# Patient Record
Sex: MALE | Race: WHITE | NOT HISPANIC OR LATINO | Employment: FULL TIME | URBAN - METROPOLITAN AREA
[De-identification: names, ages, dates, MRNs, and addresses within clinical notes are randomized per-mention and may not be internally consistent; named-entity substitution may affect disease eponyms.]

---

## 2017-01-09 ENCOUNTER — MEDICAL CORRESPONDENCE (OUTPATIENT)
Dept: TRANSPLANT | Facility: CLINIC | Age: 22
End: 2017-01-09

## 2017-01-09 ENCOUNTER — TELEPHONE (OUTPATIENT)
Dept: TRANSPLANT | Facility: CLINIC | Age: 22
End: 2017-01-09

## 2017-01-09 NOTE — TELEPHONE ENCOUNTER
Patient left message that he has new insurance information.  Return call to patient directing him to call PFR with new information.  Patient stated understanding and states he has PFR name and contact number

## 2017-01-23 ENCOUNTER — RESULTS ONLY (OUTPATIENT)
Dept: OTHER | Facility: CLINIC | Age: 22
End: 2017-01-23

## 2017-01-27 ENCOUNTER — RESULTS ONLY (OUTPATIENT)
Dept: OTHER | Facility: CLINIC | Age: 22
End: 2017-01-27

## 2017-01-27 LAB — CROSSMATCH RESULT: NORMAL

## 2017-02-01 LAB — CROSSMATCH RESULT: NORMAL

## 2017-04-03 ENCOUNTER — RESULTS ONLY (OUTPATIENT)
Dept: OTHER | Facility: CLINIC | Age: 22
End: 2017-04-03

## 2017-04-03 DIAGNOSIS — Z76.82 AWAITING ORGAN TRANSPLANT: ICD-10-CM

## 2017-04-03 PROCEDURE — 86832 HLA CLASS I HIGH DEFIN QUAL: CPT | Performed by: INTERNAL MEDICINE

## 2017-04-03 PROCEDURE — 86833 HLA CLASS II HIGH DEFIN QUAL: CPT | Performed by: INTERNAL MEDICINE

## 2017-04-06 LAB — PRA SINGLE ANTIGEN IGG ANTIBODY: NORMAL

## 2017-04-10 ENCOUNTER — TELEPHONE (OUTPATIENT)
Dept: TRANSPLANT | Facility: CLINIC | Age: 22
End: 2017-04-10

## 2017-04-10 NOTE — TELEPHONE ENCOUNTER
Pt called to see if a date had been set for his transplant. Coordinator will speak with donor coordinator and get back to pt.

## 2017-04-11 ENCOUNTER — TELEPHONE (OUTPATIENT)
Dept: TRANSPLANT | Facility: CLINIC | Age: 22
End: 2017-04-11

## 2017-04-11 NOTE — TELEPHONE ENCOUNTER
Coordinator called pt to go over instructions for upcoming transplant on 6/8/17. Pt will have pre-op appointments and final crossmatch on 6/5/17. Reviewed general components of inpt stay and post-transplant routines, including frequent appointments here as an outpt x1-2 weeks. Dialysis notified of transplant date.  Pt verbalized understanding and had no further questions.

## 2017-04-12 ENCOUNTER — TELEPHONE (OUTPATIENT)
Dept: TRANSPLANT | Facility: CLINIC | Age: 22
End: 2017-04-12

## 2017-04-13 LAB
SA1 CELL: NORMAL
SA1 COMMENTS: NORMAL
SA1 HI RISK ABY: NORMAL
SA1 MOD RISK ABY: NORMAL
SA1 TEST METHOD: NORMAL
SA2 CELL: NORMAL
SA2 COMMENTS: NORMAL
SA2 HI RISK ABY UA: NORMAL
SA2 MOD RISK ABY: NORMAL
SA2 TEST METHOD: NORMAL

## 2017-04-19 DIAGNOSIS — Z76.82 AWAITING ORGAN TRANSPLANT: Primary | ICD-10-CM

## 2017-04-19 DIAGNOSIS — N18.6 ESRD (END STAGE RENAL DISEASE) (H): ICD-10-CM

## 2017-04-24 ENCOUNTER — RECORDS - HEALTHEAST (OUTPATIENT)
Dept: ADMINISTRATIVE | Facility: OTHER | Age: 22
End: 2017-04-24

## 2017-04-25 ENCOUNTER — HOSPITAL ENCOUNTER (OUTPATIENT)
Dept: INTERVENTIONAL RADIOLOGY/VASCULAR | Facility: HOSPITAL | Age: 22
Discharge: HOME | End: 2017-04-25
Attending: INTERNAL MEDICINE

## 2017-04-25 DIAGNOSIS — I87.8 VENOUS PRESSURE INCREASED: ICD-10-CM

## 2017-04-25 DIAGNOSIS — N19 RENAL FAILURE: ICD-10-CM

## 2017-04-25 ASSESSMENT — MIFFLIN-ST. JEOR: SCORE: 2038.59

## 2017-04-26 ENCOUNTER — COMMUNICATION - HEALTHEAST (OUTPATIENT)
Dept: INTERVENTIONAL RADIOLOGY/VASCULAR | Facility: HOSPITAL | Age: 22
End: 2017-04-26

## 2017-05-03 ENCOUNTER — TELEPHONE (OUTPATIENT)
Dept: TRANSPLANT | Facility: CLINIC | Age: 22
End: 2017-05-03

## 2017-05-03 NOTE — TELEPHONE ENCOUNTER
Pt called coordinator to state leave of absence paperwork has been emailed. Pt gave verbal permission to email. Paperwork forwarded to Juliana Mitchell RN to fill out.

## 2017-05-04 ENCOUNTER — TELEPHONE (OUTPATIENT)
Dept: TRANSPLANT | Facility: CLINIC | Age: 22
End: 2017-05-04

## 2017-05-04 NOTE — TELEPHONE ENCOUNTER
Work MEKA paperwork completed for surgery scheduled 6/7/17.  Returned to patient via email, per patient request.

## 2017-06-01 ENCOUNTER — RESULTS ONLY (OUTPATIENT)
Dept: OTHER | Facility: CLINIC | Age: 22
End: 2017-06-01

## 2017-06-01 ENCOUNTER — OFFICE VISIT (OUTPATIENT)
Dept: TRANSPLANT | Facility: CLINIC | Age: 22
End: 2017-06-01
Attending: TRANSPLANT SURGERY
Payer: COMMERCIAL

## 2017-06-01 ENCOUNTER — RECORDS - HEALTHEAST (OUTPATIENT)
Dept: ADMINISTRATIVE | Facility: OTHER | Age: 22
End: 2017-06-01

## 2017-06-01 ENCOUNTER — OFFICE VISIT (OUTPATIENT)
Dept: NEPHROLOGY | Facility: CLINIC | Age: 22
End: 2017-06-01
Attending: INTERNAL MEDICINE
Payer: COMMERCIAL

## 2017-06-01 VITALS
OXYGEN SATURATION: 98 % | WEIGHT: 222.8 LBS | SYSTOLIC BLOOD PRESSURE: 137 MMHG | HEART RATE: 88 BPM | BODY MASS INDEX: 28.59 KG/M2 | RESPIRATION RATE: 20 BRPM | DIASTOLIC BLOOD PRESSURE: 91 MMHG | HEIGHT: 74 IN | TEMPERATURE: 98 F

## 2017-06-01 DIAGNOSIS — N18.6 ESRD (END STAGE RENAL DISEASE) (H): Primary | ICD-10-CM

## 2017-06-01 DIAGNOSIS — N18.6 ESRD (END STAGE RENAL DISEASE) (H): ICD-10-CM

## 2017-06-01 DIAGNOSIS — N02.B9 IGA NEPHROPATHY: ICD-10-CM

## 2017-06-01 DIAGNOSIS — Z76.82 AWAITING ORGAN TRANSPLANT: ICD-10-CM

## 2017-06-01 DIAGNOSIS — D63.1 ANEMIA IN CHRONIC KIDNEY DISEASE: ICD-10-CM

## 2017-06-01 DIAGNOSIS — N18.9 ANEMIA IN CHRONIC KIDNEY DISEASE: ICD-10-CM

## 2017-06-01 DIAGNOSIS — I15.1 HYPERTENSION SECONDARY TO OTHER RENAL DISORDERS: ICD-10-CM

## 2017-06-01 DIAGNOSIS — Z01.818 PRE-TRANSPLANT EVALUATION FOR END STAGE RENAL DISEASE: Primary | ICD-10-CM

## 2017-06-01 DIAGNOSIS — N18.6 END STAGE KIDNEY DISEASE (H): ICD-10-CM

## 2017-06-01 LAB
ALBUMIN SERPL-MCNC: 4 G/DL (ref 3.4–5)
ALBUMIN UR-MCNC: >499 MG/DL
ALP SERPL-CCNC: 108 U/L (ref 40–150)
ALT SERPL W P-5'-P-CCNC: 28 U/L (ref 0–70)
ANION GAP SERPL CALCULATED.3IONS-SCNC: 8 MMOL/L (ref 3–14)
APPEARANCE UR: CLEAR
AST SERPL W P-5'-P-CCNC: 17 U/L (ref 0–45)
BILIRUB SERPL-MCNC: 0.6 MG/DL (ref 0.2–1.3)
BILIRUB UR QL STRIP: NEGATIVE
BUN SERPL-MCNC: 27 MG/DL (ref 7–30)
CALCIUM SERPL-MCNC: 9.4 MG/DL (ref 8.5–10.1)
CHLORIDE SERPL-SCNC: 102 MMOL/L (ref 94–109)
CO2 SERPL-SCNC: 29 MMOL/L (ref 20–32)
COLOR UR AUTO: YELLOW
CREAT SERPL-MCNC: 5.82 MG/DL (ref 0.66–1.25)
ERYTHROCYTE [DISTWIDTH] IN BLOOD BY AUTOMATED COUNT: 15 % (ref 10–15)
FERRITIN SERPL-MCNC: 310 NG/ML (ref 26–388)
GFR SERPL CREATININE-BSD FRML MDRD: 12 ML/MIN/1.7M2
GLUCOSE SERPL-MCNC: 69 MG/DL (ref 70–99)
GLUCOSE UR STRIP-MCNC: NEGATIVE MG/DL
HCT VFR BLD AUTO: 41.3 % (ref 40–53)
HGB BLD-MCNC: 13.1 G/DL (ref 13.3–17.7)
HGB UR QL STRIP: ABNORMAL
HYALINE CASTS #/AREA URNS LPF: 1 /LPF (ref 0–2)
IRON SATN MFR SERPL: 21 % (ref 15–46)
IRON SERPL-MCNC: 65 UG/DL (ref 35–180)
KETONES UR STRIP-MCNC: NEGATIVE MG/DL
LEUKOCYTE ESTERASE UR QL STRIP: NEGATIVE
MCH RBC QN AUTO: 30.1 PG (ref 26.5–33)
MCHC RBC AUTO-ENTMCNC: 31.7 G/DL (ref 31.5–36.5)
MCV RBC AUTO: 95 FL (ref 78–100)
NITRATE UR QL: NEGATIVE
PH UR STRIP: 8 PH (ref 5–7)
PLATELET # BLD AUTO: 354 10E9/L (ref 150–450)
POTASSIUM SERPL-SCNC: 5.2 MMOL/L (ref 3.4–5.3)
PROT SERPL-MCNC: 8.2 G/DL (ref 6.8–8.8)
PTH-INTACT SERPL-MCNC: 367 PG/ML (ref 12–72)
RBC # BLD AUTO: 4.35 10E12/L (ref 4.4–5.9)
RBC #/AREA URNS AUTO: 10 /HPF (ref 0–2)
SODIUM SERPL-SCNC: 139 MMOL/L (ref 133–144)
SP GR UR STRIP: 1.01 (ref 1–1.03)
SQUAMOUS #/AREA URNS AUTO: <1 /HPF (ref 0–1)
TIBC SERPL-MCNC: 304 UG/DL (ref 240–430)
URN SPEC COLLECT METH UR: ABNORMAL
UROBILINOGEN UR STRIP-MCNC: 0 MG/DL (ref 0–2)
WBC # BLD AUTO: 5.3 10E9/L (ref 4–11)
WBC #/AREA URNS AUTO: 1 /HPF (ref 0–2)

## 2017-06-01 PROCEDURE — 86825 HLA X-MATH NON-CYTOTOXIC: CPT | Performed by: INTERNAL MEDICINE

## 2017-06-01 PROCEDURE — 36415 COLL VENOUS BLD VENIPUNCTURE: CPT | Performed by: TRANSPLANT SURGERY

## 2017-06-01 PROCEDURE — 86923 COMPATIBILITY TEST ELECTRIC: CPT | Performed by: TRANSPLANT SURGERY

## 2017-06-01 PROCEDURE — 86832 HLA CLASS I HIGH DEFIN QUAL: CPT | Performed by: INTERNAL MEDICINE

## 2017-06-01 PROCEDURE — 87389 HIV-1 AG W/HIV-1&-2 AB AG IA: CPT | Performed by: TRANSPLANT SURGERY

## 2017-06-01 PROCEDURE — 86645 CMV ANTIBODY IGM: CPT | Performed by: TRANSPLANT SURGERY

## 2017-06-01 PROCEDURE — 83540 ASSAY OF IRON: CPT | Performed by: TRANSPLANT SURGERY

## 2017-06-01 PROCEDURE — 86833 HLA CLASS II HIGH DEFIN QUAL: CPT | Performed by: INTERNAL MEDICINE

## 2017-06-01 PROCEDURE — 85027 COMPLETE CBC AUTOMATED: CPT | Performed by: TRANSPLANT SURGERY

## 2017-06-01 PROCEDURE — 81001 URINALYSIS AUTO W/SCOPE: CPT | Performed by: TRANSPLANT SURGERY

## 2017-06-01 PROCEDURE — 80053 COMPREHEN METABOLIC PANEL: CPT | Performed by: TRANSPLANT SURGERY

## 2017-06-01 PROCEDURE — 82728 ASSAY OF FERRITIN: CPT | Performed by: TRANSPLANT SURGERY

## 2017-06-01 PROCEDURE — 83970 ASSAY OF PARATHORMONE: CPT | Performed by: TRANSPLANT SURGERY

## 2017-06-01 PROCEDURE — 87340 HEPATITIS B SURFACE AG IA: CPT | Performed by: TRANSPLANT SURGERY

## 2017-06-01 PROCEDURE — 86850 RBC ANTIBODY SCREEN: CPT | Performed by: TRANSPLANT SURGERY

## 2017-06-01 PROCEDURE — 86704 HEP B CORE ANTIBODY TOTAL: CPT | Performed by: TRANSPLANT SURGERY

## 2017-06-01 PROCEDURE — 82306 VITAMIN D 25 HYDROXY: CPT | Performed by: TRANSPLANT SURGERY

## 2017-06-01 PROCEDURE — 86665 EPSTEIN-BARR CAPSID VCA: CPT | Performed by: TRANSPLANT SURGERY

## 2017-06-01 PROCEDURE — 86706 HEP B SURFACE ANTIBODY: CPT | Performed by: TRANSPLANT SURGERY

## 2017-06-01 PROCEDURE — 86644 CMV ANTIBODY: CPT | Performed by: TRANSPLANT SURGERY

## 2017-06-01 PROCEDURE — 86900 BLOOD TYPING SEROLOGIC ABO: CPT | Performed by: TRANSPLANT SURGERY

## 2017-06-01 PROCEDURE — 87086 URINE CULTURE/COLONY COUNT: CPT | Performed by: TRANSPLANT SURGERY

## 2017-06-01 PROCEDURE — 83550 IRON BINDING TEST: CPT | Performed by: TRANSPLANT SURGERY

## 2017-06-01 PROCEDURE — 86901 BLOOD TYPING SEROLOGIC RH(D): CPT | Performed by: TRANSPLANT SURGERY

## 2017-06-01 PROCEDURE — 86803 HEPATITIS C AB TEST: CPT | Performed by: TRANSPLANT SURGERY

## 2017-06-01 ASSESSMENT — PAIN SCALES - GENERAL: PAINLEVEL: NO PAIN (0)

## 2017-06-01 NOTE — MR AVS SNAPSHOT
After Visit Summary   6/1/2017    Golden Stover    MRN: [id number]           Patient Information     Date Of Birth          1995        Visit Information        Provider Department      6/1/2017 2:00 PM Amelia Platt NP [facility] Solid Organ Transplant        Today's Diagnoses     ESRD (end stage renal disease) (H)    -  1       Follow-ups after your visit        Your next 10 appointments already scheduled     Jun 07, 2017   Procedure with Martell Robert MD   [facility], [place], Same Day Surgery (--)    [address] [facility] [address]   [phone]            Jun 29, 2017  9:00 AM CDT   (Arrive by 8:45 AM)   Kidney Post Op with Martell Robert MD   [facility] Solid Organ Transplant ([facility])    [address] [place] [id number] [phone]            Jul 11, 2017  1:05 PM CDT   (Arrive by 12:35 PM)   Return Kidney Transplant with Jak King MD   [facility] Nephrology ([facility])    [address] [place] [id number] [phone]            Sep 11, 2017  1:05 PM CDT   (Arrive by 12:35 PM)   Return Kidney Transplant with Jak King MD   [facility] Nephrology ([facility])    [address] [place] [id number] [phone]              Who to contact     If you have questions or need follow up information about today's clinic visit or your schedule please contact [facility] SOLID ORGAN TRANSPLANT directly at [phone].  Normal or non-critical lab and imaging results will be communicated to you by MyChart, letter or phone within 4 business days after the clinic has received the results. If you do not hear from us within 7 days, please contact the clinic through MyChart or phone. If you have a critical or abnormal lab result, we will notify you by phone as soon as possible.  Submit refill requests through  Locally or call your pharmacy and they will forward the refill request to us. Please allow 3 business days for your refill to be completed.          Additional Information About Your Visit        Local [facility] Information     Locally gives you secure access to your electronic health record. If you see a primary care provider, you can also send messages to your care team and make appointments. If you have questions, please call your primary care clinic.  If you do not have a primary care provider, please call [phone] and they will assist you.        Care EveryWhere ID     This is your Care EveryWhere ID. This could be used by other organizations to access your [facility] medical records  [id number]         Blood Pressure from Last 3 Encounters:   06/01/17 (!) 137/91   12/07/16 (!) 147/93    Weight from Last 3 Encounters:   06/01/17 101.1 kg (222 lb 12.8 oz)   12/07/16 104.5 kg (230 lb 4.8 oz)              Today, you had the following     No orders found for display         Today's Medication Changes          These changes are accurate as of: 6/1/17 11:59 PM.  If you have any questions, ask your nurse or doctor.               Stop taking these medicines if you haven't already. Please contact your care team if you have questions.     RENVELA 800 MG tablet   Generic drug:  sevelamer   Stopped by:  Nurse, [facility]                    Primary Care Provider    None Specified       No primary provider on file.        Thank you!     Thank you for choosing [facility] SOLID ORGAN TRANSPLANT  for your care. Our goal is always to provide you with excellent care. Hearing back from our patients is one way we can continue to improve our services. Please take a few minutes to complete the written survey that you may receive in the mail after your visit with us. Thank you!             Your Updated Medication List - Protect others around you: Learn how to safely use, store and throw away your medicines at www.disposemymeds.org.           This list is accurate as of: 6/1/17 11:59 PM.  Always use your most recent med list.                   Brand Name Dispense Instructions for use    calcium acetate 667 MG Caps capsule    PHOSLO     Take 2,001 mg by mouth 3 times daily (with meals)       lisinopril 10 MG tablet    PRINIVIL/ZESTRIL     10 mg daily       NIFEdipine ER 90 MG Tb24    ADALAT CC     90 mg daily       SUPER B COMPLEX/C PO      1 tablet daily       Vitamin D3 400 UNITS Caps      1 capsule daily       VITAMIN E MTC PO      Take 400 Units by mouth daily

## 2017-06-01 NOTE — LETTER
6/1/2017       RE: Golden Stover  [address] [place] [id number]     Dear Colleague,    Thank you for referring your patient, Golden Stover, to the [facility] SOLID ORGAN TRANSPLANT at [facility]. Please see a copy of my visit note below.    Transplant Surgery H&P:                           HPI:      Mr. Stover is a 21 year old  Male who comes to clinic today for preop prior to planned living donor kidney transplantation. The patient was previously reviewed by the multidisciplinary selection committee and found to be medically and psychosocially appropriate for kidney transplantation. Mr. Stover has End stage renal failure due to IgA Nephropathy.     The patient is on dialysis.      If on dialysis, modality: chest port  Dialysis days: Tuesday, Thursday, Saturday    Chronic anticoagulation  [x]      [] Indication:   Recurrent infections  [x]      []  Type:                  Bladder dysfunction  [x]      [] Cause:  Claudication   [x]      [] Distance:    Previous Amputation  [x]      [] Cause:       Health events since transplant evaluation: None    Special considerations:  PONV: no  Hinduism: No    MEDICAL HISTORY:      Patient Active Problem List    Diagnosis Date Noted     Secondary renal hyperparathyroidism (H)      Priority: Medium     IgA nephropathy 11/21/2016     Priority: Medium     Hypertension 11/21/2016     Priority: Medium     Anemia in chronic kidney disease 11/21/2016     Priority: Medium     End stage kidney disease (H) 11/21/2016     Priority: Medium      Past Medical History:   Diagnosis Date     Anemia in chronic kidney disease 11/21/2016     End stage kidney disease (H) 11/21/2016     Hypertension 11/21/2016     IgA nephropathy 11/21/2016     Rhabdomyolysis 09/28/2016     Secondary renal hyperparathyroidism (H)      Past Surgical History:   Procedure Laterality Date     BIOPSY KIDNEY  2016     Current Outpatient Prescriptions    Medication Sig Dispense Refill     lisinopril (PRINIVIL/ZESTRIL) 10 MG tablet 10 mg daily        NIFEdipine ER (ADALAT CC) 90 MG TB24 90 mg daily        sevelamer (RENVELA) 800 MG tablet 800 mg 3 times daily (with meals) Take 1 tablet with snacks.       SUPER B COMPLEX/C PO 1 tablet daily        Cholecalciferol (VITAMIN D3) 400 UNITS CAPS 1 capsule daily        OTC products: None, except as noted above  No Known Allergies   Social History   Substance Use Topics     Smoking status: Never Smoker     Smokeless tobacco: Not on file     Alcohol use 0.6 oz/week     1 Standard drinks or equivalent per week     OR  Social History     Social History     Marital status: Single     Spouse name: N/A     Number of children: N/A     Years of education: N/A     Occupational History     Not on file.     Social History Main Topics     Smoking status: Never Smoker     Smokeless tobacco: Not on file     Alcohol use 0.6 oz/week     1 Standard drinks or equivalent per week     Drug use: No     Sexual activity: Not on file     Other Topics Concern     Not on file     Social History Narrative     History   Drug Use No     Family History   Problem Relation Age of Onset     CANCER Paternal Grandfather      CANCER Maternal Grandfather        REVIEW OF SYSTEMS:        C: NEGATIVE for fever, chills, change in weight  I: NEGATIVE for worrisome rashes, moles or lesions  E: NEGATIVE for vision changes or irritation  E/M: NEGATIVE for ear, mouth and throat problems  R: NEGATIVE for significant cough or SOB  B: NEGATIVE for masses, tenderness or discharge  CV: NEGATIVE for chest pain, palpitations or peripheral edema  GI: NEGATIVE for nausea, abdominal pain, heartburn, or change in bowel habits  : NEGATIVE for frequency, dysuria, or hematuria  M: NEGATIVE for significant arthralgias or myalgia  NEURO: POSITIVE for numbness or tingling bilateral lower extremities and NEGATIVE for HX headaches-migraine, HX headaches-musculoskelatal, HX CVA and HX  TIA  E: NEGATIVE for temperature intolerance, skin/hair changes  H: NEGATIVE for bleeding problems  P: NEGATIVE for changes in mood or affect    EXAM:                            GENERAL APPEARANCE: healthy, alert and no distress     EYES: EOMI,  PERRL     HENT: ear canals and TM's normal and nose and mouth without ulcers or lesions     NECK: no adenopathy, no asymmetry, masses, or scars and thyroid normal to palpation     RESP: lungs clear to auscultation - no rales, rhonchi or wheezes     CV: regular rates and rhythm, normal S1 S2, no S3 or S4 and no murmur, click or rub     ABDOMEN:  soft, nontender, no HSM or masses and bowel sounds normal     MS: extremities normal- no gross deformities noted, no evidence of inflammation in joints, FROM in all extremities.     SKIN: no suspicious lesions or rashes     NEURO: Normal strength and tone, sensory exam grossly normal, mentation intact and speech normal     PSYCH: mentation appears normal. and affect normal/bright     LYMPHATICS: No axillary, cervical, or supraclavicular nodes        DIAGNOSTICS:              No results found for this or any previous visit (from the past 672 hour(s)).  [facility] cPRA   Date Value Ref Range Status   12/13/2016 60  Final     OR   EKG: appears normal, NSR, normal axis, normal intervals, no acute ST/T changes c/w ischemia, no LVH by voltage criteria, unchanged from previous tracings  Chest XRay  Labs Resulted Today:   Results for orders placed or performed in visit on 06/01/17   CBC with platelets   Result Value Ref Range    WBC 5.3 4.0 - 11.0 10e9/L    RBC Count 4.35 (L) 4.4 - 5.9 10e12/L    Hemoglobin 13.1 (L) 13.3 - 17.7 g/dL    Hematocrit 41.3 40.0 - 53.0 %    MCV 95 78 - 100 fl    MCH 30.1 26.5 - 33.0 pg    MCHC 31.7 31.5 - 36.5 g/dL    RDW 15.0 10.0 - 15.0 %    Platelet Count 354 150 - 450 10e9/L   Comprehensive metabolic panel   Result Value Ref Range    Sodium 139 133 - 144 mmol/L    Potassium 5.2 3.4 - 5.3 mmol/L    Chloride 102 94 -  109 mmol/L    Carbon Dioxide 29 20 - 32 mmol/L    Anion Gap 8 3 - 14 mmol/L    Glucose 69 (L) 70 - 99 mg/dL    Urea Nitrogen 27 7 - 30 mg/dL    Creatinine 5.82 (H) 0.66 - 1.25 mg/dL    GFR Estimate 12 (L) >60 mL/min/1.7m2    GFR Estimate If Black 15 (L) >60 mL/min/1.7m2    Calcium 9.4 8.5 - 10.1 mg/dL    Bilirubin Total 0.6 0.2 - 1.3 mg/dL    Albumin 4.0 3.4 - 5.0 g/dL    Protein Total 8.2 6.8 - 8.8 g/dL    Alkaline Phosphatase 108 40 - 150 U/L    ALT 28 0 - 70 U/L    AST 17 0 - 45 U/L   Routine UA with microscopic   Result Value Ref Range    Color Urine Yellow     Appearance Urine Clear     Glucose Urine Negative NEG mg/dL    Bilirubin Urine Negative NEG    Ketones Urine Negative NEG mg/dL    Specific Gravity Urine 1.007 1.003 - 1.035    Blood Urine Small (A) NEG    pH Urine 8.0 (H) 5.0 - 7.0 pH    Protein Albumin Urine >499 (A) NEG mg/dL    Urobilinogen mg/dL 0.0 0.0 - 2.0 mg/dL    Nitrite Urine Negative NEG    Leukocyte Esterase Urine Negative NEG    Source Midstream Urine     WBC Urine 1 0 - 2 /HPF    RBC Urine 10 (H) 0 - 2 /HPF    Squamous Epithelial /HPF Urine <1 0 - 1 /HPF    Hyaline Casts 1 0 - 2 /LPF   Iron and iron binding capacity   Result Value Ref Range    Iron 65 35 - 180 ug/dL    Iron Binding Cap 304 240 - 430 ug/dL    Iron Saturation Index 21 15 - 46 %   Ferritin   Result Value Ref Range    Ferritin 310 26 - 388 ng/mL   ABO/Rh type and screen   Result Value Ref Range    ABO Pending     RH(D) Pending     Antibody Screen Pending     Test Valid Only At       [facility]    Specimen Expires 06/04/2017    Urine Culture Aerobic Bacterial   Result Value Ref Range    Specimen Description Midstream Urine     Special Requests Specimen received in preservative     Culture Micro Pending     Micro Report Status Pending      Labs Drawn and in Process:   Unresulted Labs Ordered in the Past 30 Days of this Admission     Date and Time Order Name Status Description     6/1/2017 1239 VITAMIN D DEFICIENCY SCREENING In process     6/1/2017 1239 PARATHYROID HORMONE INTACT In process     6/1/2017 1239 URINE CULTURE AEROBIC BACTERIAL Preliminary     6/1/2017 1239 ABO/RH TYPE AND SCREEN In process     6/1/2017 1239 HLA FINAL CROSSMATCH RECIPIENT In process     6/1/2017 1239 HEPATITIS C ANTIBODY In process     6/1/2017 1239 HEPATITIS B CORE ANTIBODY In process     6/1/2017 1239 HEPATITIS B SURFACE ANTIGEN In process     6/1/2017 1239 HEPATITIS B SURFACE ANTIBODY In process     6/1/2017 1239 HIV ANTIGEN ANTIBODY COMBO In process     6/1/2017 1239 EBV CAPSID ANTIBODY IGM In process     6/1/2017 1239 EBV CAPSID ANTIBODY IGG In process     6/1/2017 1239 CMV ANTIBODY IGM In process     6/1/2017 1239 CMV ANTIBODY IGG In process         Recent Labs   Lab Test  12/07/16   0637   HGB  12.4*   PLT  434   INR  0.89   NA  139   POTASSIUM  4.1   CR  6.21*     ASSESSMENT:                 Mr. Stover is a 21 year old male who is appropriate for elective living donor kidney transplantation with the following pertinent issues:    1. Labs reviewed. Findings requiring additional evaluation before surgery: No  2. EKG (6/01/2017): appears normal, NSR  3. ECHO NA  4. ABO= O  5. Paired Exchange case: No  6. Outstanding issues: ABO, cross match , xray    PLAN:                 1. Consent: Done  2. Outstanding issues: ABO, cross match, x-ray    Signed Electronically by: Amelia Platt CNP       Again, thank you for allowing me to participate in the care of your patient.      Sincerely,    Martell Robert MD

## 2017-06-01 NOTE — LETTER
6/1/2017       RE: Golden Stover  [address] [place] [id number]     Dear Colleague,    Thank you for referring your patient, Golden Stover, to the [facility] SOLID ORGAN TRANSPLANT at [facility]. Please see a copy of my visit note below.    See surgeon note     Again, thank you for allowing me to participate in the care of your patient.      Sincerely,    Amelia Platt NP

## 2017-06-01 NOTE — PROGRESS NOTES
Transplant Surgery H&P:                           HPI:      Mr. Stover is a 21 year old  Male who comes to clinic today for preop prior to planned living donor kidney transplantation. The patient was previously reviewed by the multidisciplinary selection committee and found to be medically and psychosocially appropriate for kidney transplantation. Mr. Stover has End stage renal failure due to IgA Nephropathy.     The patient is on dialysis.      If on dialysis, modality: chest port  Dialysis days: Tuesday, Thursday, Saturday    Chronic anticoagulation  [x]      [] Indication:   Recurrent infections  [x]      []  Type:                  Bladder dysfunction  [x]      [] Cause:  Claudication   [x]      [] Distance:    Previous Amputation  [x]      [] Cause:       Health events since transplant evaluation: None    Special considerations:  PONV: no  Mosque: No    MEDICAL HISTORY:      Patient Active Problem List    Diagnosis Date Noted     Secondary renal hyperparathyroidism (H)      Priority: Medium     IgA nephropathy 11/21/2016     Priority: Medium     Hypertension 11/21/2016     Priority: Medium     Anemia in chronic kidney disease 11/21/2016     Priority: Medium     End stage kidney disease (H) 11/21/2016     Priority: Medium      Past Medical History:   Diagnosis Date     Anemia in chronic kidney disease 11/21/2016     End stage kidney disease (H) 11/21/2016     Hypertension 11/21/2016     IgA nephropathy 11/21/2016     Rhabdomyolysis 09/28/2016     Secondary renal hyperparathyroidism (H)      Past Surgical History:   Procedure Laterality Date     BIOPSY KIDNEY  2016     Current Outpatient Prescriptions   Medication Sig Dispense Refill     lisinopril (PRINIVIL/ZESTRIL) 10 MG tablet 10 mg daily        NIFEdipine ER (ADALAT CC) 90 MG TB24 90 mg daily        sevelamer (RENVELA) 800 MG tablet 800 mg 3 times daily (with meals) Take 1 tablet with snacks.       SUPER B COMPLEX/C PO 1 tablet daily         Cholecalciferol (VITAMIN D3) 400 UNITS CAPS 1 capsule daily        OTC products: None, except as noted above  No Known Allergies   Social History   Substance Use Topics     Smoking status: Never Smoker     Smokeless tobacco: Not on file     Alcohol use 0.6 oz/week     1 Standard drinks or equivalent per week     OR  Social History     Social History     Marital status: Single     Spouse name: N/A     Number of children: N/A     Years of education: N/A     Occupational History     Not on file.     Social History Main Topics     Smoking status: Never Smoker     Smokeless tobacco: Not on file     Alcohol use 0.6 oz/week     1 Standard drinks or equivalent per week     Drug use: No     Sexual activity: Not on file     Other Topics Concern     Not on file     Social History Narrative     History   Drug Use No     Family History   Problem Relation Age of Onset     CANCER Paternal Grandfather      CANCER Maternal Grandfather        REVIEW OF SYSTEMS:        C: NEGATIVE for fever, chills, change in weight  I: NEGATIVE for worrisome rashes, moles or lesions  E: NEGATIVE for vision changes or irritation  E/M: NEGATIVE for ear, mouth and throat problems  R: NEGATIVE for significant cough or SOB  B: NEGATIVE for masses, tenderness or discharge  CV: NEGATIVE for chest pain, palpitations or peripheral edema  GI: NEGATIVE for nausea, abdominal pain, heartburn, or change in bowel habits  : NEGATIVE for frequency, dysuria, or hematuria  M: NEGATIVE for significant arthralgias or myalgia  NEURO: POSITIVE for numbness or tingling bilateral lower extremities and NEGATIVE for HX headaches-migraine, HX headaches-musculoskelatal, HX CVA and HX TIA  E: NEGATIVE for temperature intolerance, skin/hair changes  H: NEGATIVE for bleeding problems  P: NEGATIVE for changes in mood or affect    EXAM:                            GENERAL APPEARANCE: healthy, alert and no distress     EYES: EOMI,  PERRL     HENT: ear canals and TM's normal and nose  and mouth without ulcers or lesions     NECK: no adenopathy, no asymmetry, masses, or scars and thyroid normal to palpation     RESP: lungs clear to auscultation - no rales, rhonchi or wheezes     CV: regular rates and rhythm, normal S1 S2, no S3 or S4 and no murmur, click or rub     ABDOMEN:  soft, nontender, no HSM or masses and bowel sounds normal     MS: extremities normal- no gross deformities noted, no evidence of inflammation in joints, FROM in all extremities.     SKIN: no suspicious lesions or rashes     NEURO: Normal strength and tone, sensory exam grossly normal, mentation intact and speech normal     PSYCH: mentation appears normal. and affect normal/bright     LYMPHATICS: No axillary, cervical, or supraclavicular nodes        DIAGNOSTICS:              No results found for this or any previous visit (from the past 672 hour(s)).  [facility] cPRA   Date Value Ref Range Status   12/13/2016 60  Final     OR   EKG: appears normal, NSR, normal axis, normal intervals, no acute ST/T changes c/w ischemia, no LVH by voltage criteria, unchanged from previous tracings  Chest XRay  Labs Resulted Today:   Results for orders placed or performed in visit on 06/01/17   CBC with platelets   Result Value Ref Range    WBC 5.3 4.0 - 11.0 10e9/L    RBC Count 4.35 (L) 4.4 - 5.9 10e12/L    Hemoglobin 13.1 (L) 13.3 - 17.7 g/dL    Hematocrit 41.3 40.0 - 53.0 %    MCV 95 78 - 100 fl    MCH 30.1 26.5 - 33.0 pg    MCHC 31.7 31.5 - 36.5 g/dL    RDW 15.0 10.0 - 15.0 %    Platelet Count 354 150 - 450 10e9/L   Comprehensive metabolic panel   Result Value Ref Range    Sodium 139 133 - 144 mmol/L    Potassium 5.2 3.4 - 5.3 mmol/L    Chloride 102 94 - 109 mmol/L    Carbon Dioxide 29 20 - 32 mmol/L    Anion Gap 8 3 - 14 mmol/L    Glucose 69 (L) 70 - 99 mg/dL    Urea Nitrogen 27 7 - 30 mg/dL    Creatinine 5.82 (H) 0.66 - 1.25 mg/dL    GFR Estimate 12 (L) >60 mL/min/1.7m2    GFR Estimate If Black 15 (L) >60 mL/min/1.7m2    Calcium 9.4 8.5 - 10.1  mg/dL    Bilirubin Total 0.6 0.2 - 1.3 mg/dL    Albumin 4.0 3.4 - 5.0 g/dL    Protein Total 8.2 6.8 - 8.8 g/dL    Alkaline Phosphatase 108 40 - 150 U/L    ALT 28 0 - 70 U/L    AST 17 0 - 45 U/L   Routine UA with microscopic   Result Value Ref Range    Color Urine Yellow     Appearance Urine Clear     Glucose Urine Negative NEG mg/dL    Bilirubin Urine Negative NEG    Ketones Urine Negative NEG mg/dL    Specific Gravity Urine 1.007 1.003 - 1.035    Blood Urine Small (A) NEG    pH Urine 8.0 (H) 5.0 - 7.0 pH    Protein Albumin Urine >499 (A) NEG mg/dL    Urobilinogen mg/dL 0.0 0.0 - 2.0 mg/dL    Nitrite Urine Negative NEG    Leukocyte Esterase Urine Negative NEG    Source Midstream Urine     WBC Urine 1 0 - 2 /HPF    RBC Urine 10 (H) 0 - 2 /HPF    Squamous Epithelial /HPF Urine <1 0 - 1 /HPF    Hyaline Casts 1 0 - 2 /LPF   Iron and iron binding capacity   Result Value Ref Range    Iron 65 35 - 180 ug/dL    Iron Binding Cap 304 240 - 430 ug/dL    Iron Saturation Index 21 15 - 46 %   Ferritin   Result Value Ref Range    Ferritin 310 26 - 388 ng/mL   ABO/Rh type and screen   Result Value Ref Range    ABO Pending     RH(D) Pending     Antibody Screen Pending     Test Valid Only At       [facility]    Specimen Expires 06/04/2017    Urine Culture Aerobic Bacterial   Result Value Ref Range    Specimen Description Midstream Urine     Special Requests Specimen received in preservative     Culture Micro Pending     Micro Report Status Pending      Labs Drawn and in Process:   Unresulted Labs Ordered in the Past 30 Days of this Admission     Date and Time Order Name Status Description    6/1/2017 1239 VITAMIN D DEFICIENCY SCREENING In process     6/1/2017 1239 PARATHYROID HORMONE INTACT In process     6/1/2017 1239 URINE CULTURE AEROBIC BACTERIAL Preliminary     6/1/2017 1239 ABO/RH TYPE AND SCREEN In process     6/1/2017 1239 HLA FINAL CROSSMATCH RECIPIENT In process     6/1/2017  1239 HEPATITIS C ANTIBODY In process     6/1/2017 1239 HEPATITIS B CORE ANTIBODY In process     6/1/2017 1239 HEPATITIS B SURFACE ANTIGEN In process     6/1/2017 1239 HEPATITIS B SURFACE ANTIBODY In process     6/1/2017 1239 HIV ANTIGEN ANTIBODY COMBO In process     6/1/2017 1239 EBV CAPSID ANTIBODY IGM In process     6/1/2017 1239 EBV CAPSID ANTIBODY IGG In process     6/1/2017 1239 CMV ANTIBODY IGM In process     6/1/2017 1239 CMV ANTIBODY IGG In process         Recent Labs   Lab Test  12/07/16   0637   HGB  12.4*   PLT  434   INR  0.89   NA  139   POTASSIUM  4.1   CR  6.21*     ASSESSMENT:                 Mr. Stover is a 21 year old male who is appropriate for elective living donor kidney transplantation with the following pertinent issues:    1. Labs reviewed. Findings requiring additional evaluation before surgery: No  2. EKG (6/01/2017): appears normal, NSR  3. ECHO NA  4. ABO= O  5. Paired Exchange case: No  6. Outstanding issues: ABO, cross match , xray    PLAN:                 1. Consent: Done  2. Outstanding issues: ABO, cross match, x-ray    Signed Electronically by: Amelia Platt, CNP

## 2017-06-01 NOTE — MR AVS SNAPSHOT
After Visit Summary   6/1/2017    Golden Stover    MRN: [id number]           Patient Information     Date Of Birth          1995        Visit Information        Provider Department      6/1/2017 1:30 PM Nurse, [facility] Solid Organ Transplant        Today's Diagnoses     ESRD (end stage renal disease) (H)    -  1    IgA nephropathy           Follow-ups after your visit        Your next 10 appointments already scheduled     Jun 07, 2017   Procedure with Martell Robert MD   [facility], [place], Same Day Surgery (--)    [address] [facility] [address]   [phone]            Jun 29, 2017  9:00 AM CDT   (Arrive by 8:45 AM)   Kidney Post Op with Martell Robert MD   [facility] Solid Organ Transplant ([facility])    [address] [place] [id number] [phone]            Jul 11, 2017  1:05 PM CDT   (Arrive by 12:35 PM)   Return Kidney Transplant with Jak King MD   [facility] Nephrology ([facility])    [address] [place] [id number] [phone]            Sep 11, 2017  1:05 PM CDT   (Arrive by 12:35 PM)   Return Kidney Transplant with Jak King MD   [facility] Nephrology ([facility])    [address] [place] [id number] [phone]              Who to contact     If you have questions or need follow up information about today's clinic visit or your schedule please contact [facility] SOLID ORGAN TRANSPLANT directly at [phone].  Normal or non-critical lab and imaging results will be communicated to you by MyChart, letter or phone within 4 business days after the clinic has received the results. If you do not hear from us within 7 days, please contact the clinic through MyChart or phone. If you have a critical or abnormal lab result, we will notify you by phone as soon as possible.  Submit refill  "requests through [employer] or call your pharmacy and they will forward the refill request to us. Please allow 3 business days for your refill to be completed.          Additional Information About Your Visit        [employer] Information     [employer] gives you secure access to your electronic health record. If you see a primary care provider, you can also send messages to your care team and make appointments. If you have questions, please call your primary care clinic.  If you do not have a primary care provider, please call [phone] and they will assist you.        Care EveryWhere ID     This is your Care EveryWhere ID. This could be used by other organizations to access your [facility] medical records  [id number]        Your Vitals Were     Pulse Temperature Respirations Height Pulse Oximetry BMI (Body Mass Index)    88 98  F (36.7  C) (Oral) 20 1.88 m (6' 2\") 98% 28.61 kg/m2       Blood Pressure from Last 3 Encounters:   06/01/17 (!) 137/91   12/07/16 (!) 147/93    Weight from Last 3 Encounters:   06/01/17 101.1 kg (222 lb 12.8 oz)   12/07/16 104.5 kg (230 lb 4.8 oz)              Today, you had the following     No orders found for display         Today's Medication Changes          These changes are accurate as of: 6/1/17 11:59 PM.  If you have any questions, ask your nurse or doctor.               Stop taking these medicines if you haven't already. Please contact your care team if you have questions.     RENVELA 800 MG tablet   Generic drug:  sevelamer   Stopped by:  Nurse, [facility]                    Primary Care Provider    None Specified       No primary provider on file.        Thank you!     Thank you for choosing [facility] SOLID ORGAN TRANSPLANT  for your care. Our goal is always to provide you with excellent care. Hearing back from our patients is one way we can continue to improve our services. Please take a few minutes to complete the written survey that you may receive in the mail after your visit with us. " Thank you!             Your Updated Medication List - Protect others around you: Learn how to safely use, store and throw away your medicines at www.disposemymeds.org.          This list is accurate as of: 6/1/17 11:59 PM.  Always use your most recent med list.                   Brand Name Dispense Instructions for use    calcium acetate 667 MG Caps capsule    PHOSLO     Take 2,001 mg by mouth 3 times daily (with meals)       lisinopril 10 MG tablet    PRINIVIL/ZESTRIL     10 mg daily       NIFEdipine ER 90 MG Tb24    ADALAT CC     90 mg daily       SUPER B COMPLEX/C PO      1 tablet daily       Vitamin D3 400 UNITS Caps      1 capsule daily       VITAMIN E MTC PO      Take 400 Units by mouth daily

## 2017-06-01 NOTE — PATIENT INSTRUCTIONS
1. Take all of your medications the day before your surgery  2. Don't take any of your medications the day of surgery  3. Do not eat or drink anything starting at midnight on the day of your surgery

## 2017-06-01 NOTE — MR AVS SNAPSHOT
After Visit Summary   6/1/2017    Golden Stover    MRN: [id number]           Patient Information     Date Of Birth          1995        Visit Information        Provider Department      6/1/2017 3:00 PM Martell Robert MD [facility] Solid Organ Transplant        Today's Diagnoses     Pre-transplant evaluation for end stage renal disease    -  1    IgA nephropathy           Follow-ups after your visit        Your next 10 appointments already scheduled     Jun 29, 2017  9:00 AM CDT   (Arrive by 8:45 AM)   Kidney Post Op with Martell Robert MD   [facility] Solid Organ Transplant ([facility])    [address] [place] [id number]   [phone]            Jul 11, 2017  1:05 PM CDT   (Arrive by 12:35 PM)   Return Kidney Transplant with Jak King MD   [facility] Nephrology ([facility])    [address] [place] [id number]   [phone]            Sep 11, 2017  1:05 PM CDT   (Arrive by 12:35 PM)   Return Kidney Transplant with Jak King MD   [facility] Nephrology ([facility])    [address] [place] [id number]   [phone]              Who to contact     If you have questions or need follow up information about today's clinic visit or your schedule please contact [facility] SOL[facility] ORGAN TRANSPL[facility] directly at [phone].  Normal or non-critical lab and imaging results will be communicated to you by MyChart, letter or phone within 4 business days after the clinic has received the results. If you do not hear from us within 7 days, please contact the clinic through MyChart or phone. If you have a critical or abnormal lab result, we will notify you by phone as soon as possible.  Submit refill requests through [employer] or call your pharmacy and they will forward the refill request to us. Please allow 3 business days for your  refill to be completed.          Additional Information About Your Visit        [employer] Information     [employer] gives you secure access to your electronic health record. If you see a primary care provider, you can also send messages to your care team and make appointments. If you have questions, please call your primary care clinic.  If you do not have a primary care provider, please call [phone] and they will assist you.        Care EveryWhere ID     This is your Care EveryWhere ID. This could be used by other organizations to access your [facility] medical records  [id number]         Blood Pressure from Last 3 Encounters:   06/13/17 155/82   06/12/17 (!) 147/109   06/01/17 (!) 137/91    Weight from Last 3 Encounters:   06/14/17 97.5 kg (214 lb 14.4 oz)   06/13/17 98.5 kg (217 lb 3.2 oz)   06/12/17 99 kg (218 lb 3.2 oz)              Today, you had the following     No orders found for display         Today's Medication Changes          These changes are accurate as of: 6/1/17 11:59 PM.  If you have any questions, ask your nurse or doctor.               Stop taking these medicines if you haven't already. Please contact your care team if you have questions.     RENVELA 800 MG tablet   Generic drug:  sevelamer   Stopped by:  Nurse, [facility]                    Primary Care Provider    None Specified       No address on file        Thank you!     Thank you for choosing [facility] SOLID ORGAN TRANSPLANT  for your care. Our goal is always to provide you with excellent care. Hearing back from our patients is one way we can continue to improve our services. Please take a few minutes to complete the written survey that you may receive in the mail after your visit with us. Thank you!             Your Updated Medication List - Protect others around you: Learn how to safely use, store and throw away your medicines at www.disposemymeds.org.          This list is accurate as of: 6/1/17 11:59 PM.  Always use your most recent med  list.                   Brand Name Dispense Instructions for use    acetaminophen 325 MG tablet    TYLENOL    100 tablet    Take 2 tablets (650 mg) by mouth every 6 hours as needed for mild pain       calcitRIOL 0.25 MCG capsule    ROCALTROL    30 capsule    Take 1 capsule (0.25 mcg) by mouth daily       dapsone 100 MG tablet     30 tablet    Take 1 tablet (100 mg) by mouth daily       docusate sodium 100 MG tablet    COLACE    60 tablet    Take 100 mg by mouth daily       hydrOXYzine 25 MG capsule    VISTARIL    40 capsule    Take 1 capsule (25 mg) by mouth 3 times daily as needed for itching       mycophenolate 250 MG capsule    CELLCEPT - GENERIC EQUIVALENT    180 capsule    Take 3 capsules (750 mg) by mouth 2 times daily       oxyCODONE 5 MG IR tablet    ROXICODONE    40 tablet    Take 1-2 tablets (5-10 mg) by mouth every 4 hours as needed for moderate to severe pain       phosphorus tablet 250 mg 250 MG per tablet    K PHOS NEUTRAL    120 tablet    Take 2 tablets (500 mg) by mouth 2 times daily       valGANciclovir 450 MG tablet    VALCYTE    60 tablet    Take 2 tablets (900 mg) by mouth daily

## 2017-06-01 NOTE — PROGRESS NOTES
Pre Op Teaching Flowsheet       Pre and Post op Patient Education  Relevant Diagnosis:  Day -6 kidney recipient  Teaching Topic:  Pre and post op teaching  Person Involved in teaching:  Golden Stover     Motivation Level:  Asks Questions: Yes  Eager to Learn:  Yes  Cooperative: Yes  Receptive (willing/able to accept information):  Yes  Patient demonstrates understanding of the following:  Date and time of surgery:  6/07/2017     Location of surgery:  [address] [place] [address]  History and Physical and any other testing necessary prior to surgery: Yes  Required time line for completion of History and Physical and any pre-op testing: Yes    NPO Guidelines: NPO after midnight    Patient demonstrates understanding of the following:  Pre-op bowel prep:  N/A  Pre-op showering/scrub information with PCMX Soap: N/A  Medications to take the day of surgery:  Per PCP  Blood thinner medications discussed and when to stop (if applicable):  N/A  Diabetes medication management (if applicable):  N/A  Discussed pain control after surgery: pain scale and pain medications  Infection Prevention: Patient demonstrates understanding of the following:  Patient instructed on hand hygiene:  Yes  Surgical procedure site care taught: at time of discharge  Signs and symptoms of infection taught:  Yes  Wound care will be taught at the time of discharge.  Central venous catheter care will be taught at the time of discharge (if applicable).    Post-op follow-up:  Discussed how to contact the hospital, nurse, and clinic scheduling staff if necessary.    Instructional materials used/given/mailed:  [facility] Booklet, post op teaching sheet, Map, Soap, and arrival/location information.    Surgical instructions given to patient yes.    Patient attended all appointments and completed all scheduled tests.  Patient to follow up with Transplant Coordinator.  Patient stated understanding and has contact numbers.

## 2017-06-01 NOTE — LETTER
6/1/2017       RE: Golden Stover  [address]  [place] [facility] [place] [address]     Dear Colleague,    Thank you for referring your patient, Golden Stover, to the [facility] NEPHROLOGY at [facility]. Please see a copy of my visit note below.    Nephrology Follow-Up Visit    Patient Name: Golden Stover  Patient YOB: 1995  Date of Service: June 1, 2017  Patient's PCP: No primary care provider on file.    Assessment and Plan:   1. ESRD with planned LDKT: excellent candidate, optimized for surgery next week. He will take all medications the day before surgery and will hold all medications (including his Nifedipine and lisinopril) the day of surgery. He will have dialysis the day before surgery to his dry weight.  2. Cardiac assessment: he is young, otherwise healthy, has good exercise tolerance without any anginal or angina-like symptoms, and he has normal resting ECG. He is low risk for any cardiac complications surrounding surgery and does not need stress testing.  3. Hx of IgA Nephropathy: presented with end stage and thus not treated, has microscopic hematuria and proteinuria and still makes urine. There is a chance of recurrence after transplant and he was informed of this.  4. HTN: well controlled, euvolemic on exam, on two BP meds that will be held on day of surgery    Assessment and plan was discussed with patient and he voiced his understanding and agreement.    Patient staffed with Dr. King.     Tony Alicea  Nephrology Fellow  Pager: [phone]    Attestation:  This patient has been seen and evaluated by me, Jak King MD.  I have reviewed the note and agree with plan of care as documented by the fellow.       Reason for Visit:  Golden Stover is a 21 year old male with ESRD on HD who is here for evaluation prior to kidney transplant on June 7th, 2017 (from his father)    HPI:  Mr. Stover is a pleasant 21yM that was diagnosed with  high BP in July of 2016, then later in summer had labs that showed Cr of near 10 and had kidney biopsy that showed IgA nephropathy but scarred kidney. He was started on dialysis 9/2016 and has been on hemodialysis via CVC since then. He is currently dialyzing T/Th/S in [place], [place]. He has had no problem on dialysis. His BP is 130's systolic before and after dialysis. He still makes urine and does not have large fluctuations in weight. He has had no recent infections and describes no fever, chills, sweats, urinary symptoms, cough, or diarrhea.    ROS:  A comprehensive review of systems was obtained and negative, except as noted in the HPI or PMH.    Active Medical Problems:  Patient Active Problem List   Diagnosis     IgA nephropathy     Hypertension     Anemia in chronic kidney disease     End stage kidney disease (H)     Secondary renal hyperparathyroidism (H)       Personal Hx:   Social History     Social History     Marital status: Single     Spouse name: N/A     Number of children: N/A     Years of education: N/A     Occupational History     Not on file.     Social History Main Topics     Smoking status: Never Smoker     Smokeless tobacco: Not on file     Alcohol use 0.6 oz/week     1 Standard drinks or equivalent per week     Drug use: No     Sexual activity: Not on file     Other Topics Concern     Not on file     Social History Narrative       Allergies:  No Known Allergies    Medications:  Prior to Admission medications    Medication Sig Start Date End Date Taking? Authorizing Provider   calcium acetate (PHOSLO) 667 MG CAPS capsule Take 2,001 mg by mouth 3 times daily (with meals)    Reported, Patient   VITAMIN E MTC PO Take 400 Units by mouth daily    Reported, Patient   lisinopril (PRINIVIL/ZESTRIL) 10 MG tablet 10 mg daily  9/15/16   Reported, Patient   NIFEdipine ER (ADALAT CC) 90 MG TB24 90 mg daily  9/15/16   Reported, Patient   SUPER B COMPLEX/C PO 1 tablet daily  9/29/16   Reported, Patient    Cholecalciferol (VITAMIN D3) 400 UNITS CAPS 1 capsule daily  9/29/16   Reported, Patient       Vitals:  Reviewed. BP is 130's/70's    Exam:  General: alert and in no distress  HEENT: no icterus  Heart: RRR, no murmur  Lungs: CTAB  Abd: soft, non-tender  Ext: no edema  Access CVC catheter in place on R chest wall    Results:  Recent Results (from the past 336 hour(s))   Routine UA with microscopic    Collection Time: 06/01/17  1:03 PM   Result Value Ref Range    Color Urine Yellow     Appearance Urine Clear     Glucose Urine Negative NEG mg/dL    Bilirubin Urine Negative NEG    Ketones Urine Negative NEG mg/dL    Specific Gravity Urine 1.007 1.003 - 1.035    Blood Urine Small (A) NEG    pH Urine 8.0 (H) 5.0 - 7.0 pH    Protein Albumin Urine >499 (A) NEG mg/dL    Urobilinogen mg/dL 0.0 0.0 - 2.0 mg/dL    Nitrite Urine Negative NEG    Leukocyte Esterase Urine Negative NEG    Source Midstream Urine     WBC Urine 1 0 - 2 /HPF    RBC Urine 10 (H) 0 - 2 /HPF    Squamous Epithelial /HPF Urine <1 0 - 1 /HPF    Hyaline Casts 1 0 - 2 /LPF   CBC with platelets    Collection Time: 06/01/17  1:10 PM   Result Value Ref Range    WBC 5.3 4.0 - 11.0 10e9/L    RBC Count 4.35 (L) 4.4 - 5.9 10e12/L    Hemoglobin 13.1 (L) 13.3 - 17.7 g/dL    Hematocrit 41.3 40.0 - 53.0 %    MCV 95 78 - 100 fl    MCH 30.1 26.5 - 33.0 pg    MCHC 31.7 31.5 - 36.5 g/dL    RDW 15.0 10.0 - 15.0 %    Platelet Count 354 150 - 450 10e9/L   Comprehensive metabolic panel    Collection Time: 06/01/17  1:10 PM   Result Value Ref Range    Sodium 139 133 - 144 mmol/L    Potassium 5.2 3.4 - 5.3 mmol/L    Chloride 102 94 - 109 mmol/L    Carbon Dioxide 29 20 - 32 mmol/L    Anion Gap 8 3 - 14 mmol/L    Glucose 69 (L) 70 - 99 mg/dL    Urea Nitrogen 27 7 - 30 mg/dL    Creatinine 5.82 (H) 0.66 - 1.25 mg/dL    GFR Estimate 12 (L) >60 mL/min/1.7m2    GFR Estimate If Black 15 (L) >60 mL/min/1.7m2    Calcium 9.4 8.5 - 10.1 mg/dL    Bilirubin Total 0.6 0.2 - 1.3 mg/dL    Albumin  4.0 3.4 - 5.0 g/dL    Protein Total 8.2 6.8 - 8.8 g/dL    Alkaline Phosphatase 108 40 - 150 U/L    ALT 28 0 - 70 U/L    AST 17 0 - 45 U/L   Iron and iron binding capacity    Collection Time: 06/01/17  1:10 PM   Result Value Ref Range    Iron 65 35 - 180 ug/dL    Iron Binding Cap 304 240 - 430 ug/dL    Iron Saturation Index 21 15 - 46 %   Ferritin    Collection Time: 06/01/17  1:10 PM   Result Value Ref Range    Ferritin 310 26 - 388 ng/mL   ABO/Rh type and screen    Collection Time: 06/01/17  1:11 PM   Result Value Ref Range    ABO Pending     RH(D) Pending     Antibody Screen Pending     Test Valid Only At Pending     Specimen Expires Pending        Again, thank you for allowing me to participate in the care of your patient.      Sincerely,    Jak King MD

## 2017-06-01 NOTE — PROGRESS NOTES
Nephrology Follow-Up Visit    Patient Name: Golden Stover  Patient YOB: 1995  Date of Service: June 1, 2017  Patient's PCP: No primary care provider on file.    Assessment and Plan:   1. ESRD with planned LDKT: excellent candidate, optimized for surgery next week. He will take all medications the day before surgery and will hold all medications (including his Nifedipine and lisinopril) the day of surgery. He will have dialysis the day before surgery to his dry weight.  2. Cardiac assessment: he is young, otherwise healthy, has good exercise tolerance without any anginal or angina-like symptoms, and he has normal resting ECG. He is low risk for any cardiac complications surrounding surgery and does not need stress testing.  3. Hx of IgA Nephropathy: presented with end stage and thus not treated, has microscopic hematuria and proteinuria and still makes urine. There is a chance of recurrence after transplant and he was informed of this.  4. HTN: well controlled, euvolemic on exam, on two BP meds that will be held on day of surgery    Assessment and plan was discussed with patient and he voiced his understanding and agreement.    Patient staffed with Dr. King.     Tony Alicea  Nephrology Fellow  Pager: [phone]    Attestation:  This patient has been seen and evaluated by me, Jak King MD.  I have reviewed the note and agree with plan of care as documented by the fellow.       Reason for Visit:  Golden Stover is a 21 year old male with ESRD on HD who is here for evaluation prior to kidney transplant on June 7th, 2017 (from his father)    HPI:  Mr. Stover is a pleasant 21yM that was diagnosed with high BP in July of 2016, then later in summer had labs that showed Cr of near 10 and had kidney biopsy that showed IgA nephropathy but scarred kidney. He was started on dialysis 9/2016 and has been on hemodialysis via CVC since then. He is currently dialyzing T/Th/S in [place], [place]. He  has had no problem on dialysis. His BP is 130's systolic before and after dialysis. He still makes urine and does not have large fluctuations in weight. He has had no recent infections and describes no fever, chills, sweats, urinary symptoms, cough, or diarrhea.    ROS:  A comprehensive review of systems was obtained and negative, except as noted in the HPI or PMH.    Active Medical Problems:  Patient Active Problem List   Diagnosis     IgA nephropathy     Hypertension     Anemia in chronic kidney disease     End stage kidney disease (H)     Secondary renal hyperparathyroidism (H)       Personal Hx:   Social History     Social History     Marital status: Single     Spouse name: N/A     Number of children: N/A     Years of education: N/A     Occupational History     Not on file.     Social History Main Topics     Smoking status: Never Smoker     Smokeless tobacco: Not on file     Alcohol use 0.6 oz/week     1 Standard drinks or equivalent per week     Drug use: No     Sexual activity: Not on file     Other Topics Concern     Not on file     Social History Narrative       Allergies:  No Known Allergies    Medications:  Prior to Admission medications    Medication Sig Start Date End Date Taking? Authorizing Provider   calcium acetate (PHOSLO) 667 MG CAPS capsule Take 2,001 mg by mouth 3 times daily (with meals)    Reported, Patient   VITAMIN E MTC PO Take 400 Units by mouth daily    Reported, Patient   lisinopril (PRINIVIL/ZESTRIL) 10 MG tablet 10 mg daily  9/15/16   Reported, Patient   NIFEdipine ER (ADALAT CC) 90 MG TB24 90 mg daily  9/15/16   Reported, Patient   SUPER B COMPLEX/C PO 1 tablet daily  9/29/16   Reported, Patient   Cholecalciferol (VITAMIN D3) 400 UNITS CAPS 1 capsule daily  9/29/16   Reported, Patient       Vitals:  Reviewed. BP is 130's/70's    Exam:  General: alert and in no distress  HEENT: no icterus  Heart: RRR, no murmur  Lungs: CTAB  Abd: soft, non-tender  Ext: no edema  Access CVC catheter in  place on R chest wall    Results:  Recent Results (from the past 336 hour(s))   Routine UA with microscopic    Collection Time: 06/01/17  1:03 PM   Result Value Ref Range    Color Urine Yellow     Appearance Urine Clear     Glucose Urine Negative NEG mg/dL    Bilirubin Urine Negative NEG    Ketones Urine Negative NEG mg/dL    Specific Gravity Urine 1.007 1.003 - 1.035    Blood Urine Small (A) NEG    pH Urine 8.0 (H) 5.0 - 7.0 pH    Protein Albumin Urine >499 (A) NEG mg/dL    Urobilinogen mg/dL 0.0 0.0 - 2.0 mg/dL    Nitrite Urine Negative NEG    Leukocyte Esterase Urine Negative NEG    Source Midstream Urine     WBC Urine 1 0 - 2 /HPF    RBC Urine 10 (H) 0 - 2 /HPF    Squamous Epithelial /HPF Urine <1 0 - 1 /HPF    Hyaline Casts 1 0 - 2 /LPF   CBC with platelets    Collection Time: 06/01/17  1:10 PM   Result Value Ref Range    WBC 5.3 4.0 - 11.0 10e9/L    RBC Count 4.35 (L) 4.4 - 5.9 10e12/L    Hemoglobin 13.1 (L) 13.3 - 17.7 g/dL    Hematocrit 41.3 40.0 - 53.0 %    MCV 95 78 - 100 fl    MCH 30.1 26.5 - 33.0 pg    MCHC 31.7 31.5 - 36.5 g/dL    RDW 15.0 10.0 - 15.0 %    Platelet Count 354 150 - 450 10e9/L   Comprehensive metabolic panel    Collection Time: 06/01/17  1:10 PM   Result Value Ref Range    Sodium 139 133 - 144 mmol/L    Potassium 5.2 3.4 - 5.3 mmol/L    Chloride 102 94 - 109 mmol/L    Carbon Dioxide 29 20 - 32 mmol/L    Anion Gap 8 3 - 14 mmol/L    Glucose 69 (L) 70 - 99 mg/dL    Urea Nitrogen 27 7 - 30 mg/dL    Creatinine 5.82 (H) 0.66 - 1.25 mg/dL    GFR Estimate 12 (L) >60 mL/min/1.7m2    GFR Estimate If Black 15 (L) >60 mL/min/1.7m2    Calcium 9.4 8.5 - 10.1 mg/dL    Bilirubin Total 0.6 0.2 - 1.3 mg/dL    Albumin 4.0 3.4 - 5.0 g/dL    Protein Total 8.2 6.8 - 8.8 g/dL    Alkaline Phosphatase 108 40 - 150 U/L    ALT 28 0 - 70 U/L    AST 17 0 - 45 U/L   Iron and iron binding capacity    Collection Time: 06/01/17  1:10 PM   Result Value Ref Range    Iron 65 35 - 180 ug/dL    Iron Binding Cap 304 240 - 430  ug/dL    Iron Saturation Index 21 15 - 46 %   Ferritin    Collection Time: 06/01/17  1:10 PM   Result Value Ref Range    Ferritin 310 26 - 388 ng/mL   ABO/Rh type and screen    Collection Time: 06/01/17  1:11 PM   Result Value Ref Range    ABO Pending     RH(D) Pending     Antibody Screen Pending     Test Valid Only At Pending     Specimen Expires Pending

## 2017-06-01 NOTE — MR AVS SNAPSHOT
After Visit Summary   6/1/2017    Golden Stover    MRN: [id number]           Patient Information     Date Of Birth          1995        Visit Information        Provider Department      6/1/2017 3:30 PM Jak King MD [facility] Nephrology        Today's Diagnoses     Pre-transplant evaluation for end stage renal disease    -  1    End stage kidney disease (H)        Hypertension secondary to other renal disorders        Anemia in chronic kidney disease          Care Instructions    1. Take all of your medications the day before your surgery  2. Don't take any of your medications the day of surgery  3. Do not eat or drink anything starting at midnight on the day of your surgery          Follow-ups after your visit        Your next 10 appointments already scheduled     Jun 07, 2017   Procedure with Martell Robert MD   [facility] [place], Same Day Surgery (--)    [address] [address]   [phone]            Jun 29, 2017  9:00 AM CDT   (Arrive by 8:45 AM)   Kidney Post Op with Martell Robert MD   [facility] [place]    [address] [place] [id number] [phone]            Jul 11, 2017  1:05 PM CDT   (Arrive by 12:35 PM)   Return Kidney Transplant with Jak King MD   [facility] [place]    [address] [place] [id number] [phone]            Sep 11, 2017  1:05 PM CDT   (Arrive by 12:35 PM)   Return Kidney Transplant with Jak King MD   [facility] [place] [place]    [address] [place] [id number] [phone]              Who to contact     If you have questions or need follow up information about today's clinic visit or your schedule please contact [facility] NEPHROLOGY directly at [phone].  Normal or non-critical lab and  imaging results will be communicated to you by EBOOKAPLACEhart, letter or phone within 4 business days after the clinic has received the results. If you do not hear from us within 7 days, please contact the clinic through [employer] or phone. If you have a critical or abnormal lab result, we will notify you by phone as soon as possible.  Submit refill requests through [employer]Line or call your pharmacy and they will forward the refill request to us. Please allow 3 business days for your refill to be completed.          Additional Information About Your Visit        [employer] Information     [employer] gives you secure access to your electronic health record. If you see a primary care provider, you can also send messages to your care team and make appointments. If you have questions, please call your primary care clinic.  If you do not have a primary care provider, please call [phone] and they will assist you.        Care EveryWhere ID     This is your Care EveryWhere ID. This could be used by other organizations to access your [facility] medical records  [id number]         Blood Pressure from Last 3 Encounters:   06/01/17 (!) 137/91   12/07/16 (!) 147/93    Weight from Last 3 Encounters:   06/01/17 101.1 kg (222 lb 12.8 oz)   12/07/16 104.5 kg (230 lb 4.8 oz)              Today, you had the following     No orders found for display         Today's Medication Changes          These changes are accurate as of: 6/1/17 11:59 PM.  If you have any questions, ask your nurse or doctor.               Stop taking these medicines if you haven't already. Please contact your care team if you have questions.     RENVELA 800 MG tablet   Generic drug:  sevelamer   Stopped by:  Nurse, [facility]                    Primary Care Provider    None Specified       No primary provider on file.        Thank you!     Thank you for choosing [facility] NEPHROLOGY  for your care. Our goal is always to provide you with excellent care. Hearing back from our patients  is one way we can continue to improve our services. Please take a few minutes to complete the written survey that you may receive in the mail after your visit with us. Thank you!             Your Updated Medication List - Protect others around you: Learn how to safely use, store and throw away your medicines at www.disposemymeds.org.          This list is accurate as of: 6/1/17 11:59 PM.  Always use your most recent med list.                   Brand Name Dispense Instructions for use    calcium acetate 667 MG Caps capsule    PHOSLO     Take 2,001 mg by mouth 3 times daily (with meals)       lisinopril 10 MG tablet    PRINIVIL/ZESTRIL     10 mg daily       NIFEdipine ER 90 MG Tb24    ADALAT CC     90 mg daily       SUPER B COMPLEX/C PO      1 tablet daily       Vitamin D3 400 UNITS Caps      1 capsule daily       VITAMIN E MTC PO      Take 400 Units by mouth daily

## 2017-06-01 NOTE — LETTER
6/1/2017      RE: Golden Stover  [address] [address]       Nephrology Follow-Up Visit    Patient Name: Golden Stover  Patient YOB: 1995  Date of Service: June 1, 2017  Patient's PCP: No primary care provider on file.    Assessment and Plan:   1. ESRD with planned LDKT: excellent candidate, optimized for surgery next week. He will take all medications the day before surgery and will hold all medications (including his Nifedipine and lisinopril) the day of surgery. He will have dialysis the day before surgery to his dry weight.  2. Cardiac assessment: he is young, otherwise healthy, has good exercise tolerance without any anginal or angina-like symptoms, and he has normal resting ECG. He is low risk for any cardiac complications surrounding surgery and does not need stress testing.  3. Hx of IgA Nephropathy: presented with end stage and thus not treated, has microscopic hematuria and proteinuria and still makes urine. There is a chance of recurrence after transplant and he was informed of this.  4. HTN: well controlled, euvolemic on exam, on two BP meds that will be held on day of surgery    Assessment and plan was discussed with patient and he voiced his understanding and agreement.    Patient staffed with Dr. King.     Tony Alicea  Nephrology Fellow  Pager: [phone]    Attestation:  This patient has been seen and evaluated by me, Jak King MD.  I have reviewed the note and agree with plan of care as documented by the fellow.       Reason for Visit:  Golden Stover is a 21 year old male with ESRD on HD who is here for evaluation prior to kidney transplant on June 7th, 2017 (from his father)    HPI:  Mr. Stover is a pleasant 21yM that was diagnosed with high BP in July of 2016, then later in summer had labs that showed Cr of near 10 and had kidney biopsy that showed IgA nephropathy but scarred kidney. He was started on dialysis 9/2016 and has been on  hemodialysis via CVC since then. He is currently dialyzing T/Th/S in [place], [place]. He has had no problem on dialysis. His BP is 130's systolic before and after dialysis. He still makes urine and does not have large fluctuations in weight. He has had no recent infections and describes no fever, chills, sweats, urinary symptoms, cough, or diarrhea.    ROS:  A comprehensive review of systems was obtained and negative, except as noted in the HPI or PMH.    Active Medical Problems:  Patient Active Problem List   Diagnosis     IgA nephropathy     Hypertension     Anemia in chronic kidney disease     End stage kidney disease (H)     Secondary renal hyperparathyroidism (H)       Personal Hx:   Social History     Social History     Marital status: Single     Spouse name: N/A     Number of children: N/A     Years of education: N/A     Occupational History     Not on file.     Social History Main Topics     Smoking status: Never Smoker     Smokeless tobacco: Not on file     Alcohol use 0.6 oz/week     1 Standard drinks or equivalent per week     Drug use: No     Sexual activity: Not on file     Other Topics Concern     Not on file     Social History Narrative       Allergies:  No Known Allergies    Medications:  Prior to Admission medications    Medication Sig Start Date End Date Taking? Authorizing Provider   calcium acetate (PHOSLO) 667 MG CAPS capsule Take 2,001 mg by mouth 3 times daily (with meals)    Reported, Patient   VITAMIN E MTC PO Take 400 Units by mouth daily    Reported, Patient   lisinopril (PRINIVIL/ZESTRIL) 10 MG tablet 10 mg daily  9/15/16   Reported, Patient   NIFEdipine ER (ADALAT CC) 90 MG TB24 90 mg daily  9/15/16   Reported, Patient   SUPER B COMPLEX/C PO 1 tablet daily  9/29/16   Reported, Patient   Cholecalciferol (VITAMIN D3) 400 UNITS CAPS 1 capsule daily  9/29/16   Reported, Patient       Vitals:  Reviewed. BP is 130's/70's    Exam:  General: alert and in no distress  HEENT: no icterus  Heart: RRR, no  murmur  Lungs: CTAB  Abd: soft, non-tender  Ext: no edema  Access CVC catheter in place on R chest wall    Results:  Recent Results (from the past 336 hour(s))   Routine UA with microscopic    Collection Time: 06/01/17  1:03 PM   Result Value Ref Range    Color Urine Yellow     Appearance Urine Clear     Glucose Urine Negative NEG mg/dL    Bilirubin Urine Negative NEG    Ketones Urine Negative NEG mg/dL    Specific Gravity Urine 1.007 1.003 - 1.035    Blood Urine Small (A) NEG    pH Urine 8.0 (H) 5.0 - 7.0 pH    Protein Albumin Urine >499 (A) NEG mg/dL    Urobilinogen mg/dL 0.0 0.0 - 2.0 mg/dL    Nitrite Urine Negative NEG    Leukocyte Esterase Urine Negative NEG    Source Midstream Urine     WBC Urine 1 0 - 2 /HPF    RBC Urine 10 (H) 0 - 2 /HPF    Squamous Epithelial /HPF Urine <1 0 - 1 /HPF    Hyaline Casts 1 0 - 2 /LPF   CBC with platelets    Collection Time: 06/01/17  1:10 PM   Result Value Ref Range    WBC 5.3 4.0 - 11.0 10e9/L    RBC Count 4.35 (L) 4.4 - 5.9 10e12/L    Hemoglobin 13.1 (L) 13.3 - 17.7 g/dL    Hematocrit 41.3 40.0 - 53.0 %    MCV 95 78 - 100 fl    MCH 30.1 26.5 - 33.0 pg    MCHC 31.7 31.5 - 36.5 g/dL    RDW 15.0 10.0 - 15.0 %    Platelet Count 354 150 - 450 10e9/L   Comprehensive metabolic panel    Collection Time: 06/01/17  1:10 PM   Result Value Ref Range    Sodium 139 133 - 144 mmol/L    Potassium 5.2 3.4 - 5.3 mmol/L    Chloride 102 94 - 109 mmol/L    Carbon Dioxide 29 20 - 32 mmol/L    Anion Gap 8 3 - 14 mmol/L    Glucose 69 (L) 70 - 99 mg/dL    Urea Nitrogen 27 7 - 30 mg/dL    Creatinine 5.82 (H) 0.66 - 1.25 mg/dL    GFR Estimate 12 (L) >60 mL/min/1.7m2    GFR Estimate If Black 15 (L) >60 mL/min/1.7m2    Calcium 9.4 8.5 - 10.1 mg/dL    Bilirubin Total 0.6 0.2 - 1.3 mg/dL    Albumin 4.0 3.4 - 5.0 g/dL    Protein Total 8.2 6.8 - 8.8 g/dL    Alkaline Phosphatase 108 40 - 150 U/L    ALT 28 0 - 70 U/L    AST 17 0 - 45 U/L   Iron and iron binding capacity    Collection Time: 06/01/17  1:10 PM    Result Value Ref Range    Iron 65 35 - 180 ug/dL    Iron Binding Cap 304 240 - 430 ug/dL    Iron Saturation Index 21 15 - 46 %   Ferritin    Collection Time: 06/01/17  1:10 PM   Result Value Ref Range    Ferritin 310 26 - 388 ng/mL   ABO/Rh type and screen    Collection Time: 06/01/17  1:11 PM   Result Value Ref Range    ABO Pending     RH(D) Pending     Antibody Screen Pending     Test Valid Only At Pending     Specimen Expires Pending        Jak King MD

## 2017-06-02 LAB
BACTERIA SPEC CULT: NO GROWTH
CMV IGG SERPL QL IA: 0.2 AI (ref 0–0.8)
CMV IGM SERPL QL IA: NORMAL AI (ref 0–0.8)
DEPRECATED CALCIDIOL+CALCIFEROL SERPL-MC: 72 UG/L (ref 20–75)
EBV VCA IGG SER QL IA: ABNORMAL AI (ref 0–0.8)
EBV VCA IGM SER QL IA: NORMAL AI (ref 0–0.8)
HBV CORE AB SERPL QL IA: NONREACTIVE
HBV SURFACE AB SERPL IA-ACNC: 378.98 M[IU]/ML
HBV SURFACE AG SERPL QL IA: NONREACTIVE
HCV AB SERPL QL IA: NORMAL
HIV 1+2 AB+HIV1 P24 AG SERPL QL IA: NORMAL
HLA FINAL CROSSMATCH RECIPIENT: NORMAL
Lab: NORMAL
MICRO REPORT STATUS: NORMAL
SPECIMEN SOURCE: NORMAL

## 2017-06-02 ASSESSMENT — ACTIVITIES OF DAILY LIVING (ADL): FALL_HISTORY_WITHIN_LAST_SIX_MONTHS: NO

## 2017-06-06 ENCOUNTER — ANESTHESIA EVENT (OUTPATIENT)
Dept: SURGERY | Facility: CLINIC | Age: 22
End: 2017-06-06

## 2017-06-07 ENCOUNTER — APPOINTMENT (OUTPATIENT)
Dept: ULTRASOUND IMAGING | Facility: CLINIC | Age: 22
DRG: 652 | End: 2017-06-07
Attending: TRANSPLANT SURGERY
Payer: COMMERCIAL

## 2017-06-07 ENCOUNTER — ANESTHESIA (OUTPATIENT)
Dept: SURGERY | Facility: CLINIC | Age: 22
End: 2017-06-07

## 2017-06-07 ENCOUNTER — HOSPITAL ENCOUNTER (INPATIENT)
Facility: CLINIC | Age: 22
LOS: 5 days | Discharge: HOME HEALTH | DRG: 652 | End: 2017-06-12
Attending: TRANSPLANT SURGERY | Admitting: TRANSPLANT SURGERY
Payer: COMMERCIAL

## 2017-06-07 DIAGNOSIS — Z94.0 LIVING-DONOR KIDNEY TRANSPLANT RECIPIENT: ICD-10-CM

## 2017-06-07 DIAGNOSIS — N02.B9 IGA NEPHROPATHY: Primary | ICD-10-CM

## 2017-06-07 LAB
ABO + RH BLD: NORMAL
ABO + RH BLD: NORMAL
ANION GAP SERPL CALCULATED.3IONS-SCNC: 10 MMOL/L (ref 3–14)
ANION GAP SERPL CALCULATED.3IONS-SCNC: 8 MMOL/L (ref 3–14)
BLD GP AB SCN SERPL QL: NORMAL
BLD PROD TYP BPU: NORMAL
BLOOD BANK CMNT PATIENT-IMP: NORMAL
BUN SERPL-MCNC: 37 MG/DL (ref 7–30)
BUN SERPL-MCNC: 44 MG/DL (ref 7–30)
CALCIUM SERPL-MCNC: 8.5 MG/DL (ref 8.5–10.1)
CALCIUM SERPL-MCNC: 8.8 MG/DL (ref 8.5–10.1)
CHLORIDE SERPL-SCNC: 106 MMOL/L (ref 94–109)
CHLORIDE SERPL-SCNC: 108 MMOL/L (ref 94–109)
CO2 SERPL-SCNC: 21 MMOL/L (ref 20–32)
CO2 SERPL-SCNC: 22 MMOL/L (ref 20–32)
CREAT SERPL-MCNC: 6.46 MG/DL (ref 0.66–1.25)
CREAT SERPL-MCNC: 6.69 MG/DL (ref 0.66–1.25)
ERYTHROCYTE [DISTWIDTH] IN BLOOD BY AUTOMATED COUNT: 15.1 % (ref 10–15)
GFR SERPL CREATININE-BSD FRML MDRD: 10 ML/MIN/1.7M2
GFR SERPL CREATININE-BSD FRML MDRD: 11 ML/MIN/1.7M2
GLUCOSE BLDC GLUCOMTR-MCNC: 144 MG/DL (ref 70–99)
GLUCOSE BLDC GLUCOMTR-MCNC: 153 MG/DL (ref 70–99)
GLUCOSE BLDC GLUCOMTR-MCNC: 93 MG/DL (ref 70–99)
GLUCOSE SERPL-MCNC: 142 MG/DL (ref 70–99)
GLUCOSE SERPL-MCNC: 148 MG/DL (ref 70–99)
HCT VFR BLD AUTO: 35.6 % (ref 40–53)
HGB BLD-MCNC: 11.3 G/DL (ref 13.3–17.7)
HGB BLD-MCNC: 11.6 G/DL (ref 13.3–17.7)
HGB BLD-MCNC: 12 G/DL (ref 13.3–17.7)
MAGNESIUM SERPL-MCNC: 1.6 MG/DL (ref 1.6–2.3)
MCH RBC QN AUTO: 29.7 PG (ref 26.5–33)
MCHC RBC AUTO-ENTMCNC: 31.7 G/DL (ref 31.5–36.5)
MCV RBC AUTO: 94 FL (ref 78–100)
NUM BPU REQUESTED: 2
PHOSPHATE SERPL-MCNC: 3.3 MG/DL (ref 2.5–4.5)
PLATELET # BLD AUTO: 212 10E9/L (ref 150–450)
POTASSIUM SERPL-SCNC: 5.8 MMOL/L (ref 3.4–5.3)
POTASSIUM SERPL-SCNC: 5.9 MMOL/L (ref 3.4–5.3)
POTASSIUM SERPL-SCNC: 6 MMOL/L (ref 3.4–5.3)
POTASSIUM SERPL-SCNC: 6.6 MMOL/L (ref 3.4–5.3)
RBC # BLD AUTO: 3.8 10E12/L (ref 4.4–5.9)
SODIUM SERPL-SCNC: 136 MMOL/L (ref 133–144)
SODIUM SERPL-SCNC: 138 MMOL/L (ref 133–144)
SPECIMEN EXP DATE BLD: NORMAL
WBC # BLD AUTO: 6.6 10E9/L (ref 4–11)

## 2017-06-07 PROCEDURE — 25000128 H RX IP 250 OP 636: Performed by: NURSE PRACTITIONER

## 2017-06-07 PROCEDURE — 80048 BASIC METABOLIC PNL TOTAL CA: CPT | Performed by: SURGERY

## 2017-06-07 PROCEDURE — 86850 RBC ANTIBODY SCREEN: CPT | Performed by: NURSE PRACTITIONER

## 2017-06-07 PROCEDURE — 27210995 ZZH RX 272: Performed by: TRANSPLANT SURGERY

## 2017-06-07 PROCEDURE — 80048 BASIC METABOLIC PNL TOTAL CA: CPT | Performed by: TRANSPLANT SURGERY

## 2017-06-07 PROCEDURE — 36415 COLL VENOUS BLD VENIPUNCTURE: CPT | Performed by: ANESTHESIOLOGY

## 2017-06-07 PROCEDURE — 71000014 ZZH RECOVERY PHASE 1 LEVEL 2 FIRST HR: Performed by: TRANSPLANT SURGERY

## 2017-06-07 PROCEDURE — 25000128 H RX IP 250 OP 636: Performed by: TRANSPLANT SURGERY

## 2017-06-07 PROCEDURE — 81100000 ZZH ACQUISITION KIDNEY LIVING DONOR

## 2017-06-07 PROCEDURE — 25000125 ZZHC RX 250: Performed by: TRANSPLANT SURGERY

## 2017-06-07 PROCEDURE — 25000131 ZZH RX MED GY IP 250 OP 636 PS 637: Mod: GY | Performed by: TRANSPLANT SURGERY

## 2017-06-07 PROCEDURE — 37000009 ZZH ANESTHESIA TECHNICAL FEE, EACH ADDTL 15 MIN: Performed by: TRANSPLANT SURGERY

## 2017-06-07 PROCEDURE — 36000062 ZZH SURGERY LEVEL 4 1ST 30 MIN - UMMC: Performed by: TRANSPLANT SURGERY

## 2017-06-07 PROCEDURE — 40000170 ZZH STATISTIC PRE-PROCEDURE ASSESSMENT II: Performed by: TRANSPLANT SURGERY

## 2017-06-07 PROCEDURE — 85018 HEMOGLOBIN: CPT | Performed by: SURGERY

## 2017-06-07 PROCEDURE — 86900 BLOOD TYPING SEROLOGIC ABO: CPT | Performed by: NURSE PRACTITIONER

## 2017-06-07 PROCEDURE — 25000131 ZZH RX MED GY IP 250 OP 636 PS 637: Mod: GY | Performed by: STUDENT IN AN ORGANIZED HEALTH CARE EDUCATION/TRAINING PROGRAM

## 2017-06-07 PROCEDURE — 76776 US EXAM K TRANSPL W/DOPPLER: CPT

## 2017-06-07 PROCEDURE — 25000125 ZZHC RX 250: Performed by: NURSE ANESTHETIST, CERTIFIED REGISTERED

## 2017-06-07 PROCEDURE — 12000006 ZZH R&B IMCU INTERMEDIATE UMMC

## 2017-06-07 PROCEDURE — 84132 ASSAY OF SERUM POTASSIUM: CPT | Performed by: TRANSPLANT SURGERY

## 2017-06-07 PROCEDURE — 40000196 ZZH STATISTIC RAPCV CVP MONITORING

## 2017-06-07 PROCEDURE — 0TY10Z0 TRANSPLANTATION OF LEFT KIDNEY, ALLOGENEIC, OPEN APPROACH: ICD-10-PCS | Performed by: TRANSPLANT SURGERY

## 2017-06-07 PROCEDURE — S5010 5% DEXTROSE AND 0.45% SALINE: HCPCS | Performed by: NURSE ANESTHETIST, CERTIFIED REGISTERED

## 2017-06-07 PROCEDURE — 25000125 ZZHC RX 250: Performed by: ANESTHESIOLOGY

## 2017-06-07 PROCEDURE — 40000275 ZZH STATISTIC RCP TIME EA 10 MIN

## 2017-06-07 PROCEDURE — 36000064 ZZH SURGERY LEVEL 4 EA 15 ADDTL MIN - UMMC: Performed by: TRANSPLANT SURGERY

## 2017-06-07 PROCEDURE — 27210794 ZZH OR GENERAL SUPPLY STERILE: Performed by: TRANSPLANT SURGERY

## 2017-06-07 PROCEDURE — 85018 HEMOGLOBIN: CPT | Performed by: TRANSPLANT SURGERY

## 2017-06-07 PROCEDURE — 25000125 ZZHC RX 250: Performed by: STUDENT IN AN ORGANIZED HEALTH CARE EDUCATION/TRAINING PROGRAM

## 2017-06-07 PROCEDURE — 40000014 ZZH STATISTIC ARTERIAL MONITORING DAILY

## 2017-06-07 PROCEDURE — 83735 ASSAY OF MAGNESIUM: CPT | Performed by: TRANSPLANT SURGERY

## 2017-06-07 PROCEDURE — 25000128 H RX IP 250 OP 636: Performed by: NURSE ANESTHETIST, CERTIFIED REGISTERED

## 2017-06-07 PROCEDURE — 88305 TISSUE EXAM BY PATHOLOGIST: CPT | Performed by: TRANSPLANT SURGERY

## 2017-06-07 PROCEDURE — 00000146 ZZHCL STATISTIC GLUCOSE BY METER IP

## 2017-06-07 PROCEDURE — 85027 COMPLETE CBC AUTOMATED: CPT | Performed by: TRANSPLANT SURGERY

## 2017-06-07 PROCEDURE — 25800025 ZZH RX 258: Performed by: STUDENT IN AN ORGANIZED HEALTH CARE EDUCATION/TRAINING PROGRAM

## 2017-06-07 PROCEDURE — 25000125 ZZHC RX 250: Performed by: NURSE PRACTITIONER

## 2017-06-07 PROCEDURE — C2617 STENT, NON-COR, TEM W/O DEL: HCPCS | Performed by: TRANSPLANT SURGERY

## 2017-06-07 PROCEDURE — P9041 ALBUMIN (HUMAN),5%, 50ML: HCPCS | Performed by: NURSE ANESTHETIST, CERTIFIED REGISTERED

## 2017-06-07 PROCEDURE — 25000566 ZZH SEVOFLURANE, EA 15 MIN: Performed by: TRANSPLANT SURGERY

## 2017-06-07 PROCEDURE — 25000128 H RX IP 250 OP 636: Performed by: STUDENT IN AN ORGANIZED HEALTH CARE EDUCATION/TRAINING PROGRAM

## 2017-06-07 PROCEDURE — 84132 ASSAY OF SERUM POTASSIUM: CPT | Performed by: ANESTHESIOLOGY

## 2017-06-07 PROCEDURE — 71000015 ZZH RECOVERY PHASE 1 LEVEL 2 EA ADDTL HR: Performed by: TRANSPLANT SURGERY

## 2017-06-07 PROCEDURE — 86901 BLOOD TYPING SEROLOGIC RH(D): CPT | Performed by: NURSE PRACTITIONER

## 2017-06-07 PROCEDURE — 25800025 ZZH RX 258: Performed by: NURSE ANESTHETIST, CERTIFIED REGISTERED

## 2017-06-07 PROCEDURE — 84100 ASSAY OF PHOSPHORUS: CPT | Performed by: TRANSPLANT SURGERY

## 2017-06-07 PROCEDURE — 25000128 H RX IP 250 OP 636: Performed by: ANESTHESIOLOGY

## 2017-06-07 PROCEDURE — 37000008 ZZH ANESTHESIA TECHNICAL FEE, 1ST 30 MIN: Performed by: TRANSPLANT SURGERY

## 2017-06-07 DEVICE — STENT URETERAL SOFT UNIVERSA 5.0FRX18CM US-518 G53145: Type: IMPLANTABLE DEVICE | Status: NON-FUNCTIONAL

## 2017-06-07 RX ORDER — LIDOCAINE 40 MG/G
CREAM TOPICAL
Status: DISCONTINUED | OUTPATIENT
Start: 2017-06-07 | End: 2017-06-07 | Stop reason: HOSPADM

## 2017-06-07 RX ORDER — SODIUM CHLORIDE 9 MG/ML
1000 INJECTION, SOLUTION INTRAVENOUS CONTINUOUS PRN
Status: DISCONTINUED | OUTPATIENT
Start: 2017-06-07 | End: 2017-06-08

## 2017-06-07 RX ORDER — GABAPENTIN 300 MG/1
300 CAPSULE ORAL ONCE
Status: DISCONTINUED | OUTPATIENT
Start: 2017-06-07 | End: 2017-06-07 | Stop reason: HOSPADM

## 2017-06-07 RX ORDER — SODIUM CHLORIDE 450 MG/100ML
INJECTION, SOLUTION INTRAVENOUS CONTINUOUS PRN
Status: DISCONTINUED | OUTPATIENT
Start: 2017-06-07 | End: 2017-06-08

## 2017-06-07 RX ORDER — GLYCOPYRROLATE 0.2 MG/ML
INJECTION, SOLUTION INTRAMUSCULAR; INTRAVENOUS PRN
Status: DISCONTINUED | OUTPATIENT
Start: 2017-06-07 | End: 2017-06-07

## 2017-06-07 RX ORDER — FENTANYL CITRATE 50 UG/ML
INJECTION, SOLUTION INTRAMUSCULAR; INTRAVENOUS PRN
Status: DISCONTINUED | OUTPATIENT
Start: 2017-06-07 | End: 2017-06-07

## 2017-06-07 RX ORDER — VALGANCICLOVIR 450 MG/1
450 TABLET, FILM COATED ORAL
Status: DISCONTINUED | OUTPATIENT
Start: 2017-06-08 | End: 2017-06-08

## 2017-06-07 RX ORDER — FUROSEMIDE 10 MG/ML
40 INJECTION INTRAMUSCULAR; INTRAVENOUS 2 TIMES DAILY
Status: DISCONTINUED | OUTPATIENT
Start: 2017-06-07 | End: 2017-06-08

## 2017-06-07 RX ORDER — SODIUM CHLORIDE 9 MG/ML
INJECTION, SOLUTION INTRAVENOUS CONTINUOUS PRN
Status: DISCONTINUED | OUTPATIENT
Start: 2017-06-07 | End: 2017-06-07

## 2017-06-07 RX ORDER — ONDANSETRON 4 MG/1
4 TABLET, ORALLY DISINTEGRATING ORAL EVERY 30 MIN PRN
Status: DISCONTINUED | OUTPATIENT
Start: 2017-06-07 | End: 2017-06-07 | Stop reason: HOSPADM

## 2017-06-07 RX ORDER — MYCOPHENOLATE MOFETIL 250 MG/1
750 CAPSULE ORAL
Status: DISCONTINUED | OUTPATIENT
Start: 2017-06-07 | End: 2017-06-12 | Stop reason: HOSPADM

## 2017-06-07 RX ORDER — MANNITOL 250 MG/ML
INJECTION, SOLUTION INTRAVENOUS PRN
Status: DISCONTINUED | OUTPATIENT
Start: 2017-06-07 | End: 2017-06-07

## 2017-06-07 RX ORDER — CALCIUM CHLORIDE 100 MG/ML
INJECTION INTRAVENOUS; INTRAVENTRICULAR PRN
Status: DISCONTINUED | OUTPATIENT
Start: 2017-06-07 | End: 2017-06-07

## 2017-06-07 RX ORDER — DEXTROSE MONOHYDRATE 100 MG/ML
INJECTION, SOLUTION INTRAVENOUS CONTINUOUS
Status: DISCONTINUED | OUTPATIENT
Start: 2017-06-07 | End: 2017-06-07

## 2017-06-07 RX ORDER — HYDRALAZINE HYDROCHLORIDE 20 MG/ML
2.5-5 INJECTION INTRAMUSCULAR; INTRAVENOUS EVERY 10 MIN PRN
Status: DISCONTINUED | OUTPATIENT
Start: 2017-06-07 | End: 2017-06-07 | Stop reason: HOSPADM

## 2017-06-07 RX ORDER — ONDANSETRON 2 MG/ML
4 INJECTION INTRAMUSCULAR; INTRAVENOUS EVERY 30 MIN PRN
Status: DISCONTINUED | OUTPATIENT
Start: 2017-06-07 | End: 2017-06-07 | Stop reason: HOSPADM

## 2017-06-07 RX ORDER — FUROSEMIDE 10 MG/ML
40 INJECTION INTRAMUSCULAR; INTRAVENOUS ONCE
Status: COMPLETED | OUTPATIENT
Start: 2017-06-07 | End: 2017-06-07

## 2017-06-07 RX ORDER — PROPOFOL 10 MG/ML
INJECTION, EMULSION INTRAVENOUS PRN
Status: DISCONTINUED | OUTPATIENT
Start: 2017-06-07 | End: 2017-06-07

## 2017-06-07 RX ORDER — AMOXICILLIN 250 MG
1-2 CAPSULE ORAL 2 TIMES DAILY
Status: DISCONTINUED | OUTPATIENT
Start: 2017-06-08 | End: 2017-06-11

## 2017-06-07 RX ORDER — ONDANSETRON 4 MG/1
4 TABLET, ORALLY DISINTEGRATING ORAL EVERY 6 HOURS PRN
Status: DISCONTINUED | OUTPATIENT
Start: 2017-06-07 | End: 2017-06-09

## 2017-06-07 RX ORDER — DEXTROSE MONOHYDRATE 25 G/50ML
25 INJECTION, SOLUTION INTRAVENOUS ONCE
Status: DISCONTINUED | OUTPATIENT
Start: 2017-06-07 | End: 2017-06-07

## 2017-06-07 RX ORDER — LIDOCAINE HYDROCHLORIDE 20 MG/ML
INJECTION, SOLUTION INFILTRATION; PERINEURAL PRN
Status: DISCONTINUED | OUTPATIENT
Start: 2017-06-07 | End: 2017-06-07

## 2017-06-07 RX ORDER — SULFAMETHOXAZOLE AND TRIMETHOPRIM 400; 80 MG/1; MG/1
1 TABLET ORAL
Status: DISCONTINUED | OUTPATIENT
Start: 2017-06-09 | End: 2017-06-09

## 2017-06-07 RX ORDER — ONDANSETRON 2 MG/ML
4 INJECTION INTRAMUSCULAR; INTRAVENOUS EVERY 6 HOURS PRN
Status: DISCONTINUED | OUTPATIENT
Start: 2017-06-07 | End: 2017-06-09

## 2017-06-07 RX ORDER — NEOSTIGMINE METHYLSULFATE 1 MG/ML
VIAL (ML) INJECTION PRN
Status: DISCONTINUED | OUTPATIENT
Start: 2017-06-07 | End: 2017-06-07

## 2017-06-07 RX ORDER — LABETALOL HYDROCHLORIDE 5 MG/ML
10 INJECTION, SOLUTION INTRAVENOUS
Status: COMPLETED | OUTPATIENT
Start: 2017-06-07 | End: 2017-06-07

## 2017-06-07 RX ORDER — NALOXONE HYDROCHLORIDE 0.4 MG/ML
.1-.4 INJECTION, SOLUTION INTRAMUSCULAR; INTRAVENOUS; SUBCUTANEOUS
Status: DISCONTINUED | OUTPATIENT
Start: 2017-06-07 | End: 2017-06-12 | Stop reason: HOSPADM

## 2017-06-07 RX ORDER — EPHEDRINE SULFATE 50 MG/ML
INJECTION, SOLUTION INTRAMUSCULAR; INTRAVENOUS; SUBCUTANEOUS PRN
Status: DISCONTINUED | OUTPATIENT
Start: 2017-06-07 | End: 2017-06-07

## 2017-06-07 RX ORDER — PROCHLORPERAZINE MALEATE 5 MG
5-10 TABLET ORAL EVERY 6 HOURS PRN
Status: DISCONTINUED | OUTPATIENT
Start: 2017-06-07 | End: 2017-06-12 | Stop reason: HOSPADM

## 2017-06-07 RX ORDER — DEXTROSE MONOHYDRATE 25 G/50ML
25-50 INJECTION, SOLUTION INTRAVENOUS
Status: DISCONTINUED | OUTPATIENT
Start: 2017-06-07 | End: 2017-06-07 | Stop reason: HOSPADM

## 2017-06-07 RX ORDER — MEPERIDINE HYDROCHLORIDE 25 MG/ML
12.5 INJECTION INTRAMUSCULAR; INTRAVENOUS; SUBCUTANEOUS EVERY 5 MIN PRN
Status: DISCONTINUED | OUTPATIENT
Start: 2017-06-07 | End: 2017-06-07 | Stop reason: HOSPADM

## 2017-06-07 RX ORDER — ONDANSETRON 2 MG/ML
INJECTION INTRAMUSCULAR; INTRAVENOUS PRN
Status: DISCONTINUED | OUTPATIENT
Start: 2017-06-07 | End: 2017-06-07

## 2017-06-07 RX ORDER — FENTANYL CITRATE 50 UG/ML
25-50 INJECTION, SOLUTION INTRAMUSCULAR; INTRAVENOUS
Status: DISCONTINUED | OUTPATIENT
Start: 2017-06-07 | End: 2017-06-07 | Stop reason: HOSPADM

## 2017-06-07 RX ORDER — FUROSEMIDE 10 MG/ML
INJECTION INTRAMUSCULAR; INTRAVENOUS PRN
Status: DISCONTINUED | OUTPATIENT
Start: 2017-06-07 | End: 2017-06-07

## 2017-06-07 RX ORDER — SODIUM CHLORIDE, SODIUM LACTATE, POTASSIUM CHLORIDE, CALCIUM CHLORIDE 600; 310; 30; 20 MG/100ML; MG/100ML; MG/100ML; MG/100ML
INJECTION, SOLUTION INTRAVENOUS CONTINUOUS
Status: DISCONTINUED | OUTPATIENT
Start: 2017-06-07 | End: 2017-06-07 | Stop reason: HOSPADM

## 2017-06-07 RX ORDER — NICOTINE POLACRILEX 4 MG
15-30 LOZENGE BUCCAL
Status: DISCONTINUED | OUTPATIENT
Start: 2017-06-07 | End: 2017-06-07 | Stop reason: HOSPADM

## 2017-06-07 RX ORDER — ACETAMINOPHEN 325 MG/1
975 TABLET ORAL ONCE
Status: DISCONTINUED | OUTPATIENT
Start: 2017-06-07 | End: 2017-06-07 | Stop reason: HOSPADM

## 2017-06-07 RX ORDER — ALBUMIN, HUMAN INJ 5% 5 %
SOLUTION INTRAVENOUS CONTINUOUS PRN
Status: DISCONTINUED | OUTPATIENT
Start: 2017-06-07 | End: 2017-06-07

## 2017-06-07 RX ORDER — NICOTINE POLACRILEX 4 MG
15-30 LOZENGE BUCCAL
Status: DISCONTINUED | OUTPATIENT
Start: 2017-06-07 | End: 2017-06-12 | Stop reason: HOSPADM

## 2017-06-07 RX ORDER — SODIUM CHLORIDE 9 MG/ML
INJECTION, SOLUTION INTRAVENOUS CONTINUOUS
Status: DISCONTINUED | OUTPATIENT
Start: 2017-06-07 | End: 2017-06-07 | Stop reason: HOSPADM

## 2017-06-07 RX ORDER — HYDROMORPHONE HYDROCHLORIDE 1 MG/ML
.3-.5 INJECTION, SOLUTION INTRAMUSCULAR; INTRAVENOUS; SUBCUTANEOUS EVERY 5 MIN PRN
Status: DISCONTINUED | OUTPATIENT
Start: 2017-06-07 | End: 2017-06-07 | Stop reason: HOSPADM

## 2017-06-07 RX ORDER — DEXTROSE MONOHYDRATE 25 G/50ML
25-50 INJECTION, SOLUTION INTRAVENOUS
Status: DISCONTINUED | OUTPATIENT
Start: 2017-06-07 | End: 2017-06-12 | Stop reason: HOSPADM

## 2017-06-07 RX ADMIN — FUROSEMIDE 40 MG: 10 INJECTION, SOLUTION INTRAVENOUS at 14:45

## 2017-06-07 RX ADMIN — SODIUM CHLORIDE 500 MG: 9 INJECTION, SOLUTION INTRAVENOUS at 10:00

## 2017-06-07 RX ADMIN — FENTANYL CITRATE 25 MCG: 50 INJECTION INTRAMUSCULAR; INTRAVENOUS at 13:57

## 2017-06-07 RX ADMIN — FUROSEMIDE 40 MG: 10 INJECTION, SOLUTION INTRAVENOUS at 20:23

## 2017-06-07 RX ADMIN — FENTANYL CITRATE 25 MCG: 50 INJECTION INTRAMUSCULAR; INTRAVENOUS at 14:01

## 2017-06-07 RX ADMIN — FENTANYL CITRATE 50 MCG: 50 INJECTION, SOLUTION INTRAMUSCULAR; INTRAVENOUS at 11:49

## 2017-06-07 RX ADMIN — FENTANYL CITRATE 100 MCG: 50 INJECTION, SOLUTION INTRAMUSCULAR; INTRAVENOUS at 12:47

## 2017-06-07 RX ADMIN — HYDROMORPHONE HYDROCHLORIDE 0.5 MG: 1 INJECTION, SOLUTION INTRAMUSCULAR; INTRAVENOUS; SUBCUTANEOUS at 13:24

## 2017-06-07 RX ADMIN — Medication 5 MG: at 13:19

## 2017-06-07 RX ADMIN — FUROSEMIDE 20 MG: 10 INJECTION, SOLUTION INTRAVENOUS at 11:34

## 2017-06-07 RX ADMIN — ROCURONIUM BROMIDE 50 MG: 10 INJECTION INTRAVENOUS at 09:57

## 2017-06-07 RX ADMIN — LIDOCAINE HYDROCHLORIDE 100 MG: 20 INJECTION, SOLUTION INFILTRATION; PERINEURAL at 09:57

## 2017-06-07 RX ADMIN — CISATRACURIUM BESYLATE 2 MG: 2 INJECTION INTRAVENOUS at 11:14

## 2017-06-07 RX ADMIN — FENTANYL CITRATE 50 MCG: 50 INJECTION INTRAMUSCULAR; INTRAVENOUS at 14:21

## 2017-06-07 RX ADMIN — ONDANSETRON 4 MG: 2 INJECTION INTRAMUSCULAR; INTRAVENOUS at 13:20

## 2017-06-07 RX ADMIN — CEFUROXIME 1.5 G: 1.5 INJECTION, POWDER, FOR SOLUTION INTRAVENOUS at 10:00

## 2017-06-07 RX ADMIN — MYCOPHENOLATE MOFETIL 750 MG: 250 CAPSULE ORAL at 18:15

## 2017-06-07 RX ADMIN — DEXTROSE MONOHYDRATE 1000 MG: 50 INJECTION, SOLUTION INTRAVENOUS at 10:16

## 2017-06-07 RX ADMIN — MANNITOL 12.5 G: 12.5 INJECTION, SOLUTION INTRAVENOUS at 11:35

## 2017-06-07 RX ADMIN — HYDROMORPHONE HYDROCHLORIDE: 10 INJECTION, SOLUTION INTRAMUSCULAR; INTRAVENOUS; SUBCUTANEOUS at 14:24

## 2017-06-07 RX ADMIN — CISATRACURIUM BESYLATE 4 MG: 2 INJECTION INTRAVENOUS at 10:40

## 2017-06-07 RX ADMIN — SODIUM CHLORIDE 1000 ML: 9 INJECTION, SOLUTION INTRAVENOUS at 13:56

## 2017-06-07 RX ADMIN — SODIUM CHLORIDE: 9 INJECTION, SOLUTION INTRAVENOUS at 10:30

## 2017-06-07 RX ADMIN — CALCIUM CHLORIDE 500 MG: 100 INJECTION, SOLUTION INTRAVENOUS at 10:19

## 2017-06-07 RX ADMIN — GLYCOPYRROLATE 0.8 MG: 0.2 INJECTION, SOLUTION INTRAMUSCULAR; INTRAVENOUS at 13:19

## 2017-06-07 RX ADMIN — CALCIUM CHLORIDE 500 MG: 100 INJECTION, SOLUTION INTRAVENOUS at 11:23

## 2017-06-07 RX ADMIN — DEXTROSE AND SODIUM CHLORIDE: 5; 900 INJECTION, SOLUTION INTRAVENOUS at 13:52

## 2017-06-07 RX ADMIN — PROPOFOL 200 MG: 10 INJECTION, EMULSION INTRAVENOUS at 09:51

## 2017-06-07 RX ADMIN — SODIUM CHLORIDE: 9 INJECTION, SOLUTION INTRAVENOUS at 11:13

## 2017-06-07 RX ADMIN — CISATRACURIUM BESYLATE 2 MG: 2 INJECTION INTRAVENOUS at 11:44

## 2017-06-07 RX ADMIN — CISATRACURIUM BESYLATE 2 MG: 2 INJECTION INTRAVENOUS at 12:32

## 2017-06-07 RX ADMIN — Medication 10 MG: at 14:27

## 2017-06-07 RX ADMIN — CALCIUM GLUCONATE 50 ML/HR: 94 INJECTION, SOLUTION INTRAVENOUS at 21:16

## 2017-06-07 RX ADMIN — DEXTROSE MONOHYDRATE AND SODIUM CHLORIDE: 5; .45 INJECTION, SOLUTION INTRAVENOUS at 12:46

## 2017-06-07 RX ADMIN — CISATRACURIUM BESYLATE 2 MG: 2 INJECTION INTRAVENOUS at 12:47

## 2017-06-07 RX ADMIN — SODIUM CHLORIDE: 9 INJECTION, SOLUTION INTRAVENOUS at 09:45

## 2017-06-07 RX ADMIN — ANTI-THYMOCYTE GLOBULIN (RABBIT) 200 MG: 5 INJECTION, POWDER, LYOPHILIZED, FOR SOLUTION INTRAVENOUS at 10:12

## 2017-06-07 RX ADMIN — FENTANYL CITRATE 100 MCG: 50 INJECTION, SOLUTION INTRAMUSCULAR; INTRAVENOUS at 09:42

## 2017-06-07 RX ADMIN — MIDAZOLAM HYDROCHLORIDE 2 MG: 1 INJECTION, SOLUTION INTRAMUSCULAR; INTRAVENOUS at 09:42

## 2017-06-07 RX ADMIN — SODIUM CHLORIDE 1000 ML: 4.5 INJECTION, SOLUTION INTRAVENOUS at 22:14

## 2017-06-07 RX ADMIN — Medication 5 MG: at 11:38

## 2017-06-07 RX ADMIN — SODIUM CHLORIDE: 9 INJECTION, SOLUTION INTRAVENOUS at 10:10

## 2017-06-07 RX ADMIN — ALBUMIN (HUMAN): 12.5 SOLUTION INTRAVENOUS at 11:19

## 2017-06-07 ASSESSMENT — PAIN DESCRIPTION - DESCRIPTORS
DESCRIPTORS: ACHING;SORE
DESCRIPTORS: SORE
DESCRIPTORS: ACHING;SORE

## 2017-06-07 NOTE — OP NOTE
Transplant Surgery  Operative Note     Procedure date:  06/07/17    Preoperative diagnosis:  End Stage renal failure due to IgA Nephropathy    Postoperative diagnosis:  Same,     Procedure:  1. Left kidney  transplant,  Living, Right  iliac fossa, without vascular reconstruction. A J-J ureteral stent was not placed.  2. Kidney allograft preparation on Back Table      Surgeon:  TRACY PEDRAZA    Fellow/Assistant:  Radha Cohen, fellow, [place] [employer] resident     Anesthesia:  General    Specimen:  Renal biopsy    Drains:  no drain    Urine output:  800 mls    Estimated blood loss:  100    Fluids administered:    Fluid Amount   Crystalloid (mL) 2400   Colloid (mL) 250        Indication: The patient has End Stage renal failure due to IgA Nephropathy and received an organ offer for a Living kidney allograft. After discussing the risks and benefits of proceeding, the patient agreed to proceed with surgery and provided informed consent.  Findings: Integrity of recipient artery: Normal   Intraoperative Events: None,     Final ABO/Crossmatch verification: After the donor organ arrived to the operating room and prior to anastomosis, I participated in the transplant pre-verification upon organ receipt timeout by visually verifying the donor ID, organ and laterality, donor blood type, recipient unique identifier, recipient blood type, and that the donor and recipient are blood type compatible. The crossmatch was done prospectively; the T cell flow crossmatch result was negative and B cell flow crossmatch result was negative prior to anastomosis.  The patient received Thymoglobulin on induction.    Donor Organ Information:   Donor UNOS ID:  [id number]    Donor arterial clamp on:  6/7/2017 10:11 AM    Total ischemic time:  97 min    Cold ischemic time:  59 min    Warm ischemic time:  38 min    Preservation fluid:  HTK      Back Table Details:   Procedure:  Bench preparation of the kidney allograft for transplantation  without vascular reconstruction    Surgeon:  TRACY PEDRAZA    Faculty Co-Surgeon:  TRACY PEDRAZA    Fellow/Assistant:  Radha Cohen, fellow, [employer] resident     Donor arrival to recipient room:  6/7/2017 10:25 AM    Graft injury:  No    Graft biopsy:  yes    Organ received on:  Ice    Pump resistance:      Pump flow:      Arterial anatomy:  Single    Donor arterial quality:  Normal    Venous anatomy:  Double    Ureteral anatomy:  Single    Any reconstruction:  No    Artery:  none    Vein:  none     Complications: None.    Findings: Normal          Back Table Preparation:  The donor kidney was received and inspected. It had been flushed with HTK. The graft was prepared on the back table by removing perinephric fat and ligating venous tributaries and lymphatics. The ureter was also cleaned of excess tissue. The second renal vein connected with the anterior vein and was left stapled.  Sufficient outflow was present from the anterior renal vein. The kidney was stored in iced cold preservation solution until ready for transplantation. Faculty was present for the procedure.    Operative Procedure:   Arterial anastomosis start:  6/7/2017 11:14 AM    Arterial unclamp:  6/7/2017 11:48 AM    Extra vessels used:   no      The patient was brought to the operating room, placed in a supine position, and a time out was performed. Sequential compression devices were placed on both lower extremities and general endotracheal anesthesia was induced.  The patient was given IV antibiotics and a Shaikh catheter. A central line was placed by Anesthesia service. The abdomen was then shaved, prepped, and draped in the usual sterile fashion.  An incision was made in the right lower quadrant and carried down through the subcutaneous tissue and the abdominal wall fascia. If encountered, the epigastric vessels were ligated in continuity, divided and secured with surgical clips. The right iliac artery and vein were  exposed. The retractor system was placed and the lymphatics overlying the vessels were serially ligated and divided.     The patient was not heparinized. We applied atraumatic vascular clamps and the donor kidney was brought to the operative field. We made a venotomy and the anterior renal vein was anastomosed to the recipient right External Iliac vein in an end-to-side fashion. An arteriotomy was made and the donor renal artery was anastomosed to the recipient right external iliac artery  in an end to side fashion. The patient was simultaneously loaded with IV mannitol, Lasix and volume. The renal artery was protected and the clamps were removed. After several cardiac cycles, we opened the renal artery and the kidney had Good reperfusion and was firm.    The transplant ureter was managed by creating a Liche (anterior multistitch) anastomosis with absorbable suture. No The kidney made Yes urine prior to implantation.    Hemostasis was obtained, the anastomoses inspected, and the kidney placed in the iliac fossa. After placement, the vessel lay was inspected and found to be acceptable. The kidney position was Retroperitoneal. The field was irrigated with antibiotic solution. No drain was placed. The retractor was removed and the abdominal wall fascia reapproximated. Subcutaneous tissues were irrigated and hemostasis obtained.  The skin was reapproximated with running subcuticular stitch and dermabond was applied.   All needle, sponge and instrument counts were correct x 2. The patient was awakened, extubated, and transferred to PACU for post-op monitoring. Faculty was present for key portions of the procedure.

## 2017-06-07 NOTE — ANESTHESIA PREPROCEDURE EVALUATION
Anesthesia Evaluation     .             ROS/MED HX    ENT/Pulmonary:  - neg pulmonary ROS     Neurologic:  - neg neurologic ROS     Cardiovascular:     (+) hypertension-range: renal, ---. : . . . :. .       METS/Exercise Tolerance:     Hematologic:  - neg hematologic  ROS       Musculoskeletal:  - neg musculoskeletal ROS       GI/Hepatic:  - neg GI/hepatic ROS       Renal/Genitourinary:     (+) chronic renal disease, type: ESRD, Pt requires dialysis, type: Hemodialysis, Pt has history of transplant, date: today,       Endo:         Psychiatric:  - neg psychiatric ROS       Infectious Disease:  - neg infectious disease ROS       Malignancy:      - no malignancy   Other:    (+) No chance of pregnancy C-spine cleared: N/A, no H/O Chronic Pain,no other significant disability   - neg other ROS                 Physical Exam  Normal systems: cardiovascular and dental    Airway   Mallampati: II  TM distance: >3 FB  Neck ROM: full    Dental     Cardiovascular   Rhythm and rate: regular      Pulmonary    breath sounds clear to auscultation                    Anesthesia Plan      History & Physical Review  History and physical reviewed and following examination; no interval change.    ASA Status:  2 .    NPO Status:  > 8 hours    Plan for General with Intravenous induction. Maintenance will be Balanced.    PONV prophylaxis:  Ondansetron (or other 5HT-3)  Additional equipment: 2nd IV and Central Line I have examined the patient and reviewed the chart.  The patient has ESRD secondary to IgA nephropathy.  He is dialyzed through a tunneled Right IJ catheter.  Last dialyzed yesterday.  Only anesthetic exposure is wisdom teeth (catheter under MAC)  K 5.2, Hgb 13.1  Plan GETA with routine monitors plus ClearSite  CVP through tunneled port vs new central line.  Discussed with patient and his family.  They agree to proceed.    Jama Mckeon MD        Postoperative Care  Postoperative pain management:  IV analgesics.       Consents  Anesthetic plan, risks, benefits and alternatives discussed with:  Patient..                          .

## 2017-06-07 NOTE — PROGRESS NOTES
"CLINICAL NUTRITION SERVICES - ASSESSMENT NOTE     Nutrition Prescription    RECOMMENDATIONS FOR MDs/PROVIDERS TO ORDER:  - Encourage PO intakes, monitor potassium    Malnutrition Status:    -Patient does not meet two of the above criteria necessary for diagnosing malnutrition    Recommendations already ordered by Registered Dietitian (RD):  Discharge diet order written.       Future/Additional Recommendations:  If suspect eating poorly, would offer supplements and start on kcal cnts.        REASON FOR ASSESSMENT  Golden Stover is a 21 year old male seen by the dietitian for MD Order- Assess & Educate post-op SOT    NUTRITION HISTORY  Per chart: Left kidney  transplant,  Living, Right  iliac fossa, without vascular reconstruction. A J-J ureteral stent was not placed    CURRENT NUTRITION ORDERS  Diet: Clear Liquid  Intake/Tolerance: PO intakes were normal/good over the past month+. Pt states he was following a renal diet at home and was overall doing well.    LABS  K: 5.8 (H), BUN: 37 (H), Cr: 6.69 (H), GFR: 10 (L) Labs reviewed    MEDICATIONS  Cellcept, Medications reviewed    ANTHROPOMETRICS  Height: 188 cm (6' 2.016\")  Most Recent Weight: 101.1 kg (222 lb 14.2 oz)    IBW: 86.4 kg  BMI: Overweight BMI 25-29.9  Weight History: Pt has had ~ 3% wt loss in the last 7 month  Wt Readings from Last 10 Encounters:   06/07/17 101.1 kg (222 lb 14.2 oz)   06/01/17 101.1 kg (222 lb 12.8 oz)   12/07/16 104.5 kg (230 lb 4.8 oz)     Dosing Weight: 101 kg     ASSESSED NUTRITION NEEDS:  Estimated Energy Needs: 2313-6799 kcals (30-35 Kcal/Kg)  Justification: increased needs post-op SOT  Estimated Protein Needs: 131-151 grams protein (1.3-2 gm/Kg)  Justification: increased needs post op SOT  Estimated Fluid Needs: 8058-4532 mL (25-30 mL/Kg)  Justification: maintenance, or per MD pending fluid status and adequate UOP    PHYSICAL FINDINGS  See malnutrition section below.    MALNUTRITION  % Intake: No decreased intake noted  % " Weight Loss: Weight loss does not meet criteria  Subcutaneous Fat Loss: None observed  Muscle Loss: None observed  Fluid Accumulation/Edema: None noted  Malnutrition Diagnosis: Patient does not meet two of the above criteria necessary for diagnosing malnutrition    NUTRITION DIAGNOSIS:  Food and nutrition-related knowledge deficit r/t length of time since previous post-transplant education AEB patient verbal report, review of chart record, and MD consult for nutrition education.     INTERVENTIONS  Implementation  Nutrition Education:  1. Provided instruction on post-transplant diet with discussion regarding protein sources and high protein needs in acute post-tx phase.  Reviewed recommendations to follow low fat/low sodium diet long term and discussed heart healthy diet tips.  Discussed monitoring of K+/Phos lab values with possible need for adjustment of these in the diet as necessary. Reviewed need for food safety precautions to prevent food borne illness.    2. Provided & reviewed handout: Post-transplant diet guidelines. Patient receptive to information provided. Expected diet compliance is good.     Goals  1. Patient will verbalize understanding of 3 important aspects of post-transplant diet guidelines.   2. PO intake >50% meals TID.    Monitoring/Evaluation  Progress toward goals will be monitored and evaluated per protocol.      Janneth Glasgow RD, MS, LD  6B- Pager: [phone]

## 2017-06-07 NOTE — OR NURSING
Dr. Cohen updated on lab results, I&O's, and current vitals.  40mg of Lasix ordered and administered.

## 2017-06-08 ENCOUNTER — DOCUMENTATION ONLY (OUTPATIENT)
Dept: TRANSPLANT | Facility: CLINIC | Age: 22
End: 2017-06-08

## 2017-06-08 LAB
ANION GAP SERPL CALCULATED.3IONS-SCNC: 8 MMOL/L (ref 3–14)
ANION GAP SERPL CALCULATED.3IONS-SCNC: 9 MMOL/L (ref 3–14)
BASOPHILS # BLD AUTO: 0 10E9/L (ref 0–0.2)
BASOPHILS NFR BLD AUTO: 0 %
BUN SERPL-MCNC: 41 MG/DL (ref 7–30)
BUN SERPL-MCNC: 41 MG/DL (ref 7–30)
CALCIUM SERPL-MCNC: 8.8 MG/DL (ref 8.5–10.1)
CALCIUM SERPL-MCNC: 8.9 MG/DL (ref 8.5–10.1)
CHLORIDE SERPL-SCNC: 103 MMOL/L (ref 94–109)
CHLORIDE SERPL-SCNC: 107 MMOL/L (ref 94–109)
CO2 SERPL-SCNC: 24 MMOL/L (ref 20–32)
CO2 SERPL-SCNC: 24 MMOL/L (ref 20–32)
CREAT SERPL-MCNC: 4.03 MG/DL (ref 0.66–1.25)
CREAT SERPL-MCNC: 5.41 MG/DL (ref 0.66–1.25)
DIFFERENTIAL METHOD BLD: ABNORMAL
EOSINOPHIL # BLD AUTO: 0 10E9/L (ref 0–0.7)
EOSINOPHIL NFR BLD AUTO: 0.1 %
ERYTHROCYTE [DISTWIDTH] IN BLOOD BY AUTOMATED COUNT: 15 % (ref 10–15)
GFR SERPL CREATININE-BSD FRML MDRD: 13 ML/MIN/1.7M2
GFR SERPL CREATININE-BSD FRML MDRD: 19 ML/MIN/1.7M2
GLUCOSE BLDC GLUCOMTR-MCNC: 101 MG/DL (ref 70–99)
GLUCOSE BLDC GLUCOMTR-MCNC: 109 MG/DL (ref 70–99)
GLUCOSE BLDC GLUCOMTR-MCNC: 112 MG/DL (ref 70–99)
GLUCOSE BLDC GLUCOMTR-MCNC: 119 MG/DL (ref 70–99)
GLUCOSE BLDC GLUCOMTR-MCNC: 121 MG/DL (ref 70–99)
GLUCOSE BLDC GLUCOMTR-MCNC: 123 MG/DL (ref 70–99)
GLUCOSE BLDC GLUCOMTR-MCNC: 124 MG/DL (ref 70–99)
GLUCOSE BLDC GLUCOMTR-MCNC: 126 MG/DL (ref 70–99)
GLUCOSE BLDC GLUCOMTR-MCNC: 127 MG/DL (ref 70–99)
GLUCOSE BLDC GLUCOMTR-MCNC: 127 MG/DL (ref 70–99)
GLUCOSE BLDC GLUCOMTR-MCNC: 142 MG/DL (ref 70–99)
GLUCOSE SERPL-MCNC: 116 MG/DL (ref 70–99)
GLUCOSE SERPL-MCNC: 141 MG/DL (ref 70–99)
HCT VFR BLD AUTO: 37.6 % (ref 40–53)
HGB BLD-MCNC: 11.1 G/DL (ref 13.3–17.7)
HGB BLD-MCNC: 11.1 G/DL (ref 13.3–17.7)
HGB BLD-MCNC: 11.5 G/DL (ref 13.3–17.7)
HGB BLD-MCNC: 11.6 G/DL (ref 13.3–17.7)
IMM GRANULOCYTES # BLD: 0 10E9/L (ref 0–0.4)
IMM GRANULOCYTES NFR BLD: 0.2 %
LYMPHOCYTES # BLD AUTO: 0.1 10E9/L (ref 0.8–5.3)
LYMPHOCYTES NFR BLD AUTO: 0.8 %
MAGNESIUM SERPL-MCNC: 1.6 MG/DL (ref 1.6–2.3)
MCH RBC QN AUTO: 29.4 PG (ref 26.5–33)
MCHC RBC AUTO-ENTMCNC: 30.9 G/DL (ref 31.5–36.5)
MCV RBC AUTO: 95 FL (ref 78–100)
MONOCYTES # BLD AUTO: 0.3 10E9/L (ref 0–1.3)
MONOCYTES NFR BLD AUTO: 2.4 %
NEUTROPHILS # BLD AUTO: 13.3 10E9/L (ref 1.6–8.3)
NEUTROPHILS NFR BLD AUTO: 96.5 %
NRBC # BLD AUTO: 0 10*3/UL
NRBC BLD AUTO-RTO: 0 /100
PHOSPHATE SERPL-MCNC: 3.8 MG/DL (ref 2.5–4.5)
PLATELET # BLD AUTO: 217 10E9/L (ref 150–450)
POTASSIUM SERPL-SCNC: 5 MMOL/L (ref 3.4–5.3)
POTASSIUM SERPL-SCNC: 5.2 MMOL/L (ref 3.4–5.3)
POTASSIUM SERPL-SCNC: 5.3 MMOL/L (ref 3.4–5.3)
POTASSIUM SERPL-SCNC: 5.7 MMOL/L (ref 3.4–5.3)
RBC # BLD AUTO: 3.94 10E12/L (ref 4.4–5.9)
SODIUM SERPL-SCNC: 135 MMOL/L (ref 133–144)
SODIUM SERPL-SCNC: 139 MMOL/L (ref 133–144)
WBC # BLD AUTO: 13.8 10E9/L (ref 4–11)

## 2017-06-08 PROCEDURE — 84100 ASSAY OF PHOSPHORUS: CPT | Performed by: TRANSPLANT SURGERY

## 2017-06-08 PROCEDURE — 25000132 ZZH RX MED GY IP 250 OP 250 PS 637: Performed by: TRANSPLANT SURGERY

## 2017-06-08 PROCEDURE — 84132 ASSAY OF SERUM POTASSIUM: CPT | Performed by: NURSE PRACTITIONER

## 2017-06-08 PROCEDURE — 80048 BASIC METABOLIC PNL TOTAL CA: CPT | Performed by: NURSE PRACTITIONER

## 2017-06-08 PROCEDURE — 12000025 ZZH R&B TRANSPLANT INTERMEDIATE

## 2017-06-08 PROCEDURE — 85018 HEMOGLOBIN: CPT | Performed by: TRANSPLANT SURGERY

## 2017-06-08 PROCEDURE — 25000128 H RX IP 250 OP 636: Performed by: TRANSPLANT SURGERY

## 2017-06-08 PROCEDURE — 25000131 ZZH RX MED GY IP 250 OP 636 PS 637: Mod: GY | Performed by: TRANSPLANT SURGERY

## 2017-06-08 PROCEDURE — 25000125 ZZHC RX 250: Performed by: TRANSPLANT SURGERY

## 2017-06-08 PROCEDURE — 00000146 ZZHCL STATISTIC GLUCOSE BY METER IP

## 2017-06-08 PROCEDURE — 84132 ASSAY OF SERUM POTASSIUM: CPT | Performed by: TRANSPLANT SURGERY

## 2017-06-08 PROCEDURE — 85018 HEMOGLOBIN: CPT | Performed by: NURSE PRACTITIONER

## 2017-06-08 PROCEDURE — 25000132 ZZH RX MED GY IP 250 OP 250 PS 637: Performed by: SURGERY

## 2017-06-08 PROCEDURE — A9270 NON-COVERED ITEM OR SERVICE: HCPCS | Mod: GY | Performed by: NURSE PRACTITIONER

## 2017-06-08 PROCEDURE — 85025 COMPLETE CBC W/AUTO DIFF WBC: CPT | Performed by: TRANSPLANT SURGERY

## 2017-06-08 PROCEDURE — 80048 BASIC METABOLIC PNL TOTAL CA: CPT | Performed by: TRANSPLANT SURGERY

## 2017-06-08 PROCEDURE — 83735 ASSAY OF MAGNESIUM: CPT | Performed by: TRANSPLANT SURGERY

## 2017-06-08 PROCEDURE — 25000132 ZZH RX MED GY IP 250 OP 250 PS 637: Performed by: NURSE PRACTITIONER

## 2017-06-08 PROCEDURE — A9270 NON-COVERED ITEM OR SERVICE: HCPCS | Mod: GY | Performed by: TRANSPLANT SURGERY

## 2017-06-08 RX ORDER — VALGANCICLOVIR 450 MG/1
450 TABLET, FILM COATED ORAL EVERY OTHER DAY
Status: DISCONTINUED | OUTPATIENT
Start: 2017-06-10 | End: 2017-06-09

## 2017-06-08 RX ORDER — ACETAMINOPHEN 325 MG/1
650 TABLET ORAL ONCE
Status: COMPLETED | OUTPATIENT
Start: 2017-06-08 | End: 2017-06-08

## 2017-06-08 RX ORDER — DIPHENHYDRAMINE HCL 12.5MG/5ML
25-50 LIQUID (ML) ORAL ONCE
Status: COMPLETED | OUTPATIENT
Start: 2017-06-08 | End: 2017-06-08

## 2017-06-08 RX ORDER — DIPHENHYDRAMINE HCL 25 MG
25-50 CAPSULE ORAL ONCE
Status: COMPLETED | OUTPATIENT
Start: 2017-06-08 | End: 2017-06-08

## 2017-06-08 RX ORDER — LORAZEPAM 2 MG/ML
0.5 INJECTION INTRAMUSCULAR ONCE
Status: DISCONTINUED | OUTPATIENT
Start: 2017-06-08 | End: 2017-06-09 | Stop reason: CLARIF

## 2017-06-08 RX ORDER — ACETAMINOPHEN 325 MG/1
325 TABLET ORAL EVERY 6 HOURS
Status: DISCONTINUED | OUTPATIENT
Start: 2017-06-08 | End: 2017-06-12 | Stop reason: HOSPADM

## 2017-06-08 RX ORDER — HYDROXYZINE HCL 10 MG/5 ML
25 SOLUTION, ORAL ORAL EVERY 6 HOURS PRN
Status: DISCONTINUED | OUTPATIENT
Start: 2017-06-08 | End: 2017-06-12 | Stop reason: HOSPADM

## 2017-06-08 RX ORDER — DIPHENHYDRAMINE HCL 25 MG
25 CAPSULE ORAL EVERY 6 HOURS PRN
Status: DISCONTINUED | OUTPATIENT
Start: 2017-06-08 | End: 2017-06-08

## 2017-06-08 RX ORDER — OXYCODONE HYDROCHLORIDE 5 MG/1
5-10 TABLET ORAL EVERY 4 HOURS PRN
Status: DISCONTINUED | OUTPATIENT
Start: 2017-06-08 | End: 2017-06-12 | Stop reason: HOSPADM

## 2017-06-08 RX ORDER — DIPHENHYDRAMINE HCL 25 MG
50 CAPSULE ORAL EVERY 6 HOURS PRN
Status: DISCONTINUED | OUTPATIENT
Start: 2017-06-08 | End: 2017-06-08

## 2017-06-08 RX ADMIN — DIPHENHYDRAMINE HYDROCHLORIDE 50 MG: 25 CAPSULE ORAL at 13:25

## 2017-06-08 RX ADMIN — OXYCODONE HYDROCHLORIDE 10 MG: 5 TABLET ORAL at 10:03

## 2017-06-08 RX ADMIN — ACETAMINOPHEN 325 MG: 325 TABLET, FILM COATED ORAL at 08:26

## 2017-06-08 RX ADMIN — FUROSEMIDE 40 MG: 10 INJECTION, SOLUTION INTRAVENOUS at 20:36

## 2017-06-08 RX ADMIN — MYCOPHENOLATE MOFETIL 750 MG: 250 CAPSULE ORAL at 19:01

## 2017-06-08 RX ADMIN — OXYCODONE HYDROCHLORIDE 10 MG: 5 TABLET ORAL at 20:36

## 2017-06-08 RX ADMIN — HYDROXYZINE HYDROCHLORIDE 25 MG: 10 SYRUP ORAL at 18:40

## 2017-06-08 RX ADMIN — ANTI-THYMOCYTE GLOBULIN (RABBIT) 200 MG: 5 INJECTION, POWDER, LYOPHILIZED, FOR SOLUTION INTRAVENOUS at 11:29

## 2017-06-08 RX ADMIN — ACETAMINOPHEN 325 MG: 325 TABLET, FILM COATED ORAL at 20:36

## 2017-06-08 RX ADMIN — METHYLPREDNISOLONE SODIUM SUCCINATE 250 MG: 125 INJECTION, POWDER, LYOPHILIZED, FOR SOLUTION INTRAMUSCULAR; INTRAVENOUS at 09:37

## 2017-06-08 RX ADMIN — DIPHENHYDRAMINE HYDROCHLORIDE 50 MG: 12.5 SOLUTION ORAL at 08:24

## 2017-06-08 RX ADMIN — DEXTROSE AND SODIUM CHLORIDE: 5; 900 INJECTION, SOLUTION INTRAVENOUS at 00:20

## 2017-06-08 RX ADMIN — ACETAMINOPHEN 650 MG: 325 TABLET, FILM COATED ORAL at 08:37

## 2017-06-08 RX ADMIN — SENNOSIDES AND DOCUSATE SODIUM 2 TABLET: 8.6; 5 TABLET ORAL at 08:26

## 2017-06-08 RX ADMIN — SODIUM CHLORIDE 1000 ML: 9 INJECTION, SOLUTION INTRAVENOUS at 05:09

## 2017-06-08 RX ADMIN — ACETAMINOPHEN 325 MG: 325 TABLET, FILM COATED ORAL at 13:31

## 2017-06-08 RX ADMIN — SENNOSIDES AND DOCUSATE SODIUM 2 TABLET: 8.6; 5 TABLET ORAL at 20:36

## 2017-06-08 RX ADMIN — HYDROMORPHONE HYDROCHLORIDE: 10 INJECTION, SOLUTION INTRAMUSCULAR; INTRAVENOUS; SUBCUTANEOUS at 05:36

## 2017-06-08 RX ADMIN — MYCOPHENOLATE MOFETIL 750 MG: 250 CAPSULE ORAL at 08:37

## 2017-06-08 RX ADMIN — FUROSEMIDE 40 MG: 10 INJECTION, SOLUTION INTRAVENOUS at 08:26

## 2017-06-08 ASSESSMENT — PAIN DESCRIPTION - DESCRIPTORS
DESCRIPTORS: DULL
DESCRIPTORS: ACHING;SORE

## 2017-06-08 NOTE — PLAN OF CARE
"Problem: Goal Outcome Summary  Goal: Goal Outcome Summary  Outcome: Improving  /79  Pulse 87  Temp 100.1  F (37.8  C) (Oral)  Resp 18  Ht 1.88 m (6' 2.02\")  Wt 104 kg (229 lb 3.2 oz)  SpO2 95%  BMI 29.41 kg/m2     Neuro: A/Ox4.  Cardiac: SR with HR 70-90. VSS. Tmax 100.1          Respiratory: O2 sats stable on RA.   GI/: Shaikh intact with adequate UOP.   Diet/appetite: Clear liquid diet - advance as tolerated. Denies nausea.   Activity:  Ax 2. Pt stood at edge of bed for weight.   Pain: At acceptable level on current regimen. Increased pain with sitting at edge of bed and standing. Increased PCA to 0.3mg Q10min.   Skin: Incision CDI. No signs of infection.      MIV at 100mL/hr. Replacement #1 infusing over night. Replacement #2 infusing intermittently. K+ 6.6 last evening - started K cocktail infusing overnight. K+ currently 5.3. -153. Renal US at bedside last evening. -450mL/hr. CVP 7-13.      R: Continue with POC. Notify primary team with changes.      "

## 2017-06-08 NOTE — PROVIDER NOTIFICATION
Time of notification: 1:00PM  MD notified:Freda Jose, AMY CNP  Patient status: pt is itching, skin looks flushed  Temp:  [97.8  F (36.6  C)-100.1  F (37.8  C)] 98.6  F (37  C)  Heart Rate:  [] 97  Resp:  [16-26] 18  BP: (117-155)/(62-96) 129/80  SpO2:  [93 %-100 %] 94 %  Orders received: Md will address concern and review chart, no further order given.

## 2017-06-08 NOTE — PLAN OF CARE
Problem: Goal Outcome Summary  Goal: Goal Outcome Summary  Outcome: No Change  Neuro: A&Ox4.   Cardiac: SR. VSS.       Respiratory: Sating at 96%  on RA.  GI/: aldana in place - adequate urine output. BM X1  Diet/appetite: Tolerating regular diet- 3g K+ restriction.   Activity:  Assist of 1, up to chair and in halls.  Pain: At acceptable level on current regimen.   Skin:  Itchy skin, skin is appeared flushed this afternoon, MD notified, order to slow down the thymo and give benadryl  50mg. otherwise incision looks intact and approximate, no new deficits noted.      R: Plan to discharge patient from unit and transfer to . will  Continue with POC. Notify primary team with changes.

## 2017-06-08 NOTE — CONSULTS
"Nephrology Clinic    Golden Stover MRN:[id number] YOB: 1995  Date of Service: 06/08/2017  Primary care provider: No Ref-Primary, Physician  Requesting physician:  Dr. Silver Carcamo      REASON FOR CONSULT: Renal transplant    ASSESSMENT AND RECOMMENDATIONS:   Allograft function: IgA nephropathy s/p LDKT.  CRT downtrending thus far, >4L urine output, no significant electrolyte abn. Receiving thymoglobulin, steroid cellcept induction per protocol. On Cellcept, Prograf not yet started. Renal US today, cm-sized perinephric fluid collection, seroma v hematoma. +Shaikh catheter  CMV-/- EBV +/+ .  On Bactrim and Valcyte for PPx.    BMD:  Phos/ca are in good range. Supplement Magnesium if falls below 1.6 or per protocol.   , Vitamin D 72.  No intervention needed    Anemia: Mild, Hgb 11.1. Iron stores replete as of 6/1.    Volume/BP:  Was on 2 BP meds prior to transplant (Lisinopril and Nifedipine).  Currently held, BPs 120s-147/80s-108.  Treat with PRN for now.    Physician Attestation   I, Jak King, saw and evaluated Golden Stover as part of a shared visit.  I have reviewed and discussed with the advanced practice provider their history, physical and plan.    I personally reviewed the vital signs, medications and labs.    My key history or physical exam findings: Improving kidney function and good urine output.  Feels okay.    Key management decisions made by me: No acute indications for dialysis.  Continue with immunosuppression per protocol.    Jak King  Date of Service (when I saw the patient): 6/8/17    HISTORY OF PRESENT ILLNESS:   Golden Stover is a 21 year old male who POD 0-1 from living donor kidney txp from father.  ESRD 2/2 IgA nephropathy, cPRA 61% no DSA.  On HD for 8 months via R chest tunneled catheter.  Did not receive HD on day of txp, hyperkalemia to 6.6 responded well to intervention with Calcium gluconate, D5/insulin.  Made \"normal' amounts of " "urine prior to transplant. Good urine output and CRT improving.  Having incisional pain and itching this am, later in the day altered mental status possible steroid psychosis versus narcotic or benzo induced delirium.    PAST MEDICAL HISTORY:  Past Medical History:   Diagnosis Date     Anemia in chronic kidney disease 11/21/2016     End stage kidney disease (H) 11/21/2016     Hypertension 11/21/2016     IgA nephropathy 11/21/2016     Rhabdomyolysis 09/28/2016     Secondary renal hyperparathyroidism (H)      PAST SURGICAL HISTORY:  Past Surgical History:   Procedure Laterality Date     BIOPSY KIDNEY  2016     ENT SURGERY  2015    FABIOLA     MEDICATIONS:  Prescription Medications as of 6/8/2017             calcium acetate (PHOSLO) 667 MG CAPS capsule Take 2,001 mg by mouth 3 times daily (with meals)    VITAMIN E MTC PO Take 400 Units by mouth daily    lisinopril (PRINIVIL/ZESTRIL) 10 MG tablet 10 mg daily     NIFEdipine ER (ADALAT CC) 90 MG TB24 90 mg daily     SUPER B COMPLEX/C PO 1 tablet daily     Cholecalciferol (VITAMIN D3) 400 UNITS CAPS 1 capsule daily       Facility Administered Medications as of 6/8/2017             acetaminophen (TYLENOL) tablet 650 mg Take 2 tablets (650 mg) by mouth once    diphenhydrAMINE (BENADRYL) capsule 25-50 mg Take 1-2 capsules (25-50 mg) by mouth once    Linked Group 1:  \"Or\" Linked Group Details     diphenhydrAMINE (BENADRYL) solution 25-50 mg 10-20 mLs (25-50 mg) by Per NG tube route once    Linked Group 1:  \"Or\" Linked Group Details     methylPREDNISolone sodium succinate (solu-MEDROL) 250 mg in NaCl 0.9 % 50 mL intermittent infusion Inject 250 mg into the vein once    anti-thymocyte globulin (THYMOGLOBULIN - Rabbit) 200 mg in NaCl 0.9 % intermittent infusion 200 mg by Intravenous Central line route once    oxyCODONE (ROXICODONE) IR tablet 5-10 mg Take 1-2 tablets (5-10 mg) by mouth every 4 hours as needed for moderate to severe pain    acetaminophen (TYLENOL) tablet 325 mg Take " "1 tablet (325 mg) by mouth every 6 hours    valGANciclovir (VALCYTE) tablet 450 mg Starting on 6/10/2017. Take 1 tablet (450 mg) by mouth every other day    LORazepam (ATIVAN) injection 0.5 mg Inject 0.25 mLs (0.5 mg) into the vein once    hydrOXYzine (ATARAX) syrup 25 mg Take 12.5 mLs (25 mg) by mouth every 6 hours as needed for itching    diphenhydrAMINE (BENADRYL) capsule 25 mg (Discontinued) Take 1 capsule (25 mg) by mouth every 6 hours as needed for itching    diphenhydrAMINE (BENADRYL) capsule 50 mg (Discontinued) Take 2 capsules (50 mg) by mouth every 6 hours as needed for itching    sodium chloride (PF) 0.9% PF flush 10-20 mL 10-20 mLs by Intracatheter route every hour as needed for line flush or post meds or blood draw    sodium chloride (PF) 0.9% PF flush 10 mL 10 mLs by Intracatheter route every 8 hours    naloxone (NARCAN) injection 0.1-0.4 mg Inject 0.25-1 mLs (0.1-0.4 mg) into the vein every 2 minutes as needed for opioid reversal    dextrose 5% and 0.9% NaCl infusion Inject into the vein continuous    sulfamethoxazole-trimethoprim (BACTRIM/SEPTRA) 400-80 MG per tablet 1 tablet Starting on 6/9/2017. Take 1 tablet by mouth Every Mon, Wed, Fri Morning    mycophenolate (CELLCEPT - GENERIC EQUIVALENT) capsule 750 mg Take 3 capsules (750 mg) by mouth 2 times daily    furosemide (LASIX) injection 40 mg Inject 4 mLs (40 mg) into the vein 2 times daily    ondansetron (ZOFRAN-ODT) ODT tab 4 mg Take 1 tablet (4 mg) by mouth every 6 hours as needed for nausea    Linked Group 2:  \"Or\" Linked Group Details     ondansetron (ZOFRAN) injection 4 mg Inject 2 mLs (4 mg) into the vein every 6 hours as needed for nausea or vomiting    Linked Group 2:  \"Or\" Linked Group Details     prochlorperazine (COMPAZINE) injection 5-10 mg Inject 1-2 mLs (5-10 mg) into the vein every 6 hours as needed for nausea or vomiting    Linked Group 3:  \"Or\" Linked Group Details     prochlorperazine (COMPAZINE) tablet 5-10 mg Take 1-2 tablets " "(5-10 mg) by mouth every 6 hours as needed for nausea or vomiting    Linked Group 3:  \"Or\" Linked Group Details     senna-docusate (SENOKOT-S;PERICOLACE) 8.6-50 MG per tablet 1-2 tablet Take 1-2 tablets by mouth 2 times daily    glucose 40 % gel 15-30 g Take 15-30 g by mouth every 15 minutes as needed for low blood sugar    Linked Group 4:  \"Or\" Linked Group Details     dextrose 50 % injection 25-50 mL Inject 25-50 mLs into the vein every 15 minutes as needed for low blood sugar    Linked Group 4:  \"Or\" Linked Group Details     glucagon injection 1 mg Inject 1 mg Subcutaneous every 15 minutes as needed for low blood sugar (May repeat x 1 only)    Linked Group 4:  \"Or\" Linked Group Details     valGANciclovir (VALCYTE) tablet 450 mg (Discontinued) Take 1 tablet (450 mg) by mouth Once per day on Mon Thu    0.9% sodium chloride infusion (Discontinued) Inject 1,000 mLs into the vein continuous prn (For urine output up to 300 mL/hr, replace 1:1 MINUS MIV rate.  If Urine output for the hour is less than MIV rate, do not use replacement fluid (only MIV).)    0.45% sodium chloride infusion (Discontinued) Inject into the vein continuous prn (For urine output greater than 300 mL/hr, continue 0.9% and MIV to equal 300 mL, and in addition replace all remaining UO greater than 300 mL at 0.5 mL : 1mL with 0.45% NaCl.)    HYDROmorphone (DILAUDID) PCA 1 mg/mL (Discontinued) Inject into the vein PCA    sodium lactate 120 mEq, calcium gluconate 1 g, insulin regular (HumuLIN R/NovoLIN R) 14 Units in dextrose 20 % 500 mL (K+ COCKTAIL ADULT NON-DIABETIC FORMULA) infusion (Discontinued) Inject 50 mL/hr into the vein continuous    dextrose 10% infusion (Discontinued) Inject into the vein continuous    dextrose 50 % injection 25 g (Discontinued) Inject 50 mLs (25 g) into the vein once    insulin (regular) (HumuLIN R/NovoLIN R) injection 10 Units (Discontinued) Inject 10 mLs (10 Units) into the vein once         ALLERGIES:    No Known " "Allergies  REVIEW OF SYSTEMS:  A comprehensive review of systems was performed and found to be negative except as described here or above.   SOCIAL HISTORY:   Social History     Social History     Marital status: Single     Spouse name: N/A     Number of children: N/A     Years of education: N/A     Occupational History     Not on file.     Social History Main Topics     Smoking status: Never Smoker     Smokeless tobacco: Not on file     Alcohol use 0.6 oz/week     1 Standard drinks or equivalent per week     Drug use: No     Sexual activity: Not on file     Other Topics Concern     Not on file     Social History Narrative     FAMILY MEDICAL HISTORY:   Family History   Problem Relation Age of Onset     CANCER Paternal Grandfather      CANCER Maternal Grandfather      PHYSICAL EXAM:   /79 (BP Location: Left arm)  Pulse 87  Temp 98.6  F (37  C) (Oral)  Resp 18  Ht 1.88 m (6' 2.02\")  Wt 104 kg (229 lb 3.2 oz)  SpO2 94%  BMI 29.41 kg/m2  GENERAL APPEARANCE: alert and no distress  EYES: nonicteric  HENT: mouth without ulcers or lesions  NECK: supple, no adenopathy. Tunneled catheter in place, no sign of bleeding or infection  RESP: lungs clear to auscultation   CV: regular rhythm, normal rate, no rub  ABDOMEN: soft, nontender, hypoactive bowel sounds, no HSM.  Incision intact  Extremities: no clubbing, cyanosis, or edema  MS: no evidence of inflammation in joints, no muscle tenderness  SKIN: no rash  NEURO: mentation intact and speech normal  PSYCH: affect normal     LABS:   CMP  Recent Labs   Lab Test  06/08/17   1250  06/08/17   0530  06/08/17   0130  06/07/17   2230   06/07/17   1350   06/01/17   1310  12/07/16   0637   NA   --   139   --   136   --   138   --   139  139   POTASSIUM  5.2  5.3  5.7*  5.9*   < >  5.8*   < >  5.2  4.1   CHLORIDE   --   107   --   106   --   108   --   102  99   CO2   --   24   --   21   --   22   --   29  28   ANIONGAP   --   9   --   10   --   8   --   8  12   GLC   --   " 116*   --   148*   --   142*   --   69*  87   BUN   --   41*   --   44*   --   37*   --   27  24   CR   --   5.41*   --   6.46*   --   6.69*   --   5.82*  6.21*   GFRESTIMATED   --   13*   --   11*   --   10*   --   12*  11*   GFRESTBLACK   --   16*   --   13*   --   13*   --   15*  14*   ANTON   --   8.8   --   8.8   --   8.5   --   9.4  9.5   MAG   --   1.6   --    --    --   1.6   --    --    --    PHOS   --   3.8   --    --    --   3.3   --    --    --    PROTTOTAL   --    --    --    --    --    --    --   8.2  8.1   ALBUMIN   --    --    --    --    --    --    --   4.0  3.8   BILITOTAL   --    --    --    --    --    --    --   0.6  0.5   ALKPHOS   --    --    --    --    --    --    --   108  98   AST   --    --    --    --    --    --    --   17  18   ALT   --    --    --    --    --    --    --   28  31    < > = values in this interval not displayed.     CBC  Recent Labs   Lab Test  06/08/17   1000  06/08/17   0530  06/08/17   0130  06/07/17   2230   06/07/17   1350  06/01/17   1310  12/07/16   0637   HGB  11.1*  11.6*  11.5*  11.6*   < >  11.3*  13.1*  12.4*   WBC   --   13.8*   --    --    --   6.6  5.3  6.1   RBC   --   3.94*   --    --    --   3.80*  4.35*  4.26*   HCT   --   37.6*   --    --    --   35.6*  41.3  38.4*   MCV   --   95   --    --    --   94  95  90   MCH   --   29.4   --    --    --   29.7  30.1  29.1   MCHC   --   30.9*   --    --    --   31.7  31.7  32.3   RDW   --   15.0   --    --    --   15.1*  15.0  13.9   PLT   --   217   --    --    --   212  354  434    < > = values in this interval not displayed.     INR  Recent Labs   Lab Test  12/07/16   0637   INR  0.89   PTT  27     ABGNo lab results found.   URINE STUDIES  Recent Labs   Lab Test  06/01/17   1303  12/07/16   0622   COLOR  Yellow  Yellow   APPEARANCE  Clear  Clear   URINEGLC  Negative  Negative   URINEBILI  Negative  Negative   URINEKETONE  Negative  Negative   SG  1.007  1.005   UBLD  Small*  Moderate*   URINEPH  8.0*  6.0    PROTEIN  >499*  >500*   NITRITE  Negative  Negative   LEUKEST  Negative  Negative   RBCU  10*  12*   WBCU  1  2     No lab results found.  PTH  Recent Labs   Lab Test  06/01/17   1310   PTHI  367*     IRON STUDIES  Recent Labs   Lab Test  06/01/17   1310   IRON  65   FEB  304   IRONSAT  21   GARY  310     IMAGING:  Imaging studies reviewed.    Bernadette Merino PA-C

## 2017-06-08 NOTE — PROGRESS NOTES
Care Coordinator Progress Note     Admission Date/Time:  6/7/2017  Attending MD:  Martell Robert MD     Data  Chart reviewed, discussed with interdisciplinary team.   Patient was admitted for:    IgA nephropathy  Living-donor kidney transplant recipient.    Concerns with insurance coverage for discharge needs: None identified  Current Living Situation: Patient recently moved to [place]  Support System: Page ECHEVERRIA is Primary Caregiver  Services Involved:   Transportation: Family  Barriers to Discharge: None identified    Coordination of Care and Referrals: Provided patient/family with options for Home Care       Assessment  Pt s/p Living Related Kidney Transplant 6/7/17, anticipated discharge 2-3 days. I have met with Pt and his SOPage to assist with discharge planning. Pt aware of daily ATC Clinic visits starting the morning after discharge. Pt plans on staying at his Fathers home in [facility] post discharge until ATC Clinic visits are completed. Home Care follow up in [place] discussed which Pt states he will consider.  Pt currently has no outstanding questions or concerns.  CC will follow.       Plan  Anticipated Discharge Date:  3-4 days  Anticipated Discharge Plan:  Pt will stay at Fathers home in [place] post discharge until daily ATC Clinic visits are completed.    Frieda Marie RN   6B care coordinator #[phone]

## 2017-06-08 NOTE — PLAN OF CARE
Problem: Goal Outcome Summary  Goal: Goal Outcome Summary  Neuro: A&Ox4. Somewhat lethargic  Cardiac: SR with HR 70-90s. NP 130s/80s    Respiratory: Sating 100% on 1L 02. Educated pt on incentive spirometer.  GI/: Adequate pink urine output via aldana cath with MIVF and replacements #1 going. Hypoactive bowel sounds.  Diet/appetite: Tolerating clear liquids diet. Had 1000mL in already with slight nausea which has now resolved.  Activity:  Get up to chair tonight.  Pain: dilaudid pca with tolerable pain control.  Skin: abdominal incision with dermabond.     R: Continue with POC. Notify primary team with changes. Monitor vs and electrolytes. Get patient out of bed. Control pain.

## 2017-06-08 NOTE — PHARMACY-IMMUNOSUPPRESSION MONITORING
Adult Kidney Transplant Post Operative Note    HPI: 21 year old male s/p living donor kidney transplant on 6/7/17 for ESKD 2/2 IgA nephropathy.      Planned immunosuppression regimen per kidney transplant protocol:  INDUCTION: thymoglobulin to total ~ 6mg/kg and methylprednisolone IV daily x 3 doses used as pre-med for thymoglobulin.  MAINTENANCE:  mycophenolate and tacrolimus with goal trough levels of 8-10 mcg/L for first 6 months post-transplant.    Opportunistic pathogen prophylaxis includes: trimethoprim/sulfamethoxazole (to start 6/9) and valganciclovir (to start 6/10).     Patient is not enrolled in medication study at this time.     Pharmacy will monitor for medication interactions and immunosuppression levels in conjunction with the team. Medication therapy needs for discharge planning will continue to be addressed throughout the current admission via multidisciplinary rounds and order review.  Pharmacy will make recommendations as appropriate.    Thanks,   Shawanda Lagunas, PharmD Student

## 2017-06-08 NOTE — PROVIDER NOTIFICATION
Time of notification: 3:45 PM  MD notified: Freda Jose (Nurse Practitioner)  Patient status: Change of mental status. Patient suddenly became disoriented to time, place and situation. Pt is extremely anxious and agitated and was pulling at his IV's and tubing, demanding to walk and refusing to sit down. Heart rate and blood pressure elevated. NP and MD resident at bedside.   Temp:  [98.4  F (36.9  C)-100.1  F (37.8  C)] 98.5  F (36.9  C)  Heart Rate:  [] 73  Resp:  [16-18] 18  BP: (127-147)/(62-88) 147/88  SpO2:  [94 %-96 %] 96 %  Orders received: Ativan, stat BMP and Hgb labs, atarax. Pt refused ativan.

## 2017-06-08 NOTE — PROGRESS NOTES
Transfer  Transferred from: pacu  Via:bed  Reason for transfer:Pt appropriate for 6B-kidney tx  Family: Aware of transfer and with family at bedside  Belongings: Received with pt  Chart: Received with pt  Medications: Meds received from old unit with pt    Pt status: Alert and oriented, minimal pain with pca pump, vs stable.

## 2017-06-08 NOTE — PROVIDER NOTIFICATION
-------------------CRITICAL LAB VALUE-------------------    Lab Value: Potassium 6.6  Time of notification: 7:38 PM  MD notified: Javier Hartman   Patient status: Hourly -300mL  Temp:  [97.8  F (36.6  C)-99.8  F (37.7  C)] 99.8  F (37.7  C)  Pulse:  [87] 87  Heart Rate:  [64-94] 87  Resp:  [16-26] 18  BP: (117-155)/(71-96) 138/73  SpO2:  [93 %-100 %] 95 %  Orders received: MD to come to bedside.

## 2017-06-08 NOTE — PROGRESS NOTES
Transplant Surgery  Inpatient Daily Progress Note  06/08/2017    Assessment & Plan:  22yo with hx of IgA nephropathy and HTN.  Underwent LDKT without stent on 6/7/17.    Graft function: POD #1.  Cr 6.7->5.4 with hyperkalemia overnight.  Good UOP.  US: normal doppler, small fl collection.  Immunosuppression management: Induction with thymoglobulin and steroid pulse.  Thymo:  200mg 6/7, 200mg today.  Methylpred:  500mg 6/7, 250mg 6/8  MMF:  750mg BID  Tacro: Start tomorrow.  Complexity of management:Medium. Contributing factors: induction  Hematology: Mild normocytic anemia, stable.  Cardiorespiratory: Hx HTN, monitor for need to restart antihypertensives.  GI/Nutrition: Change to low K diet.  Continue senna.  Endocrine: No acute issues  Fluid/Electrolytes: MIVF: St rate at 150ml/h.  Hyperkalemia, received K cocktail overnight.  K 5.3, recheck at noon.  : Shaikh to remain due to new surgical anastomosis  Infectious disease: Tmax 100.1F, monitor.  Prophylaxis: DVT, fall, valcyte, bactrim  Disposition: Transfer to      Medical Decision Making: Medium  Subsequent visit 29887 (moderate level decision making)    ASHLEIGH/Fellow/Resident Provider: Freda Jose NP [id number]    Faculty: Silver Carcamo M.D., Ph.D.  _________________________________________________________________  Transplant History:   6/7/2017 (Kidney), Postoperative day: 1     Interval History: History is obtained from the patient  Overnight events: Very itchy today.  No nausea.  Moderate incisional pain.    ROS:   A 10-point review of systems was negative except as noted above.    Meds:    anti-thymocyte globulin  200 mg Intravenous Central line Once     acetaminophen  325 mg Oral Q6H     [START ON 6/10/2017] valGANciclovir  450 mg Oral Every Other Day     sodium chloride (PF)  10 mL Intracatheter Q8H     [START ON 6/9/2017] sulfamethoxazole-trimethoprim  1 tablet Oral Q Mon Wed Fri AM     mycophenolate  750 mg Oral BID IS     furosemide  40 mg Intravenous BID      "senna-docusate  1-2 tablet Oral BID       Physical Exam:     Admit Weight: 101.1 kg (222 lb 14.2 oz)    Current vitals:   /70  Pulse 87  Temp 99  F (37.2  C) (Oral)  Resp 18  Ht 1.88 m (6' 2.02\")  Wt 104 kg (229 lb 3.2 oz)  SpO2 94%  BMI 29.41 kg/m2    CVP (mmHg): 9 mmHg    Vital sign ranges:    Temp:  [97.8  F (36.6  C)-100.1  F (37.8  C)] 99  F (37.2  C)  Heart Rate:  [] 98  Resp:  [16-26] 18  BP: (117-155)/(62-96) 128/70  SpO2:  [93 %-100 %] 94 %  Patient Vitals for the past 24 hrs:   BP Temp Temp src Heart Rate Resp SpO2 Weight   06/08/17 0700 128/70 99  F (37.2  C) Oral 98 18 94 % -   06/08/17 0600 130/79 100.1  F (37.8  C) Oral 86 18 95 % -   06/08/17 0550 - - - - - - 104 kg (229 lb 3.2 oz)   06/08/17 0500 134/83 - - 90 18 94 % -   06/08/17 0400 129/71 98.9  F (37.2  C) Oral 77 16 95 % -   06/08/17 0300 137/67 - - 76 16 95 % -   06/08/17 0200 128/73 - - 78 16 94 % -   06/08/17 0100 131/78 98.9  F (37.2  C) Oral 94 16 95 % -   06/08/17 0000 128/74 - - 79 16 95 % -   06/07/17 2300 130/72 98.4  F (36.9  C) Oral 82 16 96 % -   06/07/17 2200 132/68 98.7  F (37.1  C) Oral 95 18 95 % -   06/07/17 2100 133/81 - - 104 18 95 % -   06/07/17 2000 127/62 98.5  F (36.9  C) Oral 80 18 96 % -   06/07/17 1900 138/73 99.8  F (37.7  C) Oral 87 18 - -   06/07/17 1830 117/71 - - 84 18 95 % -   06/07/17 1800 121/80 98.9  F (37.2  C) Oral 93 16 93 % -   06/07/17 1730 120/80 - - 86 18 94 % -   06/07/17 1700 126/75 97.8  F (36.6  C) Oral 89 18 94 % -   06/07/17 1615 125/86 - - 90 18 - -   06/07/17 1600 127/78 97.8  F (36.6  C) Oral 72 18 97 % -   06/07/17 1545 128/74 - - 73 18 97 % -   06/07/17 1529 (!) 136/92 98.5  F (36.9  C) Oral 69 26 97 % -   06/07/17 1500 (!) 145/94 98.2  F (36.8  C) Oral - 20 - -   06/07/17 1445 146/88 - - 64 18 97 % -   06/07/17 1433 - - - - - 97 % -   06/07/17 1430 148/85 - - 81 17 99 % -   06/07/17 1415 (!) 152/96 98.6  F (37  C) Oral 94 18 100 % -   06/07/17 1400 149/89 - - 75 18 100 % - "   06/07/17 1345 155/85 - - 89 16 100 % -   06/07/17 1337 (!) 155/91 97.9  F (36.6  C) Oral - 16 100 % -     General Appearance: in no apparent distress.   Skin: normal, warm, dry  Heart: ST  Lungs: Room air, unlabored  Abdomen: The abdomen is round, soft.  Incision CDI.  : aldana, dark yellow UOP  Extremities: edema: none  Neurologic: A&Ox4, strength equal    Data:   CMP  Recent Labs  Lab 06/08/17  0530 06/08/17  0130 06/07/17  2230  06/07/17  1350  06/01/17  1310     --  136  --  138  --  139   POTASSIUM 5.3 5.7* 5.9*  < > 5.8*  < > 5.2   CHLORIDE 107  --  106  --  108  --  102   CO2 24  --  21  --  22  --  29   *  --  148*  --  142*  --  69*   BUN 41*  --  44*  --  37*  --  27   CR 5.41*  --  6.46*  --  6.69*  --  5.82*   GFRESTIMATED 13*  --  11*  --  10*  --  12*   GFRESTBLACK 16*  --  13*  --  13*  --  15*   ANTON 8.8  --  8.8  --  8.5  --  9.4   MAG 1.6  --   --   --  1.6  --   --    PHOS 3.8  --   --   --  3.3  --   --    ALBUMIN  --   --   --   --   --   --  4.0   BILITOTAL  --   --   --   --   --   --  0.6   ALKPHOS  --   --   --   --   --   --  108   AST  --   --   --   --   --   --  17   ALT  --   --   --   --   --   --  28   < > = values in this interval not displayed.  CBC  Recent Labs  Lab 06/08/17  1000 06/08/17  0530  06/07/17  1350   HGB 11.1* 11.6*  < > 11.3*   WBC  --  13.8*  --  6.6   PLT  --  217  --  212   < > = values in this interval not displayed.  COAGSNo lab results found in last 7 days.    Invalid input(s): XA   Urinalysis  Recent Labs   Lab Test  06/01/17   1303  12/07/16   0622   COLOR  Yellow  Yellow   APPEARANCE  Clear  Clear   URINEGLC  Negative  Negative   URINEBILI  Negative  Negative   URINEKETONE  Negative  Negative   SG  1.007  1.005   UBLD  Small*  Moderate*   URINEPH  8.0*  6.0   PROTEIN  >499*  >500*   NITRITE  Negative  Negative   LEUKEST  Negative  Negative   RBCU  10*  12*   WBCU  1  2     Virology:  Hepatitis C Antibody   Date Value Ref Range Status    06/01/2017  NR Final    Nonreactive   Assay performance characteristics have not been established for newborns,   infants, and children

## 2017-06-08 NOTE — PROGRESS NOTES
9:59 AM  June 8, 2017  Patient removed from the [facility] waitlist after living donor kidney transplant. UNOS ID is [id number].  Gisel Robles RN, CCTC  On Call Organ Coordinator

## 2017-06-08 NOTE — PROVIDER NOTIFICATION
MD Noel notified about last hourly UOP down to 150mL. CVP 8. Temp 100.1. No new orders at this time. Continue to monitor and encourage PO fluids.

## 2017-06-09 ENCOUNTER — TELEPHONE (OUTPATIENT)
Dept: PHARMACY | Facility: CLINIC | Age: 22
End: 2017-06-09

## 2017-06-09 LAB
ANION GAP SERPL CALCULATED.3IONS-SCNC: 9 MMOL/L (ref 3–14)
BASOPHILS # BLD AUTO: 0 10E9/L (ref 0–0.2)
BASOPHILS NFR BLD AUTO: 0 %
BUN SERPL-MCNC: 41 MG/DL (ref 7–30)
CALCIUM SERPL-MCNC: 9.2 MG/DL (ref 8.5–10.1)
CHLORIDE SERPL-SCNC: 106 MMOL/L (ref 94–109)
CO2 SERPL-SCNC: 24 MMOL/L (ref 20–32)
COPATH REPORT: NORMAL
CREAT SERPL-MCNC: 3.08 MG/DL (ref 0.66–1.25)
DIFFERENTIAL METHOD BLD: ABNORMAL
EOSINOPHIL # BLD AUTO: 0 10E9/L (ref 0–0.7)
EOSINOPHIL NFR BLD AUTO: 0 %
ERYTHROCYTE [DISTWIDTH] IN BLOOD BY AUTOMATED COUNT: 14.9 % (ref 10–15)
GFR SERPL CREATININE-BSD FRML MDRD: 25 ML/MIN/1.7M2
GLUCOSE SERPL-MCNC: 113 MG/DL (ref 70–99)
HCT VFR BLD AUTO: 34.8 % (ref 40–53)
HGB BLD-MCNC: 10.9 G/DL (ref 13.3–17.7)
IMM GRANULOCYTES # BLD: 0 10E9/L (ref 0–0.4)
IMM GRANULOCYTES NFR BLD: 0.2 %
LYMPHOCYTES # BLD AUTO: 0.1 10E9/L (ref 0.8–5.3)
LYMPHOCYTES NFR BLD AUTO: 2.3 %
MAGNESIUM SERPL-MCNC: 1.9 MG/DL (ref 1.6–2.3)
MCH RBC QN AUTO: 29.5 PG (ref 26.5–33)
MCHC RBC AUTO-ENTMCNC: 31.3 G/DL (ref 31.5–36.5)
MCV RBC AUTO: 94 FL (ref 78–100)
MONOCYTES # BLD AUTO: 0.4 10E9/L (ref 0–1.3)
MONOCYTES NFR BLD AUTO: 6.8 %
NEUTROPHILS # BLD AUTO: 5.5 10E9/L (ref 1.6–8.3)
NEUTROPHILS NFR BLD AUTO: 90.7 %
NRBC # BLD AUTO: 0 10*3/UL
NRBC BLD AUTO-RTO: 0 /100
PHOSPHATE SERPL-MCNC: 3.6 MG/DL (ref 2.5–4.5)
PLATELET # BLD AUTO: 162 10E9/L (ref 150–450)
POTASSIUM SERPL-SCNC: 4.8 MMOL/L (ref 3.4–5.3)
RBC # BLD AUTO: 3.69 10E12/L (ref 4.4–5.9)
SODIUM SERPL-SCNC: 139 MMOL/L (ref 133–144)
TACROLIMUS BLD-MCNC: ABNORMAL UG/L (ref 5–15)
TME LAST DOSE: ABNORMAL H
WBC # BLD AUTO: 6.1 10E9/L (ref 4–11)

## 2017-06-09 PROCEDURE — 25000131 ZZH RX MED GY IP 250 OP 636 PS 637: Performed by: PHYSICIAN ASSISTANT

## 2017-06-09 PROCEDURE — 25000128 H RX IP 250 OP 636: Performed by: TRANSPLANT SURGERY

## 2017-06-09 PROCEDURE — 87040 BLOOD CULTURE FOR BACTERIA: CPT | Performed by: STUDENT IN AN ORGANIZED HEALTH CARE EDUCATION/TRAINING PROGRAM

## 2017-06-09 PROCEDURE — 25000132 ZZH RX MED GY IP 250 OP 250 PS 637: Performed by: STUDENT IN AN ORGANIZED HEALTH CARE EDUCATION/TRAINING PROGRAM

## 2017-06-09 PROCEDURE — 25000131 ZZH RX MED GY IP 250 OP 636 PS 637: Performed by: TRANSPLANT SURGERY

## 2017-06-09 PROCEDURE — 25000132 ZZH RX MED GY IP 250 OP 250 PS 637: Performed by: TRANSPLANT SURGERY

## 2017-06-09 PROCEDURE — 25000132 ZZH RX MED GY IP 250 OP 250 PS 637: Performed by: PHYSICIAN ASSISTANT

## 2017-06-09 PROCEDURE — 80048 BASIC METABOLIC PNL TOTAL CA: CPT | Performed by: TRANSPLANT SURGERY

## 2017-06-09 PROCEDURE — 25000128 H RX IP 250 OP 636: Performed by: PHYSICIAN ASSISTANT

## 2017-06-09 PROCEDURE — 25000125 ZZHC RX 250: Performed by: SURGERY

## 2017-06-09 PROCEDURE — 83735 ASSAY OF MAGNESIUM: CPT | Performed by: TRANSPLANT SURGERY

## 2017-06-09 PROCEDURE — 25000132 ZZH RX MED GY IP 250 OP 250 PS 637: Performed by: NURSE PRACTITIONER

## 2017-06-09 PROCEDURE — 25000128 H RX IP 250 OP 636: Performed by: STUDENT IN AN ORGANIZED HEALTH CARE EDUCATION/TRAINING PROGRAM

## 2017-06-09 PROCEDURE — 85025 COMPLETE CBC W/AUTO DIFF WBC: CPT | Performed by: TRANSPLANT SURGERY

## 2017-06-09 PROCEDURE — 80197 ASSAY OF TACROLIMUS: CPT | Performed by: TRANSPLANT SURGERY

## 2017-06-09 PROCEDURE — 84100 ASSAY OF PHOSPHORUS: CPT | Performed by: TRANSPLANT SURGERY

## 2017-06-09 PROCEDURE — 12000025 ZZH R&B TRANSPLANT INTERMEDIATE

## 2017-06-09 RX ORDER — TACROLIMUS 1 MG/1
2 CAPSULE ORAL
Status: DISCONTINUED | OUTPATIENT
Start: 2017-06-09 | End: 2017-06-11

## 2017-06-09 RX ORDER — SIMETHICONE 80 MG
80 TABLET,CHEWABLE ORAL EVERY 6 HOURS PRN
Status: DISCONTINUED | OUTPATIENT
Start: 2017-06-09 | End: 2017-06-12 | Stop reason: HOSPADM

## 2017-06-09 RX ORDER — BISACODYL 10 MG
10 SUPPOSITORY, RECTAL RECTAL ONCE
Status: COMPLETED | OUTPATIENT
Start: 2017-06-09 | End: 2017-06-09

## 2017-06-09 RX ORDER — ONDANSETRON 2 MG/ML
4-8 INJECTION INTRAMUSCULAR; INTRAVENOUS EVERY 6 HOURS PRN
Status: DISCONTINUED | OUTPATIENT
Start: 2017-06-09 | End: 2017-06-12 | Stop reason: HOSPADM

## 2017-06-09 RX ORDER — DIPHENHYDRAMINE HCL 12.5MG/5ML
25-50 LIQUID (ML) ORAL ONCE
Status: COMPLETED | OUTPATIENT
Start: 2017-06-09 | End: 2017-06-09

## 2017-06-09 RX ORDER — ONDANSETRON 4 MG/1
4 TABLET, ORALLY DISINTEGRATING ORAL EVERY 6 HOURS PRN
Status: DISCONTINUED | OUTPATIENT
Start: 2017-06-09 | End: 2017-06-12 | Stop reason: HOSPADM

## 2017-06-09 RX ORDER — DIPHENHYDRAMINE HCL 25 MG
25-50 CAPSULE ORAL ONCE
Status: COMPLETED | OUTPATIENT
Start: 2017-06-09 | End: 2017-06-09

## 2017-06-09 RX ORDER — ACETAMINOPHEN 500 MG
1000 TABLET ORAL ONCE
Status: COMPLETED | OUTPATIENT
Start: 2017-06-09 | End: 2017-06-09

## 2017-06-09 RX ORDER — VALGANCICLOVIR 450 MG/1
450 TABLET, FILM COATED ORAL DAILY
Status: DISCONTINUED | OUTPATIENT
Start: 2017-06-10 | End: 2017-06-10

## 2017-06-09 RX ORDER — LABETALOL HYDROCHLORIDE 5 MG/ML
10-20 INJECTION, SOLUTION INTRAVENOUS EVERY 6 HOURS PRN
Status: DISCONTINUED | OUTPATIENT
Start: 2017-06-09 | End: 2017-06-12 | Stop reason: HOSPADM

## 2017-06-09 RX ORDER — DIPHENHYDRAMINE HCL 25 MG
50 CAPSULE ORAL ONCE
Status: COMPLETED | OUTPATIENT
Start: 2017-06-09 | End: 2017-06-09

## 2017-06-09 RX ORDER — LORAZEPAM 2 MG/ML
0.5 INJECTION INTRAMUSCULAR EVERY 4 HOURS PRN
Status: DISCONTINUED | OUTPATIENT
Start: 2017-06-09 | End: 2017-06-12 | Stop reason: HOSPADM

## 2017-06-09 RX ORDER — SODIUM CHLORIDE 9 MG/ML
INJECTION, SOLUTION INTRAVENOUS CONTINUOUS
Status: DISCONTINUED | OUTPATIENT
Start: 2017-06-09 | End: 2017-06-10

## 2017-06-09 RX ORDER — SULFAMETHOXAZOLE AND TRIMETHOPRIM 400; 80 MG/1; MG/1
1 TABLET ORAL DAILY
Status: DISCONTINUED | OUTPATIENT
Start: 2017-06-10 | End: 2017-06-10

## 2017-06-09 RX ORDER — HEPARIN SODIUM 1000 [USP'U]/ML
2.5 INJECTION, SOLUTION INTRAVENOUS; SUBCUTANEOUS
Status: DISCONTINUED | OUTPATIENT
Start: 2017-06-09 | End: 2017-06-12 | Stop reason: HOSPADM

## 2017-06-09 RX ORDER — METHYLPREDNISOLONE SODIUM SUCCINATE 125 MG/2ML
100 INJECTION, POWDER, LYOPHILIZED, FOR SOLUTION INTRAMUSCULAR; INTRAVENOUS ONCE
Status: DISCONTINUED | OUTPATIENT
Start: 2017-06-09 | End: 2017-06-12 | Stop reason: HOSPADM

## 2017-06-09 RX ORDER — ACETAMINOPHEN 325 MG/1
650 TABLET ORAL ONCE
Status: COMPLETED | OUTPATIENT
Start: 2017-06-09 | End: 2017-06-09

## 2017-06-09 RX ORDER — ACETAMINOPHEN 325 MG/1
650 TABLET ORAL ONCE
Status: DISCONTINUED | OUTPATIENT
Start: 2017-06-09 | End: 2017-06-09

## 2017-06-09 RX ORDER — TACROLIMUS 1 MG/1
2 CAPSULE ORAL
Status: DISCONTINUED | OUTPATIENT
Start: 2017-06-09 | End: 2017-06-09

## 2017-06-09 RX ORDER — BISACODYL 10 MG
10 SUPPOSITORY, RECTAL RECTAL DAILY PRN
Status: DISCONTINUED | OUTPATIENT
Start: 2017-06-09 | End: 2017-06-12 | Stop reason: HOSPADM

## 2017-06-09 RX ADMIN — SULFAMETHOXAZOLE AND TRIMETHOPRIM 1 TABLET: 400; 80 TABLET ORAL at 07:34

## 2017-06-09 RX ADMIN — ACETAMINOPHEN 1000 MG: 500 TABLET, FILM COATED ORAL at 20:44

## 2017-06-09 RX ADMIN — HEPARIN SODIUM 2500 UNITS: 1000 INJECTION, SOLUTION INTRAVENOUS; SUBCUTANEOUS at 20:45

## 2017-06-09 RX ADMIN — OXYCODONE HYDROCHLORIDE 10 MG: 5 TABLET ORAL at 08:49

## 2017-06-09 RX ADMIN — ACETAMINOPHEN 650 MG: 325 TABLET, FILM COATED ORAL at 13:58

## 2017-06-09 RX ADMIN — ANTI-THYMOCYTE GLOBULIN (RABBIT) 200 MG: 5 INJECTION, POWDER, LYOPHILIZED, FOR SOLUTION INTRAVENOUS at 14:30

## 2017-06-09 RX ADMIN — DIPHENHYDRAMINE HYDROCHLORIDE 50 MG: 25 CAPSULE ORAL at 16:56

## 2017-06-09 RX ADMIN — SIMETHICONE CHEW TAB 80 MG 80 MG: 80 TABLET ORAL at 20:44

## 2017-06-09 RX ADMIN — LORAZEPAM 0.5 MG: 2 INJECTION, SOLUTION INTRAMUSCULAR; INTRAVENOUS at 20:45

## 2017-06-09 RX ADMIN — OXYCODONE HYDROCHLORIDE 10 MG: 5 TABLET ORAL at 04:38

## 2017-06-09 RX ADMIN — TACROLIMUS 2 MG: 1 CAPSULE ORAL at 17:37

## 2017-06-09 RX ADMIN — SODIUM CHLORIDE: 9 INJECTION, SOLUTION INTRAVENOUS at 13:08

## 2017-06-09 RX ADMIN — ACETAMINOPHEN 325 MG: 325 TABLET, FILM COATED ORAL at 03:50

## 2017-06-09 RX ADMIN — MYCOPHENOLATE MOFETIL 750 MG: 250 CAPSULE ORAL at 17:37

## 2017-06-09 RX ADMIN — TACROLIMUS 2 MG: 1 CAPSULE ORAL at 08:49

## 2017-06-09 RX ADMIN — MYCOPHENOLATE MOFETIL 750 MG: 250 CAPSULE ORAL at 07:34

## 2017-06-09 RX ADMIN — ACETAMINOPHEN 325 MG: 325 TABLET, FILM COATED ORAL at 22:23

## 2017-06-09 RX ADMIN — SENNOSIDES AND DOCUSATE SODIUM 2 TABLET: 8.6; 5 TABLET ORAL at 07:34

## 2017-06-09 RX ADMIN — OXYCODONE HYDROCHLORIDE 10 MG: 5 TABLET ORAL at 14:01

## 2017-06-09 RX ADMIN — DIPHENHYDRAMINE HYDROCHLORIDE 50 MG: 25 CAPSULE ORAL at 13:58

## 2017-06-09 RX ADMIN — BISACODYL 10 MG: 10 SUPPOSITORY RECTAL at 10:10

## 2017-06-09 RX ADMIN — DEXTROSE AND SODIUM CHLORIDE: 5; 900 INJECTION, SOLUTION INTRAVENOUS at 00:55

## 2017-06-09 ASSESSMENT — PAIN DESCRIPTION - DESCRIPTORS
DESCRIPTORS: SORE
DESCRIPTORS: DISCOMFORT
DESCRIPTORS: SORE
DESCRIPTORS: SORE

## 2017-06-09 NOTE — ANESTHESIA POSTPROCEDURE EVALUATION
Patient: Golden Stover    Procedure(s):  Kidney Transplant Living Related Recipient  - Wound Class: II-Clean Contaminated    Diagnosis:IgA Nephropathy   Diagnosis Additional Information: No value filed.    Anesthesia Type:  General    Note:  Anesthesia Post Evaluation    Last vitals:  Vitals:    06/08/17 2030 06/09/17 0002 06/09/17 0331   BP: (!) 147/92 124/85 134/82   Pulse: 78 76 79   Resp: 18 16 16   Temp: 36.9  C (98.5  F) 37.1  C (98.7  F) 37.1  C (98.8  F)   SpO2: 91% 94% 97%         Electronically Signed By: Jama Mckeon MD  June 9, 2017  7:04 AM

## 2017-06-09 NOTE — ANESTHESIA POSTPROCEDURE EVALUATION
Patient: Golden Stover    Procedure(s):  Kidney Transplant Living Related Recipient  - Wound Class: II-Clean Contaminated    Diagnosis:IgA Nephropathy   Diagnosis Additional Information: No value filed.    Anesthesia Type:  General    Note:  Anesthesia Post Evaluation    Patient location during evaluation: PACU  Patient participation: Able to fully participate in evaluation  Level of consciousness: awake and alert  Pain management: adequate  Airway patency: patent  Cardiovascular status: acceptable  Respiratory status: acceptable  Hydration status: acceptable  PONV: none             Last vitals:  Vitals:    06/08/17 2030 06/09/17 0002 06/09/17 0331   BP: (!) 147/92 124/85 134/82   Pulse: 78 76 79   Resp: 18 16 16   Temp: 36.9  C (98.5  F) 37.1  C (98.7  F) 37.1  C (98.8  F)   SpO2: 91% 94% 97%         Electronically Signed By: Jama Mckeon MD  June 9, 2017  7:06 AM

## 2017-06-09 NOTE — PROGRESS NOTES
"  Nephrology Progress Note  06/09/2017         ASSESSMENT AND RECOMMENDATIONS:    Golden Stover is a 21 year old male who POD 0-1 from living donor kidney txp from father.  ESRD 2/2 IgA nephropathy, cPRA 61% no DSA.  On HD for 8 months via R chest tunneled catheter.  Did not receive HD on day of txp, hyperkalemia to 6.6 responded well to intervention with Calcium gluconate, D5/insulin.  Made \"normal' amounts of urine prior to transplant. Good urine output and CRT improving.  Having incisional pain and itching, then AMS, possible steroid psychosis versus narcotic or benzo induced delirium on POD 0-1.    Allograft function: IgA nephropathy s/p LDKT.  CRT downtrending thus far, >4L urine output, no significant electrolyte abn. Receiving thymoglobulin, steroid cellcept induction per protocol. On Cellcept, Prograf not yet started. Renal US today, cm-sized perinephric fluid collection, seroma v hematoma. +Shaikh catheter  CMV-/- EBV +/+ .  On Bactrim and Valcyte for PPx.  INS:  Tacrolimus 2 mg bid start today. Cellcept and steroids per protocol. Completing thymoglobulin induction.    Lytes: in good range.    BMD:  Phos/ca are in good range. Supplement Magnesium if falls below 1.6 or per protocol.   , Vitamin D 72.  No intervention needed    Anemia: Mild, Hgb 10.9. Iron stores replete as of 6/1.    Volume/BP:  Was on 2 BP meds prior to transplant (Lisinopril and Nifedipine).  Currently held, BPs 120s-140s/80s-90s.  Treat with PRN for now.    H[facility]Y [facility], [place]  [facility]  Department of Medicine  Division of Renal Disease and Hypertension  [phone]    Patient seen and discussed with Dr. Caraballo.  Recommendations communicated to primary team in person and via note.    Physician Attestation   I, Bernadette Merino, saw and evaluated Golden Stover as part of a shared visit.  I have reviewed and discussed with the advanced practice provider their history, physical and plan.    I " "personally reviewed the vital signs, medications and labs.    My key history or physical exam findings:     Key management decisions made by me:       Date of Service (when I saw the patient): 6/9/17    Interval History :   Golden Stover feels better this morning.  No further AMS, pain reasonably controlled but still itchy.  Reports abd distention and no flatus, ate yesterday.  No N/V, urinary sx (has aldana), rash or edema.    Review of Systems:     I reviewed the following systems:  GI: decreased appetite in setting of bloating/constipation.  No nausea or vomiting or diarrhea.   Neuro:  No confusion  Constitutional: No fever or chills  CV: No dyspnea or edema, or chest pain.    Physical Exam:   I/O last 3 completed shifts:  In: 2243.75 [P.O.:100; I.V.:1553.75; IV Piggyback:590]  Out: 7220 [Urine:7220]   /88 (BP Location: Right arm)  Pulse 93  Temp 99.3  F (37.4  C) (Oral)  Resp 16  Ht 1.88 m (6' 2.02\")  Wt 102.8 kg (226 lb 11.2 oz)  SpO2 94%  BMI 29.09 kg/m2     GENERAL APPEARANCE: alert and no distress  EYES: nonicteric  HENT: mouth without ulcers or lesions  NECK: supple, no adenopathy. Tunneled catheter in place, no sign of bleeding or infection  RESP: lungs clear to auscultation   CV: regular rhythm, normal rate, no rub  ABDOMEN: soft, nontender, hypoactive bowel sounds, no HSM.  Incision intact  Extremities: no clubbing, cyanosis, or edema  MS: no evidence of inflammation in joints, no muscle tenderness  SKIN: no rash  NEURO: mentation intact and speech normal  PSYCH: affect normal     Labs:   All labs reviewed by me  Electrolytes/Renal -   Recent Labs   Lab Test  06/09/17   0658  06/08/17   1640  06/08/17   1250  06/08/17   0530   06/07/17   1350   NA  139  135   --   139   < >  138   POTASSIUM  4.8  5.0  5.2  5.3   < >  5.8*   CHLORIDE  106  103   --   107   < >  108   CO2  24  24   --   24   < >  22   BUN  41*  41*   --   41*   < >  37*   CR  3.08*  4.03*   --   5.41*   < >  6.69*   GLC  " 113*  141*   --   116*   < >  142*   ANTON  9.2  8.9   --   8.8   < >  8.5   MAG  1.9   --    --   1.6   --   1.6   PHOS  3.6   --    --   3.8   --   3.3    < > = values in this interval not displayed.       CBC -   Recent Labs   Lab Test  06/09/17   0658  06/08/17   1640  06/08/17   1000  06/08/17   0530   06/07/17   1350   WBC  6.1   --    --   13.8*   --   6.6   HGB  10.9*  11.1*  11.1*  11.6*   < >  11.3*   PLT  162   --    --   217   --   212    < > = values in this interval not displayed.       LFTs -   Recent Labs   Lab Test  06/01/17   1310  12/07/16   0637   ALKPHOS  108  98   BILITOTAL  0.6  0.5   ALT  28  31   AST  17  18   PROTTOTAL  8.2  8.1   ALBUMIN  4.0  3.8       Iron Panel -   Recent Labs   Lab Test  06/01/17   1310   IRON  65   IRONSAT  21   GARY  310     Imaging  Pertinent imaging reviewed by me.     Current Medications:    tacrolimus  2 mg Oral BID IS     methylPREDNISolone  100 mg Intravenous Once     [START ON 6/10/2017] valGANciclovir  450 mg Oral Daily     [START ON 6/10/2017] sulfamethoxazole-trimethoprim  1 tablet Oral Daily     acetaminophen  325 mg Oral Q6H     sodium chloride (PF)  10 mL Intracatheter Q8H     mycophenolate  750 mg Oral BID IS     senna-docusate  1-2 tablet Oral BID       NaCl 75 mL/hr at 06/09/17 1308     Bernadette Merino PA-C  Patient was seen by myself, Dr. Morris Caraballo, in conjunction with Bernadette Merino PA-C as part of a shared visit.    I personally reviewed past medical and surgical history, vital signs, medications and labs.  Present and past medical history, along with significant physical exam findings were all reviewed with ASHLEIGH.    My galeano findings:  Golden Stover 21 year old now s/p kidney transplant    Key management decisions made by me and discussed with ASHLEIGH:  Progressing well. Immunosuppression per protocol.   Morris Caraballo

## 2017-06-09 NOTE — PLAN OF CARE
Problem: Goal Outcome Summary  Goal: Goal Outcome Summary  Pt transferred from  to  around 2030 this evening. Pt is A&O x4, calm, cooperative, and pleasant. VSS, cont pulse ox is on. D5 1/2 NS is infusing through R subclavian upon transfer, rate decreased from 150mL/hr to 100mL/hr. Oxycodone 10mg given, per pt request. Pt had not wanted pain meds since 1000 this morning. Shaikh intact, brisk urine output. On a 3g K+ restricted diet, pt reports having a piece of banana bread for dinner. Denies nausea, not passing gas. Abdomen appears rounded/ distended. Incision intact, closed w/ adhesive.       2nd dose of thymo completed prior to transfer. Per report, pt had a code 21 called during thymo infusion, please refer to  staff notes for more information. Pt was disoriented x4 and paranoid, providers think that he had an adverse reaction to benadryl (extra dose of benadryl was given d/t rash development during thymo infusion). .     Pt is currently resting comfortably w/ family at the bedside.     Med card and lab book started and are at bedside. Transplant handbook also provided to pt.

## 2017-06-09 NOTE — PLAN OF CARE
Problem: Kidney Transplant (Adult)  Goal: Signs and Symptoms of Listed Potential Problems Will be Absent or Manageable (Kidney Transplant)  Signs and symptoms of listed potential problems will be absent or manageable by discharge/transition of care (reference Kidney Transplant (Adult) CPG).   Outcome: No Change  AVSS. Excellent UOP from aldana. NA stated that she saw a few small clots when she dumped his aldana but otherwise urine is clear yellow. Pain is minimal. Scheduled Tylenol given and Prn Oxycodone 10 mg given x 1. Got up to the bathroom x 1 as he thought he was going to have a BM but he didn't, he stated that he didn't even pass gas.

## 2017-06-09 NOTE — PLAN OF CARE
Problem: Individualization  Goal: Patient Preferences     Tmax 99.5, other VSS, reporting pain, requested oxycodone x2, and states relief with pain intervention.  Patient up independently, ambulating in [place] with girlfriend, aldana patent with good output, passing flatus, able to have BM x2 after suppository, and tolerating diet with good appetite.  Med card and lab book updated.  Patient met with [facility].  Patient has signed on to [facility].  Scheduled meds given as ordered.  Scheduled meds given as ordered.  Continue to monitor, treat as ordered, notify MDs with changes.  Aldana to be dc'd at 0600 per order then check PVRs following removal.

## 2017-06-09 NOTE — PROVIDER NOTIFICATION
D:  Called into room by patient's girlfriend stating that patient feeling chilled and breathing is faster.  Vitals assessed (99.5, , /98, respirations 24, saturation 96% room air) Girlfriend also stating patient may have some anxiety at baseline.  Sepsis protocal flagged.    I:  Cross cover (Dr. Hartman [phone]) notified, made aware, and without further orders.    A:  Upon reassessment, patient resting calmly and girlfriend stating he is feeling much better.  P:  Continue to monitor, treat as ordered, notify MDs with changes.  Awaiting lactic results.

## 2017-06-09 NOTE — PROGRESS NOTES
"Transplant Admission Psychosocial Assessment    Patient Name: Golden Stover  : 1995  Age: 21 year old  MRN: [id number]  Completed assessment with: The patient, his mom, girlfriend (Page) and sister (Juliet) were also present    Patient underwent living-related kidney transplant on 17.  Met with patient to update psychosocial assessment and provide brief education about SW role while inpatient, as well as expectations/requirements and follow up needs post-transplant. SW also provided education about need for compliance with transplant medications, and explained ESRD Medicare benefits and medication coverage under Medicare part B.  Medicare 2728 forms completed and signed by patient.  Presenting Information   Living Situation: Pt lives with his girlfriend Page in an apartment in [place], [place]  If not local, plans for short term stay:  Pt plans to stay with his dad and step-mom in [place] upon dicharge  Previous Functional Status: Independent with ADL's, working for [employer] and drives.  Pt reports that he had \"no symptoms\" with dialysis   Cultural/Language/Spiritual Considerations: None indicated    Support System  Primary Support Person Girlfriend Page  Other support:  Mom, Dad, Step-mom, siblings  Plan for support in immediate post-transplant period: Page and family will be helping him post discharge    Mental Health/Coping:   History of Mental Health: Pt denies having a mental health history  Current status: Pt reports that he is doing well emotionally, aside from having an adverse reaction to Benedryl and having some pain from surgery he feels well.  Coping: Pt appears to be coping well  Services Needed/Recommended: None indicated at this time    Financial   Income: Pt is on medical leave from his part-time work at [employer] so currently does not have an income.  He has been on medical leave for the past 3 weeks.  Prior to that he was working full-time at [employer] in his field.  Impact of transplant on " income: He will likely not be receiving an income until he returns to working, his family is helping support him financially  Insurance and medication coverage: Medicare through his step-mom's employer and Medicare A & B  PRIMARY HEALTH INSURANCE THROUGH MEDICA ID#[id number] GROUP#[id number] (EFFECTIVE:01/01/2017) WITH PHARMACY BENEFITS THROUGH [employer].  SECONDARY INSURANCE IS MEDICARE ID#[id number] (MEDICARE A&B EFFECTIVE: 12/01/2016).  BILL ALL MEDICATIONS TO [employer] ID#[id number] GROUP#[id number] PCN#[id number] BIN#[id number]. COB PART B MEDS TO [facility] SECONDARY ID#[id number] GROUP#OTHER AND COPAYS WILL BE $0.00 AT TIME OF SERVICE.  PT HAS A $3000 DEDUCTIBLE, WILL PAY 10% FOR MEDICATIONS, AND A $7000 OUT OF POCKET MAX.  TEST CLAIMS:CELLCEPT=$0.00, MYCOPHENOLATE=$0.00, PROGRAF=$0.00, TACROLIMUS=$0.00, NEORAL=$0.00, CYCLOSPORINE=$0.00, VALCYTE=PRODUCT NOT COVERED, VALGANCICLOVIR=$63.51  Financial concerns: Pt did not express concerns, he is aware that he will now be responsible for paying his Medicare B premium.  His parents help support him financially  Resources needed: None indicated at this time    Assessment and recommendations:    Met with pt to introduce self, SW role and offer support.  Pt's family appears very supportive.  Pt received a living-related kidney transplant, his dad is his donor.  Pt was on HD for 8 months prior to transplant via a catheter, he does not have a fistula.  He was originally dialyzing at [facility] but recently moved to [place] and has therefore recently been dialyzing at [facility].  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      Provided both inpatient and outpatient social work contact info and encouraged him to call if he has any questions/concerns.   He has no concerns about necessary follow-up or medication management.  He denied having add'l questions/concerns for SW at this time.  Please contact SW if add'l  needs arise.  SW to remain available if add'l needs arise.     SANNA Rodriguez, [facility]    Ph: [phone]  Pager: [phone]

## 2017-06-09 NOTE — PROGRESS NOTES
Care Coordinator- Discharge Planning Note     Admission Date/Time:  6/7/2017  Attending MD:  Martell Robert MD     Data  Chart reviewed, discussed with interdisciplinary team.   Patient was admitted for:   1. IgA nephropathy    2. Living-donor kidney transplant recipient         RNCC Intervention: Met with patient, he is planning to stay locally with his dad for the ATC visits and then return to his home in [place].  Patient is interested in home care for lab draw.  Patient prefers [facility]- [phone], referral call placed, pending call back.      Referrals: Provided patient/family with options for home care    Plan  Anticipated Discharge Date:  1-2 days  Anticipated Discharge Plan:   Staying locally with dad for a week or so and then home with home care.    CTS Handoff completed: baron Alcantar RN, BSN, PHN, RNCCPH: [phone]  Pager: [phone]  Covering for : Suzan King, COLLEEN RNCC

## 2017-06-09 NOTE — PROVIDER NOTIFICATION
EYE SWELLING/ITCHING  D:  Patient complaining of eye swelling and itching.  Thymo infusing for 2.5hours at time of complaint.  I:  Heidi Black PA-C notified and made aware.  Order to give additional 50mg oral benadryl.  Medication given as well as icepacks for eyes.    A:  Patient otherwise stable, without other symptoms, and denied shortness of breath.  P:  Continue to monitor, treat as ordered, notify team with issues.

## 2017-06-09 NOTE — PROGRESS NOTES
"[facility]   Transplant Infectious Disease Research Note   Patient:  Golden Stover, Date of birth 1995, Medical record number [id number]  Date of Visit:  06/09/2017  Transplants:  6/7/2017 (Kidney), Postoperative day:  2    Natural History of Infection Caused by BK Virus (and other Opportunistic Viral Pathogens) in Renal and Renal-Pancreas Transplant Patients      Sponsor:  [employer] and the National [employer] of Allergy and Infectious Diseases (NIAID)    Protocol Number: DMID    : Tiki Mayo MD       Phone: [phone]   [facility] of Medicine, Division of Infectious Diseases    IRB number [id number]  Golden Stover was approached on 06/09/17 for this BK virus natural history study.  For this study, blood and urine samples will be collected monthly for 6 checks after kidney transplant to see if BK virus is active.  Blood and urine samples will be collected every other month for 3 additional checks, so the study runs anywhere from 12 to 18 months after transplant.  If BK virus rises above a certain threshold, samples will be collected every 2 weeks for a while.  During the consent process, the risks, benefits, and alternatives of this study were discussed.  The alternative is not to participate in the study.  Golden Stover has not had a detectable BK virus test since transplant, so he is eligible. He was given time to ask questions and did volunteer to participate in this clinical study, and he had the consent form at bedside for several hours to read through prior to signing it.  Golden Stover has signed consent, and has a copy of the signed consent form.  The version of the consent form used is the update from 09Sept2016, approved by the IRB on 06Oct2016.  He \"checked\" the box for retention of leftover samples, rather than signing with his initials. He lives in [place].   Attestation:   I have reviewed " today's vital signs, medications, labs and imaging.   LUCINA QUEVEDO, Pager [phone]

## 2017-06-09 NOTE — PROGRESS NOTES
Transplant Surgery  Inpatient Daily Progress Note  06/09/2017    Assessment & Plan:  20yo with hx of IgA nephropathy and HTN.  Underwent LDKT without stent on 6/7/17.    Graft function: POD #2.  Cr 6.7->5.4->3.1. Good UOP.   US: normal doppler, small fl collection.  Immunosuppression management: Induction with thymoglobulin and steroid pulse.  Thymo:  200mg 6/7,  200mg 6/8, 200 mg today  Methylpred:  500mg 6/7, 250mg 6/8. No methylpred given due to concern for steroid psychosis.   MMF:  750mg BID  Tacro: 2mg BID started today  Complexity of management:Medium. Contributing factors: induction  Hematology: Mild normocytic anemia, stable.  Cardiorespiratory: Hx HTN, monitor for need to restart antihypertensives.  GI/Nutrition: Continue low K diet.  Continue senna. Dulcolax supp today.   Endocrine: No acute issues  Fluid/Electrolytes: MIVF: Continue NS 75 cc/hr.  Hyperkalemia, resolved. Continue low K diet.    : Shaikh to remain due to new surgical anastomosis. Plan to remove tomorrow AM.  Infectious disease: Tmax 99.5F, monitor.  Prophylaxis: DVT, fall, valcyte, bactrim  Neuro: AMS yesterday with paranoia. Concern for steroid psychosis. No confusion or paranoia seen today. Avoid further methylpred if possible.   Disposition: 7A     Medical Decision Making: Medium  Subsequent visit 29379 (moderate level decision making)    ASHLEIGH/Fellow/Resident Provider: Heidi Black PAC 0032    Faculty: Silver Carcamo M.D., Ph.D.  _________________________________________________________________  Transplant History:   6/7/2017 (Kidney), Postoperative day: 2     Interval History: History is obtained from the patient  Overnight events: Very itchy today.  No nausea.  Moderate incisional pain.    ROS:   A 10-point review of systems was negative except as noted above.    Meds:    tacrolimus  2 mg Oral BID IS     acetaminophen  650 mg Oral Once     diphenhydrAMINE  25-50 mg Oral Once    Or     diphenhydrAMINE  25-50 mg Per NG tube Once      "methylPREDNISolone  100 mg Intravenous Once     anti-thymocyte globulin  200 mg Intravenous Central line Once     acetaminophen  325 mg Oral Q6H     [START ON 6/10/2017] valGANciclovir  450 mg Oral Every Other Day     LORazepam  0.5 mg Intravenous Once     sodium chloride (PF)  10 mL Intracatheter Q8H     sulfamethoxazole-trimethoprim  1 tablet Oral Q Mon Wed Fri AM     mycophenolate  750 mg Oral BID IS     senna-docusate  1-2 tablet Oral BID       Physical Exam:     Admit Weight: 101.1 kg (222 lb 14.2 oz)    Current vitals:   BP (!) 132/92  Pulse 89  Temp 98.1  F (36.7  C) (Oral)  Resp 16  Ht 1.88 m (6' 2.02\")  Wt 102.2 kg (225 lb 4.8 oz)  SpO2 97%  BMI 28.91 kg/m2    CVP (mmHg): 9 mmHg    Vital sign ranges:    Temp:  [98.1  F (36.7  C)-98.8  F (37.1  C)] 98.1  F (36.7  C)  Pulse:  [76-89] 89  Heart Rate:  [71-73] 73  Resp:  [16-18] 16  BP: (124-147)/(82-92) 132/92  SpO2:  [91 %-97 %] 97 %  Patient Vitals for the past 24 hrs:   BP Temp Temp src Pulse Heart Rate Resp SpO2 Weight   06/09/17 0904 - - - - - - - 102.2 kg (225 lb 4.8 oz)   06/09/17 0741 (!) 132/92 98.1  F (36.7  C) Oral 89 - 16 97 % -   06/09/17 0331 134/82 98.8  F (37.1  C) Oral 79 - 16 97 % -   06/09/17 0002 124/85 98.7  F (37.1  C) Oral 76 - 16 94 % -   06/08/17 2030 (!) 147/92 98.5  F (36.9  C) Oral 78 - 18 91 % -   06/08/17 1728 - 98.5  F (36.9  C) Oral - 73 18 96 % -   06/08/17 1500 - 98.6  F (37  C) Oral - 71 18 94 % -     General Appearance: in no apparent distress.   Skin: normal, warm, dry  Heart: ST  Lungs: Room air, unlabored  Abdomen: The abdomen is round, soft.  Incision CDI.  : aldana, dark yellow UOP  Extremities: edema: none  Neurologic: A&Ox4, strength equal    Data:   CMP  Recent Labs  Lab 06/09/17  0658 06/08/17  1640  06/08/17  0530    135  --  139   POTASSIUM 4.8 5.0  < > 5.3   CHLORIDE 106 103  --  107   CO2 24 24  --  24   * 141*  --  116*   BUN 41* 41*  --  41*   CR 3.08* 4.03*  --  5.41*   GFRESTIMATED 25* " 19*  --  13*   GFRESTBLACK 31* 22*  --  16*   ANTON 9.2 8.9  --  8.8   MAG 1.9  --   --  1.6   PHOS 3.6  --   --  3.8   < > = values in this interval not displayed.  CBC  Recent Labs  Lab 06/09/17  0658 06/08/17  1640  06/08/17  0530   HGB 10.9* 11.1*  < > 11.6*   WBC 6.1  --   --  13.8*     --   --  217   < > = values in this interval not displayed.  COAGSNo lab results found in last 7 days.    Invalid input(s): XA   Urinalysis  Recent Labs   Lab Test  06/01/17   1303  12/07/16   0622   COLOR  Yellow  Yellow   APPEARANCE  Clear  Clear   URINEGLC  Negative  Negative   URINEBILI  Negative  Negative   URINEKETONE  Negative  Negative   SG  1.007  1.005   UBLD  Small*  Moderate*   URINEPH  8.0*  6.0   PROTEIN  >499*  >500*   NITRITE  Negative  Negative   LEUKEST  Negative  Negative   RBCU  10*  12*   WBCU  1  2     Virology:  Hepatitis C Antibody   Date Value Ref Range Status   06/01/2017  NR Final    Nonreactive   Assay performance characteristics have not been established for newborns,   infants, and children

## 2017-06-10 ENCOUNTER — APPOINTMENT (OUTPATIENT)
Dept: GENERAL RADIOLOGY | Facility: CLINIC | Age: 22
DRG: 652 | End: 2017-06-10
Attending: TRANSPLANT SURGERY
Payer: COMMERCIAL

## 2017-06-10 ENCOUNTER — APPOINTMENT (OUTPATIENT)
Dept: NUCLEAR MEDICINE | Facility: CLINIC | Age: 22
DRG: 652 | End: 2017-06-10
Attending: TRANSPLANT SURGERY
Payer: COMMERCIAL

## 2017-06-10 LAB
ALBUMIN UR-MCNC: 100 MG/DL
ANION GAP SERPL CALCULATED.3IONS-SCNC: 10 MMOL/L (ref 3–14)
APPEARANCE UR: CLEAR
BACTERIA #/AREA URNS HPF: ABNORMAL /HPF
BASOPHILS # BLD AUTO: 0 10E9/L (ref 0–0.2)
BASOPHILS NFR BLD AUTO: 0 %
BILIRUB UR QL STRIP: NEGATIVE
BUN SERPL-MCNC: 28 MG/DL (ref 7–30)
CALCIUM SERPL-MCNC: 9 MG/DL (ref 8.5–10.1)
CHLORIDE SERPL-SCNC: 109 MMOL/L (ref 94–109)
CO2 SERPL-SCNC: 20 MMOL/L (ref 20–32)
COLOR UR AUTO: YELLOW
CREAT SERPL-MCNC: 2.05 MG/DL (ref 0.66–1.25)
DIFFERENTIAL METHOD BLD: ABNORMAL
EOSINOPHIL # BLD AUTO: 0 10E9/L (ref 0–0.7)
EOSINOPHIL NFR BLD AUTO: 0.5 %
ERYTHROCYTE [DISTWIDTH] IN BLOOD BY AUTOMATED COUNT: 14.8 % (ref 10–15)
ERYTHROCYTE [DISTWIDTH] IN BLOOD BY AUTOMATED COUNT: 14.9 % (ref 10–15)
GFR SERPL CREATININE-BSD FRML MDRD: 41 ML/MIN/1.7M2
GLUCOSE SERPL-MCNC: 89 MG/DL (ref 70–99)
GLUCOSE UR STRIP-MCNC: NEGATIVE MG/DL
HCT VFR BLD AUTO: 33.9 % (ref 40–53)
HCT VFR BLD AUTO: 35.1 % (ref 40–53)
HGB BLD-MCNC: 10.6 G/DL (ref 13.3–17.7)
HGB BLD-MCNC: 11.2 G/DL (ref 13.3–17.7)
HGB UR QL STRIP: ABNORMAL
IMM GRANULOCYTES # BLD: 0 10E9/L (ref 0–0.4)
IMM GRANULOCYTES NFR BLD: 0.3 %
KETONES UR STRIP-MCNC: NEGATIVE MG/DL
LACTATE BLD-SCNC: 0.9 MMOL/L (ref 0.7–2.1)
LEUKOCYTE ESTERASE UR QL STRIP: NEGATIVE
LYMPHOCYTES # BLD AUTO: 0.1 10E9/L (ref 0.8–5.3)
LYMPHOCYTES NFR BLD AUTO: 1.3 %
MAGNESIUM SERPL-MCNC: 1.5 MG/DL (ref 1.6–2.3)
MCH RBC QN AUTO: 29.6 PG (ref 26.5–33)
MCH RBC QN AUTO: 29.6 PG (ref 26.5–33)
MCHC RBC AUTO-ENTMCNC: 31.3 G/DL (ref 31.5–36.5)
MCHC RBC AUTO-ENTMCNC: 31.9 G/DL (ref 31.5–36.5)
MCV RBC AUTO: 93 FL (ref 78–100)
MCV RBC AUTO: 95 FL (ref 78–100)
MONOCYTES # BLD AUTO: 0.1 10E9/L (ref 0–1.3)
MONOCYTES NFR BLD AUTO: 3.6 %
MUCOUS THREADS #/AREA URNS LPF: PRESENT /LPF
NEUTROPHILS # BLD AUTO: 3.7 10E9/L (ref 1.6–8.3)
NEUTROPHILS NFR BLD AUTO: 94.3 %
NITRATE UR QL: NEGATIVE
NRBC # BLD AUTO: 0.1 10*3/UL
NRBC BLD AUTO-RTO: 2 /100
PH UR STRIP: 6 PH (ref 5–7)
PHOSPHATE SERPL-MCNC: 1.7 MG/DL (ref 2.5–4.5)
PLATELET # BLD AUTO: 113 10E9/L (ref 150–450)
PLATELET # BLD AUTO: 140 10E9/L (ref 150–450)
POTASSIUM SERPL-SCNC: 4.4 MMOL/L (ref 3.4–5.3)
RBC # BLD AUTO: 3.58 10E12/L (ref 4.4–5.9)
RBC # BLD AUTO: 3.78 10E12/L (ref 4.4–5.9)
RBC #/AREA URNS AUTO: 56 /HPF (ref 0–2)
SODIUM SERPL-SCNC: 139 MMOL/L (ref 133–144)
SP GR UR STRIP: 1.01 (ref 1–1.03)
URN SPEC COLLECT METH UR: ABNORMAL
UROBILINOGEN UR STRIP-MCNC: NORMAL MG/DL (ref 0–2)
WBC # BLD AUTO: 2.3 10E9/L (ref 4–11)
WBC # BLD AUTO: 3.9 10E9/L (ref 4–11)
WBC #/AREA URNS AUTO: 2 /HPF (ref 0–2)

## 2017-06-10 PROCEDURE — 25000132 ZZH RX MED GY IP 250 OP 250 PS 637: Performed by: SURGERY

## 2017-06-10 PROCEDURE — S0169 CALCITROL: HCPCS | Performed by: INTERNAL MEDICINE

## 2017-06-10 PROCEDURE — 85027 COMPLETE CBC AUTOMATED: CPT | Performed by: STUDENT IN AN ORGANIZED HEALTH CARE EDUCATION/TRAINING PROGRAM

## 2017-06-10 PROCEDURE — 34300033 ZZH RX 343: Performed by: TRANSPLANT SURGERY

## 2017-06-10 PROCEDURE — 84100 ASSAY OF PHOSPHORUS: CPT | Performed by: TRANSPLANT SURGERY

## 2017-06-10 PROCEDURE — 25000125 ZZHC RX 250: Performed by: TRANSPLANT SURGERY

## 2017-06-10 PROCEDURE — 83605 ASSAY OF LACTIC ACID: CPT | Performed by: PHYSICIAN ASSISTANT

## 2017-06-10 PROCEDURE — 83605 ASSAY OF LACTIC ACID: CPT | Performed by: TRANSPLANT SURGERY

## 2017-06-10 PROCEDURE — 87086 URINE CULTURE/COLONY COUNT: CPT | Performed by: STUDENT IN AN ORGANIZED HEALTH CARE EDUCATION/TRAINING PROGRAM

## 2017-06-10 PROCEDURE — 25000132 ZZH RX MED GY IP 250 OP 250 PS 637: Performed by: TRANSPLANT SURGERY

## 2017-06-10 PROCEDURE — 71010 XR CHEST PORT 1 VW: CPT

## 2017-06-10 PROCEDURE — A9567 TECHNETIUM TC-99M AEROSOL: HCPCS | Performed by: TRANSPLANT SURGERY

## 2017-06-10 PROCEDURE — 12000025 ZZH R&B TRANSPLANT INTERMEDIATE

## 2017-06-10 PROCEDURE — 81001 URINALYSIS AUTO W/SCOPE: CPT | Performed by: STUDENT IN AN ORGANIZED HEALTH CARE EDUCATION/TRAINING PROGRAM

## 2017-06-10 PROCEDURE — 25000128 H RX IP 250 OP 636: Performed by: TRANSPLANT SURGERY

## 2017-06-10 PROCEDURE — 36415 COLL VENOUS BLD VENIPUNCTURE: CPT | Performed by: STUDENT IN AN ORGANIZED HEALTH CARE EDUCATION/TRAINING PROGRAM

## 2017-06-10 PROCEDURE — 25000132 ZZH RX MED GY IP 250 OP 250 PS 637: Performed by: NURSE PRACTITIONER

## 2017-06-10 PROCEDURE — 25000131 ZZH RX MED GY IP 250 OP 636 PS 637: Performed by: PHYSICIAN ASSISTANT

## 2017-06-10 PROCEDURE — 80048 BASIC METABOLIC PNL TOTAL CA: CPT | Performed by: TRANSPLANT SURGERY

## 2017-06-10 PROCEDURE — 25000128 H RX IP 250 OP 636: Performed by: STUDENT IN AN ORGANIZED HEALTH CARE EDUCATION/TRAINING PROGRAM

## 2017-06-10 PROCEDURE — A9540 TC99M MAA: HCPCS | Performed by: TRANSPLANT SURGERY

## 2017-06-10 PROCEDURE — 25000131 ZZH RX MED GY IP 250 OP 636 PS 637: Performed by: TRANSPLANT SURGERY

## 2017-06-10 PROCEDURE — 85025 COMPLETE CBC W/AUTO DIFF WBC: CPT | Performed by: TRANSPLANT SURGERY

## 2017-06-10 PROCEDURE — 27210210 NM LUNG SCAN VENTILATION AND PERFUSION

## 2017-06-10 PROCEDURE — 25000132 ZZH RX MED GY IP 250 OP 250 PS 637: Performed by: INTERNAL MEDICINE

## 2017-06-10 PROCEDURE — 83735 ASSAY OF MAGNESIUM: CPT | Performed by: TRANSPLANT SURGERY

## 2017-06-10 RX ORDER — CALCITRIOL 0.25 UG/1
0.25 CAPSULE, LIQUID FILLED ORAL DAILY
Status: DISCONTINUED | OUTPATIENT
Start: 2017-06-10 | End: 2017-06-12 | Stop reason: HOSPADM

## 2017-06-10 RX ORDER — HEPARIN SODIUM 5000 [USP'U]/.5ML
5000 INJECTION, SOLUTION INTRAVENOUS; SUBCUTANEOUS EVERY 8 HOURS
Status: DISCONTINUED | OUTPATIENT
Start: 2017-06-10 | End: 2017-06-12

## 2017-06-10 RX ORDER — VALGANCICLOVIR 450 MG/1
900 TABLET, FILM COATED ORAL DAILY
Status: DISCONTINUED | OUTPATIENT
Start: 2017-06-11 | End: 2017-06-12 | Stop reason: HOSPADM

## 2017-06-10 RX ORDER — FUROSEMIDE 10 MG/ML
40 INJECTION INTRAMUSCULAR; INTRAVENOUS ONCE
Status: COMPLETED | OUTPATIENT
Start: 2017-06-10 | End: 2017-06-10

## 2017-06-10 RX ORDER — DIPHENHYDRAMINE HYDROCHLORIDE 50 MG/ML
25-50 INJECTION INTRAMUSCULAR; INTRAVENOUS EVERY 6 HOURS PRN
Status: DISCONTINUED | OUTPATIENT
Start: 2017-06-10 | End: 2017-06-12 | Stop reason: HOSPADM

## 2017-06-10 RX ORDER — FUROSEMIDE 10 MG/ML
20 INJECTION INTRAMUSCULAR; INTRAVENOUS ONCE
Status: DISCONTINUED | OUTPATIENT
Start: 2017-06-10 | End: 2017-06-10

## 2017-06-10 RX ADMIN — DIPHENHYDRAMINE HYDROCHLORIDE 50 MG: 50 INJECTION, SOLUTION INTRAMUSCULAR; INTRAVENOUS at 01:36

## 2017-06-10 RX ADMIN — HEPARIN SODIUM 5000 UNITS: 5000 INJECTION, SOLUTION INTRAVENOUS; SUBCUTANEOUS at 17:28

## 2017-06-10 RX ADMIN — FUROSEMIDE 40 MG: 10 INJECTION, SOLUTION INTRAVENOUS at 10:53

## 2017-06-10 RX ADMIN — SENNOSIDES AND DOCUSATE SODIUM 2 TABLET: 8.6; 5 TABLET ORAL at 19:44

## 2017-06-10 RX ADMIN — DIPHENHYDRAMINE HYDROCHLORIDE 25 MG: 50 INJECTION, SOLUTION INTRAMUSCULAR; INTRAVENOUS at 18:04

## 2017-06-10 RX ADMIN — KIT FOR THE PREPARATION OF TECHNETIUM TC 99M PENTETATE 2 MILLICURIE: 20 INJECTION, POWDER, LYOPHILIZED, FOR SOLUTION INTRAVENOUS; RESPIRATORY (INHALATION) at 12:45

## 2017-06-10 RX ADMIN — OXYCODONE HYDROCHLORIDE 5 MG: 5 TABLET ORAL at 18:57

## 2017-06-10 RX ADMIN — TACROLIMUS 2 MG: 1 CAPSULE ORAL at 17:28

## 2017-06-10 RX ADMIN — ACETAMINOPHEN 325 MG: 325 TABLET, FILM COATED ORAL at 13:52

## 2017-06-10 RX ADMIN — VALGANCICLOVIR HYDROCHLORIDE 450 MG: 450 TABLET ORAL at 08:09

## 2017-06-10 RX ADMIN — HYDROXYZINE HYDROCHLORIDE 25 MG: 10 SYRUP ORAL at 15:49

## 2017-06-10 RX ADMIN — SENNOSIDES AND DOCUSATE SODIUM 2 TABLET: 8.6; 5 TABLET ORAL at 08:09

## 2017-06-10 RX ADMIN — MYCOPHENOLATE MOFETIL 750 MG: 250 CAPSULE ORAL at 17:28

## 2017-06-10 RX ADMIN — OXYCODONE HYDROCHLORIDE 10 MG: 5 TABLET ORAL at 11:54

## 2017-06-10 RX ADMIN — CALCITRIOL 0.25 MCG: 0.25 CAPSULE, LIQUID FILLED ORAL at 10:25

## 2017-06-10 RX ADMIN — ACETAMINOPHEN 325 MG: 325 TABLET, FILM COATED ORAL at 03:45

## 2017-06-10 RX ADMIN — TACROLIMUS 2 MG: 1 CAPSULE ORAL at 08:09

## 2017-06-10 RX ADMIN — ACETAMINOPHEN 325 MG: 325 TABLET, FILM COATED ORAL at 19:44

## 2017-06-10 RX ADMIN — Medication 6.8 MILLICURIE: at 12:45

## 2017-06-10 RX ADMIN — HEPARIN SODIUM 5000 UNITS: 5000 INJECTION, SOLUTION INTRAVENOUS; SUBCUTANEOUS at 10:24

## 2017-06-10 RX ADMIN — SULFAMETHOXAZOLE AND TRIMETHOPRIM 1 TABLET: 400; 80 TABLET ORAL at 08:09

## 2017-06-10 RX ADMIN — DIBASIC SODIUM PHOSPHATE, MONOBASIC POTASSIUM PHOSPHATE AND MONOBASIC SODIUM PHOSPHATE 500 MG: 852; 155; 130 TABLET ORAL at 10:24

## 2017-06-10 RX ADMIN — MYCOPHENOLATE MOFETIL 750 MG: 250 CAPSULE ORAL at 08:09

## 2017-06-10 RX ADMIN — OXYCODONE HYDROCHLORIDE 10 MG: 5 TABLET ORAL at 08:11

## 2017-06-10 RX ADMIN — Medication 2 G: at 08:09

## 2017-06-10 RX ADMIN — DIBASIC SODIUM PHOSPHATE, MONOBASIC POTASSIUM PHOSPHATE AND MONOBASIC SODIUM PHOSPHATE 500 MG: 852; 155; 130 TABLET ORAL at 19:44

## 2017-06-10 ASSESSMENT — PAIN DESCRIPTION - DESCRIPTORS
DESCRIPTORS: SORE
DESCRIPTORS: SORE
DESCRIPTORS: HEADACHE
DESCRIPTORS: BURNING

## 2017-06-10 NOTE — PLAN OF CARE
Problem: Individualization  Goal: Patient Preferences     Tmax 99.5, tachycardic at times, other vss, reporting some pain, requested oxycodone x2, and states relief with pain intervention.  Patient up ambulating in [place] with family, voiding after aldana dc'd this AM, reporting bowel movements, and tolerating diet with fair appetite.  Chest xray and VQ scan completed this AM - see results.  Patient also received one time dose of lasix with effectiveness.  Magnesium=1.5 and replaced per one time order.  Med card and lab book updated.  Atarax x1 for itching.  Scheduled meds given as ordered.  Continue to monitor, treat as ordered, notify MDs with changes.

## 2017-06-10 NOTE — PROGRESS NOTES
Transplant Surgery  Inpatient Daily Progress Note  06/10/2017    Assessment & Plan:  20yo with hx of IgA nephropathy and HTN.  Underwent LDKT without stent on 6/7/17.    Graft function: POD #3.  Cr 6.7->5.4->3.1>2. Good UOP.   US: normal doppler, small fl collection.  Immunosuppression management: Induction with thymoglobulin and steroid pulse.  Thymo:  200mg 6/7,  200mg 6/8, 200 mg today  Methylpred:  500mg 6/7, 250mg 6/8. No methylpred given due to concern for steroid psychosis.   MMF:  750mg BID  Tacro: 2mg BID started today  Complexity of management:Medium. Contributing factors: induction  Hematology: Mild normocytic anemia, stable.  Cardiorespiratory: Low oxygen saturation with tachypnea and tachycardia. ? Atelectasis vs PE. Cxray showed congestion, 40mg IV lasix given with improvement of symptoms, and VQ scan reported as low likelihood for PE. Hx HTN, monitor for need to restart antihypertensives.   GI/Nutrition: Continue low K diet.  Continue senna. Dulcolax supp today.   Endocrine: No acute issues  Fluid/Electrolytes: Stop IV fluids    : Shaikh removed today morning, passed 500ml urine with zero PVRs  Infectious disease: Tmax 101.5 F, monitor.  Prophylaxis: DVT, fall, valcyte, hold bactrim for new rash. May consider PCP prophylaxis with alternative meds.   Neuro: Continues to be anxious. Improved with ativan overnight. Will avoid sedatives in view of the low sats. AMS 2 days back with paranoia. Concern for steroid psychosis. No confusion or paranoia seen today. Avoid further methylpred if possible.   Disposition: 7A     Medical Decision Making: Medium  Subsequent visit 77218 (moderate level decision making)    ASHLEIGH/Fellow/Resident Provider: Radha Cohen    Faculty: Silver Carcamo M.D., Ph.D.  _________________________________________________________________  Transplant History:   6/7/2017 (Kidney), Postoperative day: 3     Interval History: History is obtained from the patient  Overnight events: Febrile  "with Tmax 101.5, cultures so far negative, UA looks clean. Low sats with tachypnea, CXRay and VQ odered awaiting results. Started SQ heparin 5000U Q8H.     ROS:   A 10-point review of systems was negative except as noted above.    Meds:    phosphorus tablet 250 mg  500 mg Oral BID     heparin  5,000 Units Subcutaneous Q8H     calcitRIOL  0.25 mcg Oral Daily     tacrolimus  2 mg Oral BID IS     methylPREDNISolone  100 mg Intravenous Once     valGANciclovir  450 mg Oral Daily     acetaminophen  325 mg Oral Q6H     sodium chloride (PF)  10 mL Intracatheter Q8H     mycophenolate  750 mg Oral BID IS     senna-docusate  1-2 tablet Oral BID       Physical Exam:     Admit Weight: 101.1 kg (222 lb 14.2 oz)    Current vitals:   BP (!) 160/95 (BP Location: Right arm)  Pulse 107  Temp 99.3  F (37.4  C) (Oral)  Resp 20  Ht 1.88 m (6' 2.02\")  Wt 103.1 kg (227 lb 6.4 oz)  SpO2 90%  BMI 29.18 kg/m2    CVP (mmHg): 9 mmHg    Vital sign ranges:    Temp:  [99.1  F (37.3  C)-102.8  F (39.3  C)] 99.3  F (37.4  C)  Pulse:  [] 107  Heart Rate:  [] 110  Resp:  [16-40] 20  BP: (136-182)/() 160/95  SpO2:  [90 %-97 %] 90 %  Patient Vitals for the past 24 hrs:   BP Temp Temp src Pulse Heart Rate Resp SpO2 Weight   06/10/17 0900 - - - - - - 90 % 103.1 kg (227 lb 6.4 oz)   06/10/17 0734 (!) 160/95 99.3  F (37.4  C) Oral - 110 20 97 % -   06/10/17 0455 - 100  F (37.8  C) Oral - - - - -   06/10/17 0330 (!) 148/93 100.2  F (37.9  C) Oral - 110 20 97 % -   06/10/17 0130 (!) 145/93 101.7  F (38.7  C) Oral - 109 20 96 % -   06/09/17 2300 156/87 100  F (37.8  C) Oral - 117 20 94 % -   06/09/17 2230 (!) 144/92 101.3  F (38.5  C) Oral - - - - -   06/09/17 2130 - 102.6  F (39.2  C) Oral - - - - -   06/09/17 2100 - 102.3  F (39.1  C) Oral - 119 28 91 % -   06/09/17 2021 (!) 182/102 102.8  F (39.3  C) Oral - 125 (!) 40 94 % -   06/09/17 1900 (!) 156/101 101.3  F (38.5  C) Oral - 122 22 95 % -   06/09/17 1751 (!) 150/98 99.5  F (37.5 "  C) Oral 107 107 24 96 % -   06/09/17 1620 158/88 99.3  F (37.4  C) Oral 93 93 16 94 % -   06/09/17 1440 143/87 99.1  F (37.3  C) Oral 106 106 16 97 % -   06/09/17 1430 136/84 99.5  F (37.5  C) Oral 94 94 16 97 % -   06/09/17 1213 - - - - - - - 102.8 kg (226 lb 11.2 oz)     General Appearance: in no apparent distress.   Skin: normal, warm, dry  Heart: ST  Lungs: Room air, unlabored  Abdomen: The abdomen is round, soft.  Incision CDI.  : aldana, dark yellow UOP  Extremities: edema: none  Neurologic: A&Ox4, strength equal    Data:   CMP    Recent Labs  Lab 06/10/17  0545 06/09/17  0658    139   POTASSIUM 4.4 4.8   CHLORIDE 109 106   CO2 20 24   GLC 89 113*   BUN 28 41*   CR 2.05* 3.08*   GFRESTIMATED 41* 25*   GFRESTBLACK 49* 31*   ANTON 9.0 9.2   MAG 1.5* 1.9   PHOS 1.7* 3.6     CBC    Recent Labs  Lab 06/10/17  0545 06/10/17  0016   HGB 11.2* 10.6*   WBC 3.9* 2.3*   * 140*     COAGSNo lab results found in last 7 days.    Invalid input(s): XA   Urinalysis  Recent Labs   Lab Test  06/10/17   0520  06/01/17   1303   COLOR  Yellow  Yellow   APPEARANCE  Clear  Clear   URINEGLC  Negative  Negative   URINEBILI  Negative  Negative   URINEKETONE  Negative  Negative   SG  1.011  1.007   UBLD  Moderate*  Small*   URINEPH  6.0  8.0*   PROTEIN  100*  >499*   NITRITE  Negative  Negative   LEUKEST  Negative  Negative   RBCU  56*  10*   WBCU  2  1     Virology:  Hepatitis C Antibody   Date Value Ref Range Status   06/01/2017  NR Final    Nonreactive   Assay performance characteristics have not been established for newborns,   infants, and children

## 2017-06-10 NOTE — PROVIDER NOTIFICATION
Pt states feeling chilled and feverish. Temp 102.8, , /102, R 40, o2 sats dropping in the low 90's. Pt eyes also swollen with no improvement. Provider notified Tylenol 1,000 mg one time dose ordered, Labetalol ordered, cooling blanket ordered, simethicone ordered for gas pains, continuous O2 NC ordered. Provider came up to assess patient. Lactic, blood cultures, CBC, UA/UC ordered. Labs drawn and lost in tube system, Provider notified and reordered labs. Lactic was 0.9. Pt broke out in a red spotted rash on back. Provider notified and ordered 25-50 mg of IV benadryl.

## 2017-06-10 NOTE — PROGRESS NOTES
Nephrology Progress Note  06/10/2017         Assessment & Recommendations:   1. LDKT - baseline creatinine still to be determined, but improving creatinine daily, down to ~ 2.0 today.  Good urine output.  No DSA.  2. Immunosuppression - now finished with Thymoglobulin and completing steroids per protocol.  On maintenance tacrolimus and mycophenolate mofetil.  3. HTN - fair control slightly above target of less than 140/90.  With fever, hypoxia and pain, infectious work up ongoing and would not be too aggressive in controlling BP at this time.  Will follow for now.  4. Anemia in chronic renal disease - stable Hgb.  Iron replete.  Will follow.  5. Secondary renal hyperparathyroidism - moderately elevated PTH, which should improve post transplant.  With low phosphorus level, will start calcitriol 0.25 mcg daily.  6. Hypophosphatemia - low serum phosphorus and recommend starting Phospha 250 Neutral 500 mg bid and will also start calcitriol.  7. Hypomagnesemia - low serum magnesium level and recommend starting oral magnesium oxide 400 mg daily.  8. Hypoxia and fever - would recommend obtaining CXR to rule out pneumonia.  Also agree with V/Q scan to help rule out PE.  Fever likely due to Thymo and patient not done with treatment.  Hypoxia could also be due to atelectasis and would encourage incentive spirometer use.  9. Rash - likely drug reaction, possibly Thymo versus Bactrim.  Patient has completed Thymo treatment.  Would also hold Bactrim for now.    Recommendations were communicated to primary team via this note.    Jak King MD    Interval History:   In the last 24 hours Golden Stover's kidney function is improved with creatinine ~ 2.0.  Patient had a fever up to 102.8 yesterday with noted sweats and chills.  Cultures negative so far.  Decrease in fever this am, but still up to 99.3.  Patient also reports a rash over his back and groin.  It is not itchy.  Patient complains of significant  "abdominal bloating and gas pain yesterday, along with some nausea, but improved today.  He doesn't feel nauseated now.  Did have 3 bowel movements.  No chest pain, but some shortness of breath with anxiety and lower oxygen levels.  No leg swelling.    Review of Systems:   4 point ROS was obtained and negative, except as noted in the Interval History.    Physical Exam:   I/O last 3 completed shifts:  In: 3493.75 [P.O.:950; I.V.:1953.75; IV Piggyback:590]  Out: 3175 [Urine:3175]   BP (!) 160/95 (BP Location: Right arm)  Pulse 107  Temp 99.3  F (37.4  C) (Oral)  Resp 20  Ht 1.88 m (6' 2.02\")  Wt 103.1 kg (227 lb 6.4 oz)  SpO2 90%  BMI 29.18 kg/m2     GENERAL APPEARANCE: slightly anxious, alert  HENT: mouth without ulcers or lesions  PULM: lungs clear to auscultation bilaterally, equal air movement  CV: regular rhythm, normal rate     - no LE edema  GI: soft, mildly tender, nondistended, bowel sounds are positive  INTEGUMENT: diffuse macular rash over back and groin  TX KIDNEY: mild to moderate TTP    Labs:   All labs reviewed by me  Electrolytes/Renal -   Recent Labs   Lab Test  06/10/17   0545  06/09/17   0658  06/08/17   1640   06/08/17   0530   NA  139  139  135   --   139   POTASSIUM  4.4  4.8  5.0   < >  5.3   CHLORIDE  109  106  103   --   107   CO2  20  24  24   --   24   BUN  28  41*  41*   --   41*   CR  2.05*  3.08*  4.03*   --   5.41*   GLC  89  113*  141*   --   116*   ANTON  9.0  9.2  8.9   --   8.8   MAG  1.5*  1.9   --    --   1.6   PHOS  1.7*  3.6   --    --   3.8    < > = values in this interval not displayed.       CBC -   Recent Labs   Lab Test  06/10/17   0545  06/10/17   0016  06/09/17   0658   WBC  3.9*  2.3*  6.1   HGB  11.2*  10.6*  10.9*   PLT  113*  140*  162       LFTs -   Recent Labs   Lab Test  06/01/17   1310  12/07/16   0637   ALKPHOS  108  98   BILITOTAL  0.6  0.5   ALT  28  31   AST  17  18   PROTTOTAL  8.2  8.1   ALBUMIN  4.0  3.8       Iron Panel -   Recent Labs   Lab Test  " 06/01/17   1310   IRON  65   IRONSAT  21   GARY  310       Current Medications:    phosphorus tablet 250 mg  500 mg Oral BID     tacrolimus  2 mg Oral BID IS     methylPREDNISolone  100 mg Intravenous Once     valGANciclovir  450 mg Oral Daily     acetaminophen  325 mg Oral Q6H     sodium chloride (PF)  10 mL Intracatheter Q8H     mycophenolate  750 mg Oral BID IS     senna-docusate  1-2 tablet Oral BID        Jak King MD

## 2017-06-10 NOTE — PROGRESS NOTES
VS tachycardic, tachypneic, HTN, O2 sats in the mid 90's on 2LPM NC. Pt is still febrile with temp max of 102.8. Provider was notified and assessed pt. Tylenol 1000 mg one time dose ordered and then scheduled every 6 hours. Cooling blanket also placed under pt as long as ice packs on warmer areas of body. Temp is now down to 100. Pt had a lactic of 0.9, CBC, and blood cultures drawn. UA/UC collected and awaiting results. Pt back broke out in a rash and IV benadryl 50mg was given with some improvement. Pt was complaining of some gas pain and received simethicone. Pt experiencing high anxiety and received ativan 0.5mg with effective relief. Pt has a double lumen tara with NS at 75ml.hr and other lumen heparin locked. Incision is liquid bandage with no new drainage. Shaikh was pulled at 6AM. Urinal at bedside. Will continue to monitor temp and notify MD of any changes.

## 2017-06-11 ENCOUNTER — APPOINTMENT (OUTPATIENT)
Dept: ULTRASOUND IMAGING | Facility: CLINIC | Age: 22
DRG: 652 | End: 2017-06-11
Attending: TRANSPLANT SURGERY
Payer: COMMERCIAL

## 2017-06-11 LAB
ANION GAP SERPL CALCULATED.3IONS-SCNC: 8 MMOL/L (ref 3–14)
BACTERIA SPEC CULT: NO GROWTH
BASOPHILS # BLD AUTO: 0 10E9/L (ref 0–0.2)
BASOPHILS NFR BLD AUTO: 0.1 %
BLD PROD TYP BPU: NORMAL
BLD PROD TYP BPU: NORMAL
BLD UNIT ID BPU: 0
BLD UNIT ID BPU: 0
BLOOD PRODUCT CODE: NORMAL
BLOOD PRODUCT CODE: NORMAL
BPU ID: NORMAL
BPU ID: NORMAL
BUN SERPL-MCNC: 23 MG/DL (ref 7–30)
CALCIUM SERPL-MCNC: 8.6 MG/DL (ref 8.5–10.1)
CHLORIDE SERPL-SCNC: 105 MMOL/L (ref 94–109)
CO2 SERPL-SCNC: 23 MMOL/L (ref 20–32)
CREAT SERPL-MCNC: 2 MG/DL (ref 0.66–1.25)
DIFFERENTIAL METHOD BLD: ABNORMAL
EOSINOPHIL # BLD AUTO: 0.1 10E9/L (ref 0–0.7)
EOSINOPHIL NFR BLD AUTO: 1 %
ERYTHROCYTE [DISTWIDTH] IN BLOOD BY AUTOMATED COUNT: 14.4 % (ref 10–15)
GFR SERPL CREATININE-BSD FRML MDRD: 42 ML/MIN/1.7M2
GLUCOSE SERPL-MCNC: 82 MG/DL (ref 70–99)
HCT VFR BLD AUTO: 31.4 % (ref 40–53)
HGB BLD-MCNC: 10.3 G/DL (ref 13.3–17.7)
IMM GRANULOCYTES # BLD: 0 10E9/L (ref 0–0.4)
IMM GRANULOCYTES NFR BLD: 0.3 %
LACTATE BLD-SCNC: 0.9 MMOL/L (ref 0.7–2.1)
LYMPHOCYTES # BLD AUTO: 0.4 10E9/L (ref 0.8–5.3)
LYMPHOCYTES NFR BLD AUTO: 5.7 %
Lab: NORMAL
MAGNESIUM SERPL-MCNC: 1.9 MG/DL (ref 1.6–2.3)
MCH RBC QN AUTO: 30.5 PG (ref 26.5–33)
MCHC RBC AUTO-ENTMCNC: 32.8 G/DL (ref 31.5–36.5)
MCV RBC AUTO: 93 FL (ref 78–100)
MICRO REPORT STATUS: NORMAL
MONOCYTES # BLD AUTO: 0.5 10E9/L (ref 0–1.3)
MONOCYTES NFR BLD AUTO: 7.1 %
NEUTROPHILS # BLD AUTO: 6.2 10E9/L (ref 1.6–8.3)
NEUTROPHILS NFR BLD AUTO: 85.8 %
NRBC # BLD AUTO: 0 10*3/UL
NRBC BLD AUTO-RTO: 0 /100
PHOSPHATE SERPL-MCNC: 2.1 MG/DL (ref 2.5–4.5)
PLATELET # BLD AUTO: 150 10E9/L (ref 150–450)
POTASSIUM SERPL-SCNC: 3.9 MMOL/L (ref 3.4–5.3)
RBC # BLD AUTO: 3.38 10E12/L (ref 4.4–5.9)
SODIUM SERPL-SCNC: 136 MMOL/L (ref 133–144)
SPECIMEN SOURCE: NORMAL
TACROLIMUS BLD-MCNC: ABNORMAL UG/L (ref 5–15)
TME LAST DOSE: ABNORMAL H
TRANSFUSION STATUS PATIENT QL: NORMAL
WBC # BLD AUTO: 7.2 10E9/L (ref 4–11)

## 2017-06-11 PROCEDURE — 25000132 ZZH RX MED GY IP 250 OP 250 PS 637: Performed by: NURSE PRACTITIONER

## 2017-06-11 PROCEDURE — 80048 BASIC METABOLIC PNL TOTAL CA: CPT | Performed by: TRANSPLANT SURGERY

## 2017-06-11 PROCEDURE — 25000132 ZZH RX MED GY IP 250 OP 250 PS 637: Performed by: TRANSPLANT SURGERY

## 2017-06-11 PROCEDURE — 25000128 H RX IP 250 OP 636: Performed by: TRANSPLANT SURGERY

## 2017-06-11 PROCEDURE — 25000132 ZZH RX MED GY IP 250 OP 250 PS 637: Performed by: INTERNAL MEDICINE

## 2017-06-11 PROCEDURE — 84100 ASSAY OF PHOSPHORUS: CPT | Performed by: TRANSPLANT SURGERY

## 2017-06-11 PROCEDURE — 80197 ASSAY OF TACROLIMUS: CPT | Performed by: TRANSPLANT SURGERY

## 2017-06-11 PROCEDURE — 25000131 ZZH RX MED GY IP 250 OP 636 PS 637: Performed by: PHYSICIAN ASSISTANT

## 2017-06-11 PROCEDURE — 25000132 ZZH RX MED GY IP 250 OP 250 PS 637: Performed by: SURGERY

## 2017-06-11 PROCEDURE — 25000131 ZZH RX MED GY IP 250 OP 636 PS 637: Performed by: TRANSPLANT SURGERY

## 2017-06-11 PROCEDURE — S0169 CALCITROL: HCPCS | Performed by: INTERNAL MEDICINE

## 2017-06-11 PROCEDURE — 76776 US EXAM K TRANSPL W/DOPPLER: CPT

## 2017-06-11 PROCEDURE — 12000026 ZZH R&B TRANSPLANT

## 2017-06-11 PROCEDURE — 85025 COMPLETE CBC W/AUTO DIFF WBC: CPT | Performed by: TRANSPLANT SURGERY

## 2017-06-11 PROCEDURE — 83735 ASSAY OF MAGNESIUM: CPT | Performed by: TRANSPLANT SURGERY

## 2017-06-11 PROCEDURE — 25000128 H RX IP 250 OP 636: Performed by: PHYSICIAN ASSISTANT

## 2017-06-11 RX ORDER — TACROLIMUS 1 MG/1
3 CAPSULE ORAL
Status: DISCONTINUED | OUTPATIENT
Start: 2017-06-11 | End: 2017-06-12 | Stop reason: HOSPADM

## 2017-06-11 RX ORDER — TACROLIMUS 1 MG/1
1 CAPSULE ORAL ONCE
Status: COMPLETED | OUTPATIENT
Start: 2017-06-11 | End: 2017-06-11

## 2017-06-11 RX ADMIN — TACROLIMUS 3 MG: 1 CAPSULE ORAL at 17:35

## 2017-06-11 RX ADMIN — VALGANCICLOVIR HYDROCHLORIDE 900 MG: 450 TABLET ORAL at 07:31

## 2017-06-11 RX ADMIN — SENNOSIDES AND DOCUSATE SODIUM 2 TABLET: 8.6; 5 TABLET ORAL at 07:30

## 2017-06-11 RX ADMIN — MYCOPHENOLATE MOFETIL 750 MG: 250 CAPSULE ORAL at 17:35

## 2017-06-11 RX ADMIN — OXYCODONE HYDROCHLORIDE 10 MG: 5 TABLET ORAL at 18:23

## 2017-06-11 RX ADMIN — ACETAMINOPHEN 325 MG: 325 TABLET, FILM COATED ORAL at 20:19

## 2017-06-11 RX ADMIN — MYCOPHENOLATE MOFETIL 750 MG: 250 CAPSULE ORAL at 07:30

## 2017-06-11 RX ADMIN — TACROLIMUS 1 MG: 1 CAPSULE ORAL at 14:39

## 2017-06-11 RX ADMIN — CALCITRIOL 0.25 MCG: 0.25 CAPSULE, LIQUID FILLED ORAL at 07:30

## 2017-06-11 RX ADMIN — HEPARIN SODIUM 5000 UNITS: 5000 INJECTION, SOLUTION INTRAVENOUS; SUBCUTANEOUS at 10:55

## 2017-06-11 RX ADMIN — HYDROXYZINE HYDROCHLORIDE 25 MG: 10 SYRUP ORAL at 23:37

## 2017-06-11 RX ADMIN — HEPARIN SODIUM 5000 UNITS: 5000 INJECTION, SOLUTION INTRAVENOUS; SUBCUTANEOUS at 03:03

## 2017-06-11 RX ADMIN — ACETAMINOPHEN 325 MG: 325 TABLET, FILM COATED ORAL at 14:00

## 2017-06-11 RX ADMIN — TACROLIMUS 2 MG: 1 CAPSULE ORAL at 07:30

## 2017-06-11 RX ADMIN — DIBASIC SODIUM PHOSPHATE, MONOBASIC POTASSIUM PHOSPHATE AND MONOBASIC SODIUM PHOSPHATE 500 MG: 852; 155; 130 TABLET ORAL at 07:30

## 2017-06-11 RX ADMIN — OXYCODONE HYDROCHLORIDE 5 MG: 5 TABLET ORAL at 00:07

## 2017-06-11 RX ADMIN — OXYCODONE HYDROCHLORIDE 5 MG: 5 TABLET ORAL at 04:59

## 2017-06-11 RX ADMIN — HEPARIN SODIUM 2300 UNITS: 1000 INJECTION, SOLUTION INTRAVENOUS; SUBCUTANEOUS at 12:16

## 2017-06-11 RX ADMIN — HEPARIN SODIUM 5000 UNITS: 5000 INJECTION, SOLUTION INTRAVENOUS; SUBCUTANEOUS at 17:35

## 2017-06-11 RX ADMIN — OXYCODONE HYDROCHLORIDE 10 MG: 5 TABLET ORAL at 12:16

## 2017-06-11 RX ADMIN — ACETAMINOPHEN 325 MG: 325 TABLET, FILM COATED ORAL at 07:31

## 2017-06-11 RX ADMIN — HYDROXYZINE HYDROCHLORIDE 25 MG: 10 SYRUP ORAL at 14:38

## 2017-06-11 RX ADMIN — ACETAMINOPHEN 325 MG: 325 TABLET, FILM COATED ORAL at 03:03

## 2017-06-11 RX ADMIN — DIBASIC SODIUM PHOSPHATE, MONOBASIC POTASSIUM PHOSPHATE AND MONOBASIC SODIUM PHOSPHATE 500 MG: 852; 155; 130 TABLET ORAL at 20:19

## 2017-06-11 ASSESSMENT — PAIN DESCRIPTION - DESCRIPTORS
DESCRIPTORS: SORE

## 2017-06-11 NOTE — PLAN OF CARE
Problem: Individualization  Goal: Patient Preferences     Afebrile, tachycardic at times, other vss, reporting some pain, requested oxycodone x2, and states relief with pain intervention.  Patient up ambulating in [place] with family, voiding good amounts, BMx1, and tolerating diet with fair appetite.  Renal ultrasound performed this AM and unremarkable.  Med card and lab book updated.  Patient logged onto [facility] and has watched discharge video.  Atarax x1 for itching.  Scheduled meds given as ordered.  Continue to monitor, treat as ordered, notify MDs with changes.

## 2017-06-11 NOTE — PROGRESS NOTES
Tmax 99.9, tachycardic. Pt satting well on room air till 0400, satting in the 80's O2 2 LPM NC put on and taken off in AM and satting in the mid 90's. Pt complained of pain and received oxy 5mg x2. Pt triggered sepsis and Lactiv was 0.9. Pt is on a regular diet and rash is beginning to clear up. Olman dressing changed. Ice pack placed on feet to help with burning feeling with relief. Will continue to monitor and follow plan of care.

## 2017-06-11 NOTE — PROGRESS NOTES
Nephrology Progress Note  06/11/2017         Assessment & Recommendations:   1. LDKT - baseline creatinine still to be determined, but has improved, although stable last two days with creatinine ~ 2.0.  Good urine output.  No DSA.  2. Immunosuppression - now finished with Thymoglobulin and completing steroids per protocol.  On maintenance tacrolimus and mycophenolate mofetil.  3. HTN - okay control right at target of less than 140/90.  No changes.  4. Anemia in chronic renal disease - stable to slight decrease in Hgb.  Iron replete.  Will follow.  5. Secondary renal hyperparathyroidism - moderately elevated PTH, which should improve post transplant.  Will continue calcitriol 0.25 mcg daily.  6. Hypophosphatemia - improved, but low serum phosphorus and continue oral phosphorus supplement and calcitriol.  7. Hypomagnesemia - now normal serum magnesium level and continue oral magnesium supplement.  8. Hypoxia and fever - CXR suggested fluid overload and low probability V/Q.  Symptoms improved with diuresis.  Fever likely due to Thymo and patient now done with treatment.  9. Rash - likely drug reaction, likely Thymo versus less likely Bactrim.  Patient has completed Thymo treatment.  Would also hold Bactrim for now.    Recommendations were communicated to primary team via this note.    Jak King MD    Interval History:   In the last 24 hours Golden Stover's kidney function is stable with creatinine ~ 2.0.  Good urine output.  Feels much better today.  Rash is resolving.  No further fever, sweats or chills and now afebrile.  No chest pain or shortness of breath.  No nausea or vomiting.  Some loose stools.  Slight swelling in feet.    Review of Systems:   4 point ROS was obtained and negative, except as noted in the Interval History.    Physical Exam:   I/O last 3 completed shifts:  In: 387.5 [P.O.:240; I.V.:147.5]  Out: 4050 [Urine:4050]   /82 (BP Location: Right arm)  Pulse 103  Temp 98.4  F  "(36.9  C) (Oral)  Resp 16  Ht 1.88 m (6' 2.02\")  Wt 100.2 kg (220 lb 14.4 oz)  SpO2 91%  BMI 28.35 kg/m2     GENERAL APPEARANCE: NAD, alert  HENT: mouth without ulcers or lesions  PULM: lungs clear to auscultation bilaterally, equal air movement  CV: regular rhythm, normal rate     - no LE edema  GI: soft, mildly tender, nondistended, bowel sounds are positive  INTEGUMENT: decreased macular rash over back, groin and feet  TX KIDNEY: mild TTP    Labs:   All labs reviewed by me  Electrolytes/Renal -   Recent Labs   Lab Test  06/11/17   0540  06/10/17   0545  06/09/17   0658   NA  136  139  139   POTASSIUM  3.9  4.4  4.8   CHLORIDE  105  109  106   CO2  23  20  24   BUN  23  28  41*   CR  2.00*  2.05*  3.08*   GLC  82  89  113*   ANTON  8.6  9.0  9.2   MAG  1.9  1.5*  1.9   PHOS  2.1*  1.7*  3.6       CBC -   Recent Labs   Lab Test  06/11/17   0540  06/10/17   0545  06/10/17   0016   WBC  7.2  3.9*  2.3*   HGB  10.3*  11.2*  10.6*   PLT  150  113*  140*       LFTs -   Recent Labs   Lab Test  06/01/17   1310  12/07/16   0637   ALKPHOS  108  98   BILITOTAL  0.6  0.5   ALT  28  31   AST  17  18   PROTTOTAL  8.2  8.1   ALBUMIN  4.0  3.8       Iron Panel -   Recent Labs   Lab Test  06/01/17   1310   IRON  65   IRONSAT  21   GARY  310       Current Medications:    phosphorus tablet 250 mg  500 mg Oral BID     heparin  5,000 Units Subcutaneous Q8H     calcitRIOL  0.25 mcg Oral Daily     valGANciclovir  900 mg Oral Daily     tacrolimus  2 mg Oral BID IS     methylPREDNISolone  100 mg Intravenous Once     acetaminophen  325 mg Oral Q6H     sodium chloride (PF)  10 mL Intracatheter Q8H     mycophenolate  750 mg Oral BID IS        Jak King MD  "

## 2017-06-11 NOTE — PROGRESS NOTES
Transplant Surgery  Inpatient Daily Progress Note  06/11/2017    Assessment & Plan: 22yo with hx of IgA nephropathy and HTN.  Underwent LDKT without stent on 6/7/17.     Graft function: POD #4.  Cr 6.7->5.4->3.1>2->2. UOP.4L yesterday. K stable at 3.9. Post operative  US: normal doppler, small fl collection. Repeat US today since creatinine hasn't gone down.   Immunosuppression management: Induction with thymoglobulin and steroid pulse.  Thymo:  200mg 6/7,  200mg 6/8, 200 mg 6/9  Methylpred:  500mg 6/7, 250mg 6/8. No methylpred given due to concern for steroid psychosis.   MMF:  750mg BID  Tacro: 2mg BID started today. Tac level pending   Complexity of management:Medium. Contributing factors: induction  Hematology: Mild normocytic anemia, stable.  Cardiorespiratory: Improved oxygen saturation and tachycardia. Yesterday patient had tachypnea and tachycardia ? Atelectasis vs PE. Cxray showed congestion, 40mg IV lasix given with improvement of symptoms, and VQ scan reported as low likelihood for PE. Hx HTN, monitor for need to restart antihypertensives.   GI/Nutrition: Continue low K diet.  Continue senna. Dulcolax supp today.   Endocrine: No acute issues  Fluid/Electrolytes: Stop IV fluids    : Voidng freely. Shaikh removed today morning, passed 500ml urine with zero PVRs.   Infectious disease: Tmax 99.9 F, monitor.  Prophylaxis: DVT, fall, valcyte, hold bactrim for new rash. May consider PCP prophylaxis with alternative meds.   Neuro: Continues to be anxious. Improved with ativan overnight. Will avoid sedatives in view of the low sats. AMS 3 days back with paranoia. Concern for steroid psychosis. No confusion or paranoia seen today. Avoid further methylpred if possible.   Disposition: 7A     Medical Decision Making: Medium  Subsequent visit 39273 (moderate level decision making)    ASHLEIGH/Fellow/Resident Provider: [place] Church    Faculty: Silver Carcamo M.D.,  "Ph.D.  _________________________________________________________________  Transplant History: Admitted 6/7/2017 for LRDKT.  6/7/2017 (Kidney), Postoperative day: 4     Interval History: History is obtained from the patient  Overnight events: No acute issues overnight. Patient reports feeling better. Denies SOB. Rash improving. Has good appetite today. Had a couple of loose BMs.     ROS:   A 10-point review of systems was negative except as noted above.    Meds:    phosphorus tablet 250 mg  500 mg Oral BID     heparin  5,000 Units Subcutaneous Q8H     calcitRIOL  0.25 mcg Oral Daily     valGANciclovir  900 mg Oral Daily     tacrolimus  2 mg Oral BID IS     methylPREDNISolone  100 mg Intravenous Once     acetaminophen  325 mg Oral Q6H     sodium chloride (PF)  10 mL Intracatheter Q8H     mycophenolate  750 mg Oral BID IS       Physical Exam:     Admit Weight: 101.1 kg (222 lb 14.2 oz)    Current vitals:   /82 (BP Location: Right arm)  Pulse 103  Temp 98.4  F (36.9  C) (Oral)  Resp 16  Ht 1.88 m (6' 2.02\")  Wt 100.2 kg (220 lb 14.4 oz)  SpO2 91%  BMI 28.35 kg/m2    CVP (mmHg): 9 mmHg    Vital sign ranges:    Temp:  [98.4  F (36.9  C)-99.9  F (37.7  C)] 98.4  F (36.9  C)  Pulse:  [] 103  Heart Rate:  [] 103  Resp:  [16-20] 16  BP: (133-164)/(75-95) 135/82  SpO2:  [85 %-96 %] 91 %  Patient Vitals for the past 24 hrs:   BP Temp Temp src Pulse Heart Rate Resp SpO2 Weight   06/11/17 0700 135/82 98.4  F (36.9  C) Oral 103 103 16 91 % -   06/11/17 0433 - - - - - - - 100.2 kg (220 lb 14.4 oz)   06/11/17 0350 144/85 - - - - - 96 % -   06/11/17 0344 - - - - - - 92 % -   06/11/17 0340 (!) 164/95 99.9  F (37.7  C) Oral - 111 16 (!) 85 % -   06/11/17 0000 133/77 99.1  F (37.3  C) Oral - 107 18 95 % -   06/10/17 1930 151/90 99.9  F (37.7  C) Oral - 110 18 90 % -   06/10/17 1550 138/75 99.5  F (37.5  C) Oral 97 97 20 95 % -   06/10/17 1150 (!) 148/92 99.2  F (37.3  C) Oral 103 103 20 94 % -   06/10/17 1005 - " - - - - - 95 % -   06/10/17 1000 - - - - - - (!) 88 % -   06/10/17 0900 - - - - - - 90 % 103.1 kg (227 lb 6.4 oz)     General Appearance: in no apparent distress.   Skin: normal  Heart: regular rate and rhythm  Lungs: NLB on RA  Abdomen: The abdomen is soft, ND, bernarda ttp, incision c/d/i   Extremities: edema: absent.   Neurologic: awake, alert and oriented. Tremor absent..     Data:   CMP  Recent Labs  Lab 06/11/17  0540 06/10/17  0545    139   POTASSIUM 3.9 4.4   CHLORIDE 105 109   CO2 23 20   GLC 82 89   BUN 23 28   CR 2.00* 2.05*   GFRESTIMATED 42* 41*   GFRESTBLACK 50* 49*   ANTON 8.6 9.0   MAG 1.9 1.5*   PHOS 2.1* 1.7*     CBC  Recent Labs  Lab 06/11/17  0540 06/10/17  0545   HGB 10.3* 11.2*   WBC 7.2 3.9*    113*     COAGSNo lab results found in last 7 days.    Invalid input(s): XA   Urinalysis  Recent Labs   Lab Test  06/10/17   0520  06/01/17   1303   COLOR  Yellow  Yellow   APPEARANCE  Clear  Clear   URINEGLC  Negative  Negative   URINEBILI  Negative  Negative   URINEKETONE  Negative  Negative   SG  1.011  1.007   UBLD  Moderate*  Small*   URINEPH  6.0  8.0*   PROTEIN  100*  >499*   NITRITE  Negative  Negative   LEUKEST  Negative  Negative   RBCU  56*  10*   WBCU  2  1     Virology:  Hepatitis C Antibody   Date Value Ref Range Status   06/01/2017  NR Final    Nonreactive   Assay performance characteristics have not been established for newborns,   infants, and children

## 2017-06-12 VITALS
TEMPERATURE: 98.9 F | WEIGHT: 218.2 LBS | OXYGEN SATURATION: 94 % | SYSTOLIC BLOOD PRESSURE: 147 MMHG | BODY MASS INDEX: 28 KG/M2 | DIASTOLIC BLOOD PRESSURE: 109 MMHG | RESPIRATION RATE: 16 BRPM | HEART RATE: 93 BPM | HEIGHT: 74 IN

## 2017-06-12 LAB
ANION GAP SERPL CALCULATED.3IONS-SCNC: 9 MMOL/L (ref 3–14)
BASOPHILS # BLD AUTO: 0 10E9/L (ref 0–0.2)
BASOPHILS NFR BLD AUTO: 0.2 %
BUN SERPL-MCNC: 18 MG/DL (ref 7–30)
CALCIUM SERPL-MCNC: 9 MG/DL (ref 8.5–10.1)
CHLORIDE SERPL-SCNC: 106 MMOL/L (ref 94–109)
CO2 SERPL-SCNC: 24 MMOL/L (ref 20–32)
CREAT SERPL-MCNC: 1.96 MG/DL (ref 0.66–1.25)
DIFFERENTIAL METHOD BLD: ABNORMAL
EOSINOPHIL # BLD AUTO: 0.1 10E9/L (ref 0–0.7)
EOSINOPHIL NFR BLD AUTO: 1.9 %
ERYTHROCYTE [DISTWIDTH] IN BLOOD BY AUTOMATED COUNT: 14.4 % (ref 10–15)
GFR SERPL CREATININE-BSD FRML MDRD: 43 ML/MIN/1.7M2
GLUCOSE SERPL-MCNC: 84 MG/DL (ref 70–99)
HCT VFR BLD AUTO: 32.5 % (ref 40–53)
HGB BLD-MCNC: 10.3 G/DL (ref 13.3–17.7)
IMM GRANULOCYTES # BLD: 0 10E9/L (ref 0–0.4)
IMM GRANULOCYTES NFR BLD: 0.6 %
LYMPHOCYTES # BLD AUTO: 0.5 10E9/L (ref 0.8–5.3)
LYMPHOCYTES NFR BLD AUTO: 7.9 %
MAGNESIUM SERPL-MCNC: 2 MG/DL (ref 1.6–2.3)
MCH RBC QN AUTO: 29.3 PG (ref 26.5–33)
MCHC RBC AUTO-ENTMCNC: 31.7 G/DL (ref 31.5–36.5)
MCV RBC AUTO: 93 FL (ref 78–100)
MONOCYTES # BLD AUTO: 0.7 10E9/L (ref 0–1.3)
MONOCYTES NFR BLD AUTO: 10.4 %
NEUTROPHILS # BLD AUTO: 5 10E9/L (ref 1.6–8.3)
NEUTROPHILS NFR BLD AUTO: 79 %
NRBC # BLD AUTO: 0 10*3/UL
NRBC BLD AUTO-RTO: 0 /100
PHOSPHATE SERPL-MCNC: 2.9 MG/DL (ref 2.5–4.5)
PLATELET # BLD AUTO: 180 10E9/L (ref 150–450)
POTASSIUM SERPL-SCNC: 4.1 MMOL/L (ref 3.4–5.3)
RBC # BLD AUTO: 3.51 10E12/L (ref 4.4–5.9)
SODIUM SERPL-SCNC: 139 MMOL/L (ref 133–144)
TACROLIMUS BLD-MCNC: ABNORMAL UG/L (ref 5–15)
TME LAST DOSE: ABNORMAL H
WBC # BLD AUTO: 6.3 10E9/L (ref 4–11)

## 2017-06-12 PROCEDURE — 85025 COMPLETE CBC W/AUTO DIFF WBC: CPT | Performed by: TRANSPLANT SURGERY

## 2017-06-12 PROCEDURE — 84100 ASSAY OF PHOSPHORUS: CPT | Performed by: TRANSPLANT SURGERY

## 2017-06-12 PROCEDURE — 83735 ASSAY OF MAGNESIUM: CPT | Performed by: TRANSPLANT SURGERY

## 2017-06-12 PROCEDURE — 25000132 ZZH RX MED GY IP 250 OP 250 PS 637: Performed by: TRANSPLANT SURGERY

## 2017-06-12 PROCEDURE — 80048 BASIC METABOLIC PNL TOTAL CA: CPT | Performed by: TRANSPLANT SURGERY

## 2017-06-12 PROCEDURE — 25000128 H RX IP 250 OP 636: Performed by: TRANSPLANT SURGERY

## 2017-06-12 PROCEDURE — 25000132 ZZH RX MED GY IP 250 OP 250 PS 637: Performed by: INTERNAL MEDICINE

## 2017-06-12 PROCEDURE — S0169 CALCITROL: HCPCS | Performed by: INTERNAL MEDICINE

## 2017-06-12 PROCEDURE — 80197 ASSAY OF TACROLIMUS: CPT | Performed by: TRANSPLANT SURGERY

## 2017-06-12 PROCEDURE — 25000131 ZZH RX MED GY IP 250 OP 636 PS 637: Performed by: TRANSPLANT SURGERY

## 2017-06-12 PROCEDURE — 25000131 ZZH RX MED GY IP 250 OP 636 PS 637: Performed by: SURGERY

## 2017-06-12 PROCEDURE — 36415 COLL VENOUS BLD VENIPUNCTURE: CPT | Performed by: TRANSPLANT SURGERY

## 2017-06-12 PROCEDURE — 25000132 ZZH RX MED GY IP 250 OP 250 PS 637: Performed by: NURSE PRACTITIONER

## 2017-06-12 RX ORDER — DAPSONE 100 MG/1
100 TABLET ORAL DAILY
Qty: 30 TABLET | Refills: 3 | Status: SHIPPED | OUTPATIENT
Start: 2017-06-12 | End: 2017-09-27

## 2017-06-12 RX ORDER — OXYCODONE HYDROCHLORIDE 5 MG/1
5-10 TABLET ORAL EVERY 4 HOURS PRN
Qty: 40 TABLET | Refills: 0 | Status: SHIPPED | OUTPATIENT
Start: 2017-06-12 | End: 2017-07-11

## 2017-06-12 RX ORDER — TACROLIMUS 1 MG/1
2 CAPSULE ORAL ONCE
Status: COMPLETED | OUTPATIENT
Start: 2017-06-12 | End: 2017-06-12

## 2017-06-12 RX ORDER — DAPSONE 25 MG/1
25 TABLET ORAL DAILY
Qty: 30 TABLET | Refills: 3 | Status: SHIPPED | OUTPATIENT
Start: 2017-06-12 | End: 2017-06-12

## 2017-06-12 RX ORDER — VALGANCICLOVIR 450 MG/1
900 TABLET, FILM COATED ORAL DAILY
Qty: 60 TABLET | Refills: 0 | Status: SHIPPED | OUTPATIENT
Start: 2017-06-12 | End: 2017-07-05

## 2017-06-12 RX ORDER — MYCOPHENOLATE MOFETIL 250 MG/1
750 CAPSULE ORAL 2 TIMES DAILY
Qty: 180 CAPSULE | Refills: 11 | Status: SHIPPED | OUTPATIENT
Start: 2017-06-12 | End: 2017-11-06

## 2017-06-12 RX ORDER — CALCITRIOL 0.25 UG/1
0.25 CAPSULE, LIQUID FILLED ORAL DAILY
Qty: 30 CAPSULE | Refills: 3 | Status: SHIPPED | OUTPATIENT
Start: 2017-06-12 | End: 2017-09-11

## 2017-06-12 RX ORDER — ACETAMINOPHEN 325 MG/1
650 TABLET ORAL EVERY 6 HOURS PRN
Qty: 100 TABLET | Refills: 3 | Status: SHIPPED | OUTPATIENT
Start: 2017-06-12 | End: 2017-07-11

## 2017-06-12 RX ORDER — TACROLIMUS 1 MG/1
3 CAPSULE ORAL 2 TIMES DAILY
Qty: 180 CAPSULE | Refills: 11 | Status: SHIPPED | OUTPATIENT
Start: 2017-06-12 | End: 2017-06-13

## 2017-06-12 RX ORDER — ASPIRIN 81 MG
100 TABLET, DELAYED RELEASE (ENTERIC COATED) ORAL DAILY
Qty: 60 TABLET | Refills: 1 | Status: SHIPPED | OUTPATIENT
Start: 2017-06-12 | End: 2018-01-29

## 2017-06-12 RX ORDER — HYDROXYZINE PAMOATE 25 MG/1
25 CAPSULE ORAL 3 TIMES DAILY PRN
Qty: 40 CAPSULE | Refills: 1 | Status: SHIPPED | OUTPATIENT
Start: 2017-06-12 | End: 2017-07-11

## 2017-06-12 RX ADMIN — CALCITRIOL 0.25 MCG: 0.25 CAPSULE, LIQUID FILLED ORAL at 08:05

## 2017-06-12 RX ADMIN — ACETAMINOPHEN 325 MG: 325 TABLET, FILM COATED ORAL at 02:37

## 2017-06-12 RX ADMIN — TACROLIMUS 3 MG: 1 CAPSULE ORAL at 08:09

## 2017-06-12 RX ADMIN — HEPARIN SODIUM 5000 UNITS: 5000 INJECTION, SOLUTION INTRAVENOUS; SUBCUTANEOUS at 02:37

## 2017-06-12 RX ADMIN — VALGANCICLOVIR HYDROCHLORIDE 900 MG: 450 TABLET ORAL at 08:06

## 2017-06-12 RX ADMIN — DIBASIC SODIUM PHOSPHATE, MONOBASIC POTASSIUM PHOSPHATE AND MONOBASIC SODIUM PHOSPHATE 500 MG: 852; 155; 130 TABLET ORAL at 08:06

## 2017-06-12 RX ADMIN — ACETAMINOPHEN 325 MG: 325 TABLET, FILM COATED ORAL at 08:04

## 2017-06-12 RX ADMIN — OXYCODONE HYDROCHLORIDE 10 MG: 5 TABLET ORAL at 08:07

## 2017-06-12 RX ADMIN — MYCOPHENOLATE MOFETIL 750 MG: 250 CAPSULE ORAL at 08:06

## 2017-06-12 RX ADMIN — TACROLIMUS 2 MG: 1 CAPSULE ORAL at 08:05

## 2017-06-12 NOTE — PROGRESS NOTES
Care Coordinator  D/I: Elvia from [facility]  [phone]/Fax: [phone] called to confirm that [facility] can accept this pt.  P: Fax d/c orders and face to face --plan d/c today.

## 2017-06-12 NOTE — DISCHARGE INSTRUCTIONS
________________________________________________________  Discharge RN please fax discharge orders to home care agency: [facility]  ________________________________________________________

## 2017-06-12 NOTE — PLAN OF CARE
Problem: Goal Outcome Summary  Goal: Goal Outcome Summary  Outcome: Improving  VSS, pt still tachycardic. Pt temp max 99.5. Pt denied any pain and nausea. Complained of itching and received atarax x1. Pt has a Olman which is heparin locked. Voiding adequate amounts. Moves independently. Incision liquid bandage with no drainage. Pt rash seems to be clearing up. Eyes appear to be less swollen than at beginning of shift. Plan to DC home today. Will continue to monitor and follow plan of care.

## 2017-06-12 NOTE — DISCHARGE SUMMARY
Transplant Surgery Discharge Summary    Golden Stover MRN# [id number]   YOB: 1995 Age: 21 year old     Date of Admission:  6/7/2017  Date of Discharge::  6/12/2017  Admitting Physician:  Martell Robert MD    Primary Care Physician:        No Ref-Primary, Physician          Admission Diagnoses:   IgA Nephropathy   Living-donor kidney transplant recipient          Discharge Diagnosis:   IgA Nephropathy   Living-donor kidney transplant recipient         Procedures:     Procedure date:  06/07/17    Preoperative diagnosis:  End Stage renal failure due to IgA Nephropathy    Postoperative diagnosis:  Same,     Procedure:  1. Left kidney  transplant,  Living, Right  iliac fossa, without vascular reconstruction. A J-J ureteral stent was not placed.  2. Kidney allograft preparation on Back Table    Surgeon:  MARTELL ROBERT    Fellow/Assistant:  Radha Cohen, fellow, [place] [employer] resident     Anesthesia:  General    Specimen:  Renal biopsy    Drains:  no drain    Urine output:  800 mls    Estimated blood loss:  100    Fluids administered:     Fluid Amount   Crystalloid (mL) 2400   Colloid (mL) 250                Non-operative procedures:   None performed          Consultations:   SOCIAL WORK IP CONSULT  PHARMACY IP CONSULT  NUTRITION SERVICES ADULT IP CONSULT  NEPHROLOGY KIDNEY/PANCREAS TRANSPLANT ADULT IP CONSULT  PHARMACY IP CONSULT         Medications Prior to Admission:     Prescriptions Prior to Admission   Medication Sig Dispense Refill Last Dose     calcium acetate (PHOSLO) 667 MG CAPS capsule Take 2,001 mg by mouth 3 times daily (with meals)   6/6/2017 at 1800     VITAMIN E MTC PO Take 400 Units by mouth daily   6/6/2017 at 0900     lisinopril (PRINIVIL/ZESTRIL) 10 MG tablet 10 mg daily    6/6/2017 at 0900     NIFEdipine ER (ADALAT CC) 90 MG TB24 90 mg daily    6/6/2017 at 0900     SUPER B COMPLEX/C PO 1 tablet daily    6/6/2017 at 0900     Cholecalciferol (VITAMIN D3) 400 UNITS  "CAPS 1 capsule daily    6/6/2017 at 0900            Discharge Medications:      Golden Stover   Home Medication Instructions TRACEY:[id number]    Printed on:06/12/17 3620   Medication Information                      acetaminophen (TYLENOL) 325 MG tablet  Take 2 tablets (650 mg) by mouth every 6 hours as needed for mild pain             calcitRIOL (ROCALTROL) 0.25 MCG capsule  Take 1 capsule (0.25 mcg) by mouth daily             dapsone 100 MG tablet  Take 1 tablet (100 mg) by mouth daily             docusate sodium (COLACE) 100 MG tablet  Take 100 mg by mouth daily             hydrOXYzine (VISTARIL) 25 MG capsule  Take 1 capsule (25 mg) by mouth 3 times daily as needed for itching             mycophenolate (CELLCEPT - GENERIC EQUIVALENT) 250 MG capsule  Take 3 capsules (750 mg) by mouth 2 times daily             oxyCODONE (ROXICODONE) 5 MG IR tablet  Take 1-2 tablets (5-10 mg) by mouth every 4 hours as needed for moderate to severe pain             phosphorus tablet 250 mg (K PHOS NEUTRAL) 250 MG per tablet  Take 2 tablets (500 mg) by mouth 2 times daily             tacrolimus (PROGRAF - GENERIC EQUIVALENT) 1 MG capsule  Take 3 capsules (3 mg) by mouth 2 times daily             valGANciclovir (VALCYTE) 450 MG tablet  Take 2 tablets (900 mg) by mouth daily                       Day of Discharge Exam   BP (!) 147/109  Pulse 93  Temp 98.9  F (37.2  C)  Resp 16  Ht 1.88 m (6' 2.02\")  Wt 99 kg (218 lb 3.2 oz)  SpO2 94%  BMI 28 kg/m2  General: Awake, alert, NAD  Cardio: RRR  Chest: NLB on RA  Abd: Soft, non-distended, appropriately TTP, incision c/d/i  Ext: WWP          Brief History of Illness:   Obtained from clinic note on 12/9/2016: \"Mr. Stover is a 21-year-old male who was diagnosed with hypertension in 7/2016 during a routine pre-employment physical. He was started on lisinopril, which he took for about 1-2 months. He then presented to his PCP in 9/2016 for evaluation of a cough and was found to be " "hypertensive with an elevated creatinine around 8 mg/dl. He was admitted to the hospital and underwent a native kidney biopsy, which showed IgA nephropathy and significant chronic changes. He was initiated on hemodialysis at that time and has remained on it since\"           Hospital Course:   Postoperatively the patient was transferred to stepdown unit for close monitoring. He was then transferred to the general floor for further cares. During their hospital admission they were treated for the following issues:    Living donor kidney transplant, no ureteral stent or drain placed: Creatinine trending down to 1.96 by POD #5.  (Cr 6.7->5.4->3.1>2->2->1.96). Good urine output. Potassium stable. Post operative  US: normal doppler, small fl collection. Repeat US on 6/11 was unremarkable. No DSA.    Immunosuppression management: Induction with thymoglobulin and steroid pulse.  Thymo:  200mg 6/7,  200mg 6/8, 200 mg 6/9  Methylpred:  500mg 6/7, 250mg 6/8. No methylpred given due to concern for steroid psychosis.   MMF:  750mg BID  Tacro: Undetectable level on 6/12. Tacrolimus increased from 2 mg BID to 4 mg BID on discharge (POD 5).     Hematology: Mild normocytic anemia, stable.    Hypoxia: Hypoxia secondary to thymoglobulin reaction. Improved with lasix IV treatment. VQ scan performed as part of work up for hypoxia, reported as low likelihood for PE.     Hypertension: PTA lisinopril and nifedipine. BP acceptable, no antihypertensives ordered. Monitor for need to restart antihypertensives.     GI/Nutrition: Regular diet. Senna stopped due to loose stools. Discharged on colace as needed with narcotic use.     Endocrine: No acute issues    Secondary renal hyperparathyroidism: Discharged on phosphorus supplement and cacitriol    : Voidng freely. Shaikh removed POD3, passed 500ml urine with zero PVRs.     Infectious disease: Post op fever secondary to thymoglobulin.  Opportunistic infection prophylaxis: Valcyte x 12 weeks and  " dapsone (consider changing to bactrim next month).    Rash: Likely related to drug reaction secondary to Thymoglobulin. Erythematous rash on back and extremity after third dose of thymoglobin (6/12/2017). Pruritus after second dose of thymo (6/11/17). Bactrim given on 6/9 and 6/10. Bactrim held. Would consider retrial of bactrim outpatient in one month. Discharge on hydroxyzine as needed for pruritus.     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 Third dose of methylpred held.  No further confusion or paranoia seen. Minmize methylpred if possible. Discharged on hydroxyzine for anxiety and pruritis.      On 6/12, they were felt to meet discharge criteria and were discharged to home with appropriate instructions and follow up.  They were tolerating regular diet, had full return of bowel and bladder function, and were ambulating independently.  The patient acknowledged understanding and were in agreement with the plan.         Antibiotics Prescribed at Discharge:   none           Imaging Studies:     Results for orders placed or performed during the hospital encounter of 06/07/17    Renal Transplant    Narrative    EXAMINATION:  RENAL TRANSPLANT, 6/8/2017 12:33 AM     COMPARISON: None available    HISTORY: Postoperative day 0, hypokalemia    TECHNIQUE:  Grey-scale, color Doppler and spectral flow analysis.    FINDINGS:  The transplant kidney is located in the right lower quadrant, and  measures 11.6 cm length. Parenchyma is of normal thickness and  echogenicity. No focal lesions. No hydronephrosis. Small avascular  anechoic fluid collection adjacent to the transplant kidney in the  anteromedial perinephric region, measuring 2.4 x 1.5 x 3.7 cm.    Early bifurcation of the renal vein.    Renal Vein 1 Flow:  122 cm/sec at hilum.   50 cm/sec at anastomosis.    Renal Vein 2 Flow:  25 cm/sec at hilum.   40 cm/sec at anastomosis.    Upper Pole Arcuate artery:  0.78 resistive  index.    Mid pole Arcuate artery:  0.77 resistive index.     Lower Pole Arcuate artery:  0.82 resistive index.     Renal artery flow:   83 cm/sec peak systolic at hilum.  113 cm/sec peak systolic at anastomosis.    Iliac artery flow:  149 cm/sec peak systolic above anastomosis.  100 cm/sec peak systolic below anastomosis.    Iliac vein flow:  39 cm/sec peak systolic above anastomosis.  56 cm/sec peak systolic below anastomosis.      Impression    IMPRESSION:  1.  Normal grayscale appearance of the transplant kidney without  hydronephrosis.  2.  Small postoperative perinephric fluid collection measuring 3.7 cm,  which may represent seroma or hematoma.  3.  Patent renal transplant Doppler evaluation without evidence of  thrombosis or stenosis.    I have personally reviewed the examination and initial interpretation  and I agree with the findings.    ZHOU GR MD   XR Chest Port 1 View    Narrative    XR CHEST PORT 1 VW 6/10/2017 10:28 AM    History: fever, tachycardia, tachypnea    Comparison: 6/1/2017    Findings: Right IJ tunneled dialysis catheter tip projects over the  mid right atrium. Cardiac silhouette is not enlarged. No pleural  effusion or pneumothorax. Perihilar and bibasilar opacities. Pulmonary  vascular engorgement. Bones, subcutaneous soft tissues, visualized  upper abdomen are unremarkable.  History      Impression    Impression:   1. Perihilar and bibasilar opacities, favoring pulmonary edema, though  atypical infection could have similar appearance.  2. Low lung volumes.    ALEXANDER THAKKAR   NM Lung Scan Ventilation and Perfusion    Narrative    Examination:NM LUNG SCAN VENTILATION AND PERFUSION, 6/10/2017 1:20 PM     Indication:  Postop day 3 kidney transplant with tachypnea and oxygen.  Rule out PE.     Additional Information: none    Technique:    The patient received 6.8 mCi of Tc-99m DTPA aerosol for ventilation  and 3.3 mCi of Tc-99m MAA for perfusion. Multiple images were obtained  of  the lungs in Anterior, posterior, HUDSON, RPO, LPO, and  MARTHA  projections.    Comparison: Chest radiograph 6/10/2017.    Findings:    The ventilation study demonstrates diffuse radiotracer uptake  throughout the lungs.    The perfusion study demonstrates small subsegmental defect at the  right lateral costophrenic angle with no corresponding ventilation  defect.      Impression    Impression:     Low probably for pulmonary emboli by modified PIOPED criteria.    I have personally reviewed the examination and initial interpretation  and I agree with the findings.    ALEXANDER THAKKAR    Renal Transplant    Narrative    EXAMINATION: US RENAL TRANSPLANT, 6/11/2017 8:35 AM     COMPARISON: 6/7/2017    HISTORY: Renal function not improving after living donor kidney  transplant. Date of transplant on 6/7/2017.    TECHNIQUE:  Grey-scale, color Doppler and spectral flow analysis.    FINDINGS:  The transplant kidney is located in the right lower quadrant, and  measures 13.2 cm length. Parenchyma is of normal thickness and  echogenicity. No focal lesions. No hydronephrosis. Redemonstration of  hypoechoic, avascular, and mildly complex perinephric fluid collection  superior and anterior to the renal transplant which measures 4.2 x 4.9  x 2.3 cm, mildly increased since the previous exam when it measured  2.4 x 1.5 x 3.7 cm.  The urinary bladder is not visualized.    Renal Vein Flow:  41 cm/sec at hilum.   70 cm/sec at anastomosis.    Upper Pole Arcuate artery:  0.63 resistive index.    Mid pole Arcuate artery:  0.67 resistive index.     Lower Pole Arcuate artery:  0.65 resistive index.     Renal artery flow:   52 cm/sec peak systolic at hilum.  186 cm/sec peak systolic at anastomosis.    Iliac artery flow:  112 cm/sec peak systolic above anastomosis.  105 cm/sec peak systolic below anastomosis.    Iliac vein flow:  39 cm/sec peak systolic above anastomosis.  50 cm/sec peak systolic below anastomosis.      Impression     IMPRESSION:  1.  Normal grayscale appearance of the transplant kidney without  hydronephrosis.  2.  Mildly increased size of mildly complex perinephric fluid  collection which may represent hematoma or seroma.  3.  Patent and unremarkable renal transplant Doppler evaluation.    I have personally reviewed the examination and initial interpretation  and I agree with the findings.    NNAMDI BANKS MD            Final Pathology Result:   No pathology submitted         Discharge Instructions and Follow-Up:   Over the next 3-5 days you will be seen in the [facility] (ph. [phone]) .  Your labs will be drawn just prior to your appointment between 6:30-7:00 am.  DO NOT take your medications prior to having labs drawn. Please bring all your medications with you from home to take after labs are drawn.   LABS:   CBC, BMP, Mg, Phos and tacrolimus levels to be drawn daily while in ATC, then every Monday, Wednesday, Friday by home health care nurse if arranged, or at an outpatient lab.     FOLLOW UP APPOINTMENTS:   Remember to always bring an updated medication list to all appointments.     An appointment with your transplant surgeon will be scheduled for approximately 2 weeks following discharge from the hospital.  Your transplant surgeon is: Dr. Robert.   You will follow up with transplant nephrology in clinic at 1 and 3 months and then annually.   Follow up with primary care provider in 4-8 weeks. (Pt to schedule)   If you have staples in place, they will be removed in 3 weeks after operation.   Call scheduling at [phone] (option 1) if you have not heard about your appointments within 48 hours after discharge.     WHEN TO CONTACT YOUR  COORDINATOR:   Transplant Coordinator [phone]   Notify your coordinator if you have pain over your kidney, increased redness or drainage from your incision, fever greater than 100.5F, or decreased urine output.   Notify your coordinator immediately if you are ever  unable to take your immunosuppressive medications for any reason.   If it is outside of office hours, please call the hospital switchboard at [phone] and ask to have the kidney transplant surgery fellow paged for urgent medical questions, or present to the emergency department.     OTHER DISCHARGE INSTRUCTIONS:   Monitor blood pressure and weight daily initially post transplant.   Walk at least four times a day, lift no greater than 10 pounds for 6-8 weeks from the time of surgery.  No driving while taking narcotics or 3 weeks after surgery.   Diet recommendations post-transplant: Heart healthy dietary habits long term (low saturated/trans fat, low sodium). High protein diet x 8 weeks. Practice food safety precautions.         Home Health Care:   Arranged           Discharge Disposition:   Discharged to home      Condition at discharge: Samir Black, PAC

## 2017-06-13 ENCOUNTER — RECORDS - HEALTHEAST (OUTPATIENT)
Dept: ADMINISTRATIVE | Facility: OTHER | Age: 22
End: 2017-06-13

## 2017-06-13 ENCOUNTER — OFFICE VISIT (OUTPATIENT)
Dept: INFUSION THERAPY | Facility: CLINIC | Age: 22
End: 2017-06-13
Attending: INTERNAL MEDICINE
Payer: COMMERCIAL

## 2017-06-13 ENCOUNTER — CARE COORDINATION (OUTPATIENT)
Dept: CARE COORDINATION | Facility: CLINIC | Age: 22
End: 2017-06-13

## 2017-06-13 ENCOUNTER — TELEPHONE (OUTPATIENT)
Dept: PHARMACY | Facility: CLINIC | Age: 22
End: 2017-06-13

## 2017-06-13 VITALS
RESPIRATION RATE: 18 BRPM | SYSTOLIC BLOOD PRESSURE: 155 MMHG | BODY MASS INDEX: 27.87 KG/M2 | TEMPERATURE: 97.6 F | OXYGEN SATURATION: 98 % | DIASTOLIC BLOOD PRESSURE: 82 MMHG | WEIGHT: 217.2 LBS

## 2017-06-13 DIAGNOSIS — E83.39 HYPOPHOSPHATEMIA: ICD-10-CM

## 2017-06-13 DIAGNOSIS — N25.81 SECONDARY RENAL HYPERPARATHYROIDISM (H): ICD-10-CM

## 2017-06-13 DIAGNOSIS — N18.9 ANEMIA IN CHRONIC KIDNEY DISEASE: ICD-10-CM

## 2017-06-13 DIAGNOSIS — D84.9 IMMUNOSUPPRESSED STATUS (H): ICD-10-CM

## 2017-06-13 DIAGNOSIS — Z94.0 STATUS POST KIDNEY TRANSPLANT: Primary | ICD-10-CM

## 2017-06-13 DIAGNOSIS — Z48.298 AFTERCARE FOLLOWING ORGAN TRANSPLANT: ICD-10-CM

## 2017-06-13 DIAGNOSIS — Z94.0 KIDNEY REPLACED BY TRANSPLANT: Primary | ICD-10-CM

## 2017-06-13 DIAGNOSIS — Z94.0 LIVING-DONOR KIDNEY TRANSPLANT RECIPIENT: ICD-10-CM

## 2017-06-13 DIAGNOSIS — D63.1 ANEMIA IN CHRONIC KIDNEY DISEASE: ICD-10-CM

## 2017-06-13 DIAGNOSIS — I15.1 HYPERTENSION SECONDARY TO OTHER RENAL DISORDERS: ICD-10-CM

## 2017-06-13 LAB
ANION GAP SERPL CALCULATED.3IONS-SCNC: 11 MMOL/L (ref 3–14)
BASOPHILS # BLD AUTO: 0 10E9/L (ref 0–0.2)
BASOPHILS NFR BLD AUTO: 0.1 %
BUN SERPL-MCNC: 20 MG/DL (ref 7–30)
CALCIUM SERPL-MCNC: 9 MG/DL (ref 8.5–10.1)
CHLORIDE SERPL-SCNC: 103 MMOL/L (ref 94–109)
CO2 SERPL-SCNC: 23 MMOL/L (ref 20–32)
CREAT SERPL-MCNC: 1.69 MG/DL (ref 0.66–1.25)
DIFFERENTIAL METHOD BLD: ABNORMAL
EOSINOPHIL # BLD AUTO: 0.2 10E9/L (ref 0–0.7)
EOSINOPHIL NFR BLD AUTO: 3.1 %
ERYTHROCYTE [DISTWIDTH] IN BLOOD BY AUTOMATED COUNT: 13.8 % (ref 10–15)
GFR SERPL CREATININE-BSD FRML MDRD: 51 ML/MIN/1.7M2
GLUCOSE SERPL-MCNC: 102 MG/DL (ref 70–99)
HCT VFR BLD AUTO: 30.8 % (ref 40–53)
HGB BLD-MCNC: 10 G/DL (ref 13.3–17.7)
IMM GRANULOCYTES # BLD: 0.2 10E9/L (ref 0–0.4)
IMM GRANULOCYTES NFR BLD: 2.2 %
LYMPHOCYTES # BLD AUTO: 0.2 10E9/L (ref 0.8–5.3)
LYMPHOCYTES NFR BLD AUTO: 2.9 %
MAGNESIUM SERPL-MCNC: 2.1 MG/DL (ref 1.6–2.3)
MCH RBC QN AUTO: 29.5 PG (ref 26.5–33)
MCHC RBC AUTO-ENTMCNC: 32.5 G/DL (ref 31.5–36.5)
MCV RBC AUTO: 91 FL (ref 78–100)
MONOCYTES # BLD AUTO: 0.9 10E9/L (ref 0–1.3)
MONOCYTES NFR BLD AUTO: 13.3 %
NEUTROPHILS # BLD AUTO: 5.4 10E9/L (ref 1.6–8.3)
NEUTROPHILS NFR BLD AUTO: 78.4 %
NRBC # BLD AUTO: 0 10*3/UL
NRBC BLD AUTO-RTO: 0 /100
PHOSPHATE SERPL-MCNC: 2.9 MG/DL (ref 2.5–4.5)
PLATELET # BLD AUTO: 270 10E9/L (ref 150–450)
POTASSIUM SERPL-SCNC: 3.9 MMOL/L (ref 3.4–5.3)
RBC # BLD AUTO: 3.39 10E12/L (ref 4.4–5.9)
SODIUM SERPL-SCNC: 137 MMOL/L (ref 133–144)
TACROLIMUS BLD-MCNC: 3 UG/L (ref 5–15)
TME LAST DOSE: ABNORMAL H
WBC # BLD AUTO: 6.8 10E9/L (ref 4–11)

## 2017-06-13 PROCEDURE — 80048 BASIC METABOLIC PNL TOTAL CA: CPT | Performed by: INTERNAL MEDICINE

## 2017-06-13 PROCEDURE — 36592 COLLECT BLOOD FROM PICC: CPT | Performed by: INTERNAL MEDICINE

## 2017-06-13 PROCEDURE — 84100 ASSAY OF PHOSPHORUS: CPT | Performed by: INTERNAL MEDICINE

## 2017-06-13 PROCEDURE — 83735 ASSAY OF MAGNESIUM: CPT | Performed by: INTERNAL MEDICINE

## 2017-06-13 PROCEDURE — 85025 COMPLETE CBC W/AUTO DIFF WBC: CPT | Performed by: INTERNAL MEDICINE

## 2017-06-13 PROCEDURE — 99215 OFFICE O/P EST HI 40 MIN: CPT

## 2017-06-13 PROCEDURE — 36415 COLL VENOUS BLD VENIPUNCTURE: CPT | Performed by: INTERNAL MEDICINE

## 2017-06-13 PROCEDURE — 80197 ASSAY OF TACROLIMUS: CPT | Performed by: INTERNAL MEDICINE

## 2017-06-13 PROCEDURE — 25000128 H RX IP 250 OP 636: Mod: ZF | Performed by: INTERNAL MEDICINE

## 2017-06-13 RX ORDER — ONDANSETRON 4 MG/1
4 TABLET, ORALLY DISINTEGRATING ORAL EVERY 6 HOURS PRN
Qty: 120 TABLET | Refills: 0 | Status: SHIPPED | OUTPATIENT
Start: 2017-06-13 | End: 2017-09-11

## 2017-06-13 RX ORDER — AMLODIPINE BESYLATE 5 MG/1
5 TABLET ORAL EVERY EVENING
Qty: 30 TABLET | Refills: 6 | Status: SHIPPED | OUTPATIENT
Start: 2017-06-13 | End: 2017-08-03

## 2017-06-13 RX ORDER — HEPARIN SODIUM 1000 [USP'U]/ML
3000 INJECTION, SOLUTION INTRAVENOUS; SUBCUTANEOUS ONCE
Status: COMPLETED | OUTPATIENT
Start: 2017-06-13 | End: 2017-06-13

## 2017-06-13 RX ORDER — TACROLIMUS 1 MG/1
6 CAPSULE ORAL 2 TIMES DAILY
Qty: 180 CAPSULE | Refills: 11 | COMMUNITY
Start: 2017-06-13 | End: 2017-06-14

## 2017-06-13 RX ORDER — HEPARIN SODIUM 1000 [USP'U]/ML
3000 INJECTION, SOLUTION INTRAVENOUS; SUBCUTANEOUS ONCE
Status: CANCELLED
Start: 2017-06-13 | End: 2017-06-13

## 2017-06-13 RX ADMIN — HEPARIN SODIUM 3000 UNITS: 1000 INJECTION, SOLUTION INTRAVENOUS; SUBCUTANEOUS at 08:02

## 2017-06-13 NOTE — LETTER
6/13/2017      RE: Golden Stover  [address]  [place] [address]       Assessment and Plan:  1. LDKT - baseline Cr still to be determined, but improved creatinine down to ~ 1.7 today.  Will make no changes in immunosuppression, pending today's drug level.  2. HTN - fair control right at target of less than 140/90.  Will start amlodipine 5 mg qHS.  3. Anemia in chronic renal disease - stable Hgb.  Iron replete.  Will follow.  4. Secondary renal hyperparathyroidism - moderately elevated PTH, which should improve post transplant.  Will continue on calcitriol and recheck PTH at 3 months post transplant.  5. Hypophosphatemia - normal, stable serum phosphorus level and will continue on oral phosphorus supplement.  6. Recommend return visit tomorrow.    Assessment and plan was discussed with patient and he voiced his understanding and agreement.    Reason for Visit:  Mr. Stover is here for hospital follow up following kidney transplant.    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         Transplant Hx:       Tx: LDKT  Date: 6/7/17       Present Maintenance IS: Tacrolimus and Mycophenolate mofetil       Baseline Creatinine: TBD       Recent DSA: No  Date last checked: 6/2017       Biopsy: No    Mr. Stover reports feeling good overall with some medical complaints.  He has ESKD secondary to IgA nephropathy, now status post LDKT 6/7/17.  His post transplant course was complicated by macular rash and fevers, felt secondary to side effect of Thymoglobulin.  Those symptoms are now resolved.  His energy level is good and improving.  He has been active and doing some walking.  Denies any chest pain or shortness of breath with exertion.  Appetite is better and he is trying to drink fluids.  Slight nausea this morning, but no vomiting.  Some loose stools in the hospital, but none since discharge yesterday.  No fever, sweats or chills.  No leg  swelling.  No further rash.    Home BP: 140/80-90s.      ROS:   A comprehensive review of systems was obtained and negative, except as noted in the HPI or PMH.    Active Medical Problems:  Patient Active Problem List   Diagnosis     IgA nephropathy     Hypertension secondary to other renal disorders     Anemia in chronic kidney disease     Secondary renal hyperparathyroidism (H)     Kidney replaced by transplant     Immunosuppressed status (H)     Aftercare following organ transplant     Hypophosphatemia       Personal Hx:  Social History     Social History     Marital status: Single     Spouse name: N/A     Number of children: N/A     Years of education: N/A     Occupational History     Not on file.     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 Authorizing Provider   oxyCODONE (ROXICODONE) 5 MG IR tablet Take 1-2 tablets (5-10 mg) by mouth every 4 hours as needed for moderate to severe pain 6/12/17   Heidi Black PA-C   acetaminophen (TYLENOL) 325 MG tablet Take 2 tablets (650 mg) by mouth every 6 hours as needed for mild pain 6/12/17   Heidi Black PA-C   mycophenolate (CELLCEPT - GENERIC EQUIVALENT) 250 MG capsule Take 3 capsules (750 mg) by mouth 2 times daily 6/12/17   Heidi Black PA-C   tacrolimus (PROGRAF - GENERIC EQUIVALENT) 1 MG capsule Take 3 capsules (3 mg) by mouth 2 times daily 6/12/17   Heidi Black PA-C   phosphorus tablet 250 mg (K PHOS NEUTRAL) 250  MG per tablet Take 2 tablets (500 mg) by mouth 2 times daily 6/12/17 7/12/17  Heidi Black PA-C   calcitRIOL (ROCALTROL) 0.25 MCG capsule Take 1 capsule (0.25 mcg) by mouth daily 6/12/17   Heidi Black PA-C   valGANciclovir (VALCYTE) 450 MG tablet Take 2 tablets (900 mg) by mouth daily 6/12/17   Heidi Black PA-C   hydrOXYzine (VISTARIL) 25 MG capsule Take 1 capsule (25 mg) by mouth 3 times daily as needed for itching 6/12/17   Heidi Black PA-C   docusate sodium (COLACE) 100 MG tablet Take 100 mg by mouth daily 6/12/17   Heidi Black PA-C   dapsone 100 MG tablet Take 1 tablet (100 mg) by mouth daily 6/12/17   Heidi Black PA-C       Vitals:  Reviewed.    Exam:   GENERAL APPEARANCE: alert and no distress  HENT: mouth without ulcers or lesions  LYMPHATICS: no cervical or supraclavicular nodes  RESP: lungs clear to auscultation - no rales, rhonchi or wheezes  CV: regular rhythm, normal rate, no rub, no murmur  EDEMA: no LE edema bilaterally  ABDOMEN: soft, nondistended, nontender, bowel sounds normal  MS: extremities normal - no gross deformities noted, no evidence of inflammation in joints, no muscle tenderness  SKIN: no rash  TX KIDNEY: mild TTP    Results:   Recent Results (from the past 8 hour(s))   Basic metabolic panel    Collection Time: 06/13/17  8:00 AM   Result Value Ref Range    Sodium 137 133 - 144 mmol/L    Potassium 3.9 3.4 - 5.3 mmol/L    Chloride 103 94 - 109 mmol/L    Carbon Dioxide 23 20 - 32 mmol/L    Anion Gap 11 3 - 14 mmol/L    Glucose 102 (H) 70 - 99 mg/dL    Urea Nitrogen 20 7 - 30 mg/dL    Creatinine 1.69 (H) 0.66 - 1.25 mg/dL    GFR Estimate 51 (L) >60 mL/min/1.7m2    GFR Estimate If Black 61 >60 mL/min/1.7m2    Calcium 9.0 8.5 - 10.1 mg/dL   Magnesium    Collection Time: 06/13/17  8:00 AM   Result Value Ref Range    Magnesium 2.1 1.6 - 2.3 mg/dL   Phosphorus    Collection Time: 06/13/17  8:00 AM   Result Value Ref Range    Phosphorus 2.9 2.5 - 4.5 mg/dL    CBC with platelets differential    Collection Time: 06/13/17  8:00 AM   Result Value Ref Range    WBC 6.8 4.0 - 11.0 10e9/L    RBC Count 3.39 (L) 4.4 - 5.9 10e12/L    Hemoglobin 10.0 (L) 13.3 - 17.7 g/dL    Hematocrit 30.8 (L) 40.0 - 53.0 %    MCV 91 78 - 100 fl    MCH 29.5 26.5 - 33.0 pg    MCHC 32.5 31.5 - 36.5 g/dL    RDW 13.8 10.0 - 15.0 %    Platelet Count 270 150 - 450 10e9/L    Diff Method Automated Method     % Neutrophils 78.4 %    % Lymphocytes 2.9 %    % Monocytes 13.3 %    % Eosinophils 3.1 %    % Basophils 0.1 %    % Immature Granulocytes 2.2 %    Nucleated RBCs 0 0 /100    Absolute Neutrophil 5.4 1.6 - 8.3 10e9/L    Absolute Lymphocytes 0.2 (L) 0.8 - 5.3 10e9/L    Absolute Monocytes 0.9 0.0 - 1.3 10e9/L    Absolute Eosinophils 0.2 0.0 - 0.7 10e9/L    Absolute Basophils 0.0 0.0 - 0.2 10e9/L    Abs Immature Granulocytes 0.2 0 - 0.4 10e9/L    Absolute Nucleated RBC 0.0          Jak King MD

## 2017-06-13 NOTE — PATIENT INSTRUCTIONS
Dear Golden Stover    Thank you for choosing [facility] ([facility]) for your transplant cares.  The following information is a summary of our appointment as well as important reminders.      Please make sure your phone is available today because I will call to update you with your anti-rejection drug levels and possibly make changes to your anti-rejection dosages.    Additional information:   1. Start taking Norvasc (amlodipine) 5 mg tablet every evening for high blood pressure. You may  this prescription from the 1st floor pharmacy upon leaving today.   2. Start taking Zofran (ondansetron) 4 mg ODT (oral disintegrating tablet) for nausea; place 1-2 tabs under the tongue every 6 hours as needed. You may  this prescription from the 1st floor pharmacy upon leaving today.   3. Your labs look great!! Keep up the good work!!  4. Return to Specialty Infusion on 2nd floor tomorrow at 7 am for labs and appointment. DO NOT take your medications until AFTER your blood has been drawn.   5. TAKE YOUR EVENING MEDICATIONS AT 7 PM TONIGHT to make sure there are 12 HOURS in between your evening meds and your morning lab draw.      We look forward in seeing you on your next appointment here at [facility].  Please don t hesitate to call us at [phone] to reschedule any of your appointments or to speak with one of the [facility] registered nurses.  It was a pleasure taking care of you today.    Sincerely,  Amy Gonzalez RN  [facility]  [address] [place] [address]  [address]  Phone:  [phone]

## 2017-06-13 NOTE — PROGRESS NOTES
Patient is a transplant patient and will be followed by the transplant team for follow up            Live Related Left Kidney (Transplant 1)                                               Transplant surgery: Surgery (6/7/2017)

## 2017-06-13 NOTE — LETTER
OUTPATIENT LABORATORY TEST ORDER    Patient Name:Golden Stover   Transplant Date: 6/7/2017  YOB: 1995  Issue Date & Time: June 13, 2017 9:48 AM     [facility] MR: [id number]  Expiration Date:  (1 year after date issued)        Diagnoses: Aftercare following organ transplant (ICD-10 Z48.288)   Kidney Transplant (ICD-10 Z94.0)   Long term use of medications (ICD-10  Z79.899)     ?Lab results to be available on the same day drawn.   ?Patient should release information to the [facility], [facility].  ?Please fax to the Transplant Center at [phone].    3x/week, First 4Weeks post-transplant    6/7/2017 - 7/7/2017    2x/week, Months 2-3 post-transplant    7/8/2017 - 9/8/2017       1x/week, Months 4-6 post-transplant    9/9/2017 - 12/9/2017  Every 2 weeks, Months 7-9 post-transplant    12/10/2017 - 3/10/2018  Every 3 weeks, Months 10-12 post-transplant   3/11/2018 - 6/7/2018       ?Hemogram and Platelet   ?Basic Metabolic Panel (Sodium, Potassium,Chloride, CO2, Creatinine, Urea Nitrogen, Glucose, Calcium)   ?Prograf/Tacrolimus drug level     Monthly   ? BK PCR Quantitative (Polyoma virus - blood in purple top tube to Reference Lab)    Weekly through first 3 months post-transplant    6/7/2017 - 9/7/2017   ?Phosphorus, Magnesium   ?MPA level (mycophenolic acid) once per week for first 3 months.    ?Please add a diff to hemogram once per week for the first 3 months.    At 6 & 12 months post-transplant         12/2017 & 6/2017   ? HBsurfaceAb, HBcoreAb, HCV at 6 months only -   ? Liver Function Tests(Bilirubin Direct/Total, AST, ALT, Alkaline Phosphatase)   ?Complete Lipid Panel fasting (Cholesterol, Triglycerides, HDL, LDL)   ? Random Urine for Protein/Creatinineratio    At month(s) 1, 2, 4, 6, 9, 12      7/2017, 8/2017, 10/2017, 12/2017, 3/2018, 6/2018                  ? PRA/DSA level (mailers provided by the patient)                    If you have any  questions, please call The Transplant Center at [phone] or [phone].    Please faxall results to [phone].  .

## 2017-06-13 NOTE — MR AVS SNAPSHOT
After Visit Summary   6/13/2017    Golden Stover    MRN: [id number]           Patient Information     Date Of Birth          1995        Visit Information        Provider Department      6/13/2017 7:00 AM UC 42 ATC; UC SPEC [facility] Specialty and Procedure        Today's Diagnoses     Status post kidney transplant    -  1    Living-donor kidney transplant recipient          Care Instructions    Dear Golden Stover    Thank you for choosing [facility] ([facility]) for your transplant cares.  The following information is a summary of our appointment as well as important reminders.      Please make sure your phone is available today because I will call to update you with your anti-rejection drug levels and possibly make changes to your anti-rejection dosages.    Additional information:   1. Start taking Norvasc (amlodipine) 5 mg tablet every evening for high blood pressure. You may  this prescription from the 1st floor pharmacy upon leaving today.   2. Start taking Zofran (ondansetron) 4 mg ODT (oral disintegrating tablet) for nausea; place 1-2 tabs under the tongue every 6 hours as needed. You may  this prescription from the 1st floor pharmacy upon leaving today.   3. Your labs look great!! Keep up the good work!!  4. Return to Specialty Infusion on 2nd floor tomorrow at 7 am for labs and appointment. DO NOT take your medications until AFTER your blood has been drawn.   5. TAKE YOUR EVENING MEDICATIONS AT 7 PM TONIGHT to make sure there are 12 HOURS in between your evening meds and your morning lab draw.      We look forward in seeing you on your next appointment here at [facility].  Please don t hesitate to call us at [phone] to reschedule any of your appointments or to speak with one of the [facility] registered nurses.  It was a pleasure taking care of you today.    Sincerely,  Amy  Carlos, RN  [facility]  Specialty Infusion [facility]  [address] [place] [address]  [id number]  Phone:  [phone]            Follow-ups after your visit        Your next 10 appointments already scheduled     Jun 14, 2017  7:00 AM CDT   (Arrive by 6:45 AM)   New Transplant Visit with [facility] SPEC INFUSION, UC 41 ATC   [facility] Specialty and Procedure ([facility])    [address] [place] [address] [id number] [phone]            Jun 14, 2017  8:00 AM CDT   (Arrive by 7:45 AM)   Kidney Transplant Discharge with Jak King MD   [facility] Specialty and Procedure ([facility])    [address] [place] [address] [id number] [phone]            Jun 29, 2017  9:00 AM CDT   (Arrive by 8:45 AM)   Kidney Post Op with Martell Robert MD   [facility] Solid Organ Transplant ([facility])    [address] [place] [address] [id number] [phone]            Jul 11, 2017  1:05 PM CDT   (Arrive by 12:35 PM)   Return Kidney Transplant with Jak King MD   [facility] Nephrology ([facility])    [address] [place] [address] [id number] [phone]            Sep 11, 2017  1:05 PM CDT   (Arrive by 12:35 PM)   Return Kidney Transplant with Jak King MD   [facility] Nephrology ([facility])    [address] [place] [id number] [phone]              Future tests that were ordered for you today     Open Standing Orders        Priority Remaining Interval Expires Ordered    Hepatic panel Routine 2/2 Every 6 Months 7/18/2018 6/13/2017    Lipid Profile Routine 2/2 Every 6 Months 7/18/2018 6/13/2017    Protein  random urine Routine 2/2 Every 6 Months 7/18/2018 6/13/2017    Tacrolimus level  Routine 12/12 3X Week 7/18/2017 6/13/2017    Tacrolimus level Routine 16/16 2X Week 11/20/2017 6/13/2017    Tacrolimus level Routine 12/12 Weekly 12/20/2017 6/13/2017    Tacrolimus level Routine 6/6 Every Other Week 3/20/2018 6/13/2017    Tacrolimus level Routine 4/4 Every 3 Weeks 7/18/2018 6/13/2017    PRA Donor Specific Antibody Routine 6/6 7/18/2018 6/13/2017    BK virus PCR quantitative Routine 12/12 Monthly 7/18/2018 6/13/2017    PRA Donor Specific Antibody Routine 6/6 7/18/2018 6/13/2017    CBC with platelets Routine 12/12 3X Week 7/18/2017 6/13/2017    Basic metabolic panel Routine 12/12 3X Week 7/18/2017 6/13/2017    CBC with platelets Routine 16/16 2X Week 11/20/2017 6/13/2017    Basic metabolic panel Routine 16/16 2X Week 11/20/2017 6/13/2017    CBC with platelets Routine 12/12 Weekly 12/20/2017 6/13/2017    Basic metabolic panel Routine 12/12 Weekly 12/20/2017 6/13/2017    CBC with platelets Routine 6/6 Every Other Week 3/20/2018 6/13/2017    Basic metabolic panel Routine 6/6 Every Other Week 3/20/2018 6/13/2017    CBC with platelets Routine 4/4 Every 3 Weeks 7/18/2018 6/13/2017    Basic metabolic panel Routine 4/4 Every 3 Weeks 7/18/2018 6/13/2017    Phosphorus Routine 12/12 Weekly 10/11/2017 6/13/2017    Magnesium Routine 12/12 Weekly 10/11/2017 6/13/2017    Mycophenolic acid Routine 12/12 Weekly 10/11/2017 6/13/2017    WBC Differential Routine 12/12 Weekly 10/11/2017 6/13/2017          Open Future Orders        Priority Expected Expires Ordered    Hepatitis B Surface Antibody Routine 12/10/2017 12/24/2017 6/13/2017    Hepatitis B core antibody Routine 12/10/2017 12/24/2017 6/13/2017    Hepatitis C antibody Routine 12/10/2017 12/24/2017 6/13/2017            Who to contact     If you have questions or need follow up information about today's clinic visit or your schedule please contact [facility] SPECIAL[facility]ED[facility] directly at [phone].  Normal or non-critical lab and  imaging results will be communicated to you by [employer], letter or phone within 4 business days after the clinic has received the results. If you do not hear from us within 7 days, please contact the clinic through [employer] or phone. If you have a critical or abnormal lab result, we will notify you by phone as soon as possible.  Submit refill requests through [employer] or call your pharmacy and they will forward the refill request to us. Please allow 3 business days for your refill to be completed.          Additional Information About Your Visit        [employer] Information     [employer] gives you secure access to your electronic health record. If you see a primary care provider, you can also send messages to your care team and make appointments. If you have questions, please call your primary care clinic.  If you do not have a primary care provider, please call [phone] and they will assist you.        Care EveryWhere ID     This is your Care EveryWhere ID. This could be used by other organizations to access your [facility] medical records  [id number]        Your Vitals Were     Temperature Respirations Pulse Oximetry BMI (Body Mass Index)          97.6  F (36.4  C) (Oral) 18 98% 27.87 kg/m2         Blood Pressure from Last 3 Encounters:   06/13/17 155/82   06/12/17 (!) 147/109   06/01/17 (!) 137/91    Weight from Last 3 Encounters:   06/13/17 98.5 kg (217 lb 3.2 oz)   06/12/17 99 kg (218 lb 3.2 oz)   06/01/17 101.1 kg (222 lb 12.8 oz)              We Performed the Following     Basic metabolic panel     CBC with platelets differential     Magnesium     Phosphorus     Tacrolimus level          Today's Medication Changes          These changes are accurate as of: 6/13/17 11:05 AM.  If you have any questions, ask your nurse or doctor.               Start taking these medicines.        Dose/Directions    amLODIPine 5 MG tablet   Commonly known as:  NORVASC   Used for:  Living-donor kidney transplant recipient         Dose:  5 mg   Take 1 tablet (5 mg) by mouth every evening   Quantity:  30 tablet   Refills:  6       ondansetron 4 MG ODT tab   Commonly known as:  ZOFRAN ODT   Used for:  Living-donor kidney transplant recipient        Dose:  4 mg   Take 1 tablet (4 mg) by mouth every 6 hours as needed for nausea   Quantity:  120 tablet   Refills:  0            Where to get your medicines      These medications were sent to [place], [place] - [address] [facility] [address]  [address], [facility] [address]    Hours:  TRANSPLANT PHONE NUMBER [phone] Phone:  [phone]     amLODIPine 5 MG tablet    ondansetron 4 MG ODT tab                Primary Care Provider    Physician No Ref-Primary       No address on file        Thank you!     Thank you for choosing [facility] SPECIALTY AND PROCEDURE  for your care. Our goal is always to provide you with excellent care. Hearing back from our patients is one way we can continue to improve our services. Please take a few minutes to complete the written survey that you may receive in the mail after your visit with us. Thank you!             Your Updated Medication List - Protect others around you: Learn how to safely use, store and throw away your medicines at www.disposemymeds.org.          This list is accurate as of: 6/13/17 11:05 AM.  Always use your most recent med list.                   Brand Name Dispense Instructions for use    acetaminophen 325 MG tablet    TYLENOL    100 tablet    Take 2 tablets (650 mg) by mouth every 6 hours as needed for mild pain       amLODIPine 5 MG tablet    NORVASC    30 tablet    Take 1 tablet (5 mg) by mouth every evening       calcitRIOL 0.25 MCG capsule    ROCALTROL    30 capsule    Take 1 capsule (0.25 mcg) by mouth daily       dapsone 100 MG tablet     30 tablet    Take 1 tablet (100 mg) by mouth daily       docusate sodium 100 MG tablet    COLACE    60 tablet    Take 100 mg by  mouth daily       hydrOXYzine 25 MG capsule    VISTARIL    40 capsule    Take 1 capsule (25 mg) by mouth 3 times daily as needed for itching       mycophenolate 250 MG capsule    CELLCEPT - GENERIC EQUIVALENT    180 capsule    Take 3 capsules (750 mg) by mouth 2 times daily       ondansetron 4 MG ODT tab    ZOFRAN ODT    120 tablet    Take 1 tablet (4 mg) by mouth every 6 hours as needed for nausea       oxyCODONE 5 MG IR tablet    ROXICODONE    40 tablet    Take 1-2 tablets (5-10 mg) by mouth every 4 hours as needed for moderate to severe pain       phosphorus tablet 250 mg 250 MG per tablet    K PHOS NEUTRAL    120 tablet    Take 2 tablets (500 mg) by mouth 2 times daily       tacrolimus 1 MG capsule    PROGRAF - GENERIC EQUIVALENT    180 capsule    Take 3 capsules (3 mg) by mouth 2 times daily       valGANciclovir 450 MG tablet    VALCYTE    60 tablet    Take 2 tablets (900 mg) by mouth daily

## 2017-06-13 NOTE — PROGRESS NOTES
Assessment and Plan:  1. LDKT - baseline Cr still to be determined, but improved creatinine down to ~ 1.7 today.  Will make no changes in immunosuppression, pending today's drug level.  2. HTN - fair control right at target of less than 140/90.  Will start amlodipine 5 mg qHS.  3. Anemia in chronic renal disease - stable Hgb.  Iron replete.  Will follow.  4. Secondary renal hyperparathyroidism - moderately elevated PTH, which should improve post transplant.  Will continue on calcitriol and recheck PTH at 3 months post transplant.  5. Hypophosphatemia - normal, stable serum phosphorus level and will continue on oral phosphorus supplement.  6. Recommend return visit tomorrow.    Assessment and plan was discussed with patient and he voiced his understanding and agreement.    Reason for Visit:  Mr. Stover is here for hospital follow up following kidney transplant.    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         Transplant Hx:       Tx: LDKT  Date: 6/7/17       Present Maintenance IS: Tacrolimus and Mycophenolate mofetil       Baseline Creatinine: TBD       Recent DSA: No  Date last checked: 6/2017       Biopsy: No    Mr. Stover reports feeling good overall with some medical complaints.  He has ESKD secondary to IgA nephropathy, now status post LDKT 6/7/17.  His post transplant course was complicated by macular rash and fevers, felt secondary to side effect of Thymoglobulin.  Those symptoms are now resolved.  His energy level is good and improving.  He has been active and doing some walking.  Denies any chest pain or shortness of breath with exertion.  Appetite is better and he is trying to drink fluids.  Slight nausea this morning, but no vomiting.  Some loose stools in the hospital, but none since discharge yesterday.  No fever, sweats or chills.  No leg swelling.  No further rash.    Home BP: 140/80-90s.      ROS:   A comprehensive review of  systems was obtained and negative, except as noted in the HPI or PMH.    Active Medical Problems:  Patient Active Problem List   Diagnosis     IgA nephropathy     Hypertension secondary to other renal disorders     Anemia in chronic kidney disease     Secondary renal hyperparathyroidism (H)     Kidney replaced by transplant     Immunosuppressed status (H)     Aftercare following organ transplant     Hypophosphatemia       Personal Hx:  Social History     Social History     Marital status: Single     Spouse name: N/A     Number of children: N/A     Years of education: N/A     Occupational History     Not on file.     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 Authorizing Provider   oxyCODONE (ROXICODONE) 5 MG IR tablet Take 1-2 tablets (5-10 mg) by mouth every 4 hours as needed for moderate to severe pain 6/12/17   Heidi Black PA-C   acetaminophen (TYLENOL) 325 MG tablet Take 2 tablets (650 mg) by mouth every 6 hours as needed for mild pain 6/12/17   Heidi Black PA-C   mycophenolate (CELLCEPT - GENERIC EQUIVALENT) 250 MG capsule Take 3 capsules (750 mg) by mouth 2 times daily 6/12/17   Heidi Black PA-C   tacrolimus (PROGRAF - GENERIC EQUIVALENT) 1 MG capsule Take 3 capsules (3 mg) by mouth 2 times daily 6/12/17   Heidi Black PA-C   phosphorus tablet 250 mg (K PHOS NEUTRAL) 250 MG per tablet Take 2 tablets (500 mg) by mouth 2 times daily 6/12/17 7/12/17  Heidi Black  HARRY Preston   calcitRIOL (ROCALTROL) 0.25 MCG capsule Take 1 capsule (0.25 mcg) by mouth daily 6/12/17   Heidi Black PA-C   valGANciclovir (VALCYTE) 450 MG tablet Take 2 tablets (900 mg) by mouth daily 6/12/17   Heidi Black PA-C   hydrOXYzine (VISTARIL) 25 MG capsule Take 1 capsule (25 mg) by mouth 3 times daily as needed for itching 6/12/17   Heidi Black PA-C   docusate sodium (COLACE) 100 MG tablet Take 100 mg by mouth daily 6/12/17   Heidi Black PA-C   dapsone 100 MG tablet Take 1 tablet (100 mg) by mouth daily 6/12/17   Heidi Black PA-C       Vitals:  Reviewed.    Exam:   GENERAL APPEARANCE: alert and no distress  HENT: mouth without ulcers or lesions  LYMPHATICS: no cervical or supraclavicular nodes  RESP: lungs clear to auscultation - no rales, rhonchi or wheezes  CV: regular rhythm, normal rate, no rub, no murmur  EDEMA: no LE edema bilaterally  ABDOMEN: soft, nondistended, nontender, bowel sounds normal  MS: extremities normal - no gross deformities noted, no evidence of inflammation in joints, no muscle tenderness  SKIN: no rash  TX KIDNEY: mild TTP    Results:   Recent Results (from the past 8 hour(s))   Basic metabolic panel    Collection Time: 06/13/17  8:00 AM   Result Value Ref Range    Sodium 137 133 - 144 mmol/L    Potassium 3.9 3.4 - 5.3 mmol/L    Chloride 103 94 - 109 mmol/L    Carbon Dioxide 23 20 - 32 mmol/L    Anion Gap 11 3 - 14 mmol/L    Glucose 102 (H) 70 - 99 mg/dL    Urea Nitrogen 20 7 - 30 mg/dL    Creatinine 1.69 (H) 0.66 - 1.25 mg/dL    GFR Estimate 51 (L) >60 mL/min/1.7m2    GFR Estimate If Black 61 >60 mL/min/1.7m2    Calcium 9.0 8.5 - 10.1 mg/dL   Magnesium    Collection Time: 06/13/17  8:00 AM   Result Value Ref Range    Magnesium 2.1 1.6 - 2.3 mg/dL   Phosphorus    Collection Time: 06/13/17  8:00 AM   Result Value Ref Range    Phosphorus 2.9 2.5 - 4.5 mg/dL   CBC with platelets differential    Collection Time: 06/13/17  8:00 AM   Result Value Ref  Range    WBC 6.8 4.0 - 11.0 10e9/L    RBC Count 3.39 (L) 4.4 - 5.9 10e12/L    Hemoglobin 10.0 (L) 13.3 - 17.7 g/dL    Hematocrit 30.8 (L) 40.0 - 53.0 %    MCV 91 78 - 100 fl    MCH 29.5 26.5 - 33.0 pg    MCHC 32.5 31.5 - 36.5 g/dL    RDW 13.8 10.0 - 15.0 %    Platelet Count 270 150 - 450 10e9/L    Diff Method Automated Method     % Neutrophils 78.4 %    % Lymphocytes 2.9 %    % Monocytes 13.3 %    % Eosinophils 3.1 %    % Basophils 0.1 %    % Immature Granulocytes 2.2 %    Nucleated RBCs 0 0 /100    Absolute Neutrophil 5.4 1.6 - 8.3 10e9/L    Absolute Lymphocytes 0.2 (L) 0.8 - 5.3 10e9/L    Absolute Monocytes 0.9 0.0 - 1.3 10e9/L    Absolute Eosinophils 0.2 0.0 - 0.7 10e9/L    Absolute Basophils 0.0 0.0 - 0.2 10e9/L    Abs Immature Granulocytes 0.2 0 - 0.4 10e9/L    Absolute Nucleated RBC 0.0

## 2017-06-13 NOTE — PROGRESS NOTES
"Golden Stover came to [facility] today for a lab and assess following a LD Kidney transplant on 6/7/17.      Discharge date: 6/12/17  Transplant coordinator: Elroy Brito  Phone number patient can be reached at: Golden cell: [phone] may leave message if unable to answer phone    Pt anticipates using [facility] in [place], [place] once discharged to home.   Address: [address] [place] [address]  Phone: [phone]      Pt advised to establish care with local PCP in [place] upon return home. Pt verbalized understanding.     Physical Assessment:  See physical asuysessment located under \"Document Flowsheets\".  Incision site: C/D/I secured with dermabond; no s/sx of infection  Lines: Franco line to right upper chest previously used for dialysis  Shaikh: n/a  Urine clarity: pt reports clear yellow urine  Hydration: pt reports intake of 2+ L within past 24 hours  Nutrition: appetite diminished, slowly improving, having nausea  Last BM: early AM 6/12/17; very soft, stopped taking daily Senna, only PRN basis  Pain: no incisional pain at rest, 1-2/10 with activity; bilat knee pain 6-7/10 during ambulation, managing with 5 mg Oxycodone as needed (last taken at 0300) and PRN Tylenol (last taken at 0600)     Labs drawn by [facility] staff Yes; at 0800 via Franco line/CVC tunneled catheter to right upper chest; 2.3 ml blood removed, saline flush prior and post lab draw, locked with 2.3 ml high dose Heparin to red lumen    Plan of care for today: lab and assessment, rounding by Nephrology, Specialty Pharmacy visit, meeting with Transplant Coordinator, medication card updated, appt. Made for tunneled CVC line removal tomorrow 6/14/17 at 11am in [facility]    Medication changes: addition of Zofran 4 mg ODT q 6 hours PRN, addition of Amlodipine 5 mg tab q HS    Medications administered: pt took all scheduled AM medications following morning lab blood draw; also given 2.3 ml high dose Heparin lock to red lumen of tunneled catheter " to upper right chest     Drug Waste Record    Drug Name: Heparin 10,000 units per 10 ml  Dose: 2.3 ml  Route administered: IV  NDC #: 87581-889-78  Amount of waste(mL): 7.7  Reason for waste: Single use vial    Patient education:    The following teaching topics were addressed: Importance of drinking 2L of non-caffeinated fluids daily, Incisional care, Signs/symptoms of infection, Medications (purposes, doses and times of administration), Phone numbers to call with concenrs (Transplant coordinator, [address] and [facility]), 6D discharge check list and Plan of care   Patient and girlfriend Page, and friend Dayron verbalized understanding and all questions answered.    Drug level:  TAC level today reviewed with Dr King who gave orders to increase dose from 4 mg BID to 6 mg BID.  Patient was updated with this information and verbalized understanding.    Face to face time: 30  Discharge Plan    Pt will follow up with [facility] for lab and assessment at 0700 on Weds. 6/14/17.  Discharge instructions reviewed with patient: YES  Patient/Representative verbalized understanding, all questions answered: YES    Discharged from unit at 1120 with whom: girlfriend Page and friend Dayron to relative home.    Amy Gonzalez RN

## 2017-06-13 NOTE — MR AVS SNAPSHOT
After Visit Summary   6/13/2017    Golden Stover    MRN: [id number]           Patient Information     Date Of Birth          1995        Visit Information        Provider Department      6/13/2017 8:00 AM Jak King MD [facility] Specialty and Procedure        Today's Diagnoses     Kidney replaced by transplant    -  1    Anemia in chronic kidney disease        Hypertension secondary to other renal disorders        Secondary renal hyperparathyroidism (H)        Immunosuppressed status (H)        Aftercare following organ transplant        Hypophosphatemia           Follow-ups after your visit        Your next 10 appointments already scheduled     Jun 29, 2017  9:00 AM CDT   (Arrive by 8:45 AM)   Kidney Post Op with Martell Robert MD   [facility] Solid Organ Transplant ([facility])    [address] [place] [address] [phone]            Jul 11, 2017  1:05 PM CDT   (Arrive by 12:35 PM)   Return Kidney Transplant with Jak King MD   [facility] Nephrology ([facility])    [address] [place] [address] [phone]            Sep 11, 2017  1:05 PM CDT   (Arrive by 12:35 PM)   Return Kidney Transplant with Jak King MD   [facility] Nephrology ([facility])    [address] [place] [address] [phone]              Who to contact     If you have questions or need follow up information about today's clinic visit or your schedule please contact [facility] SPECIALTY AND PROCEDURE directly at [phone].  Normal or non-critical lab and imaging results will be communicated to you by MyChart, letter or phone within 4 business days after the clinic has received the results. If you do not hear from us within 7 days, please contact the clinic through Vayablet  or phone. If you have a critical or abnormal lab result, we will notify you by phone as soon as possible.  Submit refill requests through Ingogo or call your pharmacy and they will forward the refill request to us. Please allow 3 business days for your refill to be completed.          Additional Information About Your Visit        MCE-5 Developmenthart Information     Ingogo gives you secure access to your electronic health record. If you see a primary care provider, you can also send messages to your care team and make appointments. If you have questions, please call your primary care clinic.  If you do not have a primary care provider, please call [phone] and they will assist you.        Care EveryWhere ID     This is your Care EveryWhere ID. This could be used by other organizations to access your [facility] medical records  [id number]         Blood Pressure from Last 3 Encounters:   06/13/17 155/82   06/12/17 (!) 147/109   06/01/17 (!) 137/91    Weight from Last 3 Encounters:   06/13/17 98.5 kg (217 lb 3.2 oz)   06/12/17 99 kg (218 lb 3.2 oz)   06/01/17 101.1 kg (222 lb 12.8 oz)              Today, you had the following     No orders found for display         Today's Medication Changes          These changes are accurate as of: 6/13/17  9:15 AM.  If you have any questions, ask your nurse or doctor.               Start taking these medicines.        Dose/Directions    amLODIPine 5 MG tablet   Commonly known as:  NORVASC   Used for:  Living-donor kidney transplant recipient        Dose:  5 mg   Take 1 tablet (5 mg) by mouth every evening   Quantity:  30 tablet   Refills:  6       ondansetron 4 MG ODT tab   Commonly known as:  ZOFRAN ODT   Used for:  Living-donor kidney transplant recipient        Dose:  4 mg   Take 1 tablet (4 mg) by mouth every 6 hours as needed for nausea   Quantity:  120 tablet   Refills:  0            Where to get your medicines      These medications were sent to [facility]  - [place], [place] - [address]  [address], [facility] [address]    Hours:  TRANSPLANT PHONE NUMBER [phone] Phone:  [phone]     amLODIPine 5 MG tablet    ondansetron 4 MG ODT tab                Primary Care Provider    Physician No Ref-Primary       No address on file        Thank you!     Thank you for choosing [facility] SPECIALTY AND PROCEDURE  for your care. Our goal is always to provide you with excellent care. Hearing back from our patients is one way we can continue to improve our services. Please take a few minutes to complete the written survey that you may receive in the mail after your visit with us. Thank you!             Your Updated Medication List - Protect others around you: Learn how to safely use, store and throw away your medicines at www.disposemymeds.org.          This list is accurate as of: 6/13/17  9:15 AM.  Always use your most recent med list.                   Brand Name Dispense Instructions for use    acetaminophen 325 MG tablet    TYLENOL    100 tablet    Take 2 tablets (650 mg) by mouth every 6 hours as needed for mild pain       amLODIPine 5 MG tablet    NORVASC    30 tablet    Take 1 tablet (5 mg) by mouth every evening       calcitRIOL 0.25 MCG capsule    ROCALTROL    30 capsule    Take 1 capsule (0.25 mcg) by mouth daily       dapsone 100 MG tablet     30 tablet    Take 1 tablet (100 mg) by mouth daily       docusate sodium 100 MG tablet    COLACE    60 tablet    Take 100 mg by mouth daily       hydrOXYzine 25 MG capsule    VISTARIL    40 capsule    Take 1 capsule (25 mg) by mouth 3 times daily as needed for itching       mycophenolate 250 MG capsule    CELLCEPT - GENERIC EQUIVALENT    180 capsule    Take 3 capsules (750 mg) by mouth 2 times daily       ondansetron 4 MG ODT tab    ZOFRAN ODT    120 tablet    Take 1 tablet (4 mg) by mouth every 6 hours as needed for nausea       oxyCODONE 5 MG IR tablet    ROXICODONE     40 tablet    Take 1-2 tablets (5-10 mg) by mouth every 4 hours as needed for moderate to severe pain       phosphorus tablet 250 mg 250 MG per tablet    K PHOS NEUTRAL    120 tablet    Take 2 tablets (500 mg) by mouth 2 times daily       tacrolimus 1 MG capsule    PROGRAF - GENERIC EQUIVALENT    180 capsule    Take 3 capsules (3 mg) by mouth 2 times daily       valGANciclovir 450 MG tablet    VALCYTE    60 tablet    Take 2 tablets (900 mg) by mouth daily

## 2017-06-14 ENCOUNTER — RECORDS - HEALTHEAST (OUTPATIENT)
Dept: ADMINISTRATIVE | Facility: OTHER | Age: 22
End: 2017-06-14

## 2017-06-14 ENCOUNTER — INFUSION THERAPY VISIT (OUTPATIENT)
Dept: INFUSION THERAPY | Facility: CLINIC | Age: 22
End: 2017-06-14
Attending: TRANSPLANT SURGERY
Payer: COMMERCIAL

## 2017-06-14 ENCOUNTER — OFFICE VISIT (OUTPATIENT)
Dept: INFUSION THERAPY | Facility: CLINIC | Age: 22
End: 2017-06-14
Attending: CLINICAL NURSE SPECIALIST
Payer: COMMERCIAL

## 2017-06-14 VITALS
BODY MASS INDEX: 27.58 KG/M2 | OXYGEN SATURATION: 97 % | TEMPERATURE: 96.9 F | WEIGHT: 214.9 LBS | RESPIRATION RATE: 16 BRPM

## 2017-06-14 DIAGNOSIS — E83.39 HYPOPHOSPHATEMIA: ICD-10-CM

## 2017-06-14 DIAGNOSIS — K59.09 OTHER CONSTIPATION: ICD-10-CM

## 2017-06-14 DIAGNOSIS — Z94.0 KIDNEY REPLACED BY TRANSPLANT: Primary | ICD-10-CM

## 2017-06-14 DIAGNOSIS — I15.1 HYPERTENSION SECONDARY TO OTHER RENAL DISORDERS: ICD-10-CM

## 2017-06-14 DIAGNOSIS — Z94.0 STATUS POST KIDNEY TRANSPLANT: Primary | ICD-10-CM

## 2017-06-14 DIAGNOSIS — N25.81 SECONDARY RENAL HYPERPARATHYROIDISM (H): ICD-10-CM

## 2017-06-14 DIAGNOSIS — Z48.298 AFTERCARE FOLLOWING ORGAN TRANSPLANT: ICD-10-CM

## 2017-06-14 DIAGNOSIS — D84.9 IMMUNOSUPPRESSED STATUS (H): ICD-10-CM

## 2017-06-14 DIAGNOSIS — D63.1 ANEMIA IN CHRONIC KIDNEY DISEASE: ICD-10-CM

## 2017-06-14 DIAGNOSIS — Z94.0 LIVING-DONOR KIDNEY TRANSPLANT RECIPIENT: ICD-10-CM

## 2017-06-14 DIAGNOSIS — N18.9 ANEMIA IN CHRONIC KIDNEY DISEASE: ICD-10-CM

## 2017-06-14 LAB
ANION GAP SERPL CALCULATED.3IONS-SCNC: 8 MMOL/L (ref 3–14)
BASOPHILS # BLD AUTO: 0 10E9/L (ref 0–0.2)
BASOPHILS NFR BLD AUTO: 0.2 %
BUN SERPL-MCNC: 20 MG/DL (ref 7–30)
CALCIUM SERPL-MCNC: 9.3 MG/DL (ref 8.5–10.1)
CHLORIDE SERPL-SCNC: 105 MMOL/L (ref 94–109)
CO2 SERPL-SCNC: 24 MMOL/L (ref 20–32)
CREAT SERPL-MCNC: 1.62 MG/DL (ref 0.66–1.25)
CROSSMATCH RESULT: NORMAL
CROSSMATCH RESULT: NORMAL
DIFFERENTIAL METHOD BLD: ABNORMAL
DONOR IDENTIFICATION: NORMAL
DSA COMMENTS: NORMAL
DSA PRESENT: NO
DSA TEST METHOD: NORMAL
EOSINOPHIL # BLD AUTO: 0.3 10E9/L (ref 0–0.7)
EOSINOPHIL NFR BLD AUTO: 3.5 %
ERYTHROCYTE [DISTWIDTH] IN BLOOD BY AUTOMATED COUNT: 13.7 % (ref 10–15)
GFR SERPL CREATININE-BSD FRML MDRD: 53 ML/MIN/1.7M2
GLUCOSE SERPL-MCNC: 94 MG/DL (ref 70–99)
HCT VFR BLD AUTO: 33.2 % (ref 40–53)
HGB BLD-MCNC: 10.7 G/DL (ref 13.3–17.7)
IMM GRANULOCYTES # BLD: 0.2 10E9/L (ref 0–0.4)
IMM GRANULOCYTES NFR BLD: 2.5 %
LYMPHOCYTES # BLD AUTO: 0.3 10E9/L (ref 0.8–5.3)
LYMPHOCYTES NFR BLD AUTO: 3 %
MAGNESIUM SERPL-MCNC: 2.1 MG/DL (ref 1.6–2.3)
MCH RBC QN AUTO: 29.2 PG (ref 26.5–33)
MCHC RBC AUTO-ENTMCNC: 32.2 G/DL (ref 31.5–36.5)
MCV RBC AUTO: 91 FL (ref 78–100)
MONOCYTES # BLD AUTO: 0.9 10E9/L (ref 0–1.3)
MONOCYTES NFR BLD AUTO: 10.4 %
NEUTROPHILS # BLD AUTO: 7.2 10E9/L (ref 1.6–8.3)
NEUTROPHILS NFR BLD AUTO: 80.4 %
NRBC # BLD AUTO: 0 10*3/UL
NRBC BLD AUTO-RTO: 0 /100
ORGAN: NORMAL
PHOSPHATE SERPL-MCNC: 2.8 MG/DL (ref 2.5–4.5)
PLATELET # BLD AUTO: 391 10E9/L (ref 150–450)
POTASSIUM SERPL-SCNC: 4.2 MMOL/L (ref 3.4–5.3)
RBC # BLD AUTO: 3.66 10E12/L (ref 4.4–5.9)
SA1 CELL: NORMAL
SA1 COMMENTS: NORMAL
SA1 HI RISK ABY: NORMAL
SA1 MOD RISK ABY: NORMAL
SA1 TEST METHOD: NORMAL
SA2 CELL: NORMAL
SA2 COMMENTS: NORMAL
SA2 HI RISK ABY UA: NORMAL
SA2 MOD RISK ABY: NORMAL
SA2 TEST METHOD: NORMAL
SODIUM SERPL-SCNC: 137 MMOL/L (ref 133–144)
TACROLIMUS BLD-MCNC: 4.2 UG/L (ref 5–15)
TME LAST DOSE: ABNORMAL H
WBC # BLD AUTO: 9 10E9/L (ref 4–11)

## 2017-06-14 PROCEDURE — 85025 COMPLETE CBC W/AUTO DIFF WBC: CPT | Performed by: INTERNAL MEDICINE

## 2017-06-14 PROCEDURE — 80048 BASIC METABOLIC PNL TOTAL CA: CPT | Performed by: INTERNAL MEDICINE

## 2017-06-14 PROCEDURE — 27210995 ZZH RX 272: Mod: ZF | Performed by: INTERNAL MEDICINE

## 2017-06-14 PROCEDURE — 36415 COLL VENOUS BLD VENIPUNCTURE: CPT | Performed by: INTERNAL MEDICINE

## 2017-06-14 PROCEDURE — 84100 ASSAY OF PHOSPHORUS: CPT | Performed by: INTERNAL MEDICINE

## 2017-06-14 PROCEDURE — 83735 ASSAY OF MAGNESIUM: CPT | Performed by: INTERNAL MEDICINE

## 2017-06-14 PROCEDURE — 80197 ASSAY OF TACROLIMUS: CPT | Performed by: INTERNAL MEDICINE

## 2017-06-14 RX ORDER — AMOXICILLIN 250 MG
2 CAPSULE ORAL DAILY PRN
Qty: 100 TABLET | Refills: 1 | Status: SHIPPED | OUTPATIENT
Start: 2017-06-14 | End: 2018-01-29

## 2017-06-14 RX ORDER — TACROLIMUS 1 MG/1
6 CAPSULE ORAL 2 TIMES DAILY
Qty: 360 CAPSULE | Refills: 11 | Status: SHIPPED | OUTPATIENT
Start: 2017-06-14 | End: 2017-06-16

## 2017-06-14 RX ADMIN — Medication 10 ML: at 11:15

## 2017-06-14 NOTE — MR AVS SNAPSHOT
After Visit Summary   6/14/2017    Golden Stover    MRN: [id number]           Patient Information     Date Of Birth          1995        Visit Information        Provider Department      6/14/2017 8:00 AM Jak King MD [facility] Specialty and Procedure        Today's Diagnoses     Kidney replaced by transplant    -  1    Aftercare following organ transplant        Anemia in chronic kidney disease        Immunosuppressed status (H)        Hypertension secondary to other renal disorders        Hypophosphatemia        Secondary renal hyperparathyroidism (H)           Follow-ups after your visit        Your next 10 appointments already scheduled     Jun 14, 2017 11:00 AM CDT   (Arrive by 10:45 AM)   Return with AMY Diaz [facility] Specialty and Procedure ([facility])    [address] [place] [address]  [address] [facility] [address] [phone]            Jun 16, 2017  7:00 AM CDT   LAB with  LAB   [facility] ([facility])    [address] [place] [address] [phone]           Patient must bring picture ID.  Patient should be prepared to give a urine specimen  Please do not eat 10-12 hours before your appointment if you are coming in fasting for labs on lipids, cholesterol, or glucose (sugar).  Pregnant women should follow their Care Team instructions. Water with medications is okay. Do not drink coffee or other fluids.   If you have concerns about taking  your medications, please ask at office or if scheduling via Likeastoret, send a message by clicking on Secure Messaging, Message Your Care Team.            Jun 29, 2017  9:00 AM CDT   (Arrive by 8:45 AM)   Kidney Post Op with Martell Robert MD   [facility] Solid Organ Transplant ([facility])    [address] [place] [address]  [address] [facility] [address]    [phone]            Jul 11, 2017  1:05 PM CDT   (Arrive by 12:35 PM)   Return Kidney Transplant with Jak King MD   [facility] Nephrology ([facility])    [address] [place] [address]  [address] [facility] [address]   [phone]            Sep 11, 2017  1:05 PM CDT   (Arrive by 12:35 PM)   Return Kidney Transplant with Jak King MD   [facility] Nephrology ([facility])    [address] [place] [address]   [phone]              Future tests that were ordered for you today     Open Standing Orders        Priority Remaining Interval Expires Ordered    Hepatic panel Routine 2/2 Every 6 Months 7/18/2018 6/13/2017    Lipid Profile Routine 2/2 Every 6 Months 7/18/2018 6/13/2017    Protein  random urine Routine 2/2 Every 6 Months 7/18/2018 6/13/2017    Tacrolimus level Routine 12/12 3X Week 7/18/2017 6/13/2017    Tacrolimus level Routine 16/16 2X Week 11/20/2017 6/13/2017    Tacrolimus level Routine 12/12 Weekly 12/20/2017 6/13/2017    Tacrolimus level Routine 6/6 Every Other Week 3/20/2018 6/13/2017    Tacrolimus level Routine 4/4 Every 3 Weeks 7/18/2018 6/13/2017    PRA Donor Specific Antibody Routine 6/6 7/18/2018 6/13/2017    BK virus PCR quantitative Routine 12/12 Monthly 7/18/2018 6/13/2017    PRA Donor Specific Antibody Routine 6/6 7/18/2018 6/13/2017    CBC with platelets Routine 12/12 3X Week 7/18/2017 6/13/2017    Basic metabolic panel Routine 12/12 3X Week 7/18/2017 6/13/2017    CBC with platelets Routine 16/16 2X Week 11/20/2017 6/13/2017    Basic metabolic panel Routine 16/16 2X Week 11/20/2017 6/13/2017    CBC with platelets Routine 12/12 Weekly 12/20/2017 6/13/2017    Basic metabolic panel Routine 12/12 Weekly 12/20/2017 6/13/2017    CBC with platelets Routine 6/6 Every Other Week 3/20/2018 6/13/2017    Basic metabolic panel Routine 6/6 Every Other Week 3/20/2018 6/13/2017    CBC with platelets Routine  4/4 Every 3 Weeks 7/18/2018 6/13/2017    Basic metabolic panel Routine 4/4 Every 3 Weeks 7/18/2018 6/13/2017    Phosphorus Routine 12/12 Weekly 10/11/2017 6/13/2017    Magnesium Routine 12/12 Weekly 10/11/2017 6/13/2017    Mycophenolic acid Routine 12/12 Weekly 10/11/2017 6/13/2017    WBC Differential Routine 12/12 Weekly 10/11/2017 6/13/2017          Open Future Orders        Priority Expected Expires Ordered    Hepatitis B Surface Antibody Routine 12/10/2017 12/24/2017 6/13/2017    Hepatitis B core antibody Routine 12/10/2017 12/24/2017 6/13/2017    Hepatitis C antibody Routine 12/10/2017 12/24/2017 6/13/2017            Who to contact     If you have questions or need follow up information about today's clinic visit or your schedule please contact [facility] SPECIALTY AND PROCEDURE directly at [phone].  Normal or non-critical lab and imaging results will be communicated to you by Embranehart, letter or phone within 4 business days after the clinic has received the results. If you do not hear from us within 7 days, please contact the clinic through [employer] or phone. If you have a critical or abnormal lab result, we will notify you by phone as soon as possible.  Submit refill requests through [employer] or call your pharmacy and they will forward the refill request to us. Please allow 3 business days for your refill to be completed.          Additional Information About Your Visit        EmbraneharCloudBolt Software Information     [employer] gives you secure access to your electronic health record. If you see a primary care provider, you can also send messages to your care team and make appointments. If you have questions, please call your primary care clinic.  If you do not have a primary care provider, please call [phone] and they will assist you.        Care EveryWhere ID     This is your Care EveryWhere ID. This could be used by other organizations to access your [place] medical records  [id number]          Blood Pressure from Last 3 Encounters:   06/13/17 155/82   06/12/17 (!) 147/109   06/01/17 (!) 137/91    Weight from Last 3 Encounters:   06/14/17 97.5 kg (214 lb 14.4 oz)   06/13/17 98.5 kg (217 lb 3.2 oz)   06/12/17 99 kg (218 lb 3.2 oz)              Today, you had the following     No orders found for display         Today's Medication Changes          These changes are accurate as of: 6/14/17  9:00 AM.  If you have any questions, ask your nurse or doctor.               Start taking these medicines.        Dose/Directions    senna-docusate 8.6-50 MG per tablet   Commonly known as:  SENOKOT-S;PERICOLACE   Used for:  Other constipation        Dose:  2 tablet   Take 2 tablets by mouth daily as needed for constipation   Quantity:  100 tablet   Refills:  1            Where to get your medicines      These medications were sent to [address] [address]    Hours:  TRANSPLANT PHONE NUMBER [phone] Phone:  [phone]     senna-docusate 8.6-50 MG per tablet                Primary Care Provider    Physician No Ref-Primary       No address on file        Thank you!     Thank you for choosing [facility] SPECIALTY AND PROCEDURE  for your care. Our goal is always to provide you with excellent care. Hearing back from our patients is one way we can continue to improve our services. Please take a few minutes to complete the written survey that you may receive in the mail after your visit with us. Thank you!             Your Updated Medication List - Protect others around you: Learn how to safely use, store and throw away your medicines at www.disposemymeds.org.          This list is accurate as of: 6/14/17  9:00 AM.  Always use your most recent med list.                   Brand Name Dispense Instructions for use    acetaminophen 325 MG tablet    TYLENOL    100 tablet    Take 2 tablets (650  mg) by mouth every 6 hours as needed for mild pain       amLODIPine 5 MG tablet    NORVASC    30 tablet    Take 1 tablet (5 mg) by mouth every evening       calcitRIOL 0.25 MCG capsule    ROCALTROL    30 capsule    Take 1 capsule (0.25 mcg) by mouth daily       dapsone 100 MG tablet     30 tablet    Take 1 tablet (100 mg) by mouth daily       docusate sodium 100 MG tablet    COLACE    60 tablet    Take 100 mg by mouth daily       hydrOXYzine 25 MG capsule    VISTARIL    40 capsule    Take 1 capsule (25 mg) by mouth 3 times daily as needed for itching       mycophenolate 250 MG capsule    CELLCEPT - GENERIC EQUIVALENT    180 capsule    Take 3 capsules (750 mg) by mouth 2 times daily       ondansetron 4 MG ODT tab    ZOFRAN ODT    120 tablet    Take 1 tablet (4 mg) by mouth every 6 hours as needed for nausea       oxyCODONE 5 MG IR tablet    ROXICODONE    40 tablet    Take 1-2 tablets (5-10 mg) by mouth every 4 hours as needed for moderate to severe pain       phosphorus tablet 250 mg 250 MG per tablet    K PHOS NEUTRAL    120 tablet    Take 2 tablets (500 mg) by mouth 2 times daily       senna-docusate 8.6-50 MG per tablet    SENOKOT-S;PERICOLACE    100 tablet    Take 2 tablets by mouth daily as needed for constipation       tacrolimus 1 MG capsule    PROGRAF - GENERIC EQUIVALENT    180 capsule    Take 6 capsules (6 mg) by mouth 2 times daily       valGANciclovir 450 MG tablet    VALCYTE    60 tablet    Take 2 tablets (900 mg) by mouth daily

## 2017-06-14 NOTE — PROGRESS NOTES
Assessment and Plan:  1. LDKT - baseline Cr still to be determined, but improved creatinine down to ~ 1.6 today.  Discussed with patient that creatinine could increase slightly due to vasoconstriction as tacrolimus levels increase to goal.  Will make no changes in immunosuppression, pending today's drug level.  2. HTN - fair control right at target of less than 140/90.  Will continue amlodipine 5 mg qHS.  3. Anemia in chronic renal disease - stable Hgb.  Iron replete.  Will follow.  4. Secondary renal hyperparathyroidism - moderately elevated PTH, which should improve post transplant.  Will continue on calcitriol and recheck PTH at 3 months post transplant.  5. Hypophosphatemia - normal, stable serum phosphorus level and will continue on oral phosphorus supplement.  6. Constipation - patient has had diarrhea post transplant, now a bit more constipated.  Recommend restarting senna-pericolace 2 tabs qHS.  7. Recommend return visit at 1 month post transplant.    Assessment and plan was discussed with patient and he voiced his understanding and agreement.    Reason for Visit:  Mr. Stover is here for hospital follow up following kidney transplant.    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         Transplant Hx:       Tx: LDKT  Date: 6/7/17       Present Maintenance IS: Tacrolimus and Mycophenolate mofetil       Baseline Creatinine: TBD       Recent DSA: No  Date last checked: 6/2017       Biopsy: No    Mr. Stover reports feeling good overall with some medical complaints.  His energy level is about the same, but generally good.  He continues to be active and did do some walking yesterday.  Denies any chest pain or shortness of breath with exertion.  Appetite is getting better, but not back to his usual.  He is trying to drink plenty of fluids.  Slight nausea, but well controlled with ondansetron as needed.  No vomiting or diarrhea.  Last bowel  movement was a couple of days ago.  Mild incisional pain.  No fever, sweats or chills.  No leg swelling.    Home BP: 140/80s.      ROS:   A comprehensive review of systems was obtained and negative, except as noted in the HPI or PMH.    Active Medical Problems:  Patient Active Problem List   Diagnosis     IgA nephropathy     Hypertension secondary to other renal disorders     Anemia in chronic kidney disease     Secondary renal hyperparathyroidism (H)     Kidney replaced by transplant     Immunosuppressed status (H)     Aftercare following organ transplant     Hypophosphatemia       Personal Hx:  Social History     Social History     Marital status: Single     Spouse name: N/A     Number of children: N/A     Years of education: N/A     Occupational History     Not on file.     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 Authorizing Provider   oxyCODONE (ROXICODONE) 5 MG IR tablet Take 1-2 tablets (5-10 mg) by mouth every 4 hours as needed for moderate to severe pain 6/12/17   Heidi Black PA-C   acetaminophen (TYLENOL) 325 MG tablet Take 2 tablets (650 mg) by mouth every 6 hours as needed for mild pain 6/12/17   Heidi Black PA-C   mycophenolate (CELLCEPT - GENERIC EQUIVALENT) 250 MG capsule Take 3 capsules (750 mg) by mouth 2 times daily 6/12/17   Heidi Black PA-C   tacrolimus (PROGRAF - GENERIC EQUIVALENT) 1 MG capsule Take 3 capsules (3 mg) by mouth 2 times daily 6/12/17    Heidi Black PA-C   phosphorus tablet 250 mg (K PHOS NEUTRAL) 250 MG per tablet Take 2 tablets (500 mg) by mouth 2 times daily 6/12/17 7/12/17  Heidi Black PA-C   calcitRIOL (ROCALTROL) 0.25 MCG capsule Take 1 capsule (0.25 mcg) by mouth daily 6/12/17   Heidi Black PA-C   valGANciclovir (VALCYTE) 450 MG tablet Take 2 tablets (900 mg) by mouth daily 6/12/17   Heidi Black PA-C   hydrOXYzine (VISTARIL) 25 MG capsule Take 1 capsule (25 mg) by mouth 3 times daily as needed for itching 6/12/17   Heidi Black PA-C   docusate sodium (COLACE) 100 MG tablet Take 100 mg by mouth daily 6/12/17   Heidi Black PA-C   dapsone 100 MG tablet Take 1 tablet (100 mg) by mouth daily 6/12/17   Heidi Black PA-C       Vitals:  Reviewed.    Exam:   GENERAL APPEARANCE: alert and no distress  HENT: mouth without ulcers or lesions  LYMPHATICS: no cervical or supraclavicular nodes  RESP: lungs clear to auscultation - no rales, rhonchi or wheezes  CV: regular rhythm, normal rate, no rub, no murmur  EDEMA: no LE edema bilaterally  ABDOMEN: soft, nondistended, nontender, bowel sounds normal  MS: extremities normal - no gross deformities noted, no evidence of inflammation in joints, no muscle tenderness  SKIN: no rash  TX KIDNEY: mild TTP    Results:   Recent Results (from the past 8 hour(s))   Basic metabolic panel    Collection Time: 06/14/17  7:28 AM   Result Value Ref Range    Sodium 137 133 - 144 mmol/L    Potassium 4.2 3.4 - 5.3 mmol/L    Chloride 105 94 - 109 mmol/L    Carbon Dioxide 24 20 - 32 mmol/L    Anion Gap 8 3 - 14 mmol/L    Glucose 94 70 - 99 mg/dL    Urea Nitrogen 20 7 - 30 mg/dL    Creatinine 1.62 (H) 0.66 - 1.25 mg/dL    GFR Estimate 53 (L) >60 mL/min/1.7m2    GFR Estimate If Black 64 >60 mL/min/1.7m2    Calcium 9.3 8.5 - 10.1 mg/dL   Magnesium    Collection Time: 06/14/17  7:28 AM   Result Value Ref Range    Magnesium 2.1 1.6 - 2.3 mg/dL   Phosphorus    Collection Time: 06/14/17   7:28 AM   Result Value Ref Range    Phosphorus 2.8 2.5 - 4.5 mg/dL   CBC with platelets differential    Collection Time: 06/14/17  7:28 AM   Result Value Ref Range    WBC 9.0 4.0 - 11.0 10e9/L    RBC Count 3.66 (L) 4.4 - 5.9 10e12/L    Hemoglobin 10.7 (L) 13.3 - 17.7 g/dL    Hematocrit 33.2 (L) 40.0 - 53.0 %    MCV 91 78 - 100 fl    MCH 29.2 26.5 - 33.0 pg    MCHC 32.2 31.5 - 36.5 g/dL    RDW 13.7 10.0 - 15.0 %    Platelet Count 391 150 - 450 10e9/L    Diff Method Automated Method     % Neutrophils 80.4 %    % Lymphocytes 3.0 %    % Monocytes 10.4 %    % Eosinophils 3.5 %    % Basophils 0.2 %    % Immature Granulocytes 2.5 %    Nucleated RBCs 0 0 /100    Absolute Neutrophil 7.2 1.6 - 8.3 10e9/L    Absolute Lymphocytes 0.3 (L) 0.8 - 5.3 10e9/L    Absolute Monocytes 0.9 0.0 - 1.3 10e9/L    Absolute Eosinophils 0.3 0.0 - 0.7 10e9/L    Absolute Basophils 0.0 0.0 - 0.2 10e9/L    Abs Immature Granulocytes 0.2 0 - 0.4 10e9/L    Absolute Nucleated RBC 0.0

## 2017-06-14 NOTE — PROGRESS NOTES
"Golden Stover came to [facility] today for a lab and assess following a living donor kidney transplant on 6/7/17.      Discharge date: 6/12/17  Transplant coordinator: Elroy Brito  Phone number patient can be reached at: Golden cell: [phone] may leave message if unable to answer phone      Physical Assessment:  See physical assessment located under \"Document Flowsheets\".  Incision site: C/D/I; secured with dermabond; no s/sx of infection  Lines: tunneled CVC to upper R chest WNL; intact and patent; used for lab draw this morning, flushed and locked with 20 NS; no Heparin lock d/t removal scheduled for later today  Shaikh: n/a  Urine clarity: pt reported clear light yellow urine; producing urine q couple hours  Hydration: pt reported intake of 2+ L of water over past 24 hours; increased fluid intake encouraged d/t weight loss and frequent urination  Nutrition: appetite slowly improving; increased intake encouraged d/t weight loss   Last BM: soft non-formed BM last reported on morning of 6/12/17; Dr. King recommended re-starting Senna daily and Rx sent to 1st floor pharmacy for pt to  upon discharge   Pain: no incisional pain at rest, 1-2/10 with activity; bilat knee pain persists; managing with 5 mg Oxycodone as needed (taking 1-2 tabs daily) and PRN Tylenol   Labs drawn by [facility] staff Yes at 0728 via red lumen of CVC tunneled catheter to right upper chest; 2.3 ml blood removed, saline flush prior and post lab draw, locked with 20 ml NS instead of 2.3 ml high dose Heparin d/t line removal scheduled for later today.      Plan of care for today: lab and assessment, rounding by Nephrology, meeting with Transplant Coordinator, medication card updated, appt. for tunneled CVC line removal today at 11am in [facility]    Medication changes: start Senna 1-2 tablets daily as needed for constipation    Medications administered:  pt took all scheduled AM medications following morning lab blood draw; also given took 10 mg " Oxycodone following removal of tunneled catheter to upper right chest      Patient education:    The following teaching topics were addressed: Importance of drinking 2L of non-caffeinated fluids daily, Incisional care, Signs/symptoms of infection, Good handwashing, Medications (purposes, doses and times of administration), Phone numbers to call with concenrs (Transplant coordinator, [facility] and [facility]), 6D discharge check list and Plan of care   Patient and girlfriend Page verbalized understanding and all questions answered.    Drug level:  TAC level today reviewed with Dr King who gave orders to remain on current dose.  Patient was updated with this information and verbalized understanding.    Face to face time: 1.5 hours  Discharge Plan    Pt will follow up with transplant lab draw in 1st floor lab on Friday. Returning home to [place] on Saturday. [facility] contacted today to confirm referral and start of care for Monday 19,2017.  Discharge instructions reviewed with patient: YES  Patient/Representative verbalized understanding, all questions answered: YES    Discharged from unit at 1245 with whom: cristóbal Abel to home.    Amy Gonzalez RN

## 2017-06-14 NOTE — PROGRESS NOTES
Nephrology Progress Note  06/12/2017        Assessment & Recommendations:   1. LDKT - baseline creatinine still to be determined, but has improved, although stable last few days with creatinine ~ 2.0.  Good urine output.  No DSA.  2. Immunosuppression - now finished with Thymoglobulin and completing steroids per protocol.  On maintenance tacrolimus and mycophenolate mofetil.  3. HTN - reasonable control, most BPs ~ 140/90.   4. Anemia in chronic renal disease - stable to slight decrease in Hgb.  Iron replete.  Will follow.  5. Secondary renal hyperparathyroidism - moderately elevated PTH, which should improve post transplant.  Will continue calcitriol 0.25 mcg daily.  6. Hypophosphatemia - improved, d/c oral phosphorus supplement.  7. Hypomagnesemia - now normal serum magnesium level and continue oral magnesium supplement.  8. Hypoxia and fever - CXR suggested fluid overload and low probability V/Q.  Symptoms improved with diuresis.  Fever likely due to Thymo and patient now done with treatment.  9. Rash - likely drug reaction, likely Thymo versus less likely Bactrim.  Patient has completed Thymo treatment.  D/C'ed Bactrim.    Recommendations were communicated to primary team via this note.  Patient seen and discussed with Dr. Caraballo.    Bernadette Merino PA-C  [facility]  Department of Medicine  Division of Renal Disease and Hypertension    Interval History:   In the last 24 hours Golden Stover's kidney function is stable with creatinine ~ 2.0.  Good urine output. BPs overall in good range. Feels much better today. No further altered mental status, rash is resolved.  No further fever, sweats or chills, chest pain, shortness of breath.  No nausea or vomiting.    Review of Systems:   4 point ROS was obtained and negative, except as noted in the Interval History.    Physical Exam:   I/O last 3 completed shifts:  In: -   Out: 2600 [Urine:2600]   BP (!) 147/109  Pulse 93  Temp 98.9  " F (37.2  C)  Resp 16  Ht 1.88 m (6' 2.02\")  Wt 99 kg (218 lb 3.2 oz)  SpO2 94%  BMI 28 kg/m2     GENERAL APPEARANCE: NAD, alert  HENT: mouth without ulcers or lesions  PULM: lungs clear to auscultation bilaterally, equal air movement  CV: regular rhythm, normal rate     - no LE edema  GI: soft, mildly tender, nondistended, bowel sounds are positive  INTEGUMENT:  rash resolving, just few faint red patches remain stomach.  TX KIDNEY: mild TTP    Labs:   All labs reviewed by me  Electrolytes/Renal -   Recent Labs   Lab Test  06/14/17   0728  06/13/17   0800  06/12/17   0601   NA  137  137  139   POTASSIUM  4.2  3.9  4.1   CHLORIDE  105  103  106   CO2  24  23  24   BUN  20  20  18   CR  1.62*  1.69*  1.96*   GLC  94  102*  84   ANTON  9.3  9.0  9.0   MAG  2.1  2.1  2.0   PHOS  2.8  2.9  2.9       CBC -   Recent Labs   Lab Test  06/14/17   0728  06/13/17   0800  06/12/17   0601   WBC  9.0  6.8  6.3   HGB  10.7*  10.0*  10.3*   PLT  391  270  180       LFTs -   Recent Labs   Lab Test  06/01/17   1310  12/07/16   0637   ALKPHOS  108  98   BILITOTAL  0.6  0.5   ALT  28  31   AST  17  18   PROTTOTAL  8.2  8.1   ALBUMIN  4.0  3.8       Iron Panel -   Recent Labs   Lab Test  06/01/17   1310   IRON  65   IRONSAT  21   GARY  310       Current Medications:      Bernadette Merino PA-C     Patient was seen by myself, Dr. Morris Caraballo, in conjunction with Bernadette Merino PA-C as part of a shared visit.    I personally reviewed past medical and surgical history, vital signs, medications and labs.  Present and past medical history, along with significant physical exam findings were all reviewed with ASHLEIGH.    My key findings:  LDKT   Immunosuppression   Rash and fever possible drug reaction thymo vs others     Key management decisions made by me and discussed with ASHLEIGH:  Continue immunosuppression. Agree with holding bactrim   "

## 2017-06-14 NOTE — PROGRESS NOTES
Transplant Surgery  Operative Note    PREOP DIAGNOSIS: S/P kidney transplant  POSTOP DIAGNOSIS: Same  PROCEDURE: Removal Tunneled Dialysis Catheter  FACULTY: Martell Robert M.D.  SURGEON: Rachna REYES CNS    ANESTHESIA: 1% buffered Lidocaine, 5 cc.  EBL: <10 ml.  SPECIMEN: none  FINDINGS: Normal.  COMPLICATIONS: None.    INDICATION: The patient does not require continued presence of tunneled dialysis catheter due to S/P kidney transplant. The indications, benefits, and risks of catheter removal were discussed, questions answered,and informed consent was provided.     PROCEDURE: The patient was placed supine on the exam bed.  The catheter, chest and neck were prepped and draped in a sterile fashion. Local anesthetic was instilled around the catheter exit site to accomplish a field block.  Blunt dissection released the subcutaneous tissues from the cuff.  The catheter was then removed.  The catheter/cuff was inspected and appeared intact. A pressure dressing was applied at the vein exit site and a dry dressing applied to the exit site. Faculty was immediately available.     Rachna REYES (Sum), CNS  Dialysis access program   [facility]  Pager # [phone]

## 2017-06-14 NOTE — MR AVS SNAPSHOT
After Visit Summary   6/14/2017    Golden Stover    MRN: [id number]           Patient Information     Date Of Birth          1995        Visit Information        Provider Department      6/14/2017 7:00 AM UC [facility] ATC; UC SPE[facility] Special[facility]        Today's Diagnoses     Status post kidney transplant    -  1    Other constipation          Care Instructions    Dear Golden Stover    Thank you for choosing [facility] ([facility]) for your transplant cares.  The following information is a summary of our appointment as well as important reminders.      Please make sure your phone is available today because I will call to update you with your anti-rejection drug levels and possibly make changes to your anti-rejection dosages.    Additional information:   1. Start Senna 2 tablets daily as needed for constipation; may decrease to 1 tablet as bowel status improves and you begin weaning off narcotics.   2. Return to the 1st floor lab at 7 am on Friday for transplant labs and appointment. DO NOT take your medications until AFTER your blood has been drawn.   3. TAKE YOUR EVENING MEDICATIONS AT 7 PM Thursday night to make sure there are 12 HOURS in between your evening meds and your morning lab draw.      We look forward in seeing you on your next appointment here at [facility].  Please don t hesitate to call us at [phone] to reschedule any of your appointments or to speak with one of the [facility] registered nurses.  It was a pleasure taking care of you today.    Sincerely,  Amy Gonzalez RN  [facility]  Specialty Infusion [facility] Procedure Center  [address] [place] [address]  [address]  Phone:  [phone]      Home care For CVC Tunneled Line Removal    No shower in next 48 hours.  Keep dressing at exit site for 48 hours.  After 48 hours the dressing may be removed  and the site may be left uncovered and may get wet if the site has sealed. If the site has not sealed  keep it covered and dry with daily dressing changes until sealed.   Remain upright for next 4-6 hours, avoid bending forward.  Monitor exit site dressing for blood oozing or stain and supraclavicular site for swelling,  If blood stain notice on dressing. May apply pressure at vein exit site (at collar bone area above CVC line exit site) for 15 minutes.  If blood running down from exit site dressing after pressure for 15 minutes, may need to seek emergency assistance immediately.    If you have any questions, please contact Rachna REYES (Sum) CNS at [phone]    KIMMIE Wise (Sum)  Dialysis access program   [facility]  Pager # [phone]          Follow-ups after your visit        Your next 10 appointments already scheduled     Jun 16, 2017  7:00 AM CDT   LAB with  LAB   [facility] ([facility])    [address] [place] [id number] [phone]           Patient must bring picture ID.  Patient should be prepared to give a urine specimen  Please do not eat 10-12 hours before your appointment if you are coming in fasting for labs on lipids, cholesterol, or glucose (sugar).  Pregnant women should follow their Care Team instructions. Water with medications is okay. Do not drink coffee or other fluids.   If you have concerns about taking  your medications, please ask at office or if scheduling via [facility], send a message by clicking on Secure Messaging, Message Your Care Team.            Jun 29, 2017  9:00 AM CDT   (Arrive by 8:45 AM)   Kidney Post Op with Martell Robert MD   [facility] ([facility])    [address] [place] [id number] [phone]            Jul 11, 2017  1:05 PM CDT   (Arrive by 12:35 PM)   Return Kidney Transplant with Jak Herrera  MD Christine   [facility] ([facility])    [address] [facility] [address]   [phone]            Sep 11, 2017  1:05 PM CDT   (Arrive by 12:35 PM)   Return Kidney Transplant with Jak King MD   [facility] ([facility])    [address]  [address] [facility] [address]   [phone]              Future tests that were ordered for you today     Open Standing Orders        Priority Remaining Interval Expires Ordered    Hepatic panel Routine 2/2 Every 6 Months 7/18/2018 6/13/2017    Lipid Profile Routine 2/2 Every 6 Months 7/18/2018 6/13/2017    Protein  random urine Routine 2/2 Every 6 Months 7/18/2018 6/13/2017    Tacrolimus level Routine 12/12 3X Week 7/18/2017 6/13/2017    Tacrolimus level Routine 16/16 2X Week 11/20/2017 6/13/2017    Tacrolimus level Routine 12/12 Weekly 12/20/2017 6/13/2017    Tacrolimus level Routine 6/6 Every Other Week 3/20/2018 6/13/2017    Tacrolimus level Routine 4/4 Every 3 Weeks 7/18/2018 6/13/2017    PRA Donor Specific Antibody Routine 6/6 7/18/2018 6/13/2017    BK virus PCR quantitative Routine 12/12 Monthly 7/18/2018 6/13/2017    PRA Donor Specific Antibody Routine 6/6 7/18/2018 6/13/2017    CBC with platelets Routine 12/12 3X Week 7/18/2017 6/13/2017    Basic metabolic panel Routine 12/12 3X Week 7/18/2017 6/13/2017    CBC with platelets Routine 16/16 2X Week 11/20/2017 6/13/2017    Basic metabolic panel Routine 16/16 2X Week 11/20/2017 6/13/2017    CBC with platelets Routine 12/12 Weekly 12/20/2017 6/13/2017    Basic metabolic panel Routine 12/12 Weekly 12/20/2017 6/13/2017    CBC with platelets Routine 6/6 Every Other Week 3/20/2018 6/13/2017    Basic metabolic panel Routine 6/6 Every Other Week 3/20/2018 6/13/2017    CBC with platelets Routine 4/4 Every 3 Weeks 7/18/2018 6/13/2017    Basic metabolic panel Routine 4/4 Every 3 Weeks 7/18/2018 6/13/2017    Phosphorus  Routine 12/12 Weekly 10/11/2017 6/13/2017    Magnesium Routine 12/12 Weekly 10/11/2017 6/13/2017    Mycophenolic acid Routine 12/12 Weekly 10/11/2017 6/13/2017    WBC Differential Routine 12/12 Weekly 10/11/2017 6/13/2017          Open Future Orders        Priority Expected Expires Ordered    [facility] Patient Care Staff to enter order for lidocaine BUFFERED 1 % solution 10 mL [[id number]] on day of procedure Routine  12/11/2017 6/14/2017    Prepare minor suture tray and 1% Lidocaine with Sodium Bicarbonate at the bedside Routine  12/11/2017 6/14/2017    [facility] Patient Care Staff to review and report anticoagulant use and related lab results to the provider completing the procedure. Routine  12/11/2017 6/14/2017    Vital signs upon patient's arrival, post-procedure, and prior to discharge. Routine  12/11/2017 6/14/2017    Apply pressure immediately after line removal.  Routine  12/11/2017 6/14/2017    Monitor those patients on anticoagulants x 30 minutes and those not on anticoagulants x 15 minutes.  Routine  12/11/2017 6/14/2017    [facility] Patient Care Staff to review discharge instructions with patient Routine  12/11/2017 6/14/2017    Removal of Tunneled Central Venous Catheter [[id number]] Routine  12/11/2017 6/14/2017    Treatment Conditions Routine  12/11/2017 6/14/2017    Page the surgery donor fellow (3653) upon the patient's arrival Routine  12/11/2017 6/14/2017    Hepatitis B Surface Antibody Routine 12/10/2017 12/24/2017 6/13/2017    Hepatitis B core antibody Routine 12/10/2017 12/24/2017 6/13/2017    Hepatitis C antibody Routine 12/10/2017 12/24/2017 6/13/2017            Who to contact     If you have questions or need follow up information about today's clinic visit or your schedule please contact [facility] SPECIALTY AND PROCEDURE directly at [phone].  Normal or non-critical lab and imaging results will be communicated to you by MyChart, letter or phone within 4 business days after the clinic  has received the results. If you do not hear from us within 7 days, please contact the clinic through Qpyn or phone. If you have a critical or abnormal lab result, we will notify you by phone as soon as possible.  Submit refill requests through Qpyn or call your pharmacy and they will forward the refill request to us. Please allow 3 business days for your refill to be completed.          Additional Information About Your Visit        Qpyn Information     Qpyn gives you secure access to your electronic health record. If you see a primary care provider, you can also send messages to your care team and make appointments. If you have questions, please call your primary care clinic.  If you do not have a primary care provider, please call [phone] and they will assist you.        Care EveryWhere ID     This is your Care EveryWhere ID. This could be used by other organizations to access your [facility] medical records  [id number]        Your Vitals Were     Temperature Respirations Pulse Oximetry BMI (Body Mass Index)          96.9  F (36.1  C) (Oral) 16 97% 27.58 kg/m2         Blood Pressure from Last 3 Encounters:   06/13/17 155/82   06/12/17 (!) 147/109   06/01/17 (!) 137/91    Weight from Last 3 Encounters:   06/14/17 97.5 kg (214 lb 14.4 oz)   06/13/17 98.5 kg (217 lb 3.2 oz)   06/12/17 99 kg (218 lb 3.2 oz)              We Performed the Following     Basic metabolic panel     CBC with platelets differential     Magnesium     Phosphorus     Tacrolimus level          Today's Medication Changes          These changes are accurate as of: 6/14/17 12:32 PM.  If you have any questions, ask your nurse or doctor.               Start taking these medicines.        Dose/Directions    senna-docusate 8.6-50 MG per tablet   Commonly known as:  SENOKOT-S;PERICOLACE   Used for:  Other constipation        Dose:  2 tablet   Take 2 tablets by mouth daily as needed for constipation   Quantity:  100 tablet   Refills:  1             Where to get your medicines      These medications were sent to [facility] [place], [place] - [id number]  [address], [facility] [address]    Hours:  TRANSPLANT PHONE NUMBER [phone] Phone:  [phone]     senna-docusate 8.6-50 MG per tablet                Primary Care Provider    Physician No Ref-Primary       No address on file        Thank you!     Thank you for choosing [facility] SPECIALTY AND PROCEDURE  for your care. Our goal is always to provide you with excellent care. Hearing back from our patients is one way we can continue to improve our services. Please take a few minutes to complete the written survey that you may receive in the mail after your visit with us. Thank you!             Your Updated Medication List - Protect others around you: Learn how to safely use, store and throw away your medicines at www.disposemymeds.org.          This list is accurate as of: 6/14/17 12:32 PM.  Always use your most recent med list.                   Brand Name Dispense Instructions for use    acetaminophen 325 MG tablet    TYLENOL    100 tablet    Take 2 tablets (650 mg) by mouth every 6 hours as needed for mild pain       amLODIPine 5 MG tablet    NORVASC    30 tablet    Take 1 tablet (5 mg) by mouth every evening       calcitRIOL 0.25 MCG capsule    ROCALTROL    30 capsule    Take 1 capsule (0.25 mcg) by mouth daily       dapsone 100 MG tablet     30 tablet    Take 1 tablet (100 mg) by mouth daily       docusate sodium 100 MG tablet    COLACE    60 tablet    Take 100 mg by mouth daily       hydrOXYzine 25 MG capsule    VISTARIL    40 capsule    Take 1 capsule (25 mg) by mouth 3 times daily as needed for itching       mycophenolate 250 MG capsule    CELLCEPT - GENERIC EQUIVALENT    180 capsule    Take 3 capsules (750 mg) by mouth 2 times daily       ondansetron 4 MG ODT tab    ZOFRAN ODT    120 tablet    Take 1 tablet  (4 mg) by mouth every 6 hours as needed for nausea       oxyCODONE 5 MG IR tablet    ROXICODONE    40 tablet    Take 1-2 tablets (5-10 mg) by mouth every 4 hours as needed for moderate to severe pain       phosphorus tablet 250 mg 250 MG per tablet    K PHOS NEUTRAL    120 tablet    Take 2 tablets (500 mg) by mouth 2 times daily       senna-docusate 8.6-50 MG per tablet    SENOKOT-S;PERICOLACE    100 tablet    Take 2 tablets by mouth daily as needed for constipation       tacrolimus 1 MG capsule    PROGRAF - GENERIC EQUIVALENT    180 capsule    Take 6 capsules (6 mg) by mouth 2 times daily       valGANciclovir 450 MG tablet    VALCYTE    60 tablet    Take 2 tablets (900 mg) by mouth daily

## 2017-06-14 NOTE — LETTER
6/14/2017       RE: Golden Stover  [address] [place] [address]     Dear Colleague,    Thank you for referring your patient, Golden Stover, to the [facility] SPECIAL[facility] PROCEDURE at [facility]. Please see a copy of my visit note below.      Transplant Surgery  Operative Note    PREOP DIAGNOSIS: S/P kidney transplant  POSTOP DIAGNOSIS: Same  PROCEDURE: Removal Tunneled Dialysis Catheter  FACULTY: Martell Robert M.D.  SURGEON: Rachna BURKS    ANESTHESIA: 1% buffered Lidocaine, 5 cc.  EBL: <10 ml.  SPECIMEN: none  FINDINGS: Normal.  COMPLICATIONS: None.    INDICATION: The patient does not require continued presence of tunneled dialysis catheter due to S/P kidney transplant. The indications, benefits, and risks of catheter removal were discussed, questions answered,and informed consent was provided.     PROCEDURE: The patient was placed supine on the exam bed.  The catheter, chest and neck were prepped and draped in a sterile fashion. Local anesthetic was instilled around the catheter exit site to accomplish a field block.  Blunt dissection released the subcutaneous tissues from the cuff.  The catheter was then removed.  The catheter/cuff was inspected and appeared intact. A pressure dressing was applied at the vein exit site and a dry dressing applied to the exit site. Faculty was immediately available.     KIMMIE Wise (Sum)  Dialysis access program   [facility]  Pager # [phone]      Again, thank you for allowing me to participate in the care of your patient.      Sincerely,    AMY Garcia

## 2017-06-14 NOTE — PATIENT INSTRUCTIONS
Dear Golden Stover    Thank you for choosing [facility] ([facility]) for your transplant cares.  The following information is a summary of our appointment as well as important reminders.      Please make sure your phone is available today because I will call to update you with your anti-rejection drug levels and possibly make changes to your anti-rejection dosages.    Additional information:   1. Start Senna 2 tablets daily as needed for constipation; may decrease to 1 tablet as bowel status improves and you begin weaning off narcotics.   2. Return to the 1st floor lab at 7 am on Friday for transplant labs and appointment. DO NOT take your medications until AFTER your blood has been drawn.   3. TAKE YOUR EVENING MEDICATIONS AT 7 PM Thursday night to make sure there are 12 HOURS in between your evening meds and your morning lab draw.      We look forward in seeing you on your next appointment here at [facility].  Please don t hesitate to call us at [phone] to reschedule any of your appointments or to speak with one of the [facility] registered nurses.  It was a pleasure taking care of you today.    Sincerely,  Amy Gonzalez RN  [facility]  [facility]  [address] [place] [address]  [address]  Phone:  [phone]      Home care For CVC Tunneled Line Removal    No shower in next 48 hours.  Keep dressing at exit site for 48 hours.  After 48 hours the dressing may be removed and the site may be left uncovered and may get wet if the site has sealed. If the site has not sealed  keep it covered and dry with daily dressing changes until sealed.   Remain upright for next 4-6 hours, avoid bending forward.  Monitor exit site dressing for blood oozing or stain and supraclavicular site for swelling,  If blood stain notice on dressing. May apply pressure at vein exit site (at collar bone area above CVC line exit  site) for 15 minutes.  If blood running down from exit site dressing after pressure for 15 minutes, may need to seek emergency assistance immediately.    If you have any questions, please contact Rachna REYES (Sum) CNS at [phone]    Rachna REYES (Sum), KIMMIE  Dialysis access program   [facility]  Pager # [phone]

## 2017-06-14 NOTE — MR AVS SNAPSHOT
After Visit Summary   6/14/2017    Golden Stover    MRN: [id number]           Patient Information     Date Of Birth          1995        Visit Information        Provider Department      6/14/2017 11:00 AM Rachna Aleman APRN [facility]        Today's Diagnoses     Status post kidney transplant    -  1       Follow-ups after your visit        Your next 10 appointments already scheduled     Jun 16, 2017  7:00 AM CDT   LAB with  LAB   [facility] ([facility])    [address] [place] [id number] [phone]           Patient must bring picture ID.  Patient should be prepared to give a urine specimen  Please do not eat 10-12 hours before your appointment if you are coming in fasting for labs on lipids, cholesterol, or glucose (sugar).  Pregnant women should follow their Care Team instructions. Water with medications is okay. Do not drink coffee or other fluids.   If you have concerns about taking  your medications, please ask at office or if scheduling via [employer], send a message by clicking on Secure Messaging, Message Your Care Team.            Jun 29, 2017  9:00 AM CDT   (Arrive by 8:45 AM)   Kidney Post Op with Martell Robert MD   [facility] Solid Organ Transplant ([facility])    [address] [place] [id number] [phone]            Jul 11, 2017  1:05 PM CDT   (Arrive by 12:35 PM)   Return Kidney Transplant with Jak King MD   [facility] Nephrology ([facility])    [address] [place] [id number] [phone]            Sep 11, 2017  1:05 PM CDT   (Arrive by 12:35 PM)   Return Kidney Transplant with Jak King MD   [facility] ([facility])    [address] [place] [id number] [phone]               Future tests that were ordered for you today     Open Standing Orders        Priority Remaining Interval Expires Ordered    Hepatic panel Routine 2/2 Every 6 Months 7/18/2018 6/13/2017    Lipid Profile Routine 2/2 Every 6 Months 7/18/2018 6/13/2017    Protein  random urine Routine 2/2 Every 6 Months 7/18/2018 6/13/2017    Tacrolimus level Routine 12/12 3X Week 7/18/2017 6/13/2017    Tacrolimus level Routine 16/16 2X Week 11/20/2017 6/13/2017    Tacrolimus level Routine 12/12 Weekly 12/20/2017 6/13/2017    Tacrolimus level Routine 6/6 Every Other Week 3/20/2018 6/13/2017    Tacrolimus level Routine 4/4 Every 3 Weeks 7/18/2018 6/13/2017    PRA Donor Specific Antibody Routine 6/6 7/18/2018 6/13/2017    BK virus PCR quantitative Routine 12/12 Monthly 7/18/2018 6/13/2017    PRA Donor Specific Antibody Routine 6/6 7/18/2018 6/13/2017    CBC with platelets Routine 12/12 3X Week 7/18/2017 6/13/2017    Basic metabolic panel Routine 12/12 3X Week 7/18/2017 6/13/2017    CBC with platelets Routine 16/16 2X Week 11/20/2017 6/13/2017    Basic metabolic panel Routine 16/16 2X Week 11/20/2017 6/13/2017    CBC with platelets Routine 12/12 Weekly 12/20/2017 6/13/2017    Basic metabolic panel Routine 12/12 Weekly 12/20/2017 6/13/2017    CBC with platelets Routine 6/6 Every Other Week 3/20/2018 6/13/2017    Basic metabolic panel Routine 6/6 Every Other Week 3/20/2018 6/13/2017    CBC with platelets Routine 4/4 Every 3 Weeks 7/18/2018 6/13/2017    Basic metabolic panel Routine 4/4 Every 3 Weeks 7/18/2018 6/13/2017    Phosphorus Routine 12/12 Weekly 10/11/2017 6/13/2017    Magnesium Routine 12/12 Weekly 10/11/2017 6/13/2017    Mycophenolic acid Routine 12/12 Weekly 10/11/2017 6/13/2017    WBC Differential Routine 12/12 Weekly 10/11/2017 6/13/2017          Open Future Orders        Priority Expected Expires Ordered    [facility] Patient Care Staff to enter order for lidocaine BUFFERED 1 % solution 10 mL [[id number]] on day of procedure  Routine  12/11/2017 6/14/2017    Prepare minor suture tray and 1% Lidocaine with Sodium Bicarbonate at the bedside Routine  12/11/2017 6/14/2017    [facility] Patient Care Staff to review and report anticoagulant use and related lab results to the provider completing the procedure. Routine  12/11/2017 6/14/2017    Vital signs upon patient's arrival, post-procedure, and prior to discharge. Routine  12/11/2017 6/14/2017    Apply pressure immediately after line removal.  Routine  12/11/2017 6/14/2017    Monitor those patients on anticoagulants x 30 minutes and those not on anticoagulants x 15 minutes.  Routine  12/11/2017 6/14/2017    [facility] Patient Care Staff to review discharge instructions with patient Routine  12/11/2017 6/14/2017    Removal of Tunneled Central Venous Catheter [[id number]] Routine  12/11/2017 6/14/2017    Treatment Conditions Routine  12/11/2017 6/14/2017    Page the surgery donor fellow (1995) upon the patient's arrival Routine  12/11/2017 6/14/2017    Hepatitis B Surface Antibody Routine 12/10/2017 12/24/2017 6/13/2017    Hepatitis B core antibody Routine 12/10/2017 12/24/2017 6/13/2017    Hepatitis C antibody Routine 12/10/2017 12/24/2017 6/13/2017            Who to contact     If you have questions or need follow up information about today's clinic visit or your schedule please contact [facility] directly at [phone].  Normal or non-critical lab and imaging results will be communicated to you by MyChart, letter or phone within 4 business days after the clinic has received the results. If you do not hear from us within 7 days, please contact the clinic through MyChart or phone. If you have a critical or abnormal lab result, we will notify you by phone as soon as possible.  Submit refill requests through [employer] or call your pharmacy and they will forward the refill request to us. Please allow 3 business days for your refill to be completed.           Additional Information About Your Visit        MyChart Information     [employer] gives you secure access to your electronic health record. If you see a primary care provider, you can also send messages to your care team and make appointments. If you have questions, please call your primary care clinic.  If you do not have a primary care provider, please call [phone] and they will assist you.        Care EveryWhere ID     This is your Care EveryWhere ID. This could be used by other organizations to access your [facility] medical records  [id number]         Blood Pressure from Last 3 Encounters:   06/13/17 155/82   06/12/17 (!) 147/109   06/01/17 (!) 137/91    Weight from Last 3 Encounters:   06/14/17 97.5 kg (214 lb 14.4 oz)   06/13/17 98.5 kg (217 lb 3.2 oz)   06/12/17 99 kg (218 lb 3.2 oz)              Today, you had the following     No orders found for display         Today's Medication Changes          These changes are accurate as of: 6/14/17 12:22 PM.  If you have any questions, ask your nurse or doctor.               Start taking these medicines.        Dose/Directions    senna-docusate 8.6-50 MG per tablet   Commonly known as:  SENOKOT-S;PERICOLACE   Used for:  Other constipation        Dose:  2 tablet   Take 2 tablets by mouth daily as needed for constipation   Quantity:  100 tablet   Refills:  1            Where to get your medicines      These medications were sent to [facility] [address] [address]    Hours:  TRANSPLANT PHONE NUMBER [phone] Phone:  [phone]     senna-docusate 8.6-50 MG per tablet                Primary Care Provider    Physician No Ref-Primary       No address on file        Thank you!     Thank you for choosing [facility]  for your care. Our goal is always to provide you with excellent care. Hearing back from our patients is one  way we can continue to improve our services. Please take a few minutes to complete the written survey that you may receive in the mail after your visit with us. Thank you!             Your Updated Medication List - Protect others around you: Learn how to safely use, store and throw away your medicines at www.disposemymeds.org.          This list is accurate as of: 6/14/17 12:22 PM.  Always use your most recent med list.                   Brand Name Dispense Instructions for use    acetaminophen 325 MG tablet    TYLENOL    100 tablet    Take 2 tablets (650 mg) by mouth every 6 hours as needed for mild pain       amLODIPine 5 MG tablet    NORVASC    30 tablet    Take 1 tablet (5 mg) by mouth every evening       calcitRIOL 0.25 MCG capsule    ROCALTROL    30 capsule    Take 1 capsule (0.25 mcg) by mouth daily       dapsone 100 MG tablet     30 tablet    Take 1 tablet (100 mg) by mouth daily       docusate sodium 100 MG tablet    COLACE    60 tablet    Take 100 mg by mouth daily       hydrOXYzine 25 MG capsule    VISTARIL    40 capsule    Take 1 capsule (25 mg) by mouth 3 times daily as needed for itching       mycophenolate 250 MG capsule    CELLCEPT - GENERIC EQUIVALENT    180 capsule    Take 3 capsules (750 mg) by mouth 2 times daily       ondansetron 4 MG ODT tab    ZOFRAN ODT    120 tablet    Take 1 tablet (4 mg) by mouth every 6 hours as needed for nausea       oxyCODONE 5 MG IR tablet    ROXICODONE    40 tablet    Take 1-2 tablets (5-10 mg) by mouth every 4 hours as needed for moderate to severe pain       phosphorus tablet 250 mg 250 MG per tablet    K PHOS NEUTRAL    120 tablet    Take 2 tablets (500 mg) by mouth 2 times daily       senna-docusate 8.6-50 MG per tablet    SENOKOT-S;PERICOLACE    100 tablet    Take 2 tablets by mouth daily as needed for constipation       tacrolimus 1 MG capsule    PROGRAF - GENERIC EQUIVALENT    180 capsule    Take 6 capsules (6 mg) by mouth 2 times daily       valGANciclovir  450 MG tablet    VALCYTE    60 tablet    Take 2 tablets (900 mg) by mouth daily

## 2017-06-14 NOTE — LETTER
6/14/2017      RE: Golden Stover  [address]  [place] [address]       Assessment and Plan:  1. LDKT - baseline Cr still to be determined, but improved creatinine down to ~ 1.6 today.  Discussed with patient that creatinine could increase slightly due to vasoconstriction as tacrolimus levels increase to goal.  Will make no changes in immunosuppression, pending today's drug level.  2. HTN - fair control right at target of less than 140/90.  Will continue amlodipine 5 mg qHS.  3. Anemia in chronic renal disease - stable Hgb.  Iron replete.  Will follow.  4. Secondary renal hyperparathyroidism - moderately elevated PTH, which should improve post transplant.  Will continue on calcitriol and recheck PTH at 3 months post transplant.  5. Hypophosphatemia - normal, stable serum phosphorus level and will continue on oral phosphorus supplement.  6. Constipation - patient has had diarrhea post transplant, now a bit more constipated.  Recommend restarting senna-pericolace 2 tabs qHS.  7. Recommend return visit at 1 month post transplant.    Assessment and plan was discussed with patient and he voiced his understanding and agreement.    Reason for Visit:  Mr. Stover is here for hospital follow up following kidney transplant.    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         Transplant Hx:       Tx: LDKT  Date: 6/7/17       Present Maintenance IS: Tacrolimus and Mycophenolate mofetil       Baseline Creatinine: TBD       Recent DSA: No  Date last checked: 6/2017       Biopsy: No    Mr. Stover reports feeling good overall with some medical complaints.  His energy level is about the same, but generally good.  He continues to be active and did do some walking yesterday.  Denies any chest pain or shortness of breath with exertion.  Appetite is getting better, but not back to his usual.  He is trying to drink plenty of fluids.  Slight nausea, but  well controlled with ondansetron as needed.  No vomiting or diarrhea.  Last bowel movement was a couple of days ago.  Mild incisional pain.  No fever, sweats or chills.  No leg swelling.    Home BP: 140/80s.      ROS:   A comprehensive review of systems was obtained and negative, except as noted in the HPI or PMH.    Active Medical Problems:  Patient Active Problem List   Diagnosis     IgA nephropathy     Hypertension secondary to other renal disorders     Anemia in chronic kidney disease     Secondary renal hyperparathyroidism (H)     Kidney replaced by transplant     Immunosuppressed status (H)     Aftercare following organ transplant     Hypophosphatemia       Personal Hx:  Social History     Social History     Marital status: Single     Spouse name: N/A     Number of children: N/A     Years of education: N/A     Occupational History     Not on file.     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 Authorizing Provider   oxyCODONE (ROXICODONE) 5 MG IR tablet Take 1-2 tablets (5-10 mg) by mouth every 4 hours as needed for moderate to severe pain 6/12/17   Heidi Black PA-C   acetaminophen (TYLENOL) 325 MG tablet Take 2 tablets (650 mg) by mouth every 6 hours as needed for mild pain 6/12/17   Heidi Black PA-C   mycophenolate (CELLCEPT - GENERIC EQUIVALENT) 250 MG capsule Take 3 capsules (750 mg) by mouth 2 times daily 6/12/17   Heidi Black PA-C   tacrolimus (PROGRAF -  GENERIC EQUIVALENT) 1 MG capsule Take 3 capsules (3 mg) by mouth 2 times daily 6/12/17   Heidi Black PA-C   phosphorus tablet 250 mg (K PHOS NEUTRAL) 250 MG per tablet Take 2 tablets (500 mg) by mouth 2 times daily 6/12/17 7/12/17  Heidi Black PA-C   calcitRIOL (ROCALTROL) 0.25 MCG capsule Take 1 capsule (0.25 mcg) by mouth daily 6/12/17   Heidi Black PA-C   valGANciclovir (VALCYTE) 450 MG tablet Take 2 tablets (900 mg) by mouth daily 6/12/17   Heidi Black PA-C   hydrOXYzine (VISTARIL) 25 MG capsule Take 1 capsule (25 mg) by mouth 3 times daily as needed for itching 6/12/17   Heidi Black PA-C   docusate sodium (COLACE) 100 MG tablet Take 100 mg by mouth daily 6/12/17   Heidi Black PA-C   dapsone 100 MG tablet Take 1 tablet (100 mg) by mouth daily 6/12/17   Heidi Black PA-C       Vitals:  Reviewed.    Exam:   GENERAL APPEARANCE: alert and no distress  HENT: mouth without ulcers or lesions  LYMPHATICS: no cervical or supraclavicular nodes  RESP: lungs clear to auscultation - no rales, rhonchi or wheezes  CV: regular rhythm, normal rate, no rub, no murmur  EDEMA: no LE edema bilaterally  ABDOMEN: soft, nondistended, nontender, bowel sounds normal  MS: extremities normal - no gross deformities noted, no evidence of inflammation in joints, no muscle tenderness  SKIN: no rash  TX KIDNEY: mild TTP    Results:   Recent Results (from the past 8 hour(s))   Basic metabolic panel    Collection Time: 06/14/17  7:28 AM   Result Value Ref Range    Sodium 137 133 - 144 mmol/L    Potassium 4.2 3.4 - 5.3 mmol/L    Chloride 105 94 - 109 mmol/L    Carbon Dioxide 24 20 - 32 mmol/L    Anion Gap 8 3 - 14 mmol/L    Glucose 94 70 - 99 mg/dL    Urea Nitrogen 20 7 - 30 mg/dL    Creatinine 1.62 (H) 0.66 - 1.25 mg/dL    GFR Estimate 53 (L) >60 mL/min/1.7m2    GFR Estimate If Black 64 >60 mL/min/1.7m2    Calcium 9.3 8.5 - 10.1 mg/dL   Magnesium    Collection Time: 06/14/17  7:28 AM   Result Value  Ref Range    Magnesium 2.1 1.6 - 2.3 mg/dL   Phosphorus    Collection Time: 06/14/17  7:28 AM   Result Value Ref Range    Phosphorus 2.8 2.5 - 4.5 mg/dL   CBC with platelets differential    Collection Time: 06/14/17  7:28 AM   Result Value Ref Range    WBC 9.0 4.0 - 11.0 10e9/L    RBC Count 3.66 (L) 4.4 - 5.9 10e12/L    Hemoglobin 10.7 (L) 13.3 - 17.7 g/dL    Hematocrit 33.2 (L) 40.0 - 53.0 %    MCV 91 78 - 100 fl    MCH 29.2 26.5 - 33.0 pg    MCHC 32.2 31.5 - 36.5 g/dL    RDW 13.7 10.0 - 15.0 %    Platelet Count 391 150 - 450 10e9/L    Diff Method Automated Method     % Neutrophils 80.4 %    % Lymphocytes 3.0 %    % Monocytes 10.4 %    % Eosinophils 3.5 %    % Basophils 0.2 %    % Immature Granulocytes 2.5 %    Nucleated RBCs 0 0 /100    Absolute Neutrophil 7.2 1.6 - 8.3 10e9/L    Absolute Lymphocytes 0.3 (L) 0.8 - 5.3 10e9/L    Absolute Monocytes 0.9 0.0 - 1.3 10e9/L    Absolute Eosinophils 0.3 0.0 - 0.7 10e9/L    Absolute Basophils 0.0 0.0 - 0.2 10e9/L    Abs Immature Granulocytes 0.2 0 - 0.4 10e9/L    Absolute Nucleated RBC 0.0        Jak King MD

## 2017-06-16 DIAGNOSIS — Z48.298 AFTERCARE FOLLOWING ORGAN TRANSPLANT: ICD-10-CM

## 2017-06-16 DIAGNOSIS — Z94.0 LIVING-DONOR KIDNEY TRANSPLANT RECIPIENT: ICD-10-CM

## 2017-06-16 DIAGNOSIS — Z94.0 KIDNEY REPLACED BY TRANSPLANT: ICD-10-CM

## 2017-06-16 LAB
ANION GAP SERPL CALCULATED.3IONS-SCNC: 8 MMOL/L (ref 3–14)
BACTERIA SPEC CULT: NO GROWTH
BACTERIA SPEC CULT: NO GROWTH
BASOPHILS # BLD AUTO: 0 10E9/L (ref 0–0.2)
BASOPHILS NFR BLD AUTO: 0.3 %
BUN SERPL-MCNC: 25 MG/DL (ref 7–30)
CALCIUM SERPL-MCNC: 9.4 MG/DL (ref 8.5–10.1)
CHLORIDE SERPL-SCNC: 106 MMOL/L (ref 94–109)
CO2 SERPL-SCNC: 24 MMOL/L (ref 20–32)
CREAT SERPL-MCNC: 1.47 MG/DL (ref 0.66–1.25)
DIFFERENTIAL METHOD BLD: ABNORMAL
EOSINOPHIL # BLD AUTO: 0.5 10E9/L (ref 0–0.7)
EOSINOPHIL NFR BLD AUTO: 4.7 %
ERYTHROCYTE [DISTWIDTH] IN BLOOD BY AUTOMATED COUNT: 13.8 % (ref 10–15)
GFR SERPL CREATININE-BSD FRML MDRD: 59 ML/MIN/1.7M2
GLUCOSE SERPL-MCNC: 92 MG/DL (ref 70–99)
HCT VFR BLD AUTO: 36.5 % (ref 40–53)
HGB BLD-MCNC: 11.7 G/DL (ref 13.3–17.7)
IMM GRANULOCYTES # BLD: 0.3 10E9/L (ref 0–0.4)
IMM GRANULOCYTES NFR BLD: 2.7 %
LYMPHOCYTES # BLD AUTO: 0.3 10E9/L (ref 0.8–5.3)
LYMPHOCYTES NFR BLD AUTO: 3.3 %
MAGNESIUM SERPL-MCNC: 1.8 MG/DL (ref 1.6–2.3)
MCH RBC QN AUTO: 29.6 PG (ref 26.5–33)
MCHC RBC AUTO-ENTMCNC: 32.1 G/DL (ref 31.5–36.5)
MCV RBC AUTO: 92 FL (ref 78–100)
MICRO REPORT STATUS: NORMAL
MICRO REPORT STATUS: NORMAL
MONOCYTES # BLD AUTO: 0.6 10E9/L (ref 0–1.3)
MONOCYTES NFR BLD AUTO: 5.9 %
NEUTROPHILS # BLD AUTO: 8 10E9/L (ref 1.6–8.3)
NEUTROPHILS NFR BLD AUTO: 83.1 %
NRBC # BLD AUTO: 0 10*3/UL
NRBC BLD AUTO-RTO: 0 /100
PHOSPHATE SERPL-MCNC: 2.7 MG/DL (ref 2.5–4.5)
PLATELET # BLD AUTO: 611 10E9/L (ref 150–450)
POTASSIUM SERPL-SCNC: 4.3 MMOL/L (ref 3.4–5.3)
RBC # BLD AUTO: 3.95 10E12/L (ref 4.4–5.9)
SODIUM SERPL-SCNC: 139 MMOL/L (ref 133–144)
SPECIMEN SOURCE: NORMAL
SPECIMEN SOURCE: NORMAL
TACROLIMUS BLD-MCNC: 5 UG/L (ref 5–15)
TME LAST DOSE: NORMAL H
WBC # BLD AUTO: 9.7 10E9/L (ref 4–11)

## 2017-06-16 RX ORDER — TACROLIMUS 1 MG/1
7 CAPSULE ORAL 2 TIMES DAILY
Qty: 420 CAPSULE | Refills: 11
Start: 2017-06-16 | End: 2017-06-22

## 2017-06-16 NOTE — PROGRESS NOTES
Tacrolimus level 5.0, goal 8-10.  Tac dose was increased from 5 mg bid to 6 mg bid two days ago.    PLAN:  Take an additional 1 mg tacrolimus capsules now.  Begin 7 mg twice a day this evening.  Check tacrolimus level with transplant labs in 3 days.

## 2017-06-18 LAB
MYCOPHENOLATE SERPL LC/MS/MS-MCNC: 1.26 MG/L (ref 1–3.5)
MYCOPHENOLATE-G SERPL LC/MS/MS-MCNC: 27.9 MG/L (ref 30–95)
TME LAST DOSE: ABNORMAL H

## 2017-06-19 DIAGNOSIS — Z94.0 KIDNEY REPLACED BY TRANSPLANT: ICD-10-CM

## 2017-06-19 DIAGNOSIS — Z48.298 AFTERCARE FOLLOWING ORGAN TRANSPLANT: ICD-10-CM

## 2017-06-19 PROCEDURE — 80197 ASSAY OF TACROLIMUS: CPT | Performed by: INTERNAL MEDICINE

## 2017-06-20 ENCOUNTER — TELEPHONE (OUTPATIENT)
Dept: TRANSPLANT | Facility: CLINIC | Age: 22
End: 2017-06-20

## 2017-06-20 NOTE — TELEPHONE ENCOUNTER
Spoke to  nurse, Suzi, regarding patients lab schedule.  Patient is doing well, however Suzi reports that his BP was 144/94 yesterday morning before taking his meds.  Patient reports BP's in the 140's/90's.  THUAN

## 2017-06-20 NOTE — PROGRESS NOTES
Medicare face to face      06/09/17 1108  MD face to face encounter     Comments: Documentation of Face to Face and Certification for Home Health Services     I certify that patient: Golden Stover is under my care and that I, or a nurse practitioner or physician's assistant working with me, had a face-to-face encounter that meets the physician face-to-face encounter requirements with this patient on: 6/12/2017.     This encounter with the patient was in whole, or in part, for the following medical condition, which is the primary reason for home health care: kidney transplant.     I certify that, based on my findings, the following services are medically necessary home health services: Nursing.     My clinical findings support the need for the above services because: Nurse is needed: To assess labs after changes in medications or other medical regimen..     Further, I certify that my clinical findings support that this patient is homebound (i.e. absences from home require considerable and taxing effort and are for medical reasons or [employer] services or infrequently or of short duration when for other reasons) because: Leaving home is medically contraindicated for the following reason(s): Infection risk / immunocompromised state where it is safer for them to receive services in the home...     Based on the above findings. I certify that this patient is confined to the home and needs intermittent skilled nursing care, physical therapy and/or speech therapy.  The patient is under my care, and I have initiated the establishment of the plan of care.  This patient will be followed by a physician who will periodically review the plan of care.   Physician/Provider to provide follow up care: No Ref-Primary, Physician     Attending hospital physician (the Medicare certified [facility] provider): Martell Robert MD   Physician Signature: See electronic signature associated with these discharge orders.   Date: 6/12/2017    Electronically signed by: Martell Robert MD

## 2017-06-20 NOTE — PROGRESS NOTES
Care Coordinator--post discharge  D/I: Hailey from [facility] [phone] and left a message requesting the d/c summary fax to: [phone]--Epic was down on 6/12 day of his d/c.  P: I have fax'd d/c summary and Medicare face to face to Hailey @ 1048.

## 2017-06-21 DIAGNOSIS — Z48.298 AFTERCARE FOLLOWING ORGAN TRANSPLANT: ICD-10-CM

## 2017-06-21 DIAGNOSIS — Z94.0 KIDNEY REPLACED BY TRANSPLANT: ICD-10-CM

## 2017-06-21 LAB
TACROLIMUS BLD-MCNC: 6.6 UG/L (ref 5–15)
TME LAST DOSE: NORMAL H

## 2017-06-21 PROCEDURE — 80197 ASSAY OF TACROLIMUS: CPT | Performed by: INTERNAL MEDICINE

## 2017-06-22 ENCOUNTER — TELEPHONE (OUTPATIENT)
Dept: TRANSPLANT | Facility: CLINIC | Age: 22
End: 2017-06-22

## 2017-06-22 DIAGNOSIS — Z94.0 LIVING-DONOR KIDNEY TRANSPLANT RECIPIENT: ICD-10-CM

## 2017-06-22 RX ORDER — TACROLIMUS 1 MG/1
8 CAPSULE ORAL 2 TIMES DAILY
Qty: 480 CAPSULE | Refills: 11 | Status: SHIPPED | OUTPATIENT
Start: 2017-06-22 | End: 2017-06-27

## 2017-06-22 NOTE — TELEPHONE ENCOUNTER
Tacrolimus level 6.6 on dose of 7 mg twice a day.  Goal level 8-10, POD 15.    PLAN:  Take tacrolimus 1 mg now, then begin 8 mg twice a day this evening.

## 2017-06-23 DIAGNOSIS — Z94.0 KIDNEY REPLACED BY TRANSPLANT: ICD-10-CM

## 2017-06-23 DIAGNOSIS — Z48.298 AFTERCARE FOLLOWING ORGAN TRANSPLANT: ICD-10-CM

## 2017-06-23 PROCEDURE — 80197 ASSAY OF TACROLIMUS: CPT | Performed by: INTERNAL MEDICINE

## 2017-06-25 DIAGNOSIS — Z48.298 AFTERCARE FOLLOWING ORGAN TRANSPLANT: ICD-10-CM

## 2017-06-25 DIAGNOSIS — Z94.0 KIDNEY REPLACED BY TRANSPLANT: ICD-10-CM

## 2017-06-25 LAB
TACROLIMUS BLD-MCNC: 6.2 UG/L (ref 5–15)
TME LAST DOSE: NORMAL H

## 2017-06-25 PROCEDURE — 80197 ASSAY OF TACROLIMUS: CPT | Performed by: INTERNAL MEDICINE

## 2017-06-26 LAB
TACROLIMUS BLD-MCNC: 7.4 UG/L (ref 5–15)
TME LAST DOSE: NORMAL H

## 2017-06-27 ENCOUNTER — TELEPHONE (OUTPATIENT)
Dept: TRANSPLANT | Facility: CLINIC | Age: 22
End: 2017-06-27

## 2017-06-27 DIAGNOSIS — Z94.0 LIVING-DONOR KIDNEY TRANSPLANT RECIPIENT: ICD-10-CM

## 2017-06-27 RX ORDER — TACROLIMUS 1 MG/1
9 CAPSULE ORAL 2 TIMES DAILY
Qty: 540 CAPSULE | Refills: 11
Start: 2017-06-27 | End: 2017-07-12

## 2017-06-27 NOTE — TELEPHONE ENCOUNTER
Creatinine 7.4, goal 8-10.    PLAN:  Increase tacrolimus dose from 8 mg twice a day to 9 mg twice a day.

## 2017-06-29 ENCOUNTER — RECORDS - HEALTHEAST (OUTPATIENT)
Dept: ADMINISTRATIVE | Facility: OTHER | Age: 22
End: 2017-06-29

## 2017-06-29 ENCOUNTER — OFFICE VISIT (OUTPATIENT)
Dept: TRANSPLANT | Facility: CLINIC | Age: 22
End: 2017-06-29
Attending: TRANSPLANT SURGERY
Payer: COMMERCIAL

## 2017-06-29 VITALS
OXYGEN SATURATION: 100 % | HEART RATE: 92 BPM | TEMPERATURE: 97.8 F | RESPIRATION RATE: 16 BRPM | DIASTOLIC BLOOD PRESSURE: 86 MMHG | SYSTOLIC BLOOD PRESSURE: 131 MMHG

## 2017-06-29 DIAGNOSIS — Z94.0 KIDNEY REPLACED BY TRANSPLANT: Primary | ICD-10-CM

## 2017-06-29 DIAGNOSIS — Z94.0 KIDNEY REPLACED BY TRANSPLANT: ICD-10-CM

## 2017-06-29 DIAGNOSIS — Z48.298 AFTERCARE FOLLOWING ORGAN TRANSPLANT: ICD-10-CM

## 2017-06-29 LAB
ANION GAP SERPL CALCULATED.3IONS-SCNC: 8 MMOL/L (ref 3–14)
BASOPHILS # BLD AUTO: 0.1 10E9/L (ref 0–0.2)
BASOPHILS NFR BLD AUTO: 2.8 %
BUN SERPL-MCNC: 14 MG/DL (ref 7–30)
CALCIUM SERPL-MCNC: 9.6 MG/DL (ref 8.5–10.1)
CHLORIDE SERPL-SCNC: 108 MMOL/L (ref 94–109)
CO2 SERPL-SCNC: 24 MMOL/L (ref 20–32)
CREAT SERPL-MCNC: 1.35 MG/DL (ref 0.66–1.25)
DIFFERENTIAL METHOD BLD: ABNORMAL
EOSINOPHIL # BLD AUTO: 0.1 10E9/L (ref 0–0.7)
EOSINOPHIL NFR BLD AUTO: 1.6 %
ERYTHROCYTE [DISTWIDTH] IN BLOOD BY AUTOMATED COUNT: 14 % (ref 10–15)
GFR SERPL CREATININE-BSD FRML MDRD: 66 ML/MIN/1.7M2
GLUCOSE SERPL-MCNC: 90 MG/DL (ref 70–99)
HCT VFR BLD AUTO: 35 % (ref 40–53)
HGB BLD-MCNC: 10.9 G/DL (ref 13.3–17.7)
IMM GRANULOCYTES # BLD: 0 10E9/L (ref 0–0.4)
IMM GRANULOCYTES NFR BLD: 0.2 %
LYMPHOCYTES # BLD AUTO: 0.7 10E9/L (ref 0.8–5.3)
LYMPHOCYTES NFR BLD AUTO: 16.6 %
MAGNESIUM SERPL-MCNC: 1.4 MG/DL (ref 1.6–2.3)
MCH RBC QN AUTO: 29.4 PG (ref 26.5–33)
MCHC RBC AUTO-ENTMCNC: 31.1 G/DL (ref 31.5–36.5)
MCV RBC AUTO: 94 FL (ref 78–100)
MONOCYTES # BLD AUTO: 0.4 10E9/L (ref 0–1.3)
MONOCYTES NFR BLD AUTO: 8.3 %
NEUTROPHILS # BLD AUTO: 3.1 10E9/L (ref 1.6–8.3)
NEUTROPHILS NFR BLD AUTO: 70.5 %
NRBC # BLD AUTO: 0 10*3/UL
NRBC BLD AUTO-RTO: 0 /100
PHOSPHATE SERPL-MCNC: 2.1 MG/DL (ref 2.5–4.5)
PLATELET # BLD AUTO: 669 10E9/L (ref 150–450)
POTASSIUM SERPL-SCNC: 4.5 MMOL/L (ref 3.4–5.3)
RBC # BLD AUTO: 3.71 10E12/L (ref 4.4–5.9)
SODIUM SERPL-SCNC: 141 MMOL/L (ref 133–144)
TACROLIMUS BLD-MCNC: 10 UG/L (ref 5–15)
TACROLIMUS BLD-MCNC: 8.5 UG/L (ref 5–15)
TME LAST DOSE: NORMAL H
TME LAST DOSE: NORMAL H
WBC # BLD AUTO: 4.3 10E9/L (ref 4–11)

## 2017-06-29 PROCEDURE — 85027 COMPLETE CBC AUTOMATED: CPT | Performed by: INTERNAL MEDICINE

## 2017-06-29 PROCEDURE — 80197 ASSAY OF TACROLIMUS: CPT | Performed by: INTERNAL MEDICINE

## 2017-06-29 PROCEDURE — 36415 COLL VENOUS BLD VENIPUNCTURE: CPT | Performed by: INTERNAL MEDICINE

## 2017-06-29 PROCEDURE — 84100 ASSAY OF PHOSPHORUS: CPT | Performed by: INTERNAL MEDICINE

## 2017-06-29 PROCEDURE — 80180 DRUG SCRN QUAN MYCOPHENOLATE: CPT | Performed by: INTERNAL MEDICINE

## 2017-06-29 PROCEDURE — 80048 BASIC METABOLIC PNL TOTAL CA: CPT | Performed by: INTERNAL MEDICINE

## 2017-06-29 PROCEDURE — 99212 OFFICE O/P EST SF 10 MIN: CPT

## 2017-06-29 PROCEDURE — 83735 ASSAY OF MAGNESIUM: CPT | Performed by: INTERNAL MEDICINE

## 2017-06-29 PROCEDURE — 85004 AUTOMATED DIFF WBC COUNT: CPT | Performed by: INTERNAL MEDICINE

## 2017-06-29 NOTE — LETTER
6/29/2017       RE: Golden Stover  [address]  [place] [address]     Dear Colleague,    Thank you for referring your patient, Golden Stover, to the [facility] SOLID ORGAN TRANSPLANT at [facility]. Please see a copy of my visit note below.    3 weeks post LDKT  Urinating ok.  Wound ok.  BP ok at home, no insulin    Crt 1.-1.4  FK 9/9, /750    No complaints.    /86  Pulse 92  Temp 97.8  F (36.6  C) (Oral)  Resp 16  SpO2 100%  Wound clean, abd soft.    Orders Only on 06/29/2017   Component Date Value Ref Range Status     WBC 06/29/2017 4.3  4.0 - 11.0 10e9/L Final     RBC Count 06/29/2017 3.71* 4.4 - 5.9 10e12/L Final     Hemoglobin 06/29/2017 10.9* 13.3 - 17.7 g/dL Final     Hematocrit 06/29/2017 35.0* 40.0 - 53.0 % Final     MCV 06/29/2017 94  78 - 100 fl Final     MCH 06/29/2017 29.4  26.5 - 33.0 pg Final     MCHC 06/29/2017 31.1* 31.5 - 36.5 g/dL Final     RDW 06/29/2017 14.0  10.0 - 15.0 % Final     Platelet Count 06/29/2017 669* 150 - 450 10e9/L Final     Sodium 06/29/2017 141  133 - 144 mmol/L Final     Potassium 06/29/2017 4.5  3.4 - 5.3 mmol/L Final     Chloride 06/29/2017 108  94 - 109 mmol/L Final     Carbon Dioxide 06/29/2017 24  20 - 32 mmol/L Final     Anion Gap 06/29/2017 8  3 - 14 mmol/L Final     Glucose 06/29/2017 90  70 - 99 mg/dL Final     Urea Nitrogen 06/29/2017 14  7 - 30 mg/dL Final     Creatinine 06/29/2017 1.35* 0.66 - 1.25 mg/dL Final     GFR Estimate 06/29/2017 66  >60 mL/min/1.7m2 Final    Non  GFR Calc     GFR Estimate If Black 06/29/2017 79  >60 mL/min/1.7m2 Final    African American GFR Calc     Calcium 06/29/2017 9.6  8.5 - 10.1 mg/dL Final     Phosphorus 06/29/2017 2.1* 2.5 - 4.5 mg/dL Final     Magnesium 06/29/2017 1.4* 1.6 - 2.3 mg/dL Final     Diff Method 06/29/2017 Automated Method   Final     % Neutrophils 06/29/2017 70.5  % Final     % Lymphocytes 06/29/2017 16.6  % Final     % Monocytes  06/29/2017 8.3  % Final     % Eosinophils 06/29/2017 1.6  % Final     % Basophils 06/29/2017 2.8  % Final     % Immature Granulocytes 06/29/2017 0.2  % Final     Nucleated RBCs 06/29/2017 0  0 /100 Final     Absolute Neutrophil 06/29/2017 3.1  1.6 - 8.3 10e9/L Final     Absolute Lymphocytes 06/29/2017 0.7* 0.8 - 5.3 10e9/L Final     Absolute Monocytes 06/29/2017 0.4  0.0 - 1.3 10e9/L Final     Absolute Eosinophils 06/29/2017 0.1  0.0 - 0.7 10e9/L Final     Absolute Basophils 06/29/2017 0.1  0.0 - 0.2 10e9/L Final     Abs Immature Granulocytes 06/29/2017 0.0  0 - 0.4 10e9/L Final     Absolute Nucleated RBC 06/29/2017 0.0   Final     I/P  Stable post LDKT  tacro dose high, levels ok  Normal response.  Continue same course, no change.    Again, thank you for allowing me to participate in the care of your patient.      Sincerely,    Martell Robert MD

## 2017-06-29 NOTE — NURSING NOTE
"Chief Complaint   Patient presents with     Kidney Transplant     POD 22       Initial There were no vitals taken for this visit. Estimated body mass index is 27.58 kg/(m^2) as calculated from the following:    Height as of 6/7/17: 1.88 m (6' 2.02\").    Weight as of 6/14/17: 97.5 kg (214 lb 14.4 oz).      "

## 2017-06-29 NOTE — PROGRESS NOTES
3 weeks post LDKT  Urinating ok.  Wound ok.  BP ok at home, no insulin    Crt 1.-1.4  FK 9/9, /750    No complaints.    /86  Pulse 92  Temp 97.8  F (36.6  C) (Oral)  Resp 16  SpO2 100%  Wound clean, abd soft.    Orders Only on 06/29/2017   Component Date Value Ref Range Status     WBC 06/29/2017 4.3  4.0 - 11.0 10e9/L Final     RBC Count 06/29/2017 3.71* 4.4 - 5.9 10e12/L Final     Hemoglobin 06/29/2017 10.9* 13.3 - 17.7 g/dL Final     Hematocrit 06/29/2017 35.0* 40.0 - 53.0 % Final     MCV 06/29/2017 94  78 - 100 fl Final     MCH 06/29/2017 29.4  26.5 - 33.0 pg Final     MCHC 06/29/2017 31.1* 31.5 - 36.5 g/dL Final     RDW 06/29/2017 14.0  10.0 - 15.0 % Final     Platelet Count 06/29/2017 669* 150 - 450 10e9/L Final     Sodium 06/29/2017 141  133 - 144 mmol/L Final     Potassium 06/29/2017 4.5  3.4 - 5.3 mmol/L Final     Chloride 06/29/2017 108  94 - 109 mmol/L Final     Carbon Dioxide 06/29/2017 24  20 - 32 mmol/L Final     Anion Gap 06/29/2017 8  3 - 14 mmol/L Final     Glucose 06/29/2017 90  70 - 99 mg/dL Final     Urea Nitrogen 06/29/2017 14  7 - 30 mg/dL Final     Creatinine 06/29/2017 1.35* 0.66 - 1.25 mg/dL Final     GFR Estimate 06/29/2017 66  >60 mL/min/1.7m2 Final    Non  GFR Calc     GFR Estimate If Black 06/29/2017 79  >60 mL/min/1.7m2 Final    African American GFR Calc     Calcium 06/29/2017 9.6  8.5 - 10.1 mg/dL Final     Phosphorus 06/29/2017 2.1* 2.5 - 4.5 mg/dL Final     Magnesium 06/29/2017 1.4* 1.6 - 2.3 mg/dL Final     Diff Method 06/29/2017 Automated Method   Final     % Neutrophils 06/29/2017 70.5  % Final     % Lymphocytes 06/29/2017 16.6  % Final     % Monocytes 06/29/2017 8.3  % Final     % Eosinophils 06/29/2017 1.6  % Final     % Basophils 06/29/2017 2.8  % Final     % Immature Granulocytes 06/29/2017 0.2  % Final     Nucleated RBCs 06/29/2017 0  0 /100 Final     Absolute Neutrophil 06/29/2017 3.1  1.6 - 8.3 10e9/L Final     Absolute Lymphocytes 06/29/2017  0.7* 0.8 - 5.3 10e9/L Final     Absolute Monocytes 06/29/2017 0.4  0.0 - 1.3 10e9/L Final     Absolute Eosinophils 06/29/2017 0.1  0.0 - 0.7 10e9/L Final     Absolute Basophils 06/29/2017 0.1  0.0 - 0.2 10e9/L Final     Abs Immature Granulocytes 06/29/2017 0.0  0 - 0.4 10e9/L Final     Absolute Nucleated RBC 06/29/2017 0.0   Final     I/P  Stable post LDKT  tacro dose high, levels ok  Normal response.  Continue same course, no change.

## 2017-06-29 NOTE — NURSING NOTE
"Chief Complaint   Patient presents with     Kidney Transplant     POD 22       Initial /86  Pulse 92  Temp 97.8  F (36.6  C) (Oral)  Resp 16  SpO2 100% Estimated body mass index is 27.58 kg/(m^2) as calculated from the following:    Height as of 6/7/17: 1.88 m (6' 2.02\").    Weight as of 6/14/17: 97.5 kg (214 lb 14.4 oz).      "

## 2017-06-29 NOTE — MR AVS SNAPSHOT
After Visit Summary   6/29/2017    Golden Stover    MRN: [id number]           Patient Information     Date Of Birth          1995        Visit Information        Provider Department      6/29/2017 9:00 AM Martell Robert MD [facility] Solid Organ Transplant        Today's Diagnoses     Kidney replaced by transplant    -  1       Follow-ups after your visit        Your next 10 appointments already scheduled     Jul 11, 2017  1:05 PM CDT   (Arrive by 12:35 PM)   Return Kidney Transplant with Jak King MD   [facility] Nephrology ([facility])    [address] [place] [id number] [phone]            Sep 11, 2017  1:05 PM CDT   (Arrive by 12:35 PM)   Return Kidney Transplant with Jak King MD   [facility] Nephrology ([facility])    [address] [place] [id number] [phone]              Who to contact     If you have questions or need follow up information about today's clinic visit or your schedule please contact [facility] SOLID ORGAN TRANSPLANT directly at [phone].  Normal or non-critical lab and imaging results will be communicated to you by Launchupshart, letter or phone within 4 business days after the clinic has received the results. If you do not hear from us within 7 days, please contact the clinic through Launchupshart or phone. If you have a critical or abnormal lab result, we will notify you by phone as soon as possible.  Submit refill requests through Kahub or call your pharmacy and they will forward the refill request to us. Please allow 3 business days for your refill to be completed.          Additional Information About Your Visit        Launchupshart Information     Kahub gives you secure access to your electronic health record. If you see a primary care provider, you can also send messages to your care team and make appointments. If you have questions,  please call your primary care clinic.  If you do not have a primary care provider, please call [phone] and they will assist you.        Care EveryWhere ID     This is your Care EveryWhere ID. This could be used by other organizations to access your [place] medical records  [id number]        Your Vitals Were     Pulse Temperature Respirations Pulse Oximetry          92 97.8  F (36.6  C) (Oral) 16 100%         Blood Pressure from Last 3 Encounters:   06/29/17 131/86   06/13/17 155/82   06/12/17 (!) 147/109    Weight from Last 3 Encounters:   06/14/17 97.5 kg (214 lb 14.4 oz)   06/13/17 98.5 kg (217 lb 3.2 oz)   06/12/17 99 kg (218 lb 3.2 oz)              Today, you had the following     No orders found for display       Primary Care Provider    Physician No Ref-Primary       No address on file        Equal Access to Services     [facility] : Hadii yasmeen Fraser, waaxda cathy, qaybta kaalmada swetha, dewey hunter . So [facility] [phone].    ATENCIÓN: Si habla español, tiene a joseph disposición servicios gratuitos de asistencia lingüística. Marlaame al [phone].    We comply with applicable federal civil rights laws and [place] laws. We do not discriminate on the basis of race, color, national origin, age, disability sex, sexual orientation or gender identity.            Thank you!     Thank you for choosing [facility] SOLID ORGAN TRANSPLANT  for your care. Our goal is always to provide you with excellent care. Hearing back from our patients is one way we can continue to improve our services. Please take a few minutes to complete the written survey that you may receive in the mail after your visit with us. Thank you!             Your Updated Medication List - Protect others around you: Learn how to safely use, store and throw away your medicines at www.disposemymeds.org.          This list is accurate as of: 6/29/17 10:22 AM.  Always use your most recent med list.                    Brand Name Dispense Instructions for use Diagnosis    acetaminophen 325 MG tablet    TYLENOL    100 tablet    Take 2 tablets (650 mg) by mouth every 6 hours as needed for mild pain    Living-donor kidney transplant recipient       amLODIPine 5 MG tablet    NORVASC    30 tablet    Take 1 tablet (5 mg) by mouth every evening    Living-donor kidney transplant recipient       calcitRIOL 0.25 MCG capsule    ROCALTROL    30 capsule    Take 1 capsule (0.25 mcg) by mouth daily    Living-donor kidney transplant recipient       dapsone 100 MG tablet     30 tablet    Take 1 tablet (100 mg) by mouth daily    Living-donor kidney transplant recipient       docusate sodium 100 MG tablet    COLACE    60 tablet    Take 100 mg by mouth daily    Living-donor kidney transplant recipient       hydrOXYzine 25 MG capsule    VISTARIL    40 capsule    Take 1 capsule (25 mg) by mouth 3 times daily as needed for itching    Living-donor kidney transplant recipient       mycophenolate 250 MG capsule    CELLCEPT - GENERIC EQUIVALENT    180 capsule    Take 3 capsules (750 mg) by mouth 2 times daily    Living-donor kidney transplant recipient       ondansetron 4 MG ODT tab    ZOFRAN ODT    120 tablet    Take 1 tablet (4 mg) by mouth every 6 hours as needed for nausea    Living-donor kidney transplant recipient       oxyCODONE 5 MG IR tablet    ROXICODONE    40 tablet    Take 1-2 tablets (5-10 mg) by mouth every 4 hours as needed for moderate to severe pain    Living-donor kidney transplant recipient       phosphorus tablet 250 mg 250 MG per tablet    K PHOS NEUTRAL    120 tablet    Take 2 tablets (500 mg) by mouth 2 times daily    Living-donor kidney transplant recipient       senna-docusate 8.6-50 MG per tablet    SENOKOT-S;PERICOLACE    100 tablet    Take 2 tablets by mouth daily as needed for constipation    Other constipation       tacrolimus 1 MG capsule    PROGRAF - GENERIC EQUIVALENT    540 capsule    Take 9 capsules (9 mg) by  mouth 2 times daily    Living-donor kidney transplant recipient       valGANciclovir 450 MG tablet    VALCYTE    60 tablet    Take 2 tablets (900 mg) by mouth daily    Living-donor kidney transplant recipient

## 2017-06-30 DIAGNOSIS — Z94.0 KIDNEY REPLACED BY TRANSPLANT: ICD-10-CM

## 2017-06-30 DIAGNOSIS — Z48.298 AFTERCARE FOLLOWING ORGAN TRANSPLANT: ICD-10-CM

## 2017-06-30 PROCEDURE — 80197 ASSAY OF TACROLIMUS: CPT | Performed by: INTERNAL MEDICINE

## 2017-07-01 LAB
MYCOPHENOLATE SERPL LC/MS/MS-MCNC: 4.4 MG/L (ref 1–3.5)
MYCOPHENOLATE-G SERPL LC/MS/MS-MCNC: 43.9 MG/L (ref 30–95)
TME LAST DOSE: ABNORMAL H

## 2017-07-03 DIAGNOSIS — Z48.298 AFTERCARE FOLLOWING ORGAN TRANSPLANT: ICD-10-CM

## 2017-07-03 DIAGNOSIS — Z94.0 KIDNEY REPLACED BY TRANSPLANT: ICD-10-CM

## 2017-07-03 LAB
TACROLIMUS BLD-MCNC: 9.5 UG/L (ref 5–15)
TME LAST DOSE: NORMAL H

## 2017-07-03 PROCEDURE — 80197 ASSAY OF TACROLIMUS: CPT | Performed by: INTERNAL MEDICINE

## 2017-07-05 DIAGNOSIS — Z94.0 LIVING-DONOR KIDNEY TRANSPLANT RECIPIENT: Primary | ICD-10-CM

## 2017-07-05 RX ORDER — VALGANCICLOVIR 450 MG/1
900 TABLET, FILM COATED ORAL DAILY
Qty: 60 TABLET | Refills: 1 | Status: SHIPPED | OUTPATIENT
Start: 2017-07-05 | End: 2017-07-12

## 2017-07-06 LAB
TACROLIMUS BLD-MCNC: 11.1 UG/L (ref 5–15)
TME LAST DOSE: NORMAL H

## 2017-07-07 ENCOUNTER — TELEPHONE (OUTPATIENT)
Dept: TRANSPLANT | Facility: CLINIC | Age: 22
End: 2017-07-07

## 2017-07-07 DIAGNOSIS — Z79.60 LONG-TERM USE OF IMMUNOSUPPRESSANT MEDICATION: ICD-10-CM

## 2017-07-07 DIAGNOSIS — Z94.0 KIDNEY TRANSPLANT RECIPIENT: ICD-10-CM

## 2017-07-07 DIAGNOSIS — E83.42 HYPOMAGNESEMIA: Primary | ICD-10-CM

## 2017-07-07 RX ORDER — MAGNESIUM OXIDE 400 MG/1
400 TABLET ORAL 2 TIMES DAILY
Qty: 60 TABLET | Refills: 1 | Status: SHIPPED | OUTPATIENT
Start: 2017-07-07 | End: 2017-07-20

## 2017-07-07 NOTE — TELEPHONE ENCOUNTER
Hypomagnesemia  - Magnesium level 1.0    PLAN:  Start magnesium oxide 400 mg twice a day  [place] [facility]  Called patient to give instructions and answer questions regarding new medication.

## 2017-07-11 ENCOUNTER — OFFICE VISIT (OUTPATIENT)
Dept: NEPHROLOGY | Facility: CLINIC | Age: 22
End: 2017-07-11
Attending: TRANSPLANT SURGERY
Payer: COMMERCIAL

## 2017-07-11 ENCOUNTER — TELEPHONE (OUTPATIENT)
Dept: PHARMACY | Facility: CLINIC | Age: 22
End: 2017-07-11

## 2017-07-11 ENCOUNTER — RECORDS - HEALTHEAST (OUTPATIENT)
Dept: ADMINISTRATIVE | Facility: OTHER | Age: 22
End: 2017-07-11

## 2017-07-11 VITALS
HEIGHT: 74 IN | BODY MASS INDEX: 27.8 KG/M2 | HEART RATE: 73 BPM | SYSTOLIC BLOOD PRESSURE: 149 MMHG | WEIGHT: 216.6 LBS | DIASTOLIC BLOOD PRESSURE: 79 MMHG | TEMPERATURE: 97.7 F

## 2017-07-11 DIAGNOSIS — D84.9 IMMUNOSUPPRESSED STATUS (H): ICD-10-CM

## 2017-07-11 DIAGNOSIS — Z94.0 KIDNEY REPLACED BY TRANSPLANT: Primary | ICD-10-CM

## 2017-07-11 DIAGNOSIS — Z48.298 AFTERCARE FOLLOWING ORGAN TRANSPLANT: ICD-10-CM

## 2017-07-11 DIAGNOSIS — Z94.0 KIDNEY REPLACED BY TRANSPLANT: ICD-10-CM

## 2017-07-11 DIAGNOSIS — I15.1 HYPERTENSION SECONDARY TO OTHER RENAL DISORDERS: ICD-10-CM

## 2017-07-11 DIAGNOSIS — E83.39 HYPOPHOSPHATEMIA: ICD-10-CM

## 2017-07-11 DIAGNOSIS — N25.81 SECONDARY RENAL HYPERPARATHYROIDISM (H): ICD-10-CM

## 2017-07-11 DIAGNOSIS — E83.42 HYPOMAGNESEMIA: ICD-10-CM

## 2017-07-11 DIAGNOSIS — D63.1 ANEMIA IN STAGE 3 CHRONIC KIDNEY DISEASE (H): ICD-10-CM

## 2017-07-11 DIAGNOSIS — N18.30 ANEMIA IN STAGE 3 CHRONIC KIDNEY DISEASE (H): ICD-10-CM

## 2017-07-11 LAB
ANION GAP SERPL CALCULATED.3IONS-SCNC: 6 MMOL/L (ref 3–14)
BASOPHILS # BLD AUTO: 0 10E9/L (ref 0–0.2)
BASOPHILS NFR BLD AUTO: 0.8 %
BUN SERPL-MCNC: 16 MG/DL (ref 7–30)
CALCIUM SERPL-MCNC: 9 MG/DL (ref 8.5–10.1)
CHLORIDE SERPL-SCNC: 109 MMOL/L (ref 94–109)
CO2 SERPL-SCNC: 24 MMOL/L (ref 20–32)
CREAT SERPL-MCNC: 1.37 MG/DL (ref 0.66–1.25)
DIFFERENTIAL METHOD BLD: NORMAL
EOSINOPHIL # BLD AUTO: 0.1 10E9/L (ref 0–0.7)
EOSINOPHIL NFR BLD AUTO: 2.3 %
ERYTHROCYTE [DISTWIDTH] IN BLOOD BY AUTOMATED COUNT: 13.7 % (ref 10–15)
GFR SERPL CREATININE-BSD FRML MDRD: 64 ML/MIN/1.7M2
GLUCOSE SERPL-MCNC: 103 MG/DL (ref 70–99)
HCT VFR BLD AUTO: 34.1 % (ref 40–53)
HGB BLD-MCNC: 10.7 G/DL (ref 13.3–17.7)
IMM GRANULOCYTES # BLD: 0 10E9/L (ref 0–0.4)
IMM GRANULOCYTES NFR BLD: 0.3 %
LYMPHOCYTES # BLD AUTO: 0.8 10E9/L (ref 0.8–5.3)
LYMPHOCYTES NFR BLD AUTO: 20.6 %
MAGNESIUM SERPL-MCNC: 1.5 MG/DL (ref 1.6–2.3)
MCH RBC QN AUTO: 29.1 PG (ref 26.5–33)
MCHC RBC AUTO-ENTMCNC: 31.4 G/DL (ref 31.5–36.5)
MCV RBC AUTO: 93 FL (ref 78–100)
MONOCYTES # BLD AUTO: 0.4 10E9/L (ref 0–1.3)
MONOCYTES NFR BLD AUTO: 9.4 %
NEUTROPHILS # BLD AUTO: 2.6 10E9/L (ref 1.6–8.3)
NEUTROPHILS NFR BLD AUTO: 66.6 %
NRBC # BLD AUTO: 0 10*3/UL
NRBC BLD AUTO-RTO: 0 /100
PHOSPHATE SERPL-MCNC: 2.4 MG/DL (ref 2.5–4.5)
PLATELET # BLD AUTO: 382 10E9/L (ref 150–450)
POTASSIUM SERPL-SCNC: 4.1 MMOL/L (ref 3.4–5.3)
RBC # BLD AUTO: 3.68 10E12/L (ref 4.4–5.9)
SODIUM SERPL-SCNC: 139 MMOL/L (ref 133–144)
WBC # BLD AUTO: 3.9 10E9/L (ref 4–11)

## 2017-07-11 PROCEDURE — 85027 COMPLETE CBC AUTOMATED: CPT | Performed by: INTERNAL MEDICINE

## 2017-07-11 PROCEDURE — 85004 AUTOMATED DIFF WBC COUNT: CPT | Performed by: INTERNAL MEDICINE

## 2017-07-11 PROCEDURE — 84100 ASSAY OF PHOSPHORUS: CPT | Performed by: INTERNAL MEDICINE

## 2017-07-11 PROCEDURE — 80048 BASIC METABOLIC PNL TOTAL CA: CPT | Performed by: INTERNAL MEDICINE

## 2017-07-11 PROCEDURE — 83735 ASSAY OF MAGNESIUM: CPT | Performed by: INTERNAL MEDICINE

## 2017-07-11 PROCEDURE — 87799 DETECT AGENT NOS DNA QUANT: CPT | Performed by: INTERNAL MEDICINE

## 2017-07-11 PROCEDURE — 36415 COLL VENOUS BLD VENIPUNCTURE: CPT | Performed by: INTERNAL MEDICINE

## 2017-07-11 ASSESSMENT — PAIN SCALES - GENERAL: PAINLEVEL: NO PAIN (0)

## 2017-07-11 NOTE — LETTER
7/11/2017       RE: Golden Stover  [address]  [place] [address]     Dear Colleague,    Thank you for referring your patient, Golden Stover, to the [facility] NEPHROLOGY at [facility]. Please see a copy of my visit note below.    Assessment and Plan:  1. LDKT - baseline Cr ~ 1.3-1.5, which has remained stable.  No DSA.  Latest tacrolimus level was a bit above goal and will decrease tacrolimus to 8 mg bid.  Will make no other changes in immunosuppression.  2. HTN - fair control right at target of less than 140/90.  With some lightheadedness, will hold off on any changes in antihypertensive medications at this time, but would like better control in the future.  3. Anemia in chronic renal disease - stable Hgb.  Iron replete.  Will follow.  4. Secondary renal hyperparathyroidism - moderately elevated PTH, which should improve post transplant.  Will continue on calcitriol and recheck PTH at 3 months post transplant.  5. Hypophosphatemia - stable, slightly low serum phosphorus level and will continue on oral phosphorus supplement.  6. Hypomagnesemia - slightly low serum magnesium level and will continue on oral magnesium supplement.  7. Recommend return visit at 3 months post transplant.    Assessment and plan was discussed with patient and he voiced his understanding and agreement.    Reason for Visit:  Mr. Stover is here for routine follow up.    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         Transplant Hx:       Tx: LDKT  Date: 6/7/17       Present Maintenance IS: Tacrolimus and Mycophenolate mofetil       Baseline Creatinine: 1.3-1.5       Recent DSA: No  Date last checked: 6/2017       Biopsy: No    Mr. Stover reports feeling good overall with some medical complaints.  His energy level is good and continues to improve, close to normal now.  He has been active and getting exercise with  "walking.  Denies any chest pain or shortness of breath with exertion.  Appetite is \"really good\" and he is trying to drink fluids.  No nausea, vomiting or diarrhea.  No fever, sweats or chills.  No leg swelling.    Home BP: 130-140/70-80s with slight lightheadedness with standing.  ROS:   A comprehensive review of systems was obtained and negative, except as noted in the HPI or PMH.    Active Medical Problems:  Patient Active Problem List   Diagnosis     IgA nephropathy     Hypertension secondary to other renal disorders     Anemia in chronic kidney disease     Secondary renal hyperparathyroidism (H)     Kidney replaced by transplant     Immunosuppressed status (H)     Aftercare following organ transplant     Hypophosphatemia     Hypomagnesemia     Personal Hx:  Social History     Social History     Marital status: Single     Spouse name: N/A     Number of children: N/A     Years of education: N/A     Occupational History     Not on file.     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 Authorizing Provider   oxyCODONE (ROXICODONE) 5 MG IR tablet Take 1-2 tablets (5-10 mg) by mouth every 4 hours as needed for moderate to severe pain 6/12/17   Heidi Black PA-C   acetaminophen (TYLENOL) 325 MG " "tablet Take 2 tablets (650 mg) by mouth every 6 hours as needed for mild pain 6/12/17   Heidi Black PA-C   mycophenolate (CELLCEPT - GENERIC EQUIVALENT) 250 MG capsule Take 3 capsules (750 mg) by mouth 2 times daily 6/12/17   Heidi Black PA-C   tacrolimus (PROGRAF - GENERIC EQUIVALENT) 1 MG capsule Take 3 capsules (3 mg) by mouth 2 times daily 6/12/17   Heidi Black PA-C   phosphorus tablet 250 mg (K PHOS NEUTRAL) 250 MG per tablet Take 2 tablets (500 mg) by mouth 2 times daily 6/12/17 7/12/17  Heidi Black PA-C   calcitRIOL (ROCALTROL) 0.25 MCG capsule Take 1 capsule (0.25 mcg) by mouth daily 6/12/17   Heidi Black PA-C   valGANciclovir (VALCYTE) 450 MG tablet Take 2 tablets (900 mg) by mouth daily 6/12/17   Heidi Black PA-C   hydrOXYzine (VISTARIL) 25 MG capsule Take 1 capsule (25 mg) by mouth 3 times daily as needed for itching 6/12/17   Heidi Black PA-C   docusate sodium (COLACE) 100 MG tablet Take 100 mg by mouth daily 6/12/17   Heidi Black PA-C   dapsone 100 MG tablet Take 1 tablet (100 mg) by mouth daily 6/12/17   Heidi Black PA-C       Vitals:  /79  Pulse 73  Temp 97.7  F (36.5  C) (Oral)  Ht 1.88 m (6' 2\")  Wt 98.2 kg (216 lb 9.6 oz)  BMI 27.81 kg/m2    Exam:   GENERAL APPEARANCE: alert and no distress  HENT: mouth without ulcers or lesions  LYMPHATICS: no cervical or supraclavicular nodes  RESP: lungs clear to auscultation - no rales, rhonchi or wheezes  CV: regular rhythm, normal rate, no rub, no murmur  EDEMA: no LE edema bilaterally  ABDOMEN: soft, nondistended, nontender, bowel sounds normal  MS: extremities normal - no gross deformities noted, no evidence of inflammation in joints, no muscle tenderness  SKIN: no rash  TX KIDNEY: normal    Results:   Recent Results (from the past 8 hour(s))   CBC with platelets    Collection Time: 07/11/17 11:55 AM   Result Value Ref Range    WBC 3.9 (L) 4.0 - 11.0 10e9/L    RBC Count 3.68 (L) 4.4 - " 5.9 10e12/L    Hemoglobin 10.7 (L) 13.3 - 17.7 g/dL    Hematocrit 34.1 (L) 40.0 - 53.0 %    MCV 93 78 - 100 fl    MCH 29.1 26.5 - 33.0 pg    MCHC 31.4 (L) 31.5 - 36.5 g/dL    RDW 13.7 10.0 - 15.0 %    Platelet Count 382 150 - 450 10e9/L   Basic metabolic panel    Collection Time: 07/11/17 11:55 AM   Result Value Ref Range    Sodium 139 133 - 144 mmol/L    Potassium 4.1 3.4 - 5.3 mmol/L    Chloride 109 94 - 109 mmol/L    Carbon Dioxide 24 20 - 32 mmol/L    Anion Gap 6 3 - 14 mmol/L    Glucose 103 (H) 70 - 99 mg/dL    Urea Nitrogen 16 7 - 30 mg/dL    Creatinine 1.37 (H) 0.66 - 1.25 mg/dL    GFR Estimate 64 >60 mL/min/1.7m2    GFR Estimate If Black 78 >60 mL/min/1.7m2    Calcium 9.0 8.5 - 10.1 mg/dL   Phosphorus    Collection Time: 07/11/17 11:55 AM   Result Value Ref Range    Phosphorus 2.4 (L) 2.5 - 4.5 mg/dL   Magnesium    Collection Time: 07/11/17 11:55 AM   Result Value Ref Range    Magnesium 1.5 (L) 1.6 - 2.3 mg/dL   WBC Differential    Collection Time: 07/11/17 11:55 AM   Result Value Ref Range    Diff Method Automated Method     % Neutrophils 66.6 %    % Lymphocytes 20.6 %    % Monocytes 9.4 %    % Eosinophils 2.3 %    % Basophils 0.8 %    % Immature Granulocytes 0.3 %    Nucleated RBCs 0 0 /100    Absolute Neutrophil 2.6 1.6 - 8.3 10e9/L    Absolute Lymphocytes 0.8 0.8 - 5.3 10e9/L    Absolute Monocytes 0.4 0.0 - 1.3 10e9/L    Absolute Eosinophils 0.1 0.0 - 0.7 10e9/L    Absolute Basophils 0.0 0.0 - 0.2 10e9/L    Abs Immature Granulocytes 0.0 0 - 0.4 10e9/L    Absolute Nucleated RBC 0.0      Again, thank you for allowing me to participate in the care of your patient.      Sincerely,  Jak King MD

## 2017-07-11 NOTE — PROGRESS NOTES
"Assessment and Plan:  1. LDKT - baseline Cr ~ 1.3-1.5, which has remained stable.  No DSA.  Latest tacrolimus level was a bit above goal and will decrease tacrolimus to 8 mg bid.  Will make no other changes in immunosuppression.  2. HTN - fair control right at target of less than 140/90.  With some lightheadedness, will hold off on any changes in antihypertensive medications at this time, but would like better control in the future.  3. Anemia in chronic renal disease - stable Hgb.  Iron replete.  Will follow.  4. Secondary renal hyperparathyroidism - moderately elevated PTH, which should improve post transplant.  Will continue on calcitriol and recheck PTH at 3 months post transplant.  5. Hypophosphatemia - stable, slightly low serum phosphorus level and will continue on oral phosphorus supplement.  6. Hypomagnesemia - slightly low serum magnesium level and will continue on oral magnesium supplement.  7. Recommend return visit at 3 months post transplant.    Assessment and plan was discussed with patient and he voiced his understanding and agreement.    Reason for Visit:  Mr. Stover is here for routine follow up.    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         Transplant Hx:       Tx: LDKT  Date: 6/7/17       Present Maintenance IS: Tacrolimus and Mycophenolate mofetil       Baseline Creatinine: 1.3-1.5       Recent DSA: No  Date last checked: 6/2017       Biopsy: No    Mr. Stover reports feeling good overall with some medical complaints.  His energy level is good and continues to improve, close to normal now.  He has been active and getting exercise with walking.  Denies any chest pain or shortness of breath with exertion.  Appetite is \"really good\" and he is trying to drink fluids.  No nausea, vomiting or diarrhea.  No fever, sweats or chills.  No leg swelling.    Home BP: 130-140/70-80s with slight lightheadedness with standing.    "   ROS:   A comprehensive review of systems was obtained and negative, except as noted in the HPI or PMH.    Active Medical Problems:  Patient Active Problem List   Diagnosis     IgA nephropathy     Hypertension secondary to other renal disorders     Anemia in chronic kidney disease     Secondary renal hyperparathyroidism (H)     Kidney replaced by transplant     Immunosuppressed status (H)     Aftercare following organ transplant     Hypophosphatemia     Hypomagnesemia       Personal Hx:  Social History     Social History     Marital status: Single     Spouse name: N/A     Number of children: N/A     Years of education: N/A     Occupational History     Not on file.     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 Authorizing Provider   oxyCODONE (ROXICODONE) 5 MG IR tablet Take 1-2 tablets (5-10 mg) by mouth every 4 hours as needed for moderate to severe pain 6/12/17   Heidi Black PA-C   acetaminophen (TYLENOL) 325 MG tablet Take 2 tablets (650 mg) by mouth every 6 hours as needed for mild pain 6/12/17   Heidi Black PA-C   mycophenolate (CELLCEPT - GENERIC EQUIVALENT) 250 MG capsule Take 3 capsules (750 mg) by mouth 2 times daily 6/12/17   Heidi Black PA-C   tacrolimus (PROGRAF  "- GENERIC EQUIVALENT) 1 MG capsule Take 3 capsules (3 mg) by mouth 2 times daily 6/12/17   Heidi Black PA-C   phosphorus tablet 250 mg (K PHOS NEUTRAL) 250 MG per tablet Take 2 tablets (500 mg) by mouth 2 times daily 6/12/17 7/12/17  Heidi Black PA-C   calcitRIOL (ROCALTROL) 0.25 MCG capsule Take 1 capsule (0.25 mcg) by mouth daily 6/12/17   Heidi Black PA-C   valGANciclovir (VALCYTE) 450 MG tablet Take 2 tablets (900 mg) by mouth daily 6/12/17   Heidi Black PA-C   hydrOXYzine (VISTARIL) 25 MG capsule Take 1 capsule (25 mg) by mouth 3 times daily as needed for itching 6/12/17   Heidi Black PA-C   docusate sodium (COLACE) 100 MG tablet Take 100 mg by mouth daily 6/12/17   Heidi Black PA-C   dapsone 100 MG tablet Take 1 tablet (100 mg) by mouth daily 6/12/17   Heidi Black PA-C       Vitals:  /79  Pulse 73  Temp 97.7  F (36.5  C) (Oral)  Ht 1.88 m (6' 2\")  Wt 98.2 kg (216 lb 9.6 oz)  BMI 27.81 kg/m2    Exam:   GENERAL APPEARANCE: alert and no distress  HENT: mouth without ulcers or lesions  LYMPHATICS: no cervical or supraclavicular nodes  RESP: lungs clear to auscultation - no rales, rhonchi or wheezes  CV: regular rhythm, normal rate, no rub, no murmur  EDEMA: no LE edema bilaterally  ABDOMEN: soft, nondistended, nontender, bowel sounds normal  MS: extremities normal - no gross deformities noted, no evidence of inflammation in joints, no muscle tenderness  SKIN: no rash  TX KIDNEY: normal    Results:   Recent Results (from the past 8 hour(s))   CBC with platelets    Collection Time: 07/11/17 11:55 AM   Result Value Ref Range    WBC 3.9 (L) 4.0 - 11.0 10e9/L    RBC Count 3.68 (L) 4.4 - 5.9 10e12/L    Hemoglobin 10.7 (L) 13.3 - 17.7 g/dL    Hematocrit 34.1 (L) 40.0 - 53.0 %    MCV 93 78 - 100 fl    MCH 29.1 26.5 - 33.0 pg    MCHC 31.4 (L) 31.5 - 36.5 g/dL    RDW 13.7 10.0 - 15.0 %    Platelet Count 382 150 - 450 10e9/L   Basic metabolic panel    Collection " Time: 07/11/17 11:55 AM   Result Value Ref Range    Sodium 139 133 - 144 mmol/L    Potassium 4.1 3.4 - 5.3 mmol/L    Chloride 109 94 - 109 mmol/L    Carbon Dioxide 24 20 - 32 mmol/L    Anion Gap 6 3 - 14 mmol/L    Glucose 103 (H) 70 - 99 mg/dL    Urea Nitrogen 16 7 - 30 mg/dL    Creatinine 1.37 (H) 0.66 - 1.25 mg/dL    GFR Estimate 64 >60 mL/min/1.7m2    GFR Estimate If Black 78 >60 mL/min/1.7m2    Calcium 9.0 8.5 - 10.1 mg/dL   Phosphorus    Collection Time: 07/11/17 11:55 AM   Result Value Ref Range    Phosphorus 2.4 (L) 2.5 - 4.5 mg/dL   Magnesium    Collection Time: 07/11/17 11:55 AM   Result Value Ref Range    Magnesium 1.5 (L) 1.6 - 2.3 mg/dL   WBC Differential    Collection Time: 07/11/17 11:55 AM   Result Value Ref Range    Diff Method Automated Method     % Neutrophils 66.6 %    % Lymphocytes 20.6 %    % Monocytes 9.4 %    % Eosinophils 2.3 %    % Basophils 0.8 %    % Immature Granulocytes 0.3 %    Nucleated RBCs 0 0 /100    Absolute Neutrophil 2.6 1.6 - 8.3 10e9/L    Absolute Lymphocytes 0.8 0.8 - 5.3 10e9/L    Absolute Monocytes 0.4 0.0 - 1.3 10e9/L    Absolute Eosinophils 0.1 0.0 - 0.7 10e9/L    Absolute Basophils 0.0 0.0 - 0.2 10e9/L    Abs Immature Granulocytes 0.0 0 - 0.4 10e9/L    Absolute Nucleated RBC 0.0

## 2017-07-11 NOTE — LETTER
"7/11/2017      RE: Golden Stover  [address]  [place] [address]       Assessment and Plan:  1. LDKT - baseline Cr ~ 1.3-1.5, which has remained stable.  No DSA.  Latest tacrolimus level was a bit above goal and will decrease tacrolimus to 8 mg bid.  Will make no other changes in immunosuppression.  2. HTN - fair control right at target of less than 140/90.  With some lightheadedness, will hold off on any changes in antihypertensive medications at this time, but would like better control in the future.  3. Anemia in chronic renal disease - stable Hgb.  Iron replete.  Will follow.  4. Secondary renal hyperparathyroidism - moderately elevated PTH, which should improve post transplant.  Will continue on calcitriol and recheck PTH at 3 months post transplant.  5. Hypophosphatemia - stable, slightly low serum phosphorus level and will continue on oral phosphorus supplement.  6. Hypomagnesemia - slightly low serum magnesium level and will continue on oral magnesium supplement.  7. Recommend return visit at 3 months post transplant.    Assessment and plan was discussed with patient and he voiced his understanding and agreement.    Reason for Visit:  Mr. Stover is here for routine follow up.    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         Transplant Hx:       Tx: LDKT  Date: 6/7/17       Present Maintenance IS: Tacrolimus and Mycophenolate mofetil       Baseline Creatinine: 1.3-1.5       Recent DSA: No  Date last checked: 6/2017       Biopsy: No    Mr. Stover reports feeling good overall with some medical complaints.  His energy level is good and continues to improve, close to normal now.  He has been active and getting exercise with walking.  Denies any chest pain or shortness of breath with exertion.  Appetite is \"really good\" and he is trying to drink fluids.  No nausea, vomiting or diarrhea.  No fever, sweats or chills.  No leg " swelling.    Home BP: 130-140/70-80s with slight lightheadedness with standing.      ROS:   A comprehensive review of systems was obtained and negative, except as noted in the HPI or PMH.    Active Medical Problems:  Patient Active Problem List   Diagnosis     IgA nephropathy     Hypertension secondary to other renal disorders     Anemia in chronic kidney disease     Secondary renal hyperparathyroidism (H)     Kidney replaced by transplant     Immunosuppressed status (H)     Aftercare following organ transplant     Hypophosphatemia     Hypomagnesemia       Personal Hx:  Social History     Social History     Marital status: Single     Spouse name: N/A     Number of children: N/A     Years of education: N/A     Occupational History     Not on file.     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 Authorizing Provider   oxyCODONE (ROXICODONE) 5 MG IR tablet Take 1-2 tablets (5-10 mg) by mouth every 4 hours as needed for moderate to severe pain 6/12/17   Heidi Black PA-C   acetaminophen (TYLENOL) 325 MG tablet Take 2 tablets (650 mg) by mouth every 6 hours as needed for mild pain 6/12/17   Heidi Black PA-C   mycophenolate (CELLCEPT - GENERIC EQUIVALENT) 250 MG capsule Take 3 capsules (750  "mg) by mouth 2 times daily 6/12/17   Heidi Black PA-C   tacrolimus (PROGRAF - GENERIC EQUIVALENT) 1 MG capsule Take 3 capsules (3 mg) by mouth 2 times daily 6/12/17   Heidi Black PA-C   phosphorus tablet 250 mg (K PHOS NEUTRAL) 250 MG per tablet Take 2 tablets (500 mg) by mouth 2 times daily 6/12/17 7/12/17  Heidi Black PA-C   calcitRIOL (ROCALTROL) 0.25 MCG capsule Take 1 capsule (0.25 mcg) by mouth daily 6/12/17   Heidi Black PA-C   valGANciclovir (VALCYTE) 450 MG tablet Take 2 tablets (900 mg) by mouth daily 6/12/17   Heidi Black PA-C   hydrOXYzine (VISTARIL) 25 MG capsule Take 1 capsule (25 mg) by mouth 3 times daily as needed for itching 6/12/17   Heidi Black PA-C   docusate sodium (COLACE) 100 MG tablet Take 100 mg by mouth daily 6/12/17   Heidi Black PA-C   dapsone 100 MG tablet Take 1 tablet (100 mg) by mouth daily 6/12/17   Heidi Black PA-C       Vitals:  /79  Pulse 73  Temp 97.7  F (36.5  C) (Oral)  Ht 1.88 m (6' 2\")  Wt 98.2 kg (216 lb 9.6 oz)  BMI 27.81 kg/m2    Exam:   GENERAL APPEARANCE: alert and no distress  HENT: mouth without ulcers or lesions  LYMPHATICS: no cervical or supraclavicular nodes  RESP: lungs clear to auscultation - no rales, rhonchi or wheezes  CV: regular rhythm, normal rate, no rub, no murmur  EDEMA: no LE edema bilaterally  ABDOMEN: soft, nondistended, nontender, bowel sounds normal  MS: extremities normal - no gross deformities noted, no evidence of inflammation in joints, no muscle tenderness  SKIN: no rash  TX KIDNEY: normal    Results:   Recent Results (from the past 8 hour(s))   CBC with platelets    Collection Time: 07/11/17 11:55 AM   Result Value Ref Range    WBC 3.9 (L) 4.0 - 11.0 10e9/L    RBC Count 3.68 (L) 4.4 - 5.9 10e12/L    Hemoglobin 10.7 (L) 13.3 - 17.7 g/dL    Hematocrit 34.1 (L) 40.0 - 53.0 %    MCV 93 78 - 100 fl    MCH 29.1 26.5 - 33.0 pg    MCHC 31.4 (L) 31.5 - 36.5 g/dL    RDW 13.7 10.0 - 15.0 " %    Platelet Count 382 150 - 450 10e9/L   Basic metabolic panel    Collection Time: 07/11/17 11:55 AM   Result Value Ref Range    Sodium 139 133 - 144 mmol/L    Potassium 4.1 3.4 - 5.3 mmol/L    Chloride 109 94 - 109 mmol/L    Carbon Dioxide 24 20 - 32 mmol/L    Anion Gap 6 3 - 14 mmol/L    Glucose 103 (H) 70 - 99 mg/dL    Urea Nitrogen 16 7 - 30 mg/dL    Creatinine 1.37 (H) 0.66 - 1.25 mg/dL    GFR Estimate 64 >60 mL/min/1.7m2    GFR Estimate If Black 78 >60 mL/min/1.7m2    Calcium 9.0 8.5 - 10.1 mg/dL   Phosphorus    Collection Time: 07/11/17 11:55 AM   Result Value Ref Range    Phosphorus 2.4 (L) 2.5 - 4.5 mg/dL   Magnesium    Collection Time: 07/11/17 11:55 AM   Result Value Ref Range    Magnesium 1.5 (L) 1.6 - 2.3 mg/dL   WBC Differential    Collection Time: 07/11/17 11:55 AM   Result Value Ref Range    Diff Method Automated Method     % Neutrophils 66.6 %    % Lymphocytes 20.6 %    % Monocytes 9.4 %    % Eosinophils 2.3 %    % Basophils 0.8 %    % Immature Granulocytes 0.3 %    Nucleated RBCs 0 0 /100    Absolute Neutrophil 2.6 1.6 - 8.3 10e9/L    Absolute Lymphocytes 0.8 0.8 - 5.3 10e9/L    Absolute Monocytes 0.4 0.0 - 1.3 10e9/L    Absolute Eosinophils 0.1 0.0 - 0.7 10e9/L    Absolute Basophils 0.0 0.0 - 0.2 10e9/L    Abs Immature Granulocytes 0.0 0 - 0.4 10e9/L    Absolute Nucleated RBC 0.0        Jak King MD

## 2017-07-11 NOTE — NURSING NOTE
"Chief Complaint   Patient presents with     RECHECK     1 month post recipient kidney       Initial /79  Pulse 73  Temp 97.7  F (36.5  C) (Oral)  Ht 1.88 m (6' 2\")  Wt 98.2 kg (216 lb 9.6 oz)  BMI 27.81 kg/m2 Estimated body mass index is 27.81 kg/(m^2) as calculated from the following:    Height as of this encounter: 1.88 m (6' 2\").    Weight as of this encounter: 98.2 kg (216 lb 9.6 oz).  Medication Reconciliation: complete  "

## 2017-07-11 NOTE — MR AVS SNAPSHOT
After Visit Summary   7/11/2017    Golden Stover    MRN: [id number]           Patient Information     Date Of Birth          1995        Visit Information        Provider Department      7/11/2017 1:05 PM Jak King MD [facility]        Today's Diagnoses     Kidney replaced by transplant    -  1    Aftercare following organ transplant        Immunosuppressed status (H)        Hypertension secondary to other renal disorders        Hypophosphatemia        Secondary renal hyperparathyroidism (H)        Hypomagnesemia        Anemia in stage 3 chronic kidney disease          Care Instructions    Decrease tacrolimus (Prograf) to 8 mg twice daily.          Follow-ups after your visit        Your next 10 appointments already scheduled     Jul 11, 2017  1:05 PM CDT   (Arrive by 12:35 PM)   Return Kidney Transplant with Jak King MD   [facility] ([facility])    [address] [place] [address] [id number] [phone]            Sep 11, 2017  1:05 PM CDT   (Arrive by 12:35 PM)   Return Kidney Transplant with Jak King MD   [facility] ([facility])    [address] [place] [address] [id number] [phone]            Jan 29, 2018  4:00 PM CST   (Arrive by 3:30 PM)   Return Kidney Transplant with Jak King MD   [facility] ([facility])    [address] [place] [address] [id number] [phone]              Who to contact     If you have questions or need follow up information about today's clinic visit or your schedule please contact [facility] directly at [phone].  Normal or non-critical lab and imaging results will be communicated to you by MyChart, letter or phone within 4 business days after the clinic has received the results. If you do not hear from us within 7 days, please  "contact the clinic through [employer] or phone. If you have a critical or abnormal lab result, we will notify you by phone as soon as possible.  Submit refill requests through [employer] or call your pharmacy and they will forward the refill request to us. Please allow 3 business days for your refill to be completed.          Additional Information About Your Visit        [email or web address] Information     [employer] gives you secure access to your electronic health record. If you see a primary care provider, you can also send messages to your care team and make appointments. If you have questions, please call your primary care clinic.  If you do not have a primary care provider, please call [phone] and they will assist you.        Care EveryWhere ID     This is your Care EveryWhere ID. This could be used by other organizations to access your [place] medical records  [id number]        Your Vitals Were     Pulse Temperature Height BMI (Body Mass Index)          73 97.7  F (36.5  C) (Oral) 1.88 m (6' 2\") 27.81 kg/m2         Blood Pressure from Last 3 Encounters:   07/11/17 149/79   06/29/17 131/86   06/13/17 155/82    Weight from Last 3 Encounters:   07/11/17 98.2 kg (216 lb 9.6 oz)   06/14/17 97.5 kg (214 lb 14.4 oz)   06/13/17 98.5 kg (217 lb 3.2 oz)              Today, you had the following     No orders found for display         Today's Medication Changes          These changes are accurate as of: 7/11/17  1:00 PM.  If you have any questions, ask your nurse or doctor.               Stop taking these medicines if you haven't already. Please contact your care team if you have questions.     acetaminophen 325 MG tablet   Commonly known as:  TYLENOL   Stopped by:  Jak King MD           hydrOXYzine 25 MG capsule   Commonly known as:  VISTARIL   Stopped by:  Jak King MD           oxyCODONE 5 MG IR tablet   Commonly known as:  ROXICODONE   Stopped by:  Jak King MD                    Primary " Care Provider    Physician No Ref-Primary       No address on file        Equal Access to Services     ALINA SANCHEZ : Hadii aad zahraa carlie Fraser, waalma gonzalesjimha, nae jonathanseverianoamanda sandovaljose raulamanda, waxkris eveline haykatie ryderyuliedward wright. So [facility] [phone].    ATENCIÓN: Si habla español, tiene a joseph disposición servicios gratuitos de asistencia lingüística. Ll[facility] [phone].    We comply with applicable federal civil rights laws and [place] laws. We do not discriminate on the basis of race, color, national origin, age, disability sex, sexual orientation or gender identity.            Thank you!     Thank you for choosing [facility]  for your care. Our goal is always to provide you with excellent care. Hearing back from our patients is one way we can continue to improve our services. Please take a few minutes to complete the written survey that you may receive in the mail after your visit with us. Thank you!             Your Updated Medication List - Protect others around you: Learn how to safely use, store and throw away your medicines at www.disposemymeds.org.          This list is accurate as of: 7/11/17  1:00 PM.  Always use your most recent med list.                   Brand Name Dispense Instructions for use Diagnosis    amLODIPine 5 MG tablet    NORVASC    30 tablet    Take 1 tablet (5 mg) by mouth every evening    Living-donor kidney transplant recipient       calcitRIOL 0.25 MCG capsule    ROCALTROL    30 capsule    Take 1 capsule (0.25 mcg) by mouth daily    Living-donor kidney transplant recipient       dapsone 100 MG tablet     30 tablet    Take 1 tablet (100 mg) by mouth daily    Living-donor kidney transplant recipient       docusate sodium 100 MG tablet    COLACE    60 tablet    Take 100 mg by mouth daily    Living-donor kidney transplant recipient       magnesium oxide 400 MG tablet    MAG-OX    60 tablet    Take 1 tablet (400 mg) by mouth 2 times daily    Hypomagnesemia, Kidney  transplant recipient, Long-term use of immunosuppressant medication       mycophenolate 250 MG capsule    CELLCEPT - GENERIC EQUIVALENT    180 capsule    Take 3 capsules (750 mg) by mouth 2 times daily    Living-donor kidney transplant recipient       ondansetron 4 MG ODT tab    ZOFRAN ODT    120 tablet    Take 1 tablet (4 mg) by mouth every 6 hours as needed for nausea    Living-donor kidney transplant recipient       phosphorus tablet 250 mg 250 MG per tablet    K PHOS NEUTRAL    120 tablet    Take 2 tablets (500 mg) by mouth 2 times daily    Living-donor kidney transplant recipient       senna-docusate 8.6-50 MG per tablet    SENOKOT-S;PERICOLACE    100 tablet    Take 2 tablets by mouth daily as needed for constipation    Other constipation       tacrolimus 1 MG capsule    PROGRAF - GENERIC EQUIVALENT    540 capsule    Take 9 capsules (9 mg) by mouth 2 times daily    Living-donor kidney transplant recipient       valGANciclovir 450 MG tablet    VALCYTE    60 tablet    Take 2 tablets (900 mg) by mouth daily    Living-donor kidney transplant recipient

## 2017-07-12 DIAGNOSIS — Z94.0 LIVING-DONOR KIDNEY TRANSPLANT RECIPIENT: Primary | ICD-10-CM

## 2017-07-12 LAB
BKV DNA # SPEC NAA+PROBE: NORMAL COPIES/ML
BKV DNA SPEC NAA+PROBE-LOG#: NORMAL LOG COPIES/ML
SPECIMEN SOURCE: NORMAL

## 2017-07-12 RX ORDER — TACROLIMUS 1 MG/1
8 CAPSULE ORAL 2 TIMES DAILY
Qty: 480 CAPSULE | Refills: 11 | Status: SHIPPED | OUTPATIENT
Start: 2017-07-12 | End: 2017-07-19

## 2017-07-12 NOTE — LETTER
OUTPATIENT LABORATORY TEST ORDER    Patient Name:Golden Stover   Transplant Date: 6/7/2017  YOB: 1995  Issue Date & Time: 7/12/2017  1:37 PM   [facility] MR: [id number]  Expiration Date:  (1 year after date issued)        Diagnoses: Aftercare following organ transplant (ICD-10 Z48.288)   Kidney Transplant (ICD-10 Z94.0)   Long term use of medications (ICD-10  Z79.899)     ?Lab results to be available on the same day drawn.   ?Patient should release information to the [facility], [facility].  ?Please fax to the Transplant Center at [phone].    3x/week, First 4Weeks post-transplant    6/7/2017 - 7/7/2017    2x/week, Months 2-3 post-transplant    7/8/2017 - 9/8/2017       1x/week, Months 4-6 post-transplant    9/9/2017 - 12/9/2017  Every 2 weeks, Months 7-9 post-transplant    12/10/2017 - 3/10/2018  Every 3 weeks, Months 10-12 post-transplant   3/11/2018 - 6/7/2018       ?Hemogram and Platelet   ?Basic Metabolic Panel (Sodium, Potassium,Chloride, CO2, Creatinine, Urea Nitrogen, Glucose, Calcium)   ?Prograf/Tacrolimus drug level     Weekly through first 3 months post-transplant    6/7/2017 - 9/7/2017   ?Phosphorus, Magnesium   ?MPA level (mycophenolic acid) once per week for first 3 months.    ?Please add a diff to hemogram once per week for the first 3 months.    Biweekly   ?EBV PCR QT    Monthly   ? BK PCR Quantitative (Polyoma virus - blood in purple top tube to Reference Lab)    At 6 & 12 months post-transplant         12/2017 & 6/2017   ? HBsurfaceAb, HBcoreAb, HCV at 6 months only -   ? Liver Function Tests(Bilirubin Direct/Total, AST, ALT, Alkaline Phosphatase)   ?Complete Lipid Panel fasting (Cholesterol, Triglycerides, HDL, LDL)   ? Random Urine for Protein/Creatinineratio    At month(s) 1, 2, 4, 6, 9, 12      7/2017, 8/2017, 10/2017, 12/2017, 3/2018, 6/2018                  ? PRA/DSA level (mailers provided by the patient)                    If  you have any questions, please call The Transplant Center at [phone] or [phone].    Please faxall results to [phone].  .

## 2017-07-12 NOTE — TELEPHONE ENCOUNTER
Spoke to patient regarding lab orders and schedule.  Let patient know that if his lab is having problems understanding lab orders to have them call transplant center directly.  Patient verbalizes understanding of Tacrolimus dose change to 8 mg BID, new Rx sent to [facility].  Confirmed patients donor is CMV negative, patient may stop taking Valcyte after 6 weeks per Dr. King.

## 2017-07-13 ENCOUNTER — RESULTS ONLY (OUTPATIENT)
Dept: OTHER | Facility: CLINIC | Age: 22
End: 2017-07-13

## 2017-07-13 DIAGNOSIS — Z48.298 AFTERCARE FOLLOWING ORGAN TRANSPLANT: ICD-10-CM

## 2017-07-13 DIAGNOSIS — Z94.0 KIDNEY REPLACED BY TRANSPLANT: ICD-10-CM

## 2017-07-13 PROCEDURE — 86828 HLA CLASS I&II ANTIBODY QUAL: CPT | Performed by: INTERNAL MEDICINE

## 2017-07-13 PROCEDURE — 86832 HLA CLASS I HIGH DEFIN QUAL: CPT | Performed by: INTERNAL MEDICINE

## 2017-07-13 PROCEDURE — 86833 HLA CLASS II HIGH DEFIN QUAL: CPT | Performed by: INTERNAL MEDICINE

## 2017-07-17 LAB — PRA DONOR SPECIFIC ABY: NORMAL

## 2017-07-19 DIAGNOSIS — Z94.0 LIVING-DONOR KIDNEY TRANSPLANT RECIPIENT: Primary | ICD-10-CM

## 2017-07-19 RX ORDER — TACROLIMUS 1 MG/1
7 CAPSULE ORAL 2 TIMES DAILY
Qty: 420 CAPSULE | Refills: 11 | Status: SHIPPED | OUTPATIENT
Start: 2017-07-19 | End: 2017-07-26

## 2017-07-19 NOTE — TELEPHONE ENCOUNTER
Spoke to patient regarding tac level = 12.  Patient confirms good 12 hour trough level and denies any diarrhea.  Patient verbalizes understanding of medication dose change to 7 mg BID.

## 2017-07-19 NOTE — TELEPHONE ENCOUNTER
Tac level is 12- please verify that this was a 12 hr level. Does pt have diarrhea>  If it was a 12 hr level and he has no diarrhea decrease Tac dose from 8 mg bid to 7 mg bid

## 2017-07-20 ENCOUNTER — TELEPHONE (OUTPATIENT)
Dept: TRANSPLANT | Facility: CLINIC | Age: 22
End: 2017-07-20

## 2017-07-20 DIAGNOSIS — E83.42 HYPOMAGNESEMIA: ICD-10-CM

## 2017-07-20 DIAGNOSIS — Z79.60 LONG-TERM USE OF IMMUNOSUPPRESSANT MEDICATION: ICD-10-CM

## 2017-07-20 DIAGNOSIS — Z94.0 KIDNEY TRANSPLANT RECIPIENT: ICD-10-CM

## 2017-07-20 RX ORDER — MAGNESIUM OXIDE 400 MG/1
400 TABLET ORAL 3 TIMES DAILY
Qty: 60 TABLET | Refills: 1 | Status: SHIPPED | OUTPATIENT
Start: 2017-07-20 | End: 2017-08-07

## 2017-07-20 NOTE — TELEPHONE ENCOUNTER
Coordinator called to increase mag ox to 3 times daily, patient understands dose change.    Patient reported wound dehiscence with pus-like drainage. Redness present. No fevers or swelling. Patient spoke with renal fellow who advised him to be seen in a clinic. Patient was seen by a wound nurse and a local clinic who prescribed bactrim and mupirocin topical. Patient currently on Dapsone due to psychosis episode in hospital with bactrim.     Coordinator spoke with bactrim ordering provider. She will switch prescription to clindamycin 300mg TID for 10 days.  Renal fellow paged for update.

## 2017-07-23 ENCOUNTER — NURSE TRIAGE (OUTPATIENT)
Dept: NURSING | Facility: CLINIC | Age: 22
End: 2017-07-23

## 2017-07-23 NOTE — TELEPHONE ENCOUNTER
Patient requesting clarification of 7/20/17 medication orders.  FNA provided the following information from EPIC:   Maddy Nagel RN at 7/20/2017 10:12 AM   Status: Signed            Patient reported wound dehiscence with pus-like drainage. Redness present. No fevers or swelling. Patient spoke with renal fellow who advised him to be seen in a clinic. Patient was seen by a wound nurse and a local clinic who prescribed bactrim and mupirocin topical. Patient currently on Dapsone due to psychosis episode in hospital with bactrim.      Coordinator spoke with bactrim ordering provider. She will switch prescription to clindamycin 300mg TID for 10 days.

## 2017-07-25 DIAGNOSIS — Z94.0 LIVING-DONOR KIDNEY TRANSPLANT RECIPIENT: Primary | ICD-10-CM

## 2017-07-25 NOTE — TELEPHONE ENCOUNTER
Creatinine baseline high.  Is patient drinking at least 2L per day?  Patient being treated for presumed surgical wound infection. Improvement on symtpoms? Fever? Redness or swelling?  Surgery would like to see patient back here.

## 2017-07-26 ENCOUNTER — TELEPHONE (OUTPATIENT)
Dept: TRANSPLANT | Facility: CLINIC | Age: 22
End: 2017-07-26

## 2017-07-26 RX ORDER — TACROLIMUS 1 MG/1
6 CAPSULE ORAL 2 TIMES DAILY
Qty: 360 CAPSULE | Refills: 11 | Status: SHIPPED | OUTPATIENT
Start: 2017-07-26 | End: 2017-08-17

## 2017-07-26 RX ORDER — TACROLIMUS 0.5 MG/1
0.5 CAPSULE ORAL 2 TIMES DAILY
Qty: 60 CAPSULE | Refills: 11 | Status: SHIPPED | OUTPATIENT
Start: 2017-07-26 | End: 2017-08-17

## 2017-07-26 NOTE — TELEPHONE ENCOUNTER
ISSUE:  Follow up for surgical wound infection.    PLAN:   Patient will be in town this Friday for a social visit. Coordinator requested him to come into clinic for wound assessment with surgical team. Patient does report wound is less red, healing w/o further s/s of infection.    Patient instructed for split dose tac 7am 6pm until 0.5mg tabs arrive, then begin 6.5mg BID.

## 2017-07-26 NOTE — TELEPHONE ENCOUNTER
Spoke to patient regarding tac level = 10.2, above goal.  Patient confirms current dose and good 12 hour trough level.  Patient verbalizes understanding of medication dose decrease to 6.5 mg BID.  Patient states that he does not have 0.5 mg tablets at home.  New Rx sent to patient pharm.  Patient will changes dose as soon as he receives 0.5 mg tablets.

## 2017-07-26 NOTE — TELEPHONE ENCOUNTER
ISSUE:  Tac level 10.2, above goal. Current dose tac 7mg BID.    PLAN:   Confirm dose, if accurate with 12h trough, decrease tacrolimus to 6.5mg BID. Repeat level with next scheduled draw.    Reqeusted patient call back. Will try again later.

## 2017-07-28 ENCOUNTER — OFFICE VISIT (OUTPATIENT)
Dept: TRANSPLANT | Facility: CLINIC | Age: 22
End: 2017-07-28
Attending: SURGERY
Payer: COMMERCIAL

## 2017-07-28 ENCOUNTER — RECORDS - HEALTHEAST (OUTPATIENT)
Dept: ADMINISTRATIVE | Facility: OTHER | Age: 22
End: 2017-07-28

## 2017-07-28 VITALS
DIASTOLIC BLOOD PRESSURE: 82 MMHG | HEART RATE: 63 BPM | RESPIRATION RATE: 16 BRPM | TEMPERATURE: 98.4 F | SYSTOLIC BLOOD PRESSURE: 151 MMHG | OXYGEN SATURATION: 97 %

## 2017-07-28 DIAGNOSIS — Z94.0 KIDNEY REPLACED BY TRANSPLANT: ICD-10-CM

## 2017-07-28 DIAGNOSIS — Z48.298 AFTERCARE FOLLOWING ORGAN TRANSPLANT: ICD-10-CM

## 2017-07-28 DIAGNOSIS — Z94.0 KIDNEY REPLACED BY TRANSPLANT: Primary | ICD-10-CM

## 2017-07-28 LAB
ANION GAP SERPL CALCULATED.3IONS-SCNC: 8 MMOL/L (ref 3–14)
BASOPHILS # BLD AUTO: 0 10E9/L (ref 0–0.2)
BASOPHILS NFR BLD AUTO: 0.8 %
BUN SERPL-MCNC: 15 MG/DL (ref 7–30)
CALCIUM SERPL-MCNC: 9.2 MG/DL (ref 8.5–10.1)
CHLORIDE SERPL-SCNC: 109 MMOL/L (ref 94–109)
CO2 SERPL-SCNC: 24 MMOL/L (ref 20–32)
CREAT SERPL-MCNC: 1.38 MG/DL (ref 0.66–1.25)
DIFFERENTIAL METHOD BLD: NORMAL
EOSINOPHIL # BLD AUTO: 0.1 10E9/L (ref 0–0.7)
EOSINOPHIL NFR BLD AUTO: 1.3 %
ERYTHROCYTE [DISTWIDTH] IN BLOOD BY AUTOMATED COUNT: 14.4 % (ref 10–15)
GFR SERPL CREATININE-BSD FRML MDRD: 64 ML/MIN/1.7M2
GLUCOSE SERPL-MCNC: 87 MG/DL (ref 70–99)
HCT VFR BLD AUTO: 34.3 % (ref 40–53)
HGB BLD-MCNC: 10.7 G/DL (ref 13.3–17.7)
IMM GRANULOCYTES # BLD: 0 10E9/L (ref 0–0.4)
IMM GRANULOCYTES NFR BLD: 0.3 %
LYMPHOCYTES # BLD AUTO: 0.9 10E9/L (ref 0.8–5.3)
LYMPHOCYTES NFR BLD AUTO: 21.9 %
MAGNESIUM SERPL-MCNC: 1.6 MG/DL (ref 1.6–2.3)
MCH RBC QN AUTO: 28.9 PG (ref 26.5–33)
MCHC RBC AUTO-ENTMCNC: 31.2 G/DL (ref 31.5–36.5)
MCV RBC AUTO: 93 FL (ref 78–100)
MONOCYTES # BLD AUTO: 0.4 10E9/L (ref 0–1.3)
MONOCYTES NFR BLD AUTO: 10.2 %
NEUTROPHILS # BLD AUTO: 2.6 10E9/L (ref 1.6–8.3)
NEUTROPHILS NFR BLD AUTO: 65.5 %
NRBC # BLD AUTO: 0 10*3/UL
NRBC BLD AUTO-RTO: 0 /100
PHOSPHATE SERPL-MCNC: 3.4 MG/DL (ref 2.5–4.5)
PLATELET # BLD AUTO: 441 10E9/L (ref 150–450)
POTASSIUM SERPL-SCNC: 3.9 MMOL/L (ref 3.4–5.3)
RBC # BLD AUTO: 3.7 10E12/L (ref 4.4–5.9)
SODIUM SERPL-SCNC: 141 MMOL/L (ref 133–144)
WBC # BLD AUTO: 3.9 10E9/L (ref 4–11)

## 2017-07-28 PROCEDURE — 99211 OFF/OP EST MAY X REQ PHY/QHP: CPT

## 2017-07-28 PROCEDURE — 36415 COLL VENOUS BLD VENIPUNCTURE: CPT | Performed by: INTERNAL MEDICINE

## 2017-07-28 PROCEDURE — 84100 ASSAY OF PHOSPHORUS: CPT | Performed by: INTERNAL MEDICINE

## 2017-07-28 PROCEDURE — 83735 ASSAY OF MAGNESIUM: CPT | Performed by: INTERNAL MEDICINE

## 2017-07-28 PROCEDURE — 85004 AUTOMATED DIFF WBC COUNT: CPT | Performed by: INTERNAL MEDICINE

## 2017-07-28 PROCEDURE — 85027 COMPLETE CBC AUTOMATED: CPT | Performed by: INTERNAL MEDICINE

## 2017-07-28 PROCEDURE — 80048 BASIC METABOLIC PNL TOTAL CA: CPT | Performed by: INTERNAL MEDICINE

## 2017-07-28 NOTE — NURSING NOTE
"Chief Complaint   Patient presents with     Kidney Transplant     POD 51       Initial /82  Pulse 63  Temp 98.4  F (36.9  C) (Oral)  Resp 16  SpO2 97% Estimated body mass index is 27.81 kg/(m^2) as calculated from the following:    Height as of 7/11/17: 1.88 m (6' 2\").    Weight as of 7/11/17: 98.2 kg (216 lb 9.6 oz).      "

## 2017-07-28 NOTE — MR AVS SNAPSHOT
After Visit Summary   7/28/2017    Golden Stover    MRN: [id number]           Patient Information     Date Of Birth          1995        Visit Information        Provider Department      7/28/2017 2:00 PM Juno Parr MD [facility] Solid [facility] Transplant        Today's Diagnoses     Kidney replaced by transplant    -  1       Follow-ups after your visit        Your next 10 appointments already scheduled     Sep 11, 2017  1:05 PM CDT   (Arrive by 12:35 PM)   Return Kidney Transplant with Jak King MD   [facility] ([facility])    [address] [place] [id number] [phone]            Jan 29, 2018  4:00 PM CST   (Arrive by 3:30 PM)   Return Kidney Transplant with Jak King MD   [facility] ([facility])    [address] [place] [id number] [phone]              Who to contact     If you have questions or need follow up information about today's clinic visit or your schedule please contact [facility]ANSPL[facility] directly at [phone].  Normal or non-critical lab and imaging results will be communicated to you by [employer], letter or phone within 4 business days after the clinic has received the results. If you do not hear from us within 7 days, please contact the clinic through [employer] or phone. If you have a critical or abnormal lab result, we will notify you by phone as soon as possible.  Submit refill requests through [employer] or call your pharmacy and they will forward the refill request to us. Please allow 3 business days for your refill to be completed.          Additional Information About Your Visit        [employer] Information     [employer] gives you secure access to your electronic health record. If you see a primary care provider, you can also send messages to your care team and make appointments. If you have questions, please  call your primary care clinic.  If you do not have a primary care provider, please call [phone] and they will assist you.        Care EveryWhere ID     This is your Care EveryWhere ID. This could be used by other organizations to access your [place] medical records  [id number]        Your Vitals Were     Pulse Temperature Respirations Pulse Oximetry          63 98.4  F (36.9  C) (Oral) 16 97%         Blood Pressure from Last 3 Encounters:   07/28/17 151/82   07/11/17 149/79   06/29/17 131/86    Weight from Last 3 Encounters:   07/11/17 98.2 kg (216 lb 9.6 oz)   06/14/17 97.5 kg (214 lb 14.4 oz)   06/13/17 98.5 kg (217 lb 3.2 oz)              Today, you had the following     No orders found for display       Primary Care Provider    Physician No Ref-Primary       No address on file        Equal Access to Services     [facility] : Hadii yasmeen Fraser, waaxda cathy, qaybta kaalmada swetha, dewey hunter . So [facility] [phone].    ATENCIÓN: Si habla español, tiene a joseph disposición servicios gratuitos de asistencia lingüística. Llame al [phone].    We comply with applicable federal civil rights laws and [place] laws. We do not discriminate on the basis of race, color, national origin, age, disability sex, sexual orientation or gender identity.            Thank you!     Thank you for choosing [facility]ANSPL[facility]  for your care. Our goal is always to provide you with excellent care. Hearing back from our patients is one way we can continue to improve our services. Please take a few minutes to complete the written survey that you may receive in the mail after your visit with us. Thank you!             Your Updated Medication List - Protect others around you: Learn how to safely use, store and throw away your medicines at www.disposemymeds.org.          This list is accurate as of: 7/28/17 11:59 PM.  Always use your most recent med list.                    Brand Name Dispense Instructions for use Diagnosis    amLODIPine 5 MG tablet    NORVASC    30 tablet    Take 1 tablet (5 mg) by mouth every evening    Living-donor kidney transplant recipient       calcitRIOL 0.25 MCG capsule    ROCALTROL    30 capsule    Take 1 capsule (0.25 mcg) by mouth daily    Living-donor kidney transplant recipient       dapsone 100 MG tablet     30 tablet    Take 1 tablet (100 mg) by mouth daily    Living-donor kidney transplant recipient       docusate sodium 100 MG tablet    COLACE    60 tablet    Take 100 mg by mouth daily    Living-donor kidney transplant recipient       magnesium oxide 400 MG tablet    MAG-OX    60 tablet    Take 1 tablet (400 mg) by mouth 3 times daily    Hypomagnesemia, Kidney transplant recipient, Long-term use of immunosuppressant medication       mycophenolate 250 MG capsule    CELLCEPT - GENERIC EQUIVALENT    180 capsule    Take 3 capsules (750 mg) by mouth 2 times daily    Living-donor kidney transplant recipient       ondansetron 4 MG ODT tab    ZOFRAN ODT    120 tablet    Take 1 tablet (4 mg) by mouth every 6 hours as needed for nausea    Living-donor kidney transplant recipient       senna-docusate 8.6-50 MG per tablet    SENOKOT-S;PERICOLACE    100 tablet    Take 2 tablets by mouth daily as needed for constipation    Other constipation       * tacrolimus 1 MG capsule    PROGRAF - GENERIC EQUIVALENT    360 capsule    Take 6 capsules (6 mg) by mouth 2 times daily Total dose = 6.5 mg BID    Living-donor kidney transplant recipient       * tacrolimus 0.5 MG capsule    PROGRAF - GENERIC EQUIVALENT    60 capsule    Take 1 capsule (0.5 mg) by mouth 2 times daily Total dose = 6.5 mg BID    Living-donor kidney transplant recipient       * Notice:  This list has 2 medication(s) that are the same as other medications prescribed for you. Read the directions carefully, and ask your doctor or other care provider to review them with you.

## 2017-07-28 NOTE — PROGRESS NOTES
Transplant Surgery Progress Note    Transplants:  6/7/2017 (Kidney); Postoperative day:  51    Transplant History:    Transplant Type:  LDKT  Donor Type: Living   Transplant Date:  6/7/2017 (Kidney)   Ureteral Stent:  Yes   Crossmatch:  negative   DSA at Tx:  No  Baseline Cr: 1.4   DeNovo DSA: No    Acute Rejection Hx:  No    Present Maintenance Immunosuppression:  Tacrolimus and Mycophenolate mofetil      Transplant Coordinator: Maddy Nagel     Transplant Office Phone Number: [phone]     Immunsupresent Medications     Immunosuppressive Agents Disp Start End    tacrolimus (PROGRAF - GENERIC EQUIVALENT) 1 MG capsule 360 capsule 7/26/2017     Sig - Route: Take 6 capsules (6 mg) by mouth 2 times daily Total dose = 6.5 mg BID - Oral    Class: E-Prescribe    tacrolimus (PROGRAF - GENERIC EQUIVALENT) 0.5 MG capsule 60 capsule 7/26/2017     Sig - Route: Take 1 capsule (0.5 mg) by mouth 2 times daily Total dose = 6.5 mg BID - Oral    Class: E-Prescribe    mycophenolate (CELLCEPT - GENERIC EQUIVALENT) 250 MG capsule 180 capsule 6/12/2017     Sig - Route: Take 3 capsules (750 mg) by mouth 2 times daily - Oral    Class: E-Prescribe        Possible Immunosuppression-related side effects:   []             headache  []             vivid dreams  []             irritability  []             fine tremor  []             nausea  []             diarrhea  []             neuropathy      []             edema  []             renal calcineurin toxicity  []             hyperkalemia  []             post-transplant diabetes  []             decreased appetite  []             increased appetite  []             other:  []             none    Prescription Medications as of 7/28/2017             tacrolimus (PROGRAF - GENERIC EQUIVALENT) 1 MG capsule Take 6 capsules (6 mg) by mouth 2 times daily Total dose = 6.5 mg BID    tacrolimus (PROGRAF - GENERIC EQUIVALENT) 0.5 MG capsule Take 1 capsule (0.5 mg) by mouth 2 times daily Total dose =  6.5 mg BID    magnesium oxide (MAG-OX) 400 MG tablet Take 1 tablet (400 mg) by mouth 3 times daily    senna-docusate (SENOKOT-S;PERICOLACE) 8.6-50 MG per tablet Take 2 tablets by mouth daily as needed for constipation    ondansetron (ZOFRAN ODT) 4 MG ODT tab Take 1 tablet (4 mg) by mouth every 6 hours as needed for nausea    amLODIPine (NORVASC) 5 MG tablet Take 1 tablet (5 mg) by mouth every evening    mycophenolate (CELLCEPT - GENERIC EQUIVALENT) 250 MG capsule Take 3 capsules (750 mg) by mouth 2 times daily    calcitRIOL (ROCALTROL) 0.25 MCG capsule Take 1 capsule (0.25 mcg) by mouth daily    docusate sodium (COLACE) 100 MG tablet Take 100 mg by mouth daily    dapsone 100 MG tablet Take 1 tablet (100 mg) by mouth daily          O:   Temp:  [98.4  F (36.9  C)] 98.4  F (36.9  C)  Pulse:  [63] 63  Resp:  [16] 16  BP: (151)/(82) 151/82  SpO2:  [97 %] 97 %  General Appearance: in no apparent distress.   Skin: Normal, no rashes or jaundice  Heart: regular rate and rhythm, normal S1 and S2, no murmur  Lungs: easy respirations, no audible wheezing.  Abdomen: flat and symmetric, The wound is dry and intact, healing well, there are no discharges at this moment without hernia. The abdomen is non-tender. The kidney graft is not tender.  There is no ascites.  Extremities: Tremor absent. There is a moderate bilateral feet skin desquamation;    Edema: absent.     Transplant Immunosuppression Labs Latest Ref Rng & Units 7/28/2017 7/11/2017 7/3/2017 6/30/2017 6/29/2017   Tacro Level 5.0 - 15.0 ug/L - - 11.1 9.5 10.0   Tacro Level - - - 2030 07/02/17 2030, 06/29/17 6/28/17 7:30pm   Creat 0.66 - 1.25 mg/dL 1.38(H) 1.37(H) - - 1.35(H)   BUN 7 - 30 mg/dL 15 16 - - 14   WBC 4.0 - 11.0 10e9/L 3.9(L) 3.9(L) - - 4.3   Neutrophil % 65.5 66.6 - - 70.5   ANEU 1.6 - 8.3 10e9/L 2.6 2.6 - - 3.1       Chemistries:   Recent Labs   Lab Test  07/28/17   1330   BUN  15   CR  1.38*   GFRESTIMATED  64   GLC  87     No results found for: A1C,  CPEPT  Recent Labs   Lab Test  06/01/17   1310   ALBUMIN  4.0   BILITOTAL  0.6   ALKPHOS  108   AST  17   ALT  28     Urine Studies:  Recent Labs   Lab Test  06/10/17   0520   COLOR  Yellow   APPEARANCE  Clear   URINEGLC  Negative   URINEBILI  Negative   URINEKETONE  Negative   SG  1.011   UBLD  Moderate*   URINEPH  6.0   PROTEIN  100*   NITRITE  Negative   LEUKEST  Negative   RBCU  56*   WBCU  2     No lab results found.  Hematology:   Recent Labs   Lab Test  07/28/17   1330  07/11/17   1155  06/29/17   0831   HGB  10.7*  10.7*  10.9*   PLT  441  382  669*   WBC  3.9*  3.9*  4.3     Coags:   Recent Labs   Lab Test  12/07/16   0637   INR  0.89     HLA antibodies:   SA1 Hi Risk Elinor   Date Value Ref Range Status   06/01/2017 None  Final     SA1 Mod Risk Elinor   Date Value Ref Range Status   06/01/2017   Final    A:68 B:46 49 52 67 75 76 77 Cw:2 5 6 9 10 12 15 16 17 18     SA2 Hi Risk Elinor   Date Value Ref Range Status   06/01/2017 DR:16  Final     SA2 Mod Risk Elinor   Date Value Ref Range Status   06/01/2017 DR:4 DRw:53 DP:01 19  Final       Assessment: @patient name@ is doing well s/p LDKT:  Issues we addressed during his visit include:    Plan:    1. Graft function: good urine output, creatinine 1.38   2. Immunosuppression Management:  Prograf 6.5 mg BID and Mycophenolate 750 mg BID.    3.  Wound infection: the right side post KT wound looks dry and clean today without discharges    Followup: regular 6 months follow up, as per kidney protocol     Jaya Mcarthur M.D.  Fellow,  Transplant Surgery    I have reviewed history, examined patient and discussed plan with the fellow/resident/ASHLEIGH.  I concur with the findings in this note.    Immunosuppressive regimen, management and long term risks discussed in detail. Changes, when applicable made as per orders.                     Total Time: 15 min,   Counselling Time: 10 min.  .

## 2017-07-28 NOTE — LETTER
7/28/2017       RE: Golden Stover  [address]  [place] [address]     Dear Colleague,    Thank you for referring your patient, Golden Stover, to the [facility]ANS[facility] at [facility]. Please see a copy of my visit note below.    Transplant Surgery Progress Note    Transplants:  6/7/2017 (Kidney); Postoperative day:  51    Transplant History:    Transplant Type:  LDKT  Donor Type: Living   Transplant Date:  6/7/2017 (Kidney)   Ureteral Stent:  Yes   Crossmatch:  negative   DSA at Tx:  No  Baseline Cr: 1.4   DeNovo DSA: No    Acute Rejection Hx:  No    Present Maintenance Immunosuppression:  Tacrolimus and Mycophenolate mofetil      Transplant Coordinator: Maddy Nagel     Transplant Office Phone Number: [phone]     Immunsupresent Medications     Immunosuppressive Agents Disp Start End    tacrolimus (PROGRAF - GENERIC EQUIVALENT) 1 MG capsule 360 capsule 7/26/2017     Sig - Route: Take 6 capsules (6 mg) by mouth 2 times daily Total dose = 6.5 mg BID - Oral    Class: E-Prescribe    tacrolimus (PROGRAF - GENERIC EQUIVALENT) 0.5 MG capsule 60 capsule 7/26/2017     Sig - Route: Take 1 capsule (0.5 mg) by mouth 2 times daily Total dose = 6.5 mg BID - Oral    Class: E-Prescribe    mycophenolate (CELLCEPT - GENERIC EQUIVALENT) 250 MG capsule 180 capsule 6/12/2017     Sig - Route: Take 3 capsules (750 mg) by mouth 2 times daily - Oral    Class: E-Prescribe        Possible Immunosuppression-related side effects:   []             headache  []             vivid dreams  []             irritability  []             fine tremor  []             nausea  []             diarrhea  []             neuropathy      []             edema  []             renal calcineurin toxicity  []             hyperkalemia  []             post-transplant diabetes  []             decreased appetite  []             increased appetite  []             other:  []              none    Prescription Medications as of 7/28/2017             tacrolimus (PROGRAF - GENERIC EQUIVALENT) 1 MG capsule Take 6 capsules (6 mg) by mouth 2 times daily Total dose = 6.5 mg BID    tacrolimus (PROGRAF - GENERIC EQUIVALENT) 0.5 MG capsule Take 1 capsule (0.5 mg) by mouth 2 times daily Total dose = 6.5 mg BID    magnesium oxide (MAG-OX) 400 MG tablet Take 1 tablet (400 mg) by mouth 3 times daily    senna-docusate (SENOKOT-S;PERICOLACE) 8.6-50 MG per tablet Take 2 tablets by mouth daily as needed for constipation    ondansetron (ZOFRAN ODT) 4 MG ODT tab Take 1 tablet (4 mg) by mouth every 6 hours as needed for nausea    amLODIPine (NORVASC) 5 MG tablet Take 1 tablet (5 mg) by mouth every evening    mycophenolate (CELLCEPT - GENERIC EQUIVALENT) 250 MG capsule Take 3 capsules (750 mg) by mouth 2 times daily    calcitRIOL (ROCALTROL) 0.25 MCG capsule Take 1 capsule (0.25 mcg) by mouth daily    docusate sodium (COLACE) 100 MG tablet Take 100 mg by mouth daily    dapsone 100 MG tablet Take 1 tablet (100 mg) by mouth daily          O:   Temp:  [98.4  F (36.9  C)] 98.4  F (36.9  C)  Pulse:  [63] 63  Resp:  [16] 16  BP: (151)/(82) 151/82  SpO2:  [97 %] 97 %  General Appearance: in no apparent distress.   Skin: Normal, no rashes or jaundice  Heart: regular rate and rhythm, normal S1 and S2, no murmur  Lungs: easy respirations, no audible wheezing.  Abdomen: flat and symmetric, The wound is dry and intact, healing well, there are no discharges at this moment without hernia. The abdomen is non-tender. The kidney graft is not tender.  There is no ascites.  Extremities: Tremor absent. There is a moderate bilateral feet skin desquamation;    Edema: absent.     Transplant Immunosuppression Labs Latest Ref Rng & Units 7/28/2017 7/11/2017 7/3/2017 6/30/2017 6/29/2017   Tacro Level 5.0 - 15.0 ug/L - - 11.1 9.5 10.0   Tacro Level - - - 2030 07/02/17 2030, 06/29/17 6/28/17 7:30pm   Creat 0.66 - 1.25 mg/dL 1.38(H) 1.37(H) - -  1.35(H)   BUN 7 - 30 mg/dL 15 16 - - 14   WBC 4.0 - 11.0 10e9/L 3.9(L) 3.9(L) - - 4.3   Neutrophil % 65.5 66.6 - - 70.5   ANEU 1.6 - 8.3 10e9/L 2.6 2.6 - - 3.1       Chemistries:   Recent Labs   Lab Test  07/28/17   1330   BUN  15   CR  1.38*   GFRESTIMATED  64   GLC  87     No results found for: A1C, CPEPT  Recent Labs   Lab Test  06/01/17   1310   ALBUMIN  4.0   BILITOTAL  0.6   ALKPHOS  108   AST  17   ALT  28     Urine Studies:  Recent Labs   Lab Test  06/10/17   0520   COLOR  Yellow   APPEARANCE  Clear   URINEGLC  Negative   URINEBILI  Negative   URINEKETONE  Negative   SG  1.011   UBLD  Moderate*   URINEPH  6.0   PROTEIN  100*   NITRITE  Negative   LEUKEST  Negative   RBCU  56*   WBCU  2     No lab results found.  Hematology:   Recent Labs   Lab Test  07/28/17   1330  07/11/17   1155  06/29/17   0831   HGB  10.7*  10.7*  10.9*   PLT  441  382  669*   WBC  3.9*  3.9*  4.3     Coags:   Recent Labs   Lab Test  12/07/16   0637   INR  0.89     HLA antibodies:   SA1 Hi Risk Elinor   Date Value Ref Range Status   06/01/2017 None  Final     SA1 Mod Risk Elinor   Date Value Ref Range Status   06/01/2017   Final    A:68 B:46 49 52 67 75 76 77 Cw:2 5 6 9 10 12 15 16 17 18     SA2 Hi Risk Elinor   Date Value Ref Range Status   06/01/2017 DR:16  Final     SA2 Mod Risk Elinor   Date Value Ref Range Status   06/01/2017 DR:4 DRw:53 DP:01 19  Final       Assessment: @patient name@ is doing well s/p LDKT:  Issues we addressed during his visit include:    Plan:    1. Graft function: good urine output, creatinine 1.38   2. Immunosuppression Management:  Prograf 6.5 mg BID and Mycophenolate 750 mg BID.    3.  Wound infection: the right side post KT wound looks dry and clean today without discharges    Followup: regular 6 months follow up, as per kidney protocol     Jaya Mcarthur M.D.  Fellow,  Transplant Surgery    I have reviewed history, examined patient and discussed plan with the fellow/resident/ASHLEIGH.  I concur with the findings in this  note.    Immunosuppressive regimen, management and long term risks discussed in detail. Changes, when applicable made as per orders.                     Total Time: 15 min,   Counselling Time: 10 min.  .

## 2017-07-31 ENCOUNTER — TELEPHONE (OUTPATIENT)
Dept: TRANSPLANT | Facility: CLINIC | Age: 22
End: 2017-07-31

## 2017-07-31 NOTE — TELEPHONE ENCOUNTER
Golden was told by the fellow last Friday that he could stop taking phosphorous. He wanted to double check- CC told golden to hold mag and phos and we will assess his lab values before d/c'ing the meds. Will review calcitrol with Dr. King at Sept appt.  Golden reports AM BPs 130s / 70s. Evening BPs 140s / 90 before taking 5mg Norvasc. Med review sent to provider for recommendations.

## 2017-08-03 ENCOUNTER — RESULTS ONLY (OUTPATIENT)
Dept: OTHER | Facility: CLINIC | Age: 22
End: 2017-08-03

## 2017-08-03 DIAGNOSIS — Z94.0 LIVING-DONOR KIDNEY TRANSPLANT RECIPIENT: Primary | ICD-10-CM

## 2017-08-03 DIAGNOSIS — Z48.298 AFTERCARE FOLLOWING ORGAN TRANSPLANT: ICD-10-CM

## 2017-08-03 DIAGNOSIS — Z94.0 KIDNEY REPLACED BY TRANSPLANT: ICD-10-CM

## 2017-08-03 PROCEDURE — 86832 HLA CLASS I HIGH DEFIN QUAL: CPT | Performed by: INTERNAL MEDICINE

## 2017-08-03 PROCEDURE — 86833 HLA CLASS II HIGH DEFIN QUAL: CPT | Performed by: INTERNAL MEDICINE

## 2017-08-03 PROCEDURE — 86828 HLA CLASS I&II ANTIBODY QUAL: CPT | Performed by: INTERNAL MEDICINE

## 2017-08-03 RX ORDER — AMLODIPINE BESYLATE 5 MG/1
10 TABLET ORAL EVERY EVENING
Qty: 60 TABLET | Refills: 1 | Status: SHIPPED | OUTPATIENT
Start: 2017-08-03 | End: 2017-09-11

## 2017-08-04 DIAGNOSIS — E83.42 HYPOMAGNESEMIA: ICD-10-CM

## 2017-08-04 DIAGNOSIS — Z94.0 KIDNEY TRANSPLANT RECIPIENT: Primary | ICD-10-CM

## 2017-08-04 DIAGNOSIS — Z79.60 LONG-TERM USE OF IMMUNOSUPPRESSANT MEDICATION: ICD-10-CM

## 2017-08-04 LAB
DONOR IDENTIFICATION: NORMAL
DSA COMMENTS: NORMAL
DSA PRESENT: NO
DSA TEST METHOD: NORMAL
INTERFERING SUBST TEST METHOD: NORMAL
INTERFERING SUBST TEST METHOD: NORMAL
INTERFERING SUBSTANCE COMMENT: NORMAL
INTERFERING SUBSTANCE COMMENT: NORMAL
INTERFERING SUBSTANCE RESULT: NORMAL
INTERFERING SUBSTANCE RESULT: NORMAL
INTERFERING SUBSTANCE: NORMAL
INTERFERING SUBSTANCE: NORMAL
ORGAN: NORMAL
SA1 CELL: NORMAL
SA1 COMMENTS: NORMAL
SA1 HI RISK ABY: NORMAL
SA1 MOD RISK ABY: NORMAL
SA1 TEST METHOD: NORMAL
SA2 CELL: NORMAL
SA2 COMMENTS: NORMAL
SA2 HI RISK ABY UA: NORMAL
SA2 MOD RISK ABY: NORMAL
SA2 TEST METHOD: NORMAL

## 2017-08-04 NOTE — TELEPHONE ENCOUNTER
Patient reports he has been taking valcyte, 900 mg daily.  Patient had held mag, but mag below goal. Patient instructed to resume mag 400mg BID - report if any diarrhea.    Patient voiced understanding.

## 2017-08-07 RX ORDER — VALGANCICLOVIR 450 MG/1
900 TABLET, FILM COATED ORAL DAILY
Qty: 60 TABLET | Refills: 1 | Status: SHIPPED | OUTPATIENT
Start: 2017-08-07 | End: 2017-09-05

## 2017-08-07 RX ORDER — MAGNESIUM OXIDE 400 MG/1
400 TABLET ORAL 2 TIMES DAILY
Qty: 60 TABLET | Refills: 1 | Status: SHIPPED | OUTPATIENT
Start: 2017-08-07 | End: 2018-01-29

## 2017-08-09 ENCOUNTER — TELEPHONE (OUTPATIENT)
Dept: TRANSPLANT | Facility: CLINIC | Age: 22
End: 2017-08-09

## 2017-08-09 NOTE — TELEPHONE ENCOUNTER
Spoke to patient regarding BP record, patient states that his BP readings have been 130-140/70-80 after increasing Amlodipine dose.  Encouraged patient increase his phosphorus intake and not restart phosphorous supplement.   Patient confirms that he is still taking Magnesium.  Patient states that he is continuing to have diarrhea.

## 2017-08-10 LAB — PRA DONOR SPECIFIC ABY: NORMAL

## 2017-08-11 NOTE — LETTER
PHYSICIAN ORDERS      DATE & TIME ISSUED: 2017 10:24 AM  PATIENT NAME: Golden Stover   : 1995     [facility] MR#  [id number]     DIAGNOSIS:  Kidney Transplant  ICD-10 CODE: Z94.0     Please complete the following labs on or around 2017:  PTH  Vitamin D      Any questions please call: [phone]    Please fax these results to [phone].    .

## 2017-08-17 ENCOUNTER — TELEPHONE (OUTPATIENT)
Dept: TRANSPLANT | Facility: CLINIC | Age: 22
End: 2017-08-17

## 2017-08-17 DIAGNOSIS — Z94.0 LIVING-DONOR KIDNEY TRANSPLANT RECIPIENT: ICD-10-CM

## 2017-08-17 RX ORDER — TACROLIMUS 1 MG/1
6 CAPSULE ORAL 2 TIMES DAILY
Qty: 360 CAPSULE | Refills: 11 | Status: SHIPPED | OUTPATIENT
Start: 2017-08-17 | End: 2017-11-10

## 2017-08-17 RX ORDER — TACROLIMUS 0.5 MG/1
CAPSULE ORAL
Qty: 60 CAPSULE | Refills: 11 | Status: SHIPPED | OUTPATIENT
Start: 2017-08-17 | End: 2017-11-10

## 2017-08-17 NOTE — TELEPHONE ENCOUNTER
Tac above goal. Current dose 6.5mg BID.    Patient confirms dose and trough. Diarrhea has improved, 2-3 stools daily, loose. Still taking mag ox 400mg BID.    DECREASE tac to 6mg BID - recheck with next scheduled lab draw. Educated patient on mag rich food to increase mag levels.

## 2017-08-18 ENCOUNTER — TELEPHONE (OUTPATIENT)
Dept: TRANSPLANT | Facility: CLINIC | Age: 22
End: 2017-08-18

## 2017-08-23 ENCOUNTER — TELEPHONE (OUTPATIENT)
Dept: TRANSPLANT | Facility: CLINIC | Age: 22
End: 2017-08-23

## 2017-08-23 NOTE — TELEPHONE ENCOUNTER
Golden called stating that his lab did not have order for MPA level to be drawn 8/21. CC explained that the orders were on the fax previously sent to [facility] - they may modify the orders for their system, but if they review the original document we sent, MPA is to be drawn weekly through 90days post transplant.   Golden will have level drawn tomorrow with this information.    Golden asked about creat elevation of 1.56 on 8/17. CC explained it was consistent with elevated tac level. Creat and tac both at goal on 8/21 draw.

## 2017-08-28 ENCOUNTER — TELEPHONE (OUTPATIENT)
Dept: TRANSPLANT | Facility: CLINIC | Age: 22
End: 2017-08-28

## 2017-08-28 NOTE — TELEPHONE ENCOUNTER
Golden called with updated lab info. Will refax lab orders to the correct lab.  Golden was also concerned that his BP has trended upward after starting norvasc at 10mg. Will review at acute review meeting tomorrow.  BP lo/23 144/90 146/87   155/60  146/90

## 2017-08-29 DIAGNOSIS — Z94.0 LIVING-DONOR KIDNEY TRANSPLANT RECIPIENT: Primary | ICD-10-CM

## 2017-08-29 DIAGNOSIS — Z94.0 KIDNEY TRANSPLANTED: Primary | ICD-10-CM

## 2017-08-29 RX ORDER — LOSARTAN POTASSIUM 25 MG/1
25 TABLET ORAL DAILY
Qty: 30 TABLET | Refills: 3 | Status: SHIPPED | OUTPATIENT
Start: 2017-08-29 | End: 2017-12-20

## 2017-08-29 NOTE — TELEPHONE ENCOUNTER
Patient reviewed at the acute kidney meeting today.  Patient has concerns regarding his increase BP.  Txp team recommends starting Losartan 25 mg daily, spoke to patient who verbalizes understanding.

## 2017-08-29 NOTE — LETTER
OUTPATIENT LABORATORY TEST ORDER    Patient Name:Golden Stover   Transplant Date: 6/7/2017  YOB: 1995  Issue Date & Time: 8/29/2017  10:28 AM  [facility] MR: [id number]  Expiration Date:  (1 year after date issued)        Diagnoses: Aftercare following organ transplant (ICD-10 Z48.288)   Kidney Transplant (ICD-10 Z94.0)   Long term use of medications (ICD-10  Z79.899)     ?Lab results to be available on the same day drawn.   ?Patient should release information to the [facility], [facility].  ?Please fax to the Transplant Center at [phone].    2x/week, Months 2-3 post-transplant    7/8/2017 - 9/8/2017       1x/week, Months 4-6 post-transplant    9/9/2017 - 12/9/2017  Every 2 weeks, Months 7-9 post-transplant    12/10/2017 - 3/10/2018  Every 3 weeks, Months 10-12 post-transplant   3/11/2018 - 6/7/2018       ?Hemogram and Platelet   ?Basic Metabolic Panel (Sodium, Potassium,Chloride, CO2, Creatinine, Urea Nitrogen, Glucose, Calcium)   ?Prograf/Tacrolimus drug level     Weekly through first 3 months post-transplant    6/7/2017 - 9/7/2017   ?Phosphorus, Magnesium   ?MPA level (mycophenolic acid) once per week for first 3 months.    ?Please add a diff to hemogram once per week for the first 3 months.    Biweekly   ?EBV PCR QT    Monthly   ? BK PCR Quantitative (Polyoma virus - blood in purple top tube to Reference Lab)    At 6 & 12 months post-transplant         12/2017 & 6/2017   ? HBsurfaceAb, HBcoreAb, HCV at 6 months only -   ? Liver Function Tests(Bilirubin Direct/Total, AST, ALT, Alkaline Phosphatase)   ?Complete Lipid Panel fasting (Cholesterol, Triglycerides, HDL, LDL)   ? Random Urine for Protein/Creatinineratio    At month(s)  2, 4, 6, 9, 12      8/2017, 10/2017, 12/2017, 3/2018, 6/2018                  ? PRA/DSA level (mailers provided by the patient)                    If you have any questions, please call The Transplant Center at [phone]  or [phone].    Please faxall results to [phone].  .

## 2017-08-31 RX ORDER — VALGANCICLOVIR 450 MG/1
900 TABLET, FILM COATED ORAL DAILY
Qty: 60 TABLET | Refills: 1 | Status: SHIPPED | OUTPATIENT
Start: 2017-08-31 | End: 2017-09-11

## 2017-08-31 NOTE — LETTER
OUTPATIENT LABORATORY TEST ORDER    Patient Name:Golden Stover   Transplant Date: 6/7/2017  YOB: 1995  Issue Date & Time: 8/29/2017  10:28 AM  [facility] MR: [id number]  Expiration Date:  (1 year after date issued)        Diagnoses: Aftercare following organ transplant (ICD-10 Z48.288)   Kidney Transplant (ICD-10 Z94.0)   Long term use of medications (ICD-10  Z79.899)     ?Lab results to be available on the same day drawn.   ?Patient should release information to the [facility], [facility].  ?Please fax to the Transplant Center at [phone].    2x/week, Months 2-3 post-transplant    7/8/2017 - 9/8/2017       1x/week, Months 4-6 post-transplant    9/9/2017 - 12/9/2017  Every 2 weeks, Months 7-9 post-transplant    12/10/2017 - 3/10/2018  Every 3 weeks, Months 10-12 post-transplant   3/11/2018 - 6/7/2018       ?Hemogram and Platelet   ?Basic Metabolic Panel (Sodium, Potassium,Chloride, CO2, Creatinine, Urea Nitrogen, Glucose, Calcium)   ?Prograf/Tacrolimus drug level     Weekly through first 3 months post-transplant    6/7/2017 - 9/7/2017   ?Phosphorus, Magnesium   ?MPA level (mycophenolic acid) once per week for first 3 months.    ?Please add a diff to hemogram once per week for the first 3 months.    Biweekly   ?EBV PCR QT    Monthly   ? BK PCR Quantitative (Polyoma virus - blood in purple top tube to Reference Lab)    At 6 & 12 months post-transplant         12/2017 & 6/2017   ? HBsurfaceAb, HBcoreAb, HCV at 6 months only -   ? Liver Function Tests(Bilirubin Direct/Total, AST, ALT, Alkaline Phosphatase)   ?Complete Lipid Panel fasting (Cholesterol, Triglycerides, HDL, LDL)   ? Random Urine for Protein/Creatinineratio    At month(s)  2, 4, 6, 9, 12      8/2017, 10/2017, 12/2017, 3/2018, 6/2018                  ? PRA/DSA level (mailers provided by the patient)                    If you have any questions, please call The Transplant Center at [phone]  or [phone].    Please faxall results to [phone].  .

## 2017-09-05 ENCOUNTER — TELEPHONE (OUTPATIENT)
Dept: TRANSPLANT | Facility: CLINIC | Age: 22
End: 2017-09-05

## 2017-09-11 ENCOUNTER — RECORDS - HEALTHEAST (OUTPATIENT)
Dept: ADMINISTRATIVE | Facility: OTHER | Age: 22
End: 2017-09-11

## 2017-09-11 ENCOUNTER — OFFICE VISIT (OUTPATIENT)
Dept: NEPHROLOGY | Facility: CLINIC | Age: 22
End: 2017-09-11
Attending: INTERNAL MEDICINE
Payer: COMMERCIAL

## 2017-09-11 VITALS
WEIGHT: 227.2 LBS | OXYGEN SATURATION: 95 % | HEART RATE: 78 BPM | HEIGHT: 74 IN | SYSTOLIC BLOOD PRESSURE: 134 MMHG | DIASTOLIC BLOOD PRESSURE: 76 MMHG | BODY MASS INDEX: 29.16 KG/M2

## 2017-09-11 DIAGNOSIS — E83.42 HYPOMAGNESEMIA: ICD-10-CM

## 2017-09-11 DIAGNOSIS — Z48.298 AFTERCARE FOLLOWING ORGAN TRANSPLANT: ICD-10-CM

## 2017-09-11 DIAGNOSIS — N25.81 SECONDARY RENAL HYPERPARATHYROIDISM (H): ICD-10-CM

## 2017-09-11 DIAGNOSIS — I15.1 HYPERTENSION SECONDARY TO OTHER RENAL DISORDERS: ICD-10-CM

## 2017-09-11 DIAGNOSIS — D63.1 ANEMIA IN STAGE 3 CHRONIC KIDNEY DISEASE (H): ICD-10-CM

## 2017-09-11 DIAGNOSIS — I15.1 HYPERTENSION SECONDARY TO OTHER RENAL DISORDERS (CODE): Primary | ICD-10-CM

## 2017-09-11 DIAGNOSIS — Z94.0 KIDNEY REPLACED BY TRANSPLANT: ICD-10-CM

## 2017-09-11 DIAGNOSIS — N18.30 ANEMIA IN STAGE 3 CHRONIC KIDNEY DISEASE (H): ICD-10-CM

## 2017-09-11 DIAGNOSIS — D84.9 IMMUNOSUPPRESSED STATUS (H): ICD-10-CM

## 2017-09-11 PROBLEM — E83.39 HYPOPHOSPHATEMIA: Status: RESOLVED | Noted: 2017-06-13 | Resolved: 2017-09-11

## 2017-09-11 LAB
ANION GAP SERPL CALCULATED.3IONS-SCNC: 7 MMOL/L (ref 3–14)
BUN SERPL-MCNC: 12 MG/DL (ref 7–30)
CALCIUM SERPL-MCNC: 9.2 MG/DL (ref 8.5–10.1)
CHLORIDE SERPL-SCNC: 105 MMOL/L (ref 94–109)
CO2 SERPL-SCNC: 24 MMOL/L (ref 20–32)
CREAT SERPL-MCNC: 1.33 MG/DL (ref 0.66–1.25)
ERYTHROCYTE [DISTWIDTH] IN BLOOD BY AUTOMATED COUNT: 13.3 % (ref 10–15)
GFR SERPL CREATININE-BSD FRML MDRD: 67 ML/MIN/1.7M2
GLUCOSE SERPL-MCNC: 93 MG/DL (ref 70–99)
HCT VFR BLD AUTO: 34.4 % (ref 40–53)
HGB BLD-MCNC: 11.2 G/DL (ref 13.3–17.7)
MCH RBC QN AUTO: 29.9 PG (ref 26.5–33)
MCHC RBC AUTO-ENTMCNC: 32.6 G/DL (ref 31.5–36.5)
MCV RBC AUTO: 92 FL (ref 78–100)
PLATELET # BLD AUTO: 432 10E9/L (ref 150–450)
POTASSIUM SERPL-SCNC: 3.9 MMOL/L (ref 3.4–5.3)
PTH-INTACT SERPL-MCNC: 132 PG/ML (ref 12–72)
RBC # BLD AUTO: 3.75 10E12/L (ref 4.4–5.9)
SODIUM SERPL-SCNC: 136 MMOL/L (ref 133–144)
WBC # BLD AUTO: 3.9 10E9/L (ref 4–11)

## 2017-09-11 PROCEDURE — 85027 COMPLETE CBC AUTOMATED: CPT | Performed by: INTERNAL MEDICINE

## 2017-09-11 PROCEDURE — 87799 DETECT AGENT NOS DNA QUANT: CPT | Performed by: INTERNAL MEDICINE

## 2017-09-11 PROCEDURE — 80048 BASIC METABOLIC PNL TOTAL CA: CPT | Performed by: INTERNAL MEDICINE

## 2017-09-11 PROCEDURE — 83970 ASSAY OF PARATHORMONE: CPT | Performed by: INTERNAL MEDICINE

## 2017-09-11 PROCEDURE — 99212 OFFICE O/P EST SF 10 MIN: CPT | Mod: ZF

## 2017-09-11 PROCEDURE — 36415 COLL VENOUS BLD VENIPUNCTURE: CPT | Performed by: INTERNAL MEDICINE

## 2017-09-11 RX ORDER — OXYCODONE HYDROCHLORIDE 5 MG/1
5-10 TABLET ORAL PRN
COMMUNITY
Start: 2017-06-12 | End: 2017-09-11

## 2017-09-11 RX ORDER — HYDROXYZINE PAMOATE 25 MG/1
25 CAPSULE ORAL PRN
COMMUNITY
Start: 2017-06-12 | End: 2017-09-11

## 2017-09-11 RX ORDER — AMLODIPINE BESYLATE 10 MG/1
10 TABLET ORAL DAILY
Qty: 90 TABLET | Refills: 3 | Status: SHIPPED | OUTPATIENT
Start: 2017-09-11 | End: 2018-09-09

## 2017-09-11 RX ORDER — ACETAMINOPHEN 325 MG/1
650 TABLET ORAL PRN
COMMUNITY
Start: 2017-06-12 | End: 2017-09-11

## 2017-09-11 ASSESSMENT — PAIN SCALES - GENERAL: PAINLEVEL: NO PAIN (0)

## 2017-09-11 NOTE — LETTER
9/11/2017      RE: Golden Stover  [address]  [place] [address]       Assessment and Plan:  1. LDKT - baseline Cr ~ 1.3-1.5, which has remained stable.  No DSA.  Will make no changes in immunosuppression.  2. HTN - well controlled at target of less than 140/90.  No changes.  3. Anemia in chronic renal disease - stable Hgb.  Iron replete.  Will follow.  4. Secondary renal hyperparathyroidism - moderately elevated PTH, which should improve post transplant.  Will continue on calcitriol and recheck PTH with today's labs.  5. Hypomagnesemia - slightly low serum magnesium level and will continue on oral magnesium supplement.  6. Prophylactic medications - patient can stop Valcyte.  7. Recommend return visit in 3 months.    Assessment and plan was discussed with patient and he voiced his understanding and agreement.    Reason for Visit:  Mr. Stover is here for routine follow up.    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         Transplant Hx:       Tx: LDKT  Date: 6/7/17       Present Maintenance IS: Tacrolimus and Mycophenolate mofetil       Baseline Creatinine: 1.3-1.5       Recent DSA: No  Date last checked: 8/2017       Biopsy: No    Mr. Stover reports feeling good overall with minimal medical complaints.  His energy level is good and pretty much normal now.  He is active and does get some exercise, even jogging.  Denies any chest pain or shortness of breath with exertion.  Appetite is good and he has gained about 10 lbs.  No nausea, vomiting or diarrhea.  No fever, sweats or chills.  No leg swelling.  No problems with medications.    Home BP: 130/70s.      ROS:   A comprehensive review of systems was obtained and negative, except as noted in the HPI or PMH.    Active Medical Problems:  Patient Active Problem List   Diagnosis     IgA nephropathy     Hypertension secondary to other renal disorders     Anemia in chronic kidney  disease     Secondary renal hyperparathyroidism (H)     Kidney replaced by transplant     Immunosuppressed status (H)     Aftercare following organ transplant     Hypomagnesemia       Personal Hx:  Social History     Social History     Marital status: Single     Spouse name: N/A     Number of children: N/A     Years of education: N/A     Occupational History     Not on file.     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 Authorizing Provider   oxyCODONE (ROXICODONE) 5 MG IR tablet Take 1-2 tablets (5-10 mg) by mouth every 4 hours as needed for moderate to severe pain 6/12/17   Heidi Black PA-C   acetaminophen (TYLENOL) 325 MG tablet Take 2 tablets (650 mg) by mouth every 6 hours as needed for mild pain 6/12/17   Heidi Black PA-C   mycophenolate (CELLCEPT - GENERIC EQUIVALENT) 250 MG capsule Take 3 capsules (750 mg) by mouth 2 times daily 6/12/17   Heidi Black PA-C   tacrolimus (PROGRAF - GENERIC EQUIVALENT) 1 MG capsule Take 3 capsules (3 mg) by mouth 2 times daily 6/12/17   Heidi Black PA-C   phosphorus tablet 250 mg (K PHOS NEUTRAL) 250 MG per tablet Take 2 tablets (500 mg) by mouth 2 times daily 6/12/17 7/12/17  Heidi Black PA-C   calcitRIOL  "(ROCALTROL) 0.25 MCG capsule Take 1 capsule (0.25 mcg) by mouth daily 6/12/17   Heidi Black PA-C   valGANciclovir (VALCYTE) 450 MG tablet Take 2 tablets (900 mg) by mouth daily 6/12/17   Heidi Blakc PA-C   hydrOXYzine (VISTARIL) 25 MG capsule Take 1 capsule (25 mg) by mouth 3 times daily as needed for itching 6/12/17   Heidi Black PA-C   docusate sodium (COLACE) 100 MG tablet Take 100 mg by mouth daily 6/12/17   Heidi Black PA-C   dapsone 100 MG tablet Take 1 tablet (100 mg) by mouth daily 6/12/17   Heidi Black PA-C       Vitals:  /76  Pulse 78  Ht 1.88 m (6' 2\")  Wt 103.1 kg (227 lb 3.2 oz)  SpO2 95%  BMI 29.17 kg/m2    Exam:   GENERAL APPEARANCE: alert and no distress  HENT: mouth without ulcers or lesions  LYMPHATICS: no cervical or supraclavicular nodes  RESP: lungs clear to auscultation - no rales, rhonchi or wheezes  CV: regular rhythm, normal rate, no rub, no murmur  EDEMA: no LE edema bilaterally  ABDOMEN: soft, nondistended, nontender, bowel sounds normal  MS: extremities normal - no gross deformities noted, no evidence of inflammation in joints, no muscle tenderness  SKIN: no rash  TX KIDNEY: normal    Results:   Recent Results (from the past 8 hour(s))   CBC with platelets    Collection Time: 09/11/17 12:32 PM   Result Value Ref Range    WBC 3.9 (L) 4.0 - 11.0 10e9/L    RBC Count 3.75 (L) 4.4 - 5.9 10e12/L    Hemoglobin 11.2 (L) 13.3 - 17.7 g/dL    Hematocrit 34.4 (L) 40.0 - 53.0 %    MCV 92 78 - 100 fl    MCH 29.9 26.5 - 33.0 pg    MCHC 32.6 31.5 - 36.5 g/dL    RDW 13.3 10.0 - 15.0 %    Platelet Count 432 150 - 450 10e9/L   Basic metabolic panel    Collection Time: 09/11/17 12:32 PM   Result Value Ref Range    Sodium 136 133 - 144 mmol/L    Potassium 3.9 3.4 - 5.3 mmol/L    Chloride 105 94 - 109 mmol/L    Carbon Dioxide 24 20 - 32 mmol/L    Anion Gap 7 3 - 14 mmol/L    Glucose 93 70 - 99 mg/dL    Urea Nitrogen 12 7 - 30 mg/dL    Creatinine 1.33 (H) 0.66 - " 1.25 mg/dL    GFR Estimate 67 >60 mL/min/1.7m2    GFR Estimate If Black 81 >60 mL/min/1.7m2    Calcium 9.2 8.5 - 10.1 mg/dL       Jak King MD

## 2017-09-11 NOTE — NURSING NOTE
"Chief Complaint   Patient presents with     RECHECK     Follow up kidney transplant.       Initial /76  Pulse 78  Ht 1.88 m (6' 2\")  Wt 103.1 kg (227 lb 3.2 oz)  SpO2 95%  BMI 29.17 kg/m2 Estimated body mass index is 29.17 kg/(m^2) as calculated from the following:    Height as of this encounter: 1.88 m (6' 2\").    Weight as of this encounter: 103.1 kg (227 lb 3.2 oz).  Medication Reconciliation: complete   Marily Martini., CMA    "

## 2017-09-11 NOTE — PROGRESS NOTES
Assessment and Plan:  1. LDKT - baseline Cr ~ 1.3-1.5, which has remained stable.  No DSA.  Will make no changes in immunosuppression.  2. HTN - well controlled at target of less than 140/90.  No changes.  3. Anemia in chronic renal disease - stable Hgb.  Iron replete.  Will follow.  4. Secondary renal hyperparathyroidism - moderately elevated PTH, which should improve post transplant.  Will continue on calcitriol and recheck PTH with today's labs.  5. Hypomagnesemia - slightly low serum magnesium level and will continue on oral magnesium supplement.  6. Prophylactic medications - patient can stop Valcyte.  7. Recommend return visit in 3 months.    Assessment and plan was discussed with patient and he voiced his understanding and agreement.    Reason for Visit:  Mr. Stover is here for routine follow up.    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         Transplant Hx:       Tx: LDKT  Date: 6/7/17       Present Maintenance IS: Tacrolimus and Mycophenolate mofetil       Baseline Creatinine: 1.3-1.5       Recent DSA: No  Date last checked: 8/2017       Biopsy: No    Mr. Stover reports feeling good overall with minimal medical complaints.  His energy level is good and pretty much normal now.  He is active and does get some exercise, even jogging.  Denies any chest pain or shortness of breath with exertion.  Appetite is good and he has gained about 10 lbs.  No nausea, vomiting or diarrhea.  No fever, sweats or chills.  No leg swelling.  No problems with medications.    Home BP: 130/70s.      ROS:   A comprehensive review of systems was obtained and negative, except as noted in the HPI or PMH.    Active Medical Problems:  Patient Active Problem List   Diagnosis     IgA nephropathy     Hypertension secondary to other renal disorders     Anemia in chronic kidney disease     Secondary renal hyperparathyroidism (H)     Kidney replaced by transplant      Immunosuppressed status (H)     Aftercare following organ transplant     Hypomagnesemia       Personal Hx:  Social History     Social History     Marital status: Single     Spouse name: N/A     Number of children: N/A     Years of education: N/A     Occupational History     Not on file.     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 Authorizing Provider   oxyCODONE (ROXICODONE) 5 MG IR tablet Take 1-2 tablets (5-10 mg) by mouth every 4 hours as needed for moderate to severe pain 6/12/17   Heidi Black PA-C   acetaminophen (TYLENOL) 325 MG tablet Take 2 tablets (650 mg) by mouth every 6 hours as needed for mild pain 6/12/17   Heidi Black PA-C   mycophenolate (CELLCEPT - GENERIC EQUIVALENT) 250 MG capsule Take 3 capsules (750 mg) by mouth 2 times daily 6/12/17   Heidi Black PA-C   tacrolimus (PROGRAF - GENERIC EQUIVALENT) 1 MG capsule Take 3 capsules (3 mg) by mouth 2 times daily 6/12/17   Heidi Black PA-C   phosphorus tablet 250 mg (K PHOS NEUTRAL) 250 MG per tablet Take 2 tablets (500 mg) by mouth 2 times daily 6/12/17 7/12/17  Heidi Black PA-C   calcitRIOL (ROCALTROL) 0.25 MCG capsule Take 1 capsule (0.25 mcg) by mouth daily 6/12/17   Wayne  "Heidi Preston PA-C   valGANciclovir (VALCYTE) 450 MG tablet Take 2 tablets (900 mg) by mouth daily 6/12/17   eHidi Black PA-C   hydrOXYzine (VISTARIL) 25 MG capsule Take 1 capsule (25 mg) by mouth 3 times daily as needed for itching 6/12/17   Heidi Black PA-C   docusate sodium (COLACE) 100 MG tablet Take 100 mg by mouth daily 6/12/17   Heidi Black PA-C   dapsone 100 MG tablet Take 1 tablet (100 mg) by mouth daily 6/12/17   Heidi Black PA-C       Vitals:  /76  Pulse 78  Ht 1.88 m (6' 2\")  Wt 103.1 kg (227 lb 3.2 oz)  SpO2 95%  BMI 29.17 kg/m2    Exam:   GENERAL APPEARANCE: alert and no distress  HENT: mouth without ulcers or lesions  LYMPHATICS: no cervical or supraclavicular nodes  RESP: lungs clear to auscultation - no rales, rhonchi or wheezes  CV: regular rhythm, normal rate, no rub, no murmur  EDEMA: no LE edema bilaterally  ABDOMEN: soft, nondistended, nontender, bowel sounds normal  MS: extremities normal - no gross deformities noted, no evidence of inflammation in joints, no muscle tenderness  SKIN: no rash  TX KIDNEY: normal    Results:   Recent Results (from the past 8 hour(s))   CBC with platelets    Collection Time: 09/11/17 12:32 PM   Result Value Ref Range    WBC 3.9 (L) 4.0 - 11.0 10e9/L    RBC Count 3.75 (L) 4.4 - 5.9 10e12/L    Hemoglobin 11.2 (L) 13.3 - 17.7 g/dL    Hematocrit 34.4 (L) 40.0 - 53.0 %    MCV 92 78 - 100 fl    MCH 29.9 26.5 - 33.0 pg    MCHC 32.6 31.5 - 36.5 g/dL    RDW 13.3 10.0 - 15.0 %    Platelet Count 432 150 - 450 10e9/L   Basic metabolic panel    Collection Time: 09/11/17 12:32 PM   Result Value Ref Range    Sodium 136 133 - 144 mmol/L    Potassium 3.9 3.4 - 5.3 mmol/L    Chloride 105 94 - 109 mmol/L    Carbon Dioxide 24 20 - 32 mmol/L    Anion Gap 7 3 - 14 mmol/L    Glucose 93 70 - 99 mg/dL    Urea Nitrogen 12 7 - 30 mg/dL    Creatinine 1.33 (H) 0.66 - 1.25 mg/dL    GFR Estimate 67 >60 mL/min/1.7m2    GFR Estimate If Black 81 >60 " mL/min/1.7m2    Calcium 9.2 8.5 - 10.1 mg/dL

## 2017-09-11 NOTE — MR AVS SNAPSHOT
After Visit Summary   9/11/2017    Golden Stover    MRN: [id number]           Patient Information     Date Of Birth          1995        Visit Information        Provider Department      9/11/2017 1:05 PM Jak King MD [facility] Nephrology        Today's Diagnoses     Hypertension secondary to other renal disorders (CODE)    -  1    Secondary renal hyperparathyroidism (H)           Follow-ups after your visit        Follow-up notes from your care team     Return in about 3 months (around 12/11/2017).      Your next 10 appointments already scheduled     Sep 11, 2017  1:30 PM CDT   Lab with  LAB   [facility] ([facility])    [address]  1st Floor  [facility] [id number] [phone]            Jan 29, 2018  4:00 PM CST   (Arrive by 3:30 PM)   Return Kidney Transplant with Jak King MD   [facility] Nephrology ([facility])    [address]  3rd [facility] [id number] [phone]              Future tests that were ordered for you today     Open Future Orders        Priority Expected Expires Ordered    Mycophenolic acid Routine  9/11/2018 9/11/2017    Tacrolimus level Routine  9/11/2018 9/11/2017            Who to contact     If you have questions or need follow up information about today's clinic visit or your schedule please contact [facility] NEPHROLOG[facility] directly at [phone].  Normal or non-critical lab and imaging results will be communicated to you by MyChart, letter or phone within 4 business days after the clinic has received the results. If you do not hear from us within 7 days, please contact the clinic through MyChart or phone. If you have a critical or abnormal lab result, we will notify you by phone as soon as possible.  Submit refill requests through [employer] or call your pharmacy and they will forward the refill request to us. Please allow 3 business days for your refill to  "be completed.          Additional Information About Your Visit        [email or web address] Information     [employer] gives you secure access to your electronic health record. If you see a primary care provider, you can also send messages to your care team and make appointments. If you have questions, please call your primary care clinic.  If you do not have a primary care provider, please call [phone] and they will assist you.        Care EveryWhere ID     This is your Care EveryWhere ID. This could be used by other organizations to access your [facility] medical records  [id number]        Your Vitals Were     Pulse Height Pulse Oximetry BMI (Body Mass Index)          78 1.88 m (6' 2\") 95% 29.17 kg/m2         Blood Pressure from Last 3 Encounters:   09/11/17 134/76   07/28/17 151/82   07/11/17 149/79    Weight from Last 3 Encounters:   09/11/17 103.1 kg (227 lb 3.2 oz)   07/11/17 98.2 kg (216 lb 9.6 oz)   06/14/17 97.5 kg (214 lb 14.4 oz)              We Performed the Following     Parathyroid Hormone Intact          Today's Medication Changes          These changes are accurate as of: 9/11/17  1:17 PM.  If you have any questions, ask your nurse or doctor.               These medicines have changed or have updated prescriptions.        Dose/Directions    amLODIPine 10 MG tablet   Commonly known as:  NORVASC   This may have changed:    - medication strength  - when to take this   Used for:  Hypertension secondary to other renal disorders (CODE), Secondary renal hyperparathyroidism (H)   Changed by:  Jak King MD        Dose:  10 mg   Take 1 tablet (10 mg) by mouth daily   Quantity:  90 tablet   Refills:  3         Stop taking these medicines if you haven't already. Please contact your care team if you have questions.     acetaminophen 325 MG tablet   Commonly known as:  TYLENOL   Stopped by:  Jak King MD           hydrOXYzine 25 MG capsule   Commonly known as:  VISTARIL   Stopped by:  Jak King" MD Javier           ondansetron 4 MG ODT tab   Commonly known as:  ZOFRAN ODT   Stopped by:  Jak King MD           oxyCODONE 5 MG IR tablet   Commonly known as:  ROXICODONE   Stopped by:  Jak King MD           valGANciclovir 450 MG tablet   Commonly known as:  VALCYTE   Stopped by:  Jak King MD                Where to get your medicines      These [facility] [address] [address] [place] [address]     Phone:  [phone]     amLODIPine 10 MG tablet                Primary Care Provider    Physician No Ref-Primary       No address on file        Equal Access to Services     [facility]: Hadii yasmeen vital hadasho Soomaali, waaxda luqadaha, qaybta kaalmada adeegyada, dewey hunter . So [facility] [phone].    ATENCIÓN: Si habla español, tiene a joseph disposición servicios gratuitos de asistencia lingüística. [facility] [phone].    We comply with applicable federal civil rights laws and [place] laws. We do not discriminate on the basis of race, color, national origin, age, disability sex, sexual orientation or gender identity.            Thank you!     Thank you for choosing [facility]PHROLOG[facility]  for your care. Our goal is always to provide you with excellent care. Hearing back from our patients is one way we can continue to improve our services. Please take a few minutes to complete the written survey that you may receive in the mail after your visit with us. Thank you!             Your Updated Medication List - Protect others around you: Learn how to safely use, store and throw away your medicines at www.disposemymeds.org.          This list is accurate as of: 9/11/17  1:17 PM.  Always use your most recent med list.                   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 renal disorders (CODE), Secondary renal hyperparathyroidism (H)       calcitRIOL 0.25 MCG capsule    ROCALTROL    30 capsule    Take 1 capsule (0.25 mcg) by mouth daily    Living-donor kidney transplant recipient       dapsone 100 MG tablet     30 tablet    Take 1 tablet (100 mg) by mouth daily    Living-donor kidney transplant recipient       docusate sodium 100 MG tablet    COLACE    60 tablet    Take 100 mg by mouth daily    Living-donor kidney transplant recipient       losartan 25 MG tablet    COZAAR    30 tablet    Take 1 tablet (25 mg) by mouth daily    Kidney transplanted       magnesium oxide 400 MG tablet    MAG-OX    60 tablet    Take 1 tablet (400 mg) by mouth 2 times daily    Hypomagnesemia, Kidney transplant recipient, Long-term use of immunosuppressant medication       mycophenolate 250 MG capsule    GENERIC EQUIVALENT    180 capsule    Take 3 capsules (750 mg) by mouth 2 times daily    Living-donor kidney transplant recipient       senna-docusate 8.6-50 MG per tablet    SENOKOT-S;PERICOLACE    100 tablet    Take 2 tablets by mouth daily as needed for constipation    Other constipation       * tacrolimus 0.5 MG capsule    GENERIC EQUIVALENT    60 capsule    HOLD. Dose change.    Living-donor kidney transplant recipient       * tacrolimus 1 MG capsule    GENERIC EQUIVALENT    360 capsule    Take 6 capsules (6 mg) by mouth 2 times daily Total dose = 6 mg BID Dose change.    Living-donor kidney transplant recipient       * Notice:  This list has 2 medication(s) that are the same as other medications prescribed for you. Read the directions carefully, and ask your doctor or other care provider to review them with you.

## 2017-09-11 NOTE — LETTER
September 11, 2017       TO: Golden Stover  [address] [place] [address]       Dear ,    We are writing to inform you of your test results.    Test results indicate you may require additional follow up, see comment below.    Resulted Orders   Parathyroid Hormone Intact   Result Value Ref Range    Parathyroid Hormone Intact 132 (H) 12 - 72 pg/mL       Decreased parathormone, now only mildly elevated, so I would recommend stopping calcitriol.     It was a pleasure to see you at your recent visit. Please contact us at [phone] if you have any questions or concerns. Thank you, we look forward to seeing you at your next visit.       Sincerely,    Jak King MD.   of Medicine  Division of Kidney Disease and Hypertension  [facility]phrology [facility]   Third Floor  [address] [place] [address]

## 2017-09-18 ENCOUNTER — HOSPITAL ENCOUNTER (OUTPATIENT)
Facility: CLINIC | Age: 22
Setting detail: SPECIMEN
Discharge: HOME | End: 2017-09-18
Admitting: INTERNAL MEDICINE
Payer: COMMERCIAL

## 2017-09-18 PROCEDURE — 87799 DETECT AGENT NOS DNA QUANT: CPT | Performed by: INTERNAL MEDICINE

## 2017-09-20 DIAGNOSIS — Z48.298 AFTERCARE FOLLOWING ORGAN TRANSPLANT: ICD-10-CM

## 2017-09-20 DIAGNOSIS — Z94.0 KIDNEY REPLACED BY TRANSPLANT: ICD-10-CM

## 2017-09-26 ENCOUNTER — RESULTS ONLY (OUTPATIENT)
Dept: OTHER | Facility: CLINIC | Age: 22
End: 2017-09-26

## 2017-09-26 DIAGNOSIS — Z94.0 KIDNEY REPLACED BY TRANSPLANT: ICD-10-CM

## 2017-09-26 DIAGNOSIS — Z48.298 AFTERCARE FOLLOWING ORGAN TRANSPLANT: ICD-10-CM

## 2017-09-26 PROCEDURE — 86832 HLA CLASS I HIGH DEFIN QUAL: CPT | Performed by: INTERNAL MEDICINE

## 2017-09-26 PROCEDURE — 86833 HLA CLASS II HIGH DEFIN QUAL: CPT | Performed by: INTERNAL MEDICINE

## 2017-09-27 DIAGNOSIS — Z94.0 LIVING-DONOR KIDNEY TRANSPLANT RECIPIENT: ICD-10-CM

## 2017-09-27 RX ORDER — DAPSONE 100 MG/1
100 TABLET ORAL DAILY
Qty: 30 TABLET | Refills: 3 | Status: SHIPPED | OUTPATIENT
Start: 2017-09-27 | End: 2018-01-26

## 2017-09-27 RX ORDER — DAPSONE 100 MG/1
100 TABLET ORAL DAILY
Qty: 30 TABLET | Refills: 3 | Status: SHIPPED | OUTPATIENT
Start: 2017-09-27 | End: 2017-09-27

## 2017-09-28 LAB — PRA DONOR SPECIFIC ABY: NORMAL

## 2017-09-29 ENCOUNTER — TELEPHONE (OUTPATIENT)
Dept: TRANSPLANT | Facility: CLINIC | Age: 22
End: 2017-09-29

## 2017-09-29 NOTE — TELEPHONE ENCOUNTER
Pt says recently Rx'd amoxicillin for strep, asking for confirmation safe to take d/t recent kidney transplant. Please advise at [phone].

## 2017-10-12 ENCOUNTER — TELEPHONE (OUTPATIENT)
Dept: TRANSPLANT | Facility: CLINIC | Age: 22
End: 2017-10-12

## 2017-10-12 NOTE — TELEPHONE ENCOUNTER
Received a VM from Golden - he has severe swelling, redness, and pain with walking in his right ankle.  Some swelling is starting on his left side as well.    He is having a US to r/o DVT performed at [facility] and will request results be sent to [facility].

## 2017-10-17 ENCOUNTER — TELEPHONE (OUTPATIENT)
Dept: TRANSPLANT | Facility: CLINIC | Age: 22
End: 2017-10-17

## 2017-10-17 ENCOUNTER — COMMITTEE REVIEW (OUTPATIENT)
Dept: TRANSPLANT | Facility: CLINIC | Age: 22
End: 2017-10-17

## 2017-10-17 DIAGNOSIS — M79.89 OTHER SPECIFIED SOFT TISSUE DISORDERS (CODE): ICD-10-CM

## 2017-10-17 DIAGNOSIS — I82.409 DVT (DEEP VENOUS THROMBOSIS) (H): ICD-10-CM

## 2017-10-17 DIAGNOSIS — Z94.0 KIDNEY REPLACED BY TRANSPLANT: Primary | ICD-10-CM

## 2017-10-17 DIAGNOSIS — M25.472 SWELLING OF ANKLE, LEFT: ICD-10-CM

## 2017-10-17 NOTE — LETTER
OUTPATIENT LABORATORY TEST ORDER    Patient Name:Golden Stover   Transplant Date: 6/7/2017  YOB: 1995  Issue Date & Time: 10/17/2017  10:02 AM  [facility] MR: [id number]  Expiration Date:  (1 year after date issued)     Diagnoses: Aftercare following organ transplant (ICD-10 Z48.288)   Kidney Transplant (ICD-10 Z94.0)   Long term use of medications (ICD-10  Z79.899)     ?Lab results to be available on the same day drawn.   ?Patient should release information to the [facility].     ?Please fax to the Transplant Center at [phone].    1x/week, Months 4-6 post-transplant    9/9/2017 - 12/9/2017  Every 2 weeks, Months 7-9 post-transplant    12/10/2017 - 3/10/2018  Every 3 weeks, Months 10-12 post-transplant   3/11/2018 - 6/7/2018       ?Hemogram and Platelet   ?Basic Metabolic Panel (Sodium, Potassium,Chloride, CO2, Creatinine, Urea Nitrogen, Glucose, Calcium)   ?Prograf/Tacrolimus drug level     Biweekly   ?EBV PCR QT    Monthly   Due: NOW   ? BK PCR Quantitative (Polyoma virus - blood in purple top tube to Reference Lab)    At 6 & 12 months post-transplant         12/2017 & 6/2017   ? HBsurfaceAb, HBcoreAb, HCV at 6 months only -   ? Liver Function Tests(Bilirubin Direct/Total, AST, ALT, Alkaline Phosphatase)   ?Complete Lipid Panel fasting (Cholesterol, Triglycerides, HDL, LDL)   ? Random Urine for Protein/Creatinineratio    At month(s)   4, 6, 9, 12       10/2017, 12/2017, 3/2018, 6/2018                  ? PRA/DSA level (mailers provided by the patient)                    If you have any questions, please call The Transplant Center at [phone] or [phone].    Please faxall results to [phone].  .

## 2017-10-17 NOTE — TELEPHONE ENCOUNTER
I would still do it.  Why would he have cellulitis, other than immunosuppression.            Previous Messages       ----- Message -----      From: Maddy Nagel RN      Sent: 10/17/2017  12:28 PM        To: Jak King MD   Subject: Kidney US                                         I talked with Golden, he stated the outside MD started him on augmentin for cellulitis. Swelling and pain is slightly improved on day 3 of therapy.     I presume no US.     Just an FYI.   Grace

## 2017-10-17 NOTE — COMMITTEE REVIEW
Abdominal Patient Discussion Note Transplant Coordinator: Maddy Nagel  Transplant Surgeon:       Referring Physician: Jose Alberto Krishnamurthy    Committee Review Members:  Nephrology Luis Patricia MD, Morris Caraballo MD   Transplant Jak Javier King MD, Rolando Dimas MD       Additional Discussion Notes and Findings: Discussed at the acute kidney meeting.  Txp team recommends ultrasound with doppler needed.  Patient due for BK, updated current txp labs.  Manuela Owens LPN    Discussed plan with Golden. Golden stated that the outside hospital determined that there was no DVT, but he is being treated with Augmentin for cellulitis.

## 2017-10-17 NOTE — TELEPHONE ENCOUNTER
Provider Call: Transplant Lab  Facility Name: [facility]  Facility Location: [place], [place]  Reason for Call: Missing labs  Callback needed? No     Labs should have been drawn 10/9. Any results?  Thanks!

## 2017-10-18 LAB
DONOR IDENTIFICATION: NORMAL
DSA COMMENTS: NORMAL
DSA PRESENT: NO
DSA TEST METHOD: NORMAL
ORGAN: NORMAL
SA1 CELL: NORMAL
SA1 COMMENTS: NORMAL
SA1 HI RISK ABY: NORMAL
SA1 MOD RISK ABY: NORMAL
SA1 TEST METHOD: NORMAL
SA2 CELL: NORMAL
SA2 COMMENTS: NORMAL
SA2 HI RISK ABY UA: NORMAL
SA2 MOD RISK ABY: NORMAL
SA2 TEST METHOD: NORMAL

## 2017-11-06 DIAGNOSIS — Z94.0 LIVING-DONOR KIDNEY TRANSPLANT RECIPIENT: Primary | ICD-10-CM

## 2017-11-06 RX ORDER — MYCOPHENOLATE MOFETIL 250 MG/1
750 CAPSULE ORAL 2 TIMES DAILY
Qty: 180 CAPSULE | Refills: 11 | Status: CANCELLED | OUTPATIENT
Start: 2017-11-06

## 2017-11-06 RX ORDER — MYCOPHENOLATE MOFETIL 250 MG/1
750 CAPSULE ORAL 2 TIMES DAILY
Qty: 180 CAPSULE | Refills: 11 | Status: SHIPPED | OUTPATIENT
Start: 2017-11-06 | End: 2018-01-26

## 2017-11-06 RX ORDER — MYCOPHENOLATE MOFETIL 250 MG/1
750 CAPSULE ORAL 2 TIMES DAILY
Qty: 180 CAPSULE | Refills: 11 | Status: SHIPPED | OUTPATIENT
Start: 2017-11-06 | End: 2017-11-06

## 2017-11-06 RX ORDER — MYCOPHENOLATE MOFETIL 250 MG/1
750 CAPSULE ORAL 2 TIMES DAILY
Qty: 84 CAPSULE | Refills: 0 | Status: SHIPPED | OUTPATIENT
Start: 2017-11-06 | End: 2018-10-05

## 2017-11-06 NOTE — TELEPHONE ENCOUNTER
Patient Call: Medication Refill  Instruct the patient to first contact their pharmacy. If they have called their pharmacy and require further assistance, route to LPN.  Patient changed insurance and needs all his meds changed. Pt will be available today at [phone]

## 2017-11-08 DIAGNOSIS — Z94.0 LIVING-DONOR KIDNEY TRANSPLANT RECIPIENT: Primary | ICD-10-CM

## 2017-11-08 NOTE — TELEPHONE ENCOUNTER
Tac level below goal. Current dose = 6mg twice daily.  Confirm dose & trough, no new illness (nausea, vomiting), and no missed doses.  If accurate, INCREASE tacrolimus to 6.5mg twice daily.

## 2017-11-09 NOTE — TELEPHONE ENCOUNTER
Left message for patient regarding tac level below goal.  Instructed patient return call and confirm current dose and good 12 hour trough level.  If both accurate, patient should increase tac dose to 6.5 mg BID.

## 2017-11-10 RX ORDER — TACROLIMUS 0.5 MG/1
0.5 CAPSULE ORAL 2 TIMES DAILY
Qty: 60 CAPSULE | Refills: 11 | Status: SHIPPED | OUTPATIENT
Start: 2017-11-10 | End: 2017-11-16

## 2017-11-10 RX ORDER — TACROLIMUS 1 MG/1
6 CAPSULE ORAL 2 TIMES DAILY
Qty: 360 CAPSULE | Refills: 11 | Status: SHIPPED | OUTPATIENT
Start: 2017-11-10 | End: 2017-11-16

## 2017-11-10 NOTE — TELEPHONE ENCOUNTER
Patient left message confirming current dose and good 12 hour trough level.  Patient denies any new illness or missed doses.  Patient verbalizes understanding of medication dose increase to 6.5 mg BID.

## 2017-11-13 NOTE — LETTER
OUTPATIENT LABORATORY TEST ORDER    Patient Name:Golden Stover   Transplant Date: 6/7/2017  YOB: 1995  Issue Date & Time: 11/13/2017  9:04 AM  [facility] MR: [id number]  Expiration Date:  (1 year after date issued)     Diagnoses: Aftercare following organ transplant (ICD-10 Z48.288)   Kidney Transplant (ICD-10 Z94.0)   Long term use of medications (ICD-10  Z79.899)     ?Lab results to be available on the same day drawn.   ?Patient should release information to the [facility] Transplant Center.     ?Please fax to the Transplant Center at [phone].    1x/week, Months 4-6 post-transplant    9/9/2017 - 12/9/2017  Every 2 weeks, Months 7-9 post-transplant    12/10/2017 - 3/10/2018  Every 3 weeks, Months 10-12 post-transplant   3/11/2018 - 6/7/2018       ?Hemogram and Platelet   ?Basic Metabolic Panel (Sodium, Potassium,Chloride, CO2, Creatinine, Urea Nitrogen, Glucose, Calcium)   ?Prograf/Tacrolimus drug level     Biweekly   ?EBV PCR QT    Monthly   Due: NOW   ? BK PCR Quantitative (Polyoma virus - blood in purple top tube to Reference Lab)    At 6 & 12 months post-transplant         12/2017 & 6/2017   ? HBsurfaceAb, HBcoreAb, HCV at 6 months only -   ? Liver Function Tests(Bilirubin Direct/Total, AST, ALT, Alkaline Phosphatase)   ?Complete Lipid Panel fasting (Cholesterol, Triglycerides, HDL, LDL)   ? Random Urine for Protein/Creatinineratio    At month(s)   6, 9, 12 12/2017, 3/2018, 6/2018                  ? PRA/DSA level (mailers provided by the patient)                    If you have any questions, please call The Transplant Center at [phone] or [phone].    Please faxall results to [phone].  .

## 2017-11-16 DIAGNOSIS — Z94.0 LIVING-DONOR KIDNEY TRANSPLANT RECIPIENT: Primary | ICD-10-CM

## 2017-11-16 RX ORDER — TACROLIMUS 1 MG/1
6 CAPSULE ORAL 2 TIMES DAILY
Qty: 360 CAPSULE | Refills: 11 | Status: SHIPPED | OUTPATIENT
Start: 2017-11-16 | End: 2017-12-13

## 2017-11-16 RX ORDER — TACROLIMUS 0.5 MG/1
CAPSULE ORAL
Qty: 60 CAPSULE | Refills: 11
Start: 2017-11-16 | End: 2018-01-26

## 2017-11-16 NOTE — TELEPHONE ENCOUNTER
Spoke to patient regarding tac level elevated above goal.  Patient confirms current dose and good 12 hour trough level.  Patient verbalizes understanding of medication dose decrease to 6 mg BID.

## 2017-11-16 NOTE — TELEPHONE ENCOUNTER
Tac level now high after last dose adjustment. Current dose = 6.5mg BID.  Confirm dose and trough. If accurate, DECREASE tacrolimus to 6mg twice daily.

## 2017-11-22 ENCOUNTER — RESULTS ONLY (OUTPATIENT)
Dept: OTHER | Facility: CLINIC | Age: 22
End: 2017-11-22

## 2017-11-22 DIAGNOSIS — Z48.298 AFTERCARE FOLLOWING ORGAN TRANSPLANT: ICD-10-CM

## 2017-11-22 DIAGNOSIS — Z94.0 KIDNEY REPLACED BY TRANSPLANT: ICD-10-CM

## 2017-11-22 PROCEDURE — 86833 HLA CLASS II HIGH DEFIN QUAL: CPT | Performed by: INTERNAL MEDICINE

## 2017-11-22 PROCEDURE — 86832 HLA CLASS I HIGH DEFIN QUAL: CPT | Performed by: INTERNAL MEDICINE

## 2017-11-27 LAB — PRA DONOR SPECIFIC ABY: NORMAL

## 2017-11-30 NOTE — LETTER
OUTPATIENT LABORATORY TEST ORDER    Patient Name:Golden Stover   Transplant Date: 6/7/2017  YOB: 1995  Issue Date & Time: 11/13/2017  9:04 AM  [facility] MR: [id number]  Expiration Date:  (1 year after date issued)     Diagnoses: Aftercare following organ transplant (ICD-10 Z48.288)   Kidney Transplant (ICD-10 Z94.0)   Long term use of medications (ICD-10  Z79.899)     ?Lab results to be available on the same day drawn.   ?Patient should release information to the [facility].     ?Please fax to the Transplant Center at [phone].    1x/week, Months 4-6 post-transplant    9/9/2017 - 12/9/2017  Every 2 weeks, Months 7-9 post-transplant    12/10/2017 - 3/10/2018  Every 3 weeks, Months 10-12 post-transplant   3/11/2018 - 6/7/2018       ?Hemogram and Platelet   ?Basic Metabolic Panel (Sodium, Potassium,Chloride, CO2, Creatinine, Urea Nitrogen, Glucose, Calcium)   ?Prograf/Tacrolimus drug level     Biweekly   ?EBV PCR QT    Monthly   OVERDUE   ? BK PCR Quantitative (Polyoma virus - blood in purple top tube to Reference Lab)    At 6 & 12 months post-transplant         12/2017 & 6/2017   ? HBsurfaceAb, HBcoreAb, HCV at 6 months only -   ? Liver Function Tests(Bilirubin Direct/Total, AST, ALT, Alkaline Phosphatase)   ?Complete Lipid Panel fasting (Cholesterol, Triglycerides, HDL, LDL)   ? Random Urine for Protein/Creatinineratio    At month(s)   6, 9, 12 12/2017, 3/2018, 6/2018                  ? PRA/DSA level (mailers provided by the patient)                    If you have any questions, please call The Transplant Center at [phone] or [phone].    Please faxall results to [phone].  .

## 2017-12-05 ENCOUNTER — TELEPHONE (OUTPATIENT)
Dept: TRANSPLANT | Facility: CLINIC | Age: 22
End: 2017-12-05

## 2017-12-05 NOTE — TELEPHONE ENCOUNTER
Congrats on 6 months post transplant!    At this point, please start lab draws every other week.  You have a new tac goal level of 6 - 8, we will continue to adjust your meds as needed based upon this level.  Please continue to contact your coordinator with any questions or concerns.      Golden voiced understanding.

## 2017-12-06 ENCOUNTER — TELEPHONE (OUTPATIENT)
Dept: TRANSPLANT | Facility: CLINIC | Age: 22
End: 2017-12-06

## 2017-12-06 NOTE — LETTER
PHYSICIAN ORDERS      DATE & TIME ISSUED: 2017 11:41 AM  PATIENT NAME: Golden Stover   : 1995     [facility] MR# [if applicable]: [id number]     DIAGNOSIS:  Kidney Transplant  ICD-10 CODE: Z94.0     Please complete the following lab at the next routine transplant lab draw:  Total bilirubin     Any questions please call: [phone]    Please fax these results to [phone].    .

## 2017-12-06 NOTE — TELEPHONE ENCOUNTER
Jak King MD Schindelholz, Alanna, RN                     Elevated total bilirubin and would just repeat in 2 weeks when patient is well hydrated, unless he has pain over his right upper abdominal quadrant.  Otherwise, labs are normal or unchanged.  Would make no changes or interventions at this time.

## 2017-12-08 ENCOUNTER — TRANSFERRED RECORDS (OUTPATIENT)
Dept: HEALTH INFORMATION MANAGEMENT | Facility: CLINIC | Age: 22
End: 2017-12-08

## 2017-12-08 NOTE — TELEPHONE ENCOUNTER
RNCC left  for Golden. If he's having pain, please be seen in urgent care over the weekend. If no pain, will recheck Tbili next week.  I asked Golden to call on Monday for follow up.

## 2017-12-11 LAB
CW4: 527
DONOR IDENTIFICATION: NORMAL
DSA COMMENTS: NORMAL
DSA PRESENT: YES
DSA TEST METHOD: NORMAL
ORGAN: NORMAL
SA1 CELL: NORMAL
SA1 COMMENTS: NORMAL
SA1 HI RISK ABY: NORMAL
SA1 MOD RISK ABY: NORMAL
SA1 TEST METHOD: NORMAL
SA2 CELL: NORMAL
SA2 COMMENTS: NORMAL
SA2 HI RISK ABY UA: NORMAL
SA2 MOD RISK ABY: NORMAL
SA2 TEST METHOD: NORMAL

## 2017-12-12 ENCOUNTER — TELEPHONE (OUTPATIENT)
Dept: TRANSPLANT | Facility: CLINIC | Age: 22
End: 2017-12-12

## 2017-12-12 NOTE — LETTER
PHYSICIAN ORDERS      DATE & TIME ISSUED: 2017 9:15 AM  PATIENT NAME: Golden Stover   : 1995     DIAGNOSIS:  Kidney replaced by transplant, elevated liver enzymes.    Please add on hepatic panel to 17 lab draw.    Any questions please call: Grace Nagel RN, BSN                                             Transplant Care Coordinator                                             [phone]    Please fax these results to [phone].    .

## 2017-12-12 NOTE — LETTER
PHYSICIAN ORDERS      DATE & TIME ISSUED: 2017 12:08 PM  PATIENT NAME: Golden Stover   : 1995     [facility] MR# [if applicable]: [id number]     DIAGNOSIS:  Kidney Transplant  ICD-10 CODE: Z94.0     Please complete the following lab at the next routine transplant lab draw:  T cell subset    Any questions please call: [phone]    Please fax these results to [phone].    .

## 2017-12-12 NOTE — TELEPHONE ENCOUNTER
Elevated T bili, patient presented to outside ED on Friday with abdominal pain. US of liver and gallbladder was completed (will request results).   Discussed with Dr. King will repeat hepatic panel and refer to GI.    Discussed this with Golden, he would like to see GI locally and will call me once he checks his insurance so that we can send out a referral letter.

## 2017-12-13 DIAGNOSIS — Z94.0 LIVING-DONOR KIDNEY TRANSPLANT RECIPIENT: Primary | ICD-10-CM

## 2017-12-13 RX ORDER — TACROLIMUS 1 MG/1
5 CAPSULE ORAL 2 TIMES DAILY
Qty: 300 CAPSULE | Refills: 11 | Status: SHIPPED | OUTPATIENT
Start: 2017-12-13 | End: 2018-01-26

## 2017-12-13 NOTE — TELEPHONE ENCOUNTER
Spoke to patient regarding tac level above new goal of 6-8.  Patient confirms current dose and good 12 hour trough level.  Patient verbalizes understanding of medication dose decrease to 5 mg BID.

## 2017-12-13 NOTE — TELEPHONE ENCOUNTER
Tac level above new goal of 6 - 8.  Current dose = 6mg twice daily.  Confirm dose and trough. If accurate, DECREASE tacrolimus to 5mg twice daily and recheck tac level in 1 week (send order if needed).

## 2017-12-18 ENCOUNTER — TELEPHONE (OUTPATIENT)
Dept: TRANSPLANT | Facility: CLINIC | Age: 22
End: 2017-12-18

## 2017-12-18 NOTE — TELEPHONE ENCOUNTER
Repeat tbili up. Pain?  Golden was recently seen in the ED for abdominal pain with elevated liver enzymes - any update?    Due for labs.

## 2017-12-20 DIAGNOSIS — Z94.0 KIDNEY TRANSPLANTED: ICD-10-CM

## 2017-12-21 RX ORDER — LOSARTAN POTASSIUM 25 MG/1
TABLET ORAL
Qty: 30 TABLET | Refills: 11 | Status: SHIPPED | OUTPATIENT
Start: 2017-12-21 | End: 2020-03-20

## 2017-12-26 NOTE — TELEPHONE ENCOUNTER
Left message for patient regarding recent ED visit.  Instructed patient return call with general health update.

## 2017-12-28 ENCOUNTER — TELEPHONE (OUTPATIENT)
Dept: TRANSPLANT | Facility: CLINIC | Age: 22
End: 2017-12-28

## 2017-12-28 NOTE — TELEPHONE ENCOUNTER
ISSUE:  Tac level 7.7  Cr 1.46 (baseline 1.25-1.4)    PLAN:   Please call pt to ensure pt is drinking 2-3L/day, no new illness/fever/malaise/abdominal pain/edema, medication changes, or normal UOP. If the above is normal, encourage continued hydration and repeat labs, including Tacrolimus level next week.

## 2017-12-29 NOTE — TELEPHONE ENCOUNTER
Spoke to patient regarding tac level = 7.7 and creatinine = 1.46.  Patient states that he has been hydrating well, denies any new or recent illness, fever or medication changes.  Patient states that he was seen at his PCP for some knee swelling, patient is using compression socks at this time.  PCP did not find any new issues at his visit.

## 2018-01-04 ENCOUNTER — TELEPHONE (OUTPATIENT)
Dept: TRANSPLANT | Facility: CLINIC | Age: 23
End: 2018-01-04

## 2018-01-04 NOTE — LETTER
PHYSICIAN ORDERS      DATE & TIME ISSUED: 2018 5:24 PM  PATIENT NAME: Golden Stover   : 1995     [facility] MR# [if applicable]: [id number]     DIAGNOSIS:  Kidney Transplant  ICD-10 CODE: Z94.0     Please complete the following labs at the next routine transplant lab draw:  LDH  Haptoglobin  T cell subset  CBC w/ Diff.      Any questions please call: [phone]    Please fax these results to [phone].    .

## 2018-01-04 NOTE — TELEPHONE ENCOUNTER
Golden saw GI due to elevated Tbili. GI referred him to hematology.  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   Possible hemolysis 2/2:  1. Dapsone  2. Tacrolimus  3. MMF    Hematology recommended Golden follow up with Transplant nephrology within 10days.     RNCC to disucss with Dr. King - need for native kidney US and CD4 count.

## 2018-01-04 NOTE — TELEPHONE ENCOUNTER
Discussed at the acute kidney meeting.  Txp team recommends LDH and Haptoglobin be drawn at next lab draw, updated current lab orders.  Also, stop Dapsone X 2 weeks.  IF CD4 comes back abnormal patient should start pentamidine.       Golden voiced understanding, will stop dapsone and repeat labs per schedule on Monday, 1/8.

## 2018-01-04 NOTE — LETTER
PHYSICIAN ORDERS      DATE & TIME ISSUED: 2018 5:24 PM  PATIENT NAME: Golden Stover   : 1995     [facility] MR# [if applicable]: [id number]     DIAGNOSIS:  Kidney Transplant  ICD-10 CODE: Z94.0     Please complete the following labs on 2017.   LDH  Haptoglobin  T cell subset  CBC w/ Diff.      Any questions please call: [phone]    Please fax these results to [phone].    .

## 2018-01-22 ASSESSMENT — ENCOUNTER SYMPTOMS
ARTHRALGIAS: 1
MUSCLE CRAMPS: 1
JOINT SWELLING: 1

## 2018-01-24 ENCOUNTER — TELEPHONE (OUTPATIENT)
Dept: TRANSPLANT | Facility: CLINIC | Age: 23
End: 2018-01-24

## 2018-01-24 DIAGNOSIS — Z94.0 LIVING-DONOR KIDNEY TRANSPLANT RECIPIENT: ICD-10-CM

## 2018-01-24 NOTE — LETTER
PHYSICIAN ORDERS      DATE & TIME ISSUED: 2018 1:54 PM  PATIENT NAME: Golden Stover   : 1995     [facility] MR# [if applicable]: [id number]     DIAGNOSIS:  Kidney Transplant  ICD-10 CODE: Z94.0     Please repeat the following lab on 2018:  Tacrolimus level    Any questions please call: [phone]    Please fax these results to [phone].    .

## 2018-01-24 NOTE — TELEPHONE ENCOUNTER
CD4 count low, will need to start pentamidine. All other labs look good.   No rise in hemoglobin with removal of dapsone.   Tbili improved, no changes.  Golden voiced understanding.    Tac level below goal, no recent dose change - ensure trough and 5mg BID.  If accurate, INCREASE to 5.5mg BID and recheck level next week M or T.  Golden voiced understanding - will recheck level on Monday.    Overdue for DSA - will have drawn here at [facility] during visit on Monday.

## 2018-01-26 RX ORDER — TACROLIMUS 0.5 MG/1
0.5 CAPSULE ORAL 2 TIMES DAILY
Qty: 60 CAPSULE | Refills: 11 | Status: SHIPPED | OUTPATIENT
Start: 2018-01-26 | End: 2018-02-22

## 2018-01-26 RX ORDER — TACROLIMUS 1 MG/1
5 CAPSULE ORAL 2 TIMES DAILY
Qty: 300 CAPSULE | Refills: 11 | Status: SHIPPED | OUTPATIENT
Start: 2018-01-26 | End: 2018-02-22

## 2018-01-29 ENCOUNTER — OFFICE VISIT (OUTPATIENT)
Dept: NEPHROLOGY | Facility: CLINIC | Age: 23
End: 2018-01-29
Attending: INTERNAL MEDICINE
Payer: COMMERCIAL

## 2018-01-29 ENCOUNTER — RECORDS - HEALTHEAST (OUTPATIENT)
Dept: ADMINISTRATIVE | Facility: OTHER | Age: 23
End: 2018-01-29

## 2018-01-29 VITALS
HEIGHT: 74 IN | HEART RATE: 76 BPM | WEIGHT: 208.6 LBS | OXYGEN SATURATION: 97 % | BODY MASS INDEX: 26.77 KG/M2 | TEMPERATURE: 97.8 F | DIASTOLIC BLOOD PRESSURE: 74 MMHG | SYSTOLIC BLOOD PRESSURE: 128 MMHG

## 2018-01-29 DIAGNOSIS — D84.9 IMMUNOSUPPRESSED STATUS (H): ICD-10-CM

## 2018-01-29 DIAGNOSIS — N18.30 ANEMIA IN STAGE 3 CHRONIC KIDNEY DISEASE (H): ICD-10-CM

## 2018-01-29 DIAGNOSIS — N25.81 SECONDARY RENAL HYPERPARATHYROIDISM (H): ICD-10-CM

## 2018-01-29 DIAGNOSIS — I15.1 HYPERTENSION SECONDARY TO OTHER RENAL DISORDERS: ICD-10-CM

## 2018-01-29 DIAGNOSIS — Z48.298 AFTERCARE FOLLOWING ORGAN TRANSPLANT: ICD-10-CM

## 2018-01-29 DIAGNOSIS — Z94.0 KIDNEY REPLACED BY TRANSPLANT: Primary | ICD-10-CM

## 2018-01-29 DIAGNOSIS — D63.1 ANEMIA IN STAGE 3 CHRONIC KIDNEY DISEASE (H): ICD-10-CM

## 2018-01-29 PROBLEM — E83.42 HYPOMAGNESEMIA: Status: RESOLVED | Noted: 2017-07-11 | Resolved: 2018-01-29

## 2018-01-29 PROCEDURE — G0463 HOSPITAL OUTPT CLINIC VISIT: HCPCS | Mod: ZF

## 2018-01-29 ASSESSMENT — PAIN SCALES - GENERAL: PAINLEVEL: NO PAIN (0)

## 2018-01-29 NOTE — LETTER
1/29/2018      RE: Golden Stover  [address]  [place] [address]       Assessment and Plan:  1. LDKT - baseline Cr ~ 1.3-1.5, which has remained stable.  No proteinuria.  No DSA.  Will make no changes in immunosuppression.  2. HTN - well controlled at target of less than 140/90.  No changes.  3. Anemia in chronic kidney disease - stable Hgb.  Iron replete.  Will follow.  4. Secondary renal hyperparathyroidism - mildly elevated PTH, which has improved post transplant.  Patient is now off calcitriol and will recheck PTH and vitamin D at 12 months post transplant.  5. Hypomagnesemia - normal serum magnesium level with last check and patient is now off oral magnesium supplement.  6. Elevated total bilirubin - unclear etiology, but seems most likely due to hemolysis with slightly low haptoglobin with indirect bilirubin more than half total bilirubin, although LDH is normal.  Lower total bilirubin, near normal, now that dapsone has been stopped.  Would repeat in 1 month.  7. Prophylactic medications - CD4 level less than 200 and will start pentamidine qmonth.  Would recheck CD4 level at one year post transplant 6/2018.  8. Recommend return visit in 3 months.    Assessment and plan was discussed with patient and he voiced his understanding and agreement.    Reason for Visit:  Mr. Stover is here for routine follow up.    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         Transplant Hx:       Tx: LDKT  Date: 6/7/17       Present Maintenance IS: Tacrolimus and Mycophenolate mofetil       Baseline Creatinine: 1.3-1.5       Recent DSA: No  Date last checked: 11/2017       Biopsy: No    Mr. Stover reports feeling good overall with minimal medical complaints.  Since his last clinic visit, patient has had a few issues.  He developed right leg redness and bilateral leg swelling.  Lower extremity dopplers were negative for DVT and patient was  treated with antibiotics and diuretics with resolution of symptoms.  In addition, he had elevated total bilirubin for unclear etiology.  Work up was unremarkable.  It was felt this could be due to hemolysis and his dapsone was stopped with some decrease in total bilirubin with last check.    His energy level is otherwise good and pretty much normal.  He is active and does get some exercise.  Denies any chest pain or shortness of breath with exertion.  Appetite is good, but with more exercise he has lost about 20 lbs.  No nausea, vomiting or diarrhea.  No fever, sweats or chills.  No leg swelling.    Home BP: 120-130/70-80s.      ROS:   A comprehensive review of systems was obtained and negative, except as noted in the HPI or PMH.    Active Medical Problems:  Patient Active Problem List   Diagnosis     IgA nephropathy     Hypertension secondary to other renal disorders     Anemia in chronic kidney disease     Secondary renal hyperparathyroidism (H)     Kidney replaced by transplant     Immunosuppressed status (H)     Aftercare following organ transplant       Personal Hx:  Social History     Social History     Marital status: Single     Spouse name: N/A     Number of children: N/A     Years of education: N/A     Occupational History     Not on file.     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 Authorizing Provider   oxyCODONE (ROXICODONE) 5 MG IR tablet Take 1-2 tablets (5-10 mg) by mouth every 4 hours as needed for moderate to severe pain 6/12/17   Heidi Black PA-C   acetaminophen (TYLENOL) 325 MG tablet Take 2 tablets (650 mg) by mouth every 6 hours as needed for mild pain 6/12/17   Heidi Black PA-C   mycophenolate (CELLCEPT - GENERIC EQUIVALENT) 250 MG capsule Take 3 capsules (750 mg) by mouth 2 times daily 6/12/17   Heidi Black PA-C   tacrolimus (PROGRAF - GENERIC EQUIVALENT) 1 MG capsule Take 3 capsules (3 mg) by mouth 2 times daily 6/12/17   Heidi Black PA-C   phosphorus tablet 250 mg (K PHOS NEUTRAL) 250 MG per tablet Take 2 tablets (500 mg) by mouth 2 times daily 6/12/17 7/12/17  Heidi Black PA-C   calcitRIOL (ROCALTROL) 0.25 MCG capsule Take 1 capsule (0.25 mcg) by mouth daily 6/12/17   Heidi Black PA-C   valGANciclovir (VALCYTE) 450 MG tablet Take 2 tablets (900 mg) by mouth daily 6/12/17   Heidi Black PA-C   hydrOXYzine (VISTARIL) 25 MG capsule Take 1 capsule (25 mg) by mouth 3 times daily as needed for itching 6/12/17   Heidi Black PA-C   docusate sodium (COLACE) 100 MG tablet Take 100 mg by mouth daily 6/12/17   Heidi Black PA-C   dapsone 100 MG tablet Take 1 tablet (100 mg) by mouth daily 6/12/17   Heidi Black PA-C       Vitals:  /74 (BP Location: Right arm, Patient Position: Sitting, Cuff Size: Adult Large)  Pulse 76  Temp 97.8  F (36.6  C) (Oral)  Ht 1.88 m (6' 2\")  Wt 94.6 kg (208 lb 9.6 oz)  SpO2 97%  BMI 26.78 kg/m2    Exam:   GENERAL APPEARANCE: alert and no distress  HENT: mouth without ulcers or lesions  LYMPHATICS: no cervical or supraclavicular nodes  RESP: lungs clear to auscultation - no rales, rhonchi or wheezes  CV: regular rhythm, normal rate, no rub, no murmur  EDEMA: no LE " edema bilaterally  ABDOMEN: soft, nondistended, nontender, bowel sounds normal  MS: extremities normal - no gross deformities noted, no evidence of inflammation in joints, no muscle tenderness  SKIN: no rash  TX KIDNEY: normal    Results:   Recent Results (from the past 336 hour(s))   CBC with platelets    Collection Time: 01/23/18  7:51 AM   Result Value Ref Range    WBC Count (External) 4.0 3.4 - 10.7 10(9)L    RBC Count (External) 4.30 4.20 - 5.90 10**12/L    Hemoglobin (External) 12.3 (L) 13.0 - 17.0 g/dL    Hematocrit (External) 36.9 (L) 37.5 - 51.0 %    MCV (External) 85.8 82.0 - 99.0 fL    MCH (External) 28.6 27.0 - 34.0 pg    MCHC (External) 33.3 32.0 - 35.7 g/dL    RDW (External) 14.6 11.0 - 15.0 %    Platelet Count (External) 416 (H) 150 - 400 10(9)L   Basic metabolic panel    Collection Time: 01/23/18  7:51 AM   Result Value Ref Range    Sodium (External) 141 134 - 143 mEq/L    Potassium (External) 3.8 3.4 - 5.1 mEq/L    Chloride (External) 106 99 - 110 mEq/L    CO2 (External) 23 19 - 29 mEq/L    Anion Gap (External) 12.0 3.0 - 15.0 mEq/L    Urea Nitrogen (External) 13 5 - 24 mg/dL    Creatinine (External) 1.26 (H) 0.70 - 1.20 mg/dL    GFR Estimated (External) >60 >60 mL/min/1.73m2    Calcium (External) 9.8 8.4 - 10.5 mg/dL    Glucose (External) 95 70 - 100 mg/dL   Hepatic panel    Collection Time: 01/23/18  7:51 AM   Result Value Ref Range    Alk Phosphatase (External) 71 40 - 150 IU/L    Bilirubin Total (External) 1.3 (H) 0.2 - 1.2 mg/dL    Bilirubin Direct (External) 0.5 0.0 - 0.5 mg/dL    Albumin (External) 4.5 3.5 - 5.0 g/dL    Protein Total (External) 7.3 6.0 - 8.0 g/dL    AST (External) 19 10 - 40 IU/L    ALT (External) 20 6 - 40 IU/L   Tacrolimus level    Collection Time: 01/23/18  7:51 AM   Result Value Ref Range    Tacrolimus(FK-506) (External) 5.7 5.0 - 15.0 ng/mL       Jak King MD

## 2018-01-29 NOTE — NURSING NOTE
"Chief Complaint   Patient presents with     RECHECK     3 month post kidney tx follow up        Initial /74 (BP Location: Right arm, Patient Position: Sitting, Cuff Size: Adult Large)  Pulse 76  Temp 97.8  F (36.6  C) (Oral)  Ht 1.88 m (6' 2\")  Wt 94.6 kg (208 lb 9.6 oz)  SpO2 97%  BMI 26.78 kg/m2 Estimated body mass index is 26.78 kg/(m^2) as calculated from the following:    Height as of this encounter: 1.88 m (6' 2\").    Weight as of this encounter: 94.6 kg (208 lb 9.6 oz).  Medication Reconciliation: complete     Rasheed Quintanilla MA    "

## 2018-01-29 NOTE — PROGRESS NOTES
Assessment and Plan:  1. LDKT - baseline Cr ~ 1.3-1.5, which has remained stable.  No proteinuria.  No DSA.  Will make no changes in immunosuppression.  2. HTN - well controlled at target of less than 140/90.  No changes.  3. Anemia in chronic kidney disease - stable Hgb.  Iron replete.  Will follow.  4. Secondary renal hyperparathyroidism - mildly elevated PTH, which has improved post transplant.  Patient is now off calcitriol and will recheck PTH and vitamin D at 12 months post transplant.  5. Hypomagnesemia - normal serum magnesium level with last check and patient is now off oral magnesium supplement.  6. Elevated total bilirubin - unclear etiology, but seems most likely due to hemolysis with slightly low haptoglobin with indirect bilirubin more than half total bilirubin, although LDH is normal.  Lower total bilirubin, near normal, now that dapsone has been stopped.  Would repeat in 1 month.  7. Prophylactic medications - CD4 level less than 200 and will start pentamidine qmonth.  Would recheck CD4 level at one year post transplant 6/2018.  8. Recommend return visit in 3 months.    Assessment and plan was discussed with patient and he voiced his understanding and agreement.    Reason for Visit:  Mr. Stover is here for routine follow up.    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         Transplant Hx:       Tx: LDKT  Date: 6/7/17       Present Maintenance IS: Tacrolimus and Mycophenolate mofetil       Baseline Creatinine: 1.3-1.5       Recent DSA: No  Date last checked: 11/2017       Biopsy: No    Mr. Stover reports feeling good overall with minimal medical complaints.  Since his last clinic visit, patient has had a few issues.  He developed right leg redness and bilateral leg swelling.  Lower extremity dopplers were negative for DVT and patient was treated with antibiotics and diuretics with resolution of symptoms.  In addition, he had  elevated total bilirubin for unclear etiology.  Work up was unremarkable.  It was felt this could be due to hemolysis and his dapsone was stopped with some decrease in total bilirubin with last check.    His energy level is otherwise good and pretty much normal.  He is active and does get some exercise.  Denies any chest pain or shortness of breath with exertion.  Appetite is good, but with more exercise he has lost about 20 lbs.  No nausea, vomiting or diarrhea.  No fever, sweats or chills.  No leg swelling.    Home BP: 120-130/70-80s.      ROS:   A comprehensive review of systems was obtained and negative, except as noted in the HPI or PMH.    Active Medical Problems:  Patient Active Problem List   Diagnosis     IgA nephropathy     Hypertension secondary to other renal disorders     Anemia in chronic kidney disease     Secondary renal hyperparathyroidism (H)     Kidney replaced by transplant     Immunosuppressed status (H)     Aftercare following organ transplant       Personal Hx:  Social History     Social History     Marital status: Single     Spouse name: N/A     Number of children: N/A     Years of education: N/A     Occupational History     Not on file.     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 Authorizing Provider   oxyCODONE (ROXICODONE) 5 MG IR tablet Take 1-2 tablets (5-10 mg) by mouth every 4 hours as needed for moderate to severe pain 6/12/17   Heidi Black PA-C   acetaminophen (TYLENOL) 325 MG tablet Take 2 tablets (650 mg) by mouth every 6 hours as needed for mild pain 6/12/17   Heidi Black PA-C   mycophenolate (CELLCEPT - GENERIC EQUIVALENT) 250 MG capsule Take 3 capsules (750 mg) by mouth 2 times daily 6/12/17   Heidi Black PA-C   tacrolimus (PROGRAF - GENERIC EQUIVALENT) 1 MG capsule Take 3 capsules (3 mg) by mouth 2 times daily 6/12/17   Heidi Black PA-C   phosphorus tablet 250 mg (K PHOS NEUTRAL) 250 MG per tablet Take 2 tablets (500 mg) by mouth 2 times daily 6/12/17 7/12/17  Heidi Black PA-C   calcitRIOL (ROCALTROL) 0.25 MCG capsule Take 1 capsule (0.25 mcg) by mouth daily 6/12/17   Heidi Black PA-C   valGANciclovir (VALCYTE) 450 MG tablet Take 2 tablets (900 mg) by mouth daily 6/12/17   Heidi Black PA-C   hydrOXYzine (VISTARIL) 25 MG capsule Take 1 capsule (25 mg) by mouth 3 times daily as needed for itching 6/12/17   Heidi Black PA-C   docusate sodium (COLACE) 100 MG tablet Take 100 mg by mouth daily 6/12/17   Heidi Black PA-C   dapsone 100 MG tablet Take 1 tablet (100 mg) by mouth daily 6/12/17   Heidi Black PA-C       Vitals:  /74 (BP Location: Right arm, Patient Position: Sitting, Cuff Size: Adult Large)  Pulse 76  Temp 97.8  F (36.6  C) (Oral)  Ht 1.88 m (6' 2\")  Wt 94.6 kg (208 lb 9.6 oz)  SpO2 97%  BMI 26.78 kg/m2    Exam:   GENERAL APPEARANCE: alert and no distress  HENT: mouth without ulcers or lesions  LYMPHATICS: no cervical or supraclavicular nodes  RESP: lungs clear to auscultation - no rales, rhonchi or wheezes  CV: regular rhythm, normal rate, no rub, no murmur  EDEMA: no LE edema bilaterally  ABDOMEN: soft, nondistended, nontender, bowel sounds normal  MS: extremities " normal - no gross deformities noted, no evidence of inflammation in joints, no muscle tenderness  SKIN: no rash  TX KIDNEY: normal    Results:   Recent Results (from the past 336 hour(s))   CBC with platelets    Collection Time: 01/23/18  7:51 AM   Result Value Ref Range    WBC Count (External) 4.0 3.4 - 10.7 10(9)L    RBC Count (External) 4.30 4.20 - 5.90 10**12/L    Hemoglobin (External) 12.3 (L) 13.0 - 17.0 g/dL    Hematocrit (External) 36.9 (L) 37.5 - 51.0 %    MCV (External) 85.8 82.0 - 99.0 fL    MCH (External) 28.6 27.0 - 34.0 pg    MCHC (External) 33.3 32.0 - 35.7 g/dL    RDW (External) 14.6 11.0 - 15.0 %    Platelet Count (External) 416 (H) 150 - 400 10(9)L   Basic metabolic panel    Collection Time: 01/23/18  7:51 AM   Result Value Ref Range    Sodium (External) 141 134 - 143 mEq/L    Potassium (External) 3.8 3.4 - 5.1 mEq/L    Chloride (External) 106 99 - 110 mEq/L    CO2 (External) 23 19 - 29 mEq/L    Anion Gap (External) 12.0 3.0 - 15.0 mEq/L    Urea Nitrogen (External) 13 5 - 24 mg/dL    Creatinine (External) 1.26 (H) 0.70 - 1.20 mg/dL    GFR Estimated (External) >60 >60 mL/min/1.73m2    Calcium (External) 9.8 8.4 - 10.5 mg/dL    Glucose (External) 95 70 - 100 mg/dL   Hepatic panel    Collection Time: 01/23/18  7:51 AM   Result Value Ref Range    Alk Phosphatase (External) 71 40 - 150 IU/L    Bilirubin Total (External) 1.3 (H) 0.2 - 1.2 mg/dL    Bilirubin Direct (External) 0.5 0.0 - 0.5 mg/dL    Albumin (External) 4.5 3.5 - 5.0 g/dL    Protein Total (External) 7.3 6.0 - 8.0 g/dL    AST (External) 19 10 - 40 IU/L    ALT (External) 20 6 - 40 IU/L   Tacrolimus level    Collection Time: 01/23/18  7:51 AM   Result Value Ref Range    Tacrolimus(FK-506) (External) 5.7 5.0 - 15.0 ng/mL

## 2018-01-29 NOTE — MR AVS SNAPSHOT
"              After Visit Summary   1/29/2018    Golden Stover    MRN: [id number]           Patient Information     Date Of Birth          1995        Visit Information        Provider Department      1/29/2018 4:00 PM Jak King MD [facility] Nephrology         Follow-ups after your visit        Follow-up notes from your care team     Return in about 3 months (around 4/29/2018).      Who to contact     If you have questions or need follow up information about today's clinic visit or your schedule please contact [facility] NEPHROLOGY directly at [phone].  Normal or non-critical lab and imaging results will be communicated to you by JMEAhart, letter or phone within 4 business days after the clinic has received the results. If you do not hear from us within 7 days, please contact the clinic through Goingt or phone. If you have a critical or abnormal lab result, we will notify you by phone as soon as possible.  Submit refill requests through Glokalise or call your pharmacy and they will forward the refill request to us. Please allow 3 business days for your refill to be completed.          Additional Information About Your Visit        MyChart Information     Glokalise gives you secure access to your electronic health record. If you see a primary care provider, you can also send messages to your care team and make appointments. If you have questions, please call your primary care clinic.  If you do not have a primary care provider, please call [phone] and they will assist you.        Care EveryWhere ID     This is your Care EveryWhere ID. This could be used by other organizations to access your [place] medical records  [id number]        Your Vitals Were     Pulse Temperature Height Pulse Oximetry BMI (Body Mass Index)       76 97.8  F (36.6  C) (Oral) 1.88 m (6' 2\") 97% 26.78 kg/m2        Blood Pressure from Last 3 Encounters:   01/29/18 128/74   09/11/17 134/76   07/28/17 151/82    Weight " from Last 3 Encounters:   01/29/18 94.6 kg (208 lb 9.6 oz)   09/11/17 103.1 kg (227 lb 3.2 oz)   07/11/17 98.2 kg (216 lb 9.6 oz)              Today, you had the following     No orders found for display         Today's Medication Changes          These changes are accurate as of 1/29/18  4:38 PM.  If you have any questions, ask your nurse or doctor.               Stop taking these medicines if you haven't already. Please contact your care team if you have questions.     docusate sodium 100 MG tablet   Commonly known as:  COLACE   Stopped by:  Jak King MD           magnesium oxide 400 MG tablet   Commonly known as:  MAG-OX   Stopped by:  Jak King MD           senna-docusate 8.6-50 MG per tablet   Commonly known as:  SENOKOT-S;PERICOLACE   Stopped by:  Jak King MD                    Primary Care Provider Fax #    Physician No Ref-Primary [phone]       No address on file        Equal Access to Services     [facility]: Hadii yasmeen ku hadasho Soomaali, waaxda luqadaha, qaybta kaalmada adeegyada, waxay nevain haykatie hunter . So [facility] [phone].    ATENCIÓN: Si habla español, tiene a joseph disposición servicios gratuitos de asistencia lingüística. Llame al [phone].    We comply with applicable federal civil rights laws and [place] laws. We do not discriminate on the basis of race, color, national origin, age, disability, sex, sexual orientation, or gender identity.            Thank you!     Thank you for choosing [facility] NEPHROLOGY  for your care. Our goal is always to provide you with excellent care. Hearing back from our patients is one way we can continue to improve our services. Please take a few minutes to complete the written survey that you may receive in the mail after your visit with us. Thank you!             Your Updated Medication List - Protect others around you: Learn how to safely use, store and throw away your medicines at  www.disposemymeds.org.          This list is accurate as of 1/29/18  4:38 PM.  Always use your most recent med list.                   Brand Name Dispense Instructions for use Diagnosis    amLODIPine 10 MG tablet    NORVASC    90 tablet    Take 1 tablet (10 mg) by mouth daily    Hypertension secondary to other renal disorders (CODE), Secondary renal hyperparathyroidism (H)       losartan 25 MG tablet    COZAAR    30 tablet    TAKE 1 TABLET (25 MG) BY MOUTH DAILY    Kidney transplanted       mycophenolate 250 MG capsule    GENERIC EQUIVALENT    84 capsule    Take 3 capsules (750 mg) by mouth 2 times daily    Living-donor kidney transplant recipient       * tacrolimus 0.5 MG capsule    GENERIC EQUIVALENT    60 capsule    Take 1 capsule (0.5 mg) by mouth 2 times daily Total dose = 5.5mg BID. Dose change.    Living-donor kidney transplant recipient       * tacrolimus 1 MG capsule    GENERIC EQUIVALENT    300 capsule    Take 5 capsules (5 mg) by mouth 2 times daily Total dose = 5.5mg BID. Dose change.    Living-donor kidney transplant recipient       * Notice:  This list has 2 medication(s) that are the same as other medications prescribed for you. Read the directions carefully, and ask your doctor or other care provider to review them with you.

## 2018-01-30 ENCOUNTER — TELEPHONE (OUTPATIENT)
Dept: TRANSPLANT | Facility: CLINIC | Age: 23
End: 2018-01-30

## 2018-01-30 NOTE — TELEPHONE ENCOUNTER
[facility] left a voice mail on 1/30/18 at 2:39 pm needing clarification of the Tacrolimus [phone] option 1 then option 6  Fax # [phone]  Ref # [id number]

## 2018-01-31 NOTE — TELEPHONE ENCOUNTER
Spoke with [facility] regarding current Tacrolimus dose, confirmed current dose.  No further questions/concerns.

## 2018-02-06 ENCOUNTER — RESULTS ONLY (OUTPATIENT)
Dept: OTHER | Facility: CLINIC | Age: 23
End: 2018-02-06

## 2018-02-06 DIAGNOSIS — Z94.0 KIDNEY REPLACED BY TRANSPLANT: ICD-10-CM

## 2018-02-06 DIAGNOSIS — Z48.298 AFTERCARE FOLLOWING ORGAN TRANSPLANT: ICD-10-CM

## 2018-02-06 PROCEDURE — 86832 HLA CLASS I HIGH DEFIN QUAL: CPT | Performed by: INTERNAL MEDICINE

## 2018-02-06 PROCEDURE — 86833 HLA CLASS II HIGH DEFIN QUAL: CPT | Performed by: INTERNAL MEDICINE

## 2018-02-08 ENCOUNTER — TELEPHONE (OUTPATIENT)
Dept: TRANSPLANT | Facility: CLINIC | Age: 23
End: 2018-02-08

## 2018-02-08 NOTE — LETTER
OUTPATIENT LABORATORY TEST ORDER    Patient Name:Golden Stover   Transplant Date: 6/7/2017  YOB: 1995  Issue Date & Time: 2/8/2018  4:36 PM  [facility] MR: [id number]  Expiration Date:  (1 year after date issued)     Diagnoses: Aftercare following organ transplant (ICD-10 Z48.288)   Kidney Transplant (ICD-10 Z94.0)   Long term use of medications (ICD-10  Z79.899)     ?Lab results to be available on the same day drawn.   ?Patient should release information to the [facility], [facility].     ?Please fax to the Transplant Center at [phone].    Every 2 weeks, Months 7-9 post-transplant    12/10/2017 - 3/10/2018  Every 3 weeks, Months 10-12 post-transplant   3/11/2018 - 6/7/2018       ?Hemogram and Platelet   ?Basic Metabolic Panel (Sodium, Potassium,Chloride, CO2, Creatinine, Urea Nitrogen, Glucose, Calcium)   ?Prograf/Tacrolimus drug level     Biweekly   ?EBV PCR QT    Monthly   OVERDUE   ? BK PCR Quantitative (Polyoma virus - blood in purple top tube to Reference Lab)    At 12 months post-transplant         Due: 6/2017   ? Liver Function Tests(Bilirubin Direct/Total, AST, ALT, Alkaline Phosphatase)   ?Complete Lipid Panel fasting (Cholesterol, Triglycerides, HDL, LDL)   ? Random Urine for Protein/Creatinineratio    At month(s)   9, 12        Due: 3/2018, 6/2018                  ? PRA/DSA level (mailers provided by the patient)                    If you have any questions, please call The [facility] at [phone] or [phone].    Please faxall results to [phone].  .

## 2018-02-08 NOTE — TELEPHONE ENCOUNTER
Updated current standing lab orders and faxed [facility] @ [phone]

## 2018-02-08 NOTE — TELEPHONE ENCOUNTER
Golden is due for pentamidine, he tried to call [facility], but they stated they weren't able to schedule at this time. RNCC will call to ensure orders were received.  Due for BK - RNCC will call outside lab for this.  DSA was sent by patient with last lab draw.      Please send order to outside lab for liver panel (with total bilirubin) to be drawn end of February.

## 2018-02-08 NOTE — LETTER
PHYSICIAN ORDERS      DATE & TIME ISSUED: 2018 4:38 PM  PATIENT NAME: Golden Stover   : 1995     [facility] MR# [if applicable]: [id number]     DIAGNOSIS:  Kidney Transplant  ICD-10 CODE: Z94.0     Please complete the following labs at the end of 2018:  Liver panel with total billirubin    Any questions please call: [phone]    Please fax these results to [phone].    .

## 2018-02-09 LAB — PRA DONOR SPECIFIC ABY: NORMAL

## 2018-02-14 ENCOUNTER — TELEPHONE (OUTPATIENT)
Dept: TRANSPLANT | Facility: CLINIC | Age: 23
End: 2018-02-14

## 2018-02-14 NOTE — TELEPHONE ENCOUNTER
Prior Authorization Specialty Medication Request    Medication/Dose:   tacrolimus (GENERIC EQUIVALENT) 1 MG capsule Take 5 capsules (5 mg) by mouth 2 times daily Total dose = 5.5mg BID.     Diagnosis and ICD: Kidney Transplant Z94.0  New/Renewal/Insurance Change PA: Renewal    Important Lab Values:     Previously Tried and Failed Therapies:     Rationale:     Would you like to include any research articles?    If yes please include the hyperlink(s) below or fax @ [phone].    (Include Name and MRN)    If you received a fax notification from an outside Pharmacy;  Pharmacy Name:[facility]  Pharmacy #:  Pharmacy Fax:[phone]

## 2018-02-19 NOTE — TELEPHONE ENCOUNTER
[facility] Prior Authorization Team   Phone: [phone]  Fax: [phone]    PA Initiation    Medication: tacrolimus 1 MG capsule   Insurance Company: [employer] - Phone [phone] Fax [phone]  Pharmacy Filling the Rx: [facility]  Filling Pharmacy Phone:    Filling Pharmacy Fax: [phone]    Start Date: 2/19/2018

## 2018-02-20 ENCOUNTER — TELEPHONE (OUTPATIENT)
Dept: TRANSPLANT | Facility: CLINIC | Age: 23
End: 2018-02-20

## 2018-02-20 NOTE — TELEPHONE ENCOUNTER
Darling Jackmano calling to verify that Immunosuppression Checklist form has been received. States that you may call her at [phone] ext [phone], or may speak with any associate at the main number of [phone].

## 2018-02-20 NOTE — TELEPHONE ENCOUNTER
[facility] Prior Authorization Team   Phone: [phone]  Fax: [phone]    Prior Authorization Not Needed per Insurance    Medication: tacrolimus 1 MG capsule   Insurance Company: [employer] - Phone [phone] Fax [phone]  Expected CoPay:      Pharmacy Filling the Rx: [facility]    Pharmacy Notified:    Patient Notified:      Per insurance rep at PA dept - no PA is needed for this medication at this time - they were able to receive a paid claim while running a test claim under [facility].

## 2018-02-21 DIAGNOSIS — Z94.0 LIVING-DONOR KIDNEY TRANSPLANT RECIPIENT: Primary | ICD-10-CM

## 2018-02-21 NOTE — TELEPHONE ENCOUNTER
Provider Call:   Facility Name: [facility]  Reason for Call: Following up on call from yesterday. Needing confirmation today  Callback needed? Yes  Return Call Needed  Same as documented in contacts section

## 2018-02-22 RX ORDER — TACROLIMUS 0.5 MG/1
CAPSULE ORAL
Qty: 60 CAPSULE | Refills: 11
Start: 2018-02-22 | End: 2019-02-18

## 2018-02-22 RX ORDER — TACROLIMUS 1 MG/1
5 CAPSULE ORAL 2 TIMES DAILY
Qty: 300 CAPSULE | Refills: 11 | Status: SHIPPED | OUTPATIENT
Start: 2018-02-22 | End: 2018-10-05

## 2018-02-22 NOTE — TELEPHONE ENCOUNTER
Spoke to patient regarding tac level above goal.  Patient confirms current dose and good 12 hour trough level.  Patient denies any new or recent illness.  Patient verbalizes understanding of medication dose decrease to 5 mg BID.  Patient reports last pentamidine dose was on 2/15/2018 and next treatment scheduled for 3/15/2018.

## 2018-02-22 NOTE — TELEPHONE ENCOUNTER
Tacrolimus level above goal. Current dose = 5.5mg BID.  Confirm dose and trough, no recent illness (diarrhea).  If accurate, DECREASE tacrolimus to 5mg twice daily.     Please record date of most recent pentamidine treatment.

## 2018-03-28 ENCOUNTER — TELEPHONE (OUTPATIENT)
Dept: TRANSPLANT | Facility: CLINIC | Age: 23
End: 2018-03-28

## 2018-03-28 NOTE — TELEPHONE ENCOUNTER
Tacrolimus level now below goal. Current dose = 5mg BID.  Confirm dose and trough, no missed doses.  If accurate, INCREASE tacrolimus to 5.5mg twice daily and repeat tacrolimus level next week (send order if needed).

## 2018-03-29 DIAGNOSIS — Z94.0 KIDNEY TRANSPLANTED: ICD-10-CM

## 2018-03-29 NOTE — TELEPHONE ENCOUNTER
Left message for patient regarding tac level below goal.  Instructed patient return call and confirm current dose and good 12 hour trough level.  If both are accurate, patient should increase dose to 5.5 mg BID.

## 2018-03-30 RX ORDER — LOSARTAN POTASSIUM 25 MG/1
TABLET ORAL
Qty: 30 TABLET | Refills: 11 | Status: SHIPPED | OUTPATIENT
Start: 2018-03-30 | End: 2018-07-20

## 2018-03-30 NOTE — TELEPHONE ENCOUNTER
Left message for patient regarding tac level below goal.  Instructed patient return call and confirm current dose and good 12 hour trough level.  If both are accurate, patient should increase dose to 5.5 mg BID

## 2018-04-25 ENCOUNTER — TELEPHONE (OUTPATIENT)
Dept: TRANSPLANT | Facility: CLINIC | Age: 23
End: 2018-04-25

## 2018-04-25 NOTE — LETTER
PHYSICIAN ORDERS      DATE & TIME ISSUED: 2018 8:43 AM  PATIENT NAME: Golden Stover   : 1995     [facility] MR# [if applicable]: [id number]     DIAGNOSIS:  Kidney Transplant  ICD-10 CODE: Z94.0     Please complete the following labs at the next routine transplant lab draw:  CBC with diff and Tcell subset     Any questions please call: [phone]    Please fax these results to [phone].    .

## 2018-04-25 NOTE — TELEPHONE ENCOUNTER
Patient curious about pentamidine course - next pentamidine treatment scheduled for 5/16/18.  Will check CD4 with next set of labs and d/c if >200 (due for labs on 5/7/18).    Golden voiced understanding.

## 2018-05-08 ENCOUNTER — TELEPHONE (OUTPATIENT)
Dept: TRANSPLANT | Facility: CLINIC | Age: 23
End: 2018-05-08

## 2018-05-08 NOTE — LETTER
PHYSICIAN ORDERS      DATE & TIME ISSUED: 2018 10:32 AM  PATIENT NAME: Golden Stover   : 1995     [facility] MR# [if applicable]: [id number]     DIAGNOSIS:  kidney transplant   ICD-10 CODE: z 94.0       Abdominal Xray (KUB of kidney transplant ) - confirming tx ureteral stent removed   Kidney transplant right lower quad                  Any questions please call:  [phone]. FAX report     .

## 2018-05-08 NOTE — TELEPHONE ENCOUNTER
Jaya Mcarthur MD Schindelholz, Alanna, RN                     Zach Edwards,     Did review his images (few kidney US) including with radiology, and there is no stent seen on US; but we can't see a stent on US , unless specifically looking for it. We may need an abdominal XR to be 100% sure.     Thanks,   Jaya            Previous Messages       ----- Message -----      From: Maddy Nagel, PRETTY      Sent: 4/27/2018   8:26 AM        To: Martell Robert MD, Jaya Mcarthur MD     Good Morning Golden Perez is a kidney transplant from June 2017. I see that he still has a stent listed on his implant list, however, the operative note states that there was not a stent placed.     Please review imaging to confirm that there WAS or WAS not a stent placed.     Please let me know if you need a current flat plate to confirm.

## 2018-05-11 ENCOUNTER — TELEPHONE (OUTPATIENT)
Dept: TRANSPLANT | Facility: CLINIC | Age: 23
End: 2018-05-11

## 2018-05-11 NOTE — TELEPHONE ENCOUNTER
Pt is returning message from [employer]. Pt is on his lunch break until 1230p. Pt gets off work at 4p. Please return call to pt at that time or later please.

## 2018-05-11 NOTE — LETTER
PHYSICIAN ORDERS      DATE & TIME ISSUED: May 11, 2018 2:25 PM  PATIENT NAME: Golden Stover   : 1995     [facility] MR# [if applicable]: [id number]     DIAGNOSIS:  Kidney Transplant  ICD-10 CODE: Z94.0     Please complete the following lab at the next routine transplant lab draw:  UA    Any questions please call: [phone]  Please fax lab results to [phone].    .

## 2018-05-11 NOTE — TELEPHONE ENCOUNTER
Cr 1.6    Called Golden Stover regarding increase creatinine   Denies any new medications -   Denies Any recent fevers illness   Denies Any recent diarrhea -   Denies any issues with voiding     Admits dehydration - new job -   tacrolimus level pending   Plan to repeat labs in the next week      2nd ITEM

## 2018-06-08 ENCOUNTER — TRANSFERRED RECORDS (OUTPATIENT)
Dept: HEALTH INFORMATION MANAGEMENT | Facility: CLINIC | Age: 23
End: 2018-06-08

## 2018-06-11 ENCOUNTER — TELEPHONE (OUTPATIENT)
Dept: TRANSPLANT | Facility: CLINIC | Age: 23
End: 2018-06-11

## 2018-06-11 DIAGNOSIS — Z79.899 ENCOUNTER FOR LONG-TERM CURRENT USE OF MEDICATION: ICD-10-CM

## 2018-06-11 DIAGNOSIS — Z48.298 AFTERCARE FOLLOWING ORGAN TRANSPLANT: ICD-10-CM

## 2018-06-11 DIAGNOSIS — Z94.0 KIDNEY REPLACED BY TRANSPLANT: Primary | ICD-10-CM

## 2018-06-11 NOTE — LETTER
[facility]  Room 2[address]  [facility],  [address]  [address]  Tel [phone]  Toll Free [phone]                OUTPATIENT LABORATORY TEST ORDER    Patient Name: Golden Stover  Transplant Date: 6/7/2017 (Kidney)  YOB: 1995                                                     Issue Date & Time:June 14, 2018  10:07 AM    [facility] MR:  [id number]  Exp. Date (1 year after date issued)    Diagnoses: Kidney Transplant (ICD-10  Z94.0)   Long term use of medications (ICD-10  Z79.899)     Lab results to be available on the same day drawn.   Patient should release information to the [facility].  Please fax to the Transplant Center at [phone].    Monthly   ?Hemogram and Platelet  ?Basic Metabolic Panel (Sodium, Potassium, Chloride, CO2, Creatinine, Urea Nitrogen, Glucose, Calcium)  ?/Tacrolimus/Prograf drug level     Every 6 Months                 ?BK (Polyoma Virus) PCR Quantitative - Plasma                                           ?Urine for protein/creatinine    Yearly:   ? PRA/DSA level (mailers provided by the patient)     If you have any questions, please call [facility] at [phone] or [phone].    Please fax labs to [phone]  .

## 2018-06-12 NOTE — TELEPHONE ENCOUNTER
Please call Golden Stover   Review with Golden  that abdominal xray did not indicate stent - NO stent placed on the time transplant       2nd Issue overdue for  Donor Specific Antibodies  -Please have it drawn with next lab draw     1 year Kidney and Pancreas Transplant Patient Phone Call    Congratulations on your 1 year post-transplant anniversary!    Please re-review with the patient how to contact the Transplant Center, as their coordination team will now change:  Best phone contact is [phone] option 5, as if the need is urgent, as you will have a new transplant coordination team to assist you after your first year of transplant.    When the patient should contact the Transplant Center:    If you run out of your immunosuppression medications.     Prolonged illness that is not resolved after recommendations of the PCP, such as frequent UTI.    Viral infections such as : Shingles (herpes-zoster), CMV, EBV, and Influenza    Cancer    Increased creatinine or pain over your graft site.   Please review the patient's baseline creatinine with them:  Assessment and Plan:  1. LDKT - baseline Cr ~ 1.3-1.5, which has remained stable.  No proteinuria.  No DSA.       Hospitalizations at facilities other than [facility].    When the patient should visit their local ED or Urgent Care:    Severe dehydration (uncontrolled nausea, vomiting, diarrhea).    Unable to urinate.    Fevers >101    Other medical emergencies.      Medications:  Please complete medication reconciliation and ensure the patient has an up to date medication card at home.   *ALWAYS BRING YOUR MED CARD TO APPOINTMENTS.*  Please confirm if the patient is independent with medication set up and administration: If no, who helps the patient with their medications?  Please review if the patient has had any incidents of missed medications:  If yes, in the last 1 month:  If yes, in the last 3 months:  Does the patient have any strategies in place to avoid missed  immunosuppression doses: IF yes, please explain   Labs:  Please review current lab frequency with the patient after 1 year post transplant:  Labs will now be drawn monthly up to 3 years post-transplant.   Contact the Transplant Center if additional lab orders are needed.  Please update and mail lab letter and orders.    Your coordinator is currently monitoring your most recent transplant complications: No complications      General Transplant Recommendations:    Yearly nephrology visits with our MDs.    yearly follow up with your primary care provider (PCP)     Follow up with specialty providers as directed.    Frequent reviews of the transplant handbook and / or [facility].    Lab review on MyChart.   If the patient does not utilize MyChart, please record how the patient monitors their lab values:

## 2018-06-13 NOTE — TELEPHONE ENCOUNTER
Left message for patient informing him that stent was never placed and that he is due for DSA labs.  Instructed patient return call to review 1 yr post txp f/u below.

## 2018-06-14 NOTE — TELEPHONE ENCOUNTER
Left message for patient informing him that stent was never placed and that he is due for DSA labs.  Instructed patient return call to review 1 yr post txp f/u below.  Sent ngmocot message.

## 2018-06-14 NOTE — TELEPHONE ENCOUNTER
Patient replied via [employer]:  Thank you, I'll get the donor antibody draw done tomorrow, and good deal about the stent, I figured one wasent used Dr. Grijalva mentioned it right before the surgery but still good to double check just in case. I'll get those draws done though!   Update current standing lab orders.  Mailed copy to patient and faxed copy to patient's lab.

## 2018-06-15 ENCOUNTER — TELEPHONE (OUTPATIENT)
Dept: TRANSPLANT | Facility: CLINIC | Age: 23
End: 2018-06-15

## 2018-06-15 ENCOUNTER — RESULTS ONLY (OUTPATIENT)
Dept: OTHER | Facility: CLINIC | Age: 23
End: 2018-06-15

## 2018-06-15 DIAGNOSIS — Z94.0 KIDNEY REPLACED BY TRANSPLANT: ICD-10-CM

## 2018-06-15 DIAGNOSIS — Z79.899 ENCOUNTER FOR LONG-TERM CURRENT USE OF MEDICATION: ICD-10-CM

## 2018-06-15 DIAGNOSIS — Z48.298 AFTERCARE FOLLOWING ORGAN TRANSPLANT: ICD-10-CM

## 2018-06-15 PROCEDURE — 86832 HLA CLASS I HIGH DEFIN QUAL: CPT | Performed by: INTERNAL MEDICINE

## 2018-06-15 PROCEDURE — 86833 HLA CLASS II HIGH DEFIN QUAL: CPT | Performed by: INTERNAL MEDICINE

## 2018-06-15 NOTE — TELEPHONE ENCOUNTER
Notes Recorded by Jak King MD on 6/13/2018 at 1:31 PM  Hyperbilirubinemia and previously felt to be secondary to hemolysis, likely due to dapsone, which has been stopped.  However, now with further increase in bilirubin, would recommend again assessing for hemolysis with checking peripheral smear, LDH and haptoglobin.  Would also refer patient back to Hematology.  Jak King MD Huepfel, Mary K, RN                   He had already seen Hematology, not sure where, and would just refer him back there.            Previous Messages       ----- Message -----      From: Gayle Jimenez, RN      Sent: 6/14/2018   7:00 AM        To: Jak King MD   Subject: Easy question - [place] or [facility] hematology           He has a follow up with you in July - (look like this appointment is not correct )     Question - Should I have the labs and hematology in [facility] or [place]

## 2018-06-15 NOTE — LETTER
PHYSICIAN ORDERS      DATE & TIME ISSUED: 2018 10:31 AM  PATIENT NAME: Golden Stover   : 1995     [facility] MR# [if applicable]: [id number]     DIAGNOSIS:  Kidney Transplant  ICD-10 CODE: Z94.0     Please complete the following labs:  Hemolysis with checking peripheral smear  LDH   Haptoglobin    Any questions please call: [phone]  Please fax lab results to [phone].    .

## 2018-06-19 LAB — PRA DONOR SPECIFIC ABY: NORMAL

## 2018-06-19 NOTE — TELEPHONE ENCOUNTER
Called Golden Stover who stated that he followed up with hepatologist  In [place] --Golden was told he had Gilbert syndrome   Golden will make a follow up appointment with local hepatologist and discuss with Dr King on July 20 at [facility]    Plan   Please send fax and mail  Lab ordersrders to Local lab and to  Golden Stover  request by Dr King - for hemolysis with checking peripheral smear, LDH and haptoglobin.

## 2018-07-20 ENCOUNTER — RECORDS - HEALTHEAST (OUTPATIENT)
Dept: ADMINISTRATIVE | Facility: OTHER | Age: 23
End: 2018-07-20

## 2018-07-20 ENCOUNTER — OFFICE VISIT (OUTPATIENT)
Dept: NEPHROLOGY | Facility: CLINIC | Age: 23
End: 2018-07-20
Payer: COMMERCIAL

## 2018-07-20 VITALS
WEIGHT: 206 LBS | RESPIRATION RATE: 18 BRPM | BODY MASS INDEX: 26.44 KG/M2 | HEIGHT: 74 IN | HEART RATE: 68 BPM | SYSTOLIC BLOOD PRESSURE: 132 MMHG | DIASTOLIC BLOOD PRESSURE: 78 MMHG

## 2018-07-20 DIAGNOSIS — D63.1 ANEMIA IN STAGE 3 CHRONIC KIDNEY DISEASE (H): ICD-10-CM

## 2018-07-20 DIAGNOSIS — I15.1 HYPERTENSION SECONDARY TO OTHER RENAL DISORDERS: ICD-10-CM

## 2018-07-20 DIAGNOSIS — E80.4 GILBERT SYNDROME: ICD-10-CM

## 2018-07-20 DIAGNOSIS — D84.9 IMMUNOSUPPRESSED STATUS (H): ICD-10-CM

## 2018-07-20 DIAGNOSIS — N18.30 ANEMIA IN STAGE 3 CHRONIC KIDNEY DISEASE (H): ICD-10-CM

## 2018-07-20 DIAGNOSIS — N25.81 SECONDARY RENAL HYPERPARATHYROIDISM (H): ICD-10-CM

## 2018-07-20 DIAGNOSIS — E55.9 VITAMIN D DEFICIENCY: ICD-10-CM

## 2018-07-20 DIAGNOSIS — Z48.298 AFTERCARE FOLLOWING ORGAN TRANSPLANT: ICD-10-CM

## 2018-07-20 DIAGNOSIS — Z94.0 KIDNEY REPLACED BY TRANSPLANT: Primary | ICD-10-CM

## 2018-07-20 NOTE — LETTER
7/20/2018       RE: Golden Stover  [address]  [place] [facility] [place] [address]     Dear Colleague,    Thank you for referring your patient, Golden Stover, to the [facility] at [facility]. Please see a copy of my visit note below.    Assessment and Plan:  # LDKT - baseline Cr ~ 1.3-1.5, which has remained stable to improved with latest labs.  Normal proteinuria.  No DSA.  # Immunosuppression - maintenance immunosuppression with tacrolimus and mycophenolate mofetil.  Target tacrolimus level 4-6.  Will make no changes in immunosuppression.  # HTN - okay control below target of less than 140/90.  No changes.  # Anemia in chronic kidney disease - stable Hgb, near normal.  Iron replete.  Will follow.  # Secondary renal hyperparathyroidism - mildly elevated PTH, which has improved post transplant.  Patient is now off calcitriol and will recheck PTH and vitamin D with next labs.  # Vitamin D deficiency - low vitamin D level, but patient is not on oral cholecalciferol.  Will recheck vitamin D level with next labs.  # Elevated total bilirubin - unremarkable evaluation and felt secondary to Gilbert's Syndrome with no further evaluation required.  # Prophylactic medications - CD4 level now more than 200 and can stop pentamidine with no further need for prophylaxis.  # Skin cancer risk - no new skin lesions.  Discussed sun protection.  # Recommend return visit in 6 months.    Assessment and plan was discussed with patient and he voiced his understanding and agreement.    Reason for Visit:  Mr. Stover is here for routine follow up.    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         Transplant Hx:       Tx: LDKT  Date: 6/7/17       Present Maintenance IS: Tacrolimus and Mycophenolate mofetil       Baseline Creatinine: 1.3-1.5       Recent DSA: No  Date last checked: 6/2018       Biopsy:  No    Mr. Stover reports feeling good overall with minimal medical complaints.  Since last clinic visit, patient reports no hospitalizations or new medical complaints and has been doing well overall.  He did have some issues with elevated total bilirubin and after evaluation by both GI and Hematology, it was felt he likely had Gilbert's Syndrome and no further evaluation was required.  His energy level is good and remains normal.  He is active and does get regular exercise.  Denies any chest pain or shortness of breath with exertion.  Appetite is good and he has lost another couple of pounds watching his diet.  No nausea, vomiting or diarrhea.  No fever, sweats or chills.  No leg swelling.    Home BP: 120-130/70-80s.      ROS:   A comprehensive review of systems was obtained and negative, except as noted in the HPI or PMH.    Active Medical Problems:  Patient Active Problem List   Diagnosis     IgA nephropathy     Hypertension secondary to other renal disorders     Anemia in chronic kidney disease     Secondary renal hyperparathyroidism (H)     Kidney replaced by transplant     Immunosuppressed status (H)     Aftercare following organ transplant     Vitamin D deficiency     Gilbert syndrome       Personal Hx:  Social History     Social History     Marital status: Single     Spouse name: N/A     Number of children: N/A     Years of education: N/A     Occupational History     Not on file.     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 Authorizing Provider   oxyCODONE (ROXICODONE) 5 MG IR tablet Take 1-2 tablets (5-10 mg) by mouth every 4 hours as needed for moderate to severe pain 6/12/17   Heidi Black PA-C   acetaminophen (TYLENOL) 325 MG tablet Take 2 tablets (650 mg) by mouth every 6 hours as needed for mild pain 6/12/17   Heidi Black PA-C   mycophenolate (CELLCEPT - GENERIC EQUIVALENT) 250 MG capsule Take 3 capsules (750 mg) by mouth 2 times daily 6/12/17   eHidi Black PA-C   tacrolimus (PROGRAF - GENERIC EQUIVALENT) 1 MG capsule Take 3 capsules (3 mg) by mouth 2 times daily 6/12/17   Heidi Black PA-C   phosphorus tablet 250 mg (K PHOS NEUTRAL) 250 MG per tablet Take 2 tablets (500 mg) by mouth 2 times daily 6/12/17 7/12/17  Heidi Black PA-C   calcitRIOL (ROCALTROL) 0.25 MCG capsule Take 1 capsule (0.25 mcg) by mouth daily 6/12/17   Heidi Black PA-C   valGANciclovir (VALCYTE) 450 MG tablet Take 2 tablets (900 mg) by mouth daily 6/12/17   Heidi Black PA-C   hydrOXYzine (VISTARIL) 25 MG capsule Take 1 capsule (25 mg) by mouth 3 times daily as needed for itching 6/12/17   Heidi Black PA-C   docusate sodium (COLACE) 100 MG tablet Take 100 mg by mouth daily 6/12/17   Heidi Black PA-C   dapsone 100 MG tablet Take 1 tablet (100 mg) by mouth daily 6/12/17   Heidi Black PA-C       Vitals:  /78 (BP Location: Left arm, Patient Position: Chair, Cuff Size: Adult Regular)  Pulse 68  Resp 18  Ht 1.88 m (6' 2\")  Wt 93.4 kg (206 lb)  BMI 26.45 kg/m2    Exam:   GENERAL APPEARANCE: alert and no distress  HENT: mouth without ulcers or lesions  LYMPHATICS: no cervical or supraclavicular nodes  RESP: lungs clear to " auscultation - no rales, rhonchi or wheezes  CV: regular rhythm, normal rate, no rub, no murmur  EDEMA: no LE edema bilaterally  ABDOMEN: soft, nondistended, nontender, bowel sounds normal  MS: extremities normal - no gross deformities noted, no evidence of inflammation in joints, no muscle tenderness  SKIN: no rash  TX KIDNEY: normal    Results:   Recent Results (from the past 336 hour(s))   CBC with platelets    Collection Time: 07/18/18  7:28 AM   Result Value Ref Range    WBC Count (External) 6.9 3.2 - 11.0 10(9)L    RBC Count (External) 4.79 4.14 - 5.76 10*12/L    Hemoglobin (External) 13.2 12.9 - 16.9 g/dL    Hematocrit (External) 40.4 38.4 - 49.7 %    MCV (External) 84.3 81.4 - 99.0 fl    MCH (External) 27.6 26.7 - 33.1 pg    MCHC (External) 32.7 31.6 - 35.5 g/dL    RDW (External) 14.0 11.3 - 14.6 %    Platelet Count (External) 393 (H) 130 - 375 10(9)L   Basic metabolic panel    Collection Time: 07/18/18  7:28 AM   Result Value Ref Range    Sodium (External) 140 134 - 143 mEq/L    Potassium (External) 4.0 3.4 - 5.1 mEq/L    Chloride (External) 106 99 - 110 mEq/L    CO2 (External) 26 19 - 29 mEq/L    Anion Gap (External) 8.0 3.0 - 15.0 mEq/L    Urea Nitrogen (External) 23 5 - 24 mg/dL    Creatinine (External) 1.21 (H) 0.70 - 1.20 mg/dL    GFR Estimated (External) >60 >60 ml/min/1.73m2    Calcium (External) 9.9 8.4 - 10.5 mg/dL    Glucose (External) 105 (H) 70 - 100 mg/dL       Again, thank you for allowing me to participate in the care of your patient.      Sincerely,    Jak King MD

## 2018-07-20 NOTE — LETTER
7/20/2018      RE: Golden Stover  [address]  [facility] [address]       Assessment and Plan:  # LDKT - baseline Cr ~ 1.3-1.5, which has remained stable to improved with latest labs.  Normal proteinuria.  No DSA.  # Immunosuppression - maintenance immunosuppression with tacrolimus and mycophenolate mofetil.  Target tacrolimus level 4-6.  Will make no changes in immunosuppression.  # HTN - okay control below target of less than 140/90.  No changes.  # Anemia in chronic kidney disease - stable Hgb, near normal.  Iron replete.  Will follow.  # Secondary renal hyperparathyroidism - mildly elevated PTH, which has improved post transplant.  Patient is now off calcitriol and will recheck PTH and vitamin D with next labs.  # Vitamin D deficiency - low vitamin D level, but patient is not on oral cholecalciferol.  Will recheck vitamin D level with next labs.  # Elevated total bilirubin - unremarkable evaluation and felt secondary to Gilbert's Syndrome with no further evaluation required.  # Prophylactic medications - CD4 level now more than 200 and can stop pentamidine with no further need for prophylaxis.  # Skin cancer risk - no new skin lesions.  Discussed sun protection.  # Recommend return visit in 6 months.    Assessment and plan was discussed with patient and he voiced his understanding and agreement.    Reason for Visit:  Mr. Stover is here for routine follow up.    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         Transplant Hx:       Tx: LDKT  Date: 6/7/17       Present Maintenance IS: Tacrolimus and Mycophenolate mofetil       Baseline Creatinine: 1.3-1.5       Recent DSA: No  Date last checked: 6/2018       Biopsy: No    Mr. Stover reports feeling good overall with minimal medical complaints.  Since last clinic visit, patient reports no hospitalizations or new medical complaints and has been doing well overall.  He did have  some issues with elevated total bilirubin and after evaluation by both GI and Hematology, it was felt he likely had Gilbert's Syndrome and no further evaluation was required.  His energy level is good and remains normal.  He is active and does get regular exercise.  Denies any chest pain or shortness of breath with exertion.  Appetite is good and he has lost another couple of pounds watching his diet.  No nausea, vomiting or diarrhea.  No fever, sweats or chills.  No leg swelling.    Home BP: 120-130/70-80s.      ROS:   A comprehensive review of systems was obtained and negative, except as noted in the HPI or PMH.    Active Medical Problems:  Patient Active Problem List   Diagnosis     IgA nephropathy     Hypertension secondary to other renal disorders     Anemia in chronic kidney disease     Secondary renal hyperparathyroidism (H)     Kidney replaced by transplant     Immunosuppressed status (H)     Aftercare following organ transplant     Vitamin D deficiency     Gilbert syndrome       Personal Hx:  Social History     Social History     Marital status: Single     Spouse name: N/A     Number of children: N/A     Years of education: N/A     Occupational History     Not on file.     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 Authorizing Provider   oxyCODONE (ROXICODONE) 5 MG IR tablet Take 1-2 tablets (5-10 mg) by mouth every 4 hours as needed for moderate to severe pain 6/12/17   Heidi Black PA-C   acetaminophen (TYLENOL) 325 MG tablet Take 2 tablets (650 mg) by mouth every 6 hours as needed for mild pain 6/12/17   Heidi Black PA-C   mycophenolate (CELLCEPT - GENERIC EQUIVALENT) 250 MG capsule Take 3 capsules (750 mg) by mouth 2 times daily 6/12/17   Heidi Black PA-C   tacrolimus (PROGRAF - GENERIC EQUIVALENT) 1 MG capsule Take 3 capsules (3 mg) by mouth 2 times daily 6/12/17   Heidi Black PA-C   phosphorus tablet 250 mg (K PHOS NEUTRAL) 250 MG per tablet Take 2 tablets (500 mg) by mouth 2 times daily 6/12/17 7/12/17  Heidi Black PA-C   calcitRIOL (ROCALTROL) 0.25 MCG capsule Take 1 capsule (0.25 mcg) by mouth daily 6/12/17   Heidi Black PA-C   valGANciclovir (VALCYTE) 450 MG tablet Take 2 tablets (900 mg) by mouth daily 6/12/17   Heidi Black PA-C   hydrOXYzine (VISTARIL) 25 MG capsule Take 1 capsule (25 mg) by mouth 3 times daily as needed for itching 6/12/17   Heidi Black PA-C   docusate sodium (COLACE) 100 MG tablet Take 100 mg by mouth daily 6/12/17   Heidi Black PA-C   dapsone 100 MG tablet Take 1 tablet (100 mg) by mouth daily 6/12/17   Heidi Black PA-C       Vitals:  /78 (BP Location: Left arm, Patient Position: Chair, Cuff Size: Adult Regular)  Pulse 68  Resp 18  Ht 1.88 m (6' 2\")  Wt 93.4 kg (206 lb)  BMI 26.45 kg/m2    Exam:   GENERAL APPEARANCE: alert and no distress  HENT: mouth without ulcers or lesions  LYMPHATICS: no cervical or supraclavicular nodes  RESP: lungs clear to auscultation - no rales, rhonchi or wheezes  CV: regular rhythm, normal rate, no rub, no murmur  EDEMA: no LE edema bilaterally  ABDOMEN: soft, nondistended, nontender, bowel sounds normal  MS: extremities normal - no gross " deformities noted, no evidence of inflammation in joints, no muscle tenderness  SKIN: no rash  TX KIDNEY: normal    Results:   Recent Results (from the past 336 hour(s))   CBC with platelets    Collection Time: 07/18/18  7:28 AM   Result Value Ref Range    WBC Count (External) 6.9 3.2 - 11.0 10(9)L    RBC Count (External) 4.79 4.14 - 5.76 10*12/L    Hemoglobin (External) 13.2 12.9 - 16.9 g/dL    Hematocrit (External) 40.4 38.4 - 49.7 %    MCV (External) 84.3 81.4 - 99.0 fl    MCH (External) 27.6 26.7 - 33.1 pg    MCHC (External) 32.7 31.6 - 35.5 g/dL    RDW (External) 14.0 11.3 - 14.6 %    Platelet Count (External) 393 (H) 130 - 375 10(9)L   Basic metabolic panel    Collection Time: 07/18/18  7:28 AM   Result Value Ref Range    Sodium (External) 140 134 - 143 mEq/L    Potassium (External) 4.0 3.4 - 5.1 mEq/L    Chloride (External) 106 99 - 110 mEq/L    CO2 (External) 26 19 - 29 mEq/L    Anion Gap (External) 8.0 3.0 - 15.0 mEq/L    Urea Nitrogen (External) 23 5 - 24 mg/dL    Creatinine (External) 1.21 (H) 0.70 - 1.20 mg/dL    GFR Estimated (External) >60 >60 ml/min/1.73m2    Calcium (External) 9.9 8.4 - 10.5 mg/dL    Glucose (External) 105 (H) 70 - 100 mg/dL       Jak King MD

## 2018-07-20 NOTE — PROGRESS NOTES
Assessment and Plan:  # LDKT - baseline Cr ~ 1.3-1.5, which has remained stable to improved with latest labs.  Normal proteinuria.  No DSA.  # Immunosuppression - maintenance immunosuppression with tacrolimus and mycophenolate mofetil.  Target tacrolimus level 4-6.  Will make no changes in immunosuppression.  # HTN - okay control below target of less than 140/90.  No changes.  # Anemia in chronic kidney disease - stable Hgb, near normal.  Iron replete.  Will follow.  # Secondary renal hyperparathyroidism - mildly elevated PTH, which has improved post transplant.  Patient is now off calcitriol and will recheck PTH and vitamin D with next labs.  # Vitamin D deficiency - low vitamin D level, but patient is not on oral cholecalciferol.  Will recheck vitamin D level with next labs.  # Elevated total bilirubin - unremarkable evaluation and felt secondary to Gilbert's Syndrome with no further evaluation required.  # Prophylactic medications - CD4 level now more than 200 and can stop pentamidine with no further need for prophylaxis.  # Skin cancer risk - no new skin lesions.  Discussed sun protection.  # Recommend return visit in 6 months.    Assessment and plan was discussed with patient and he voiced his understanding and agreement.    Reason for Visit:  Mr. Stover is here for routine follow up.    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         Transplant Hx:       Tx: LDKT  Date: 6/7/17       Present Maintenance IS: Tacrolimus and Mycophenolate mofetil       Baseline Creatinine: 1.3-1.5       Recent DSA: No  Date last checked: 6/2018       Biopsy: No    Mr. Stover reports feeling good overall with minimal medical complaints.  Since last clinic visit, patient reports no hospitalizations or new medical complaints and has been doing well overall.  He did have some issues with elevated total bilirubin and after evaluation by both GI and Hematology,  it was felt he likely had Gilbert's Syndrome and no further evaluation was required.  His energy level is good and remains normal.  He is active and does get regular exercise.  Denies any chest pain or shortness of breath with exertion.  Appetite is good and he has lost another couple of pounds watching his diet.  No nausea, vomiting or diarrhea.  No fever, sweats or chills.  No leg swelling.    Home BP: 120-130/70-80s.      ROS:   A comprehensive review of systems was obtained and negative, except as noted in the HPI or PMH.    Active Medical Problems:  Patient Active Problem List   Diagnosis     IgA nephropathy     Hypertension secondary to other renal disorders     Anemia in chronic kidney disease     Secondary renal hyperparathyroidism (H)     Kidney replaced by transplant     Immunosuppressed status (H)     Aftercare following organ transplant     Vitamin D deficiency     Gilbert syndrome       Personal Hx:  Social History     Social History     Marital status: Single     Spouse name: N/A     Number of children: N/A     Years of education: N/A     Occupational History     Not on file.     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 Authorizing Provider   oxyCODONE (ROXICODONE)  "5 MG IR tablet Take 1-2 tablets (5-10 mg) by mouth every 4 hours as needed for moderate to severe pain 6/12/17   Heidi Black PA-C   acetaminophen (TYLENOL) 325 MG tablet Take 2 tablets (650 mg) by mouth every 6 hours as needed for mild pain 6/12/17   Heidi Black PA-C   mycophenolate (CELLCEPT - GENERIC EQUIVALENT) 250 MG capsule Take 3 capsules (750 mg) by mouth 2 times daily 6/12/17   Heidi Black PA-C   tacrolimus (PROGRAF - GENERIC EQUIVALENT) 1 MG capsule Take 3 capsules (3 mg) by mouth 2 times daily 6/12/17   Heidi Black PA-C   phosphorus tablet 250 mg (K PHOS NEUTRAL) 250 MG per tablet Take 2 tablets (500 mg) by mouth 2 times daily 6/12/17 7/12/17  Heidi Black PA-C   calcitRIOL (ROCALTROL) 0.25 MCG capsule Take 1 capsule (0.25 mcg) by mouth daily 6/12/17   Heidi Black PA-C   valGANciclovir (VALCYTE) 450 MG tablet Take 2 tablets (900 mg) by mouth daily 6/12/17   Heidi Black PA-C   hydrOXYzine (VISTARIL) 25 MG capsule Take 1 capsule (25 mg) by mouth 3 times daily as needed for itching 6/12/17   Heidi Black PA-C   docusate sodium (COLACE) 100 MG tablet Take 100 mg by mouth daily 6/12/17   Heidi Black PA-C   dapsone 100 MG tablet Take 1 tablet (100 mg) by mouth daily 6/12/17   Heidi Black PA-C       Vitals:  /78 (BP Location: Left arm, Patient Position: Chair, Cuff Size: Adult Regular)  Pulse 68  Resp 18  Ht 1.88 m (6' 2\")  Wt 93.4 kg (206 lb)  BMI 26.45 kg/m2    Exam:   GENERAL APPEARANCE: alert and no distress  HENT: mouth without ulcers or lesions  LYMPHATICS: no cervical or supraclavicular nodes  RESP: lungs clear to auscultation - no rales, rhonchi or wheezes  CV: regular rhythm, normal rate, no rub, no murmur  EDEMA: no LE edema bilaterally  ABDOMEN: soft, nondistended, nontender, bowel sounds normal  MS: extremities normal - no gross deformities noted, no evidence of inflammation in joints, no muscle tenderness  SKIN: no " rash  TX KIDNEY: normal    Results:   Recent Results (from the past 336 hour(s))   CBC with platelets    Collection Time: 07/18/18  7:28 AM   Result Value Ref Range    WBC Count (External) 6.9 3.2 - 11.0 10(9)L    RBC Count (External) 4.79 4.14 - 5.76 10*12/L    Hemoglobin (External) 13.2 12.9 - 16.9 g/dL    Hematocrit (External) 40.4 38.4 - 49.7 %    MCV (External) 84.3 81.4 - 99.0 fl    MCH (External) 27.6 26.7 - 33.1 pg    MCHC (External) 32.7 31.6 - 35.5 g/dL    RDW (External) 14.0 11.3 - 14.6 %    Platelet Count (External) 393 (H) 130 - 375 10(9)L   Basic metabolic panel    Collection Time: 07/18/18  7:28 AM   Result Value Ref Range    Sodium (External) 140 134 - 143 mEq/L    Potassium (External) 4.0 3.4 - 5.1 mEq/L    Chloride (External) 106 99 - 110 mEq/L    CO2 (External) 26 19 - 29 mEq/L    Anion Gap (External) 8.0 3.0 - 15.0 mEq/L    Urea Nitrogen (External) 23 5 - 24 mg/dL    Creatinine (External) 1.21 (H) 0.70 - 1.20 mg/dL    GFR Estimated (External) >60 >60 ml/min/1.73m2    Calcium (External) 9.9 8.4 - 10.5 mg/dL    Glucose (External) 105 (H) 70 - 100 mg/dL

## 2018-08-17 ENCOUNTER — TELEPHONE (OUTPATIENT)
Dept: TRANSPLANT | Facility: CLINIC | Age: 23
End: 2018-08-17

## 2018-08-17 NOTE — LETTER
PHYSICIAN ORDERS      DATE & TIME ISSUED: 2018 9:40 AM  PATIENT NAME: Golden Stover   : 1995     [facility] MR# [if applicable]: [id number]     DIAGNOSIS:  Kidney Transplant  ICD-10 CODE: Z94.0     Please repeat the following lab in 1-2 weeks:  Tacrolimus drug level    Any questions please call: [phone]  Please fax lab results to [phone].    .

## 2018-08-20 NOTE — TELEPHONE ENCOUNTER
ISSUE:  Tacrolimus 8.1  Goal 4-6  WBC 10.7 elevated from pt baseline    OUTCOME:   Spoke with pt regarding lab results. Pt verbalized good 12 hour trough and reports he has been taking 5.5 mg tac BID.  Pt denies recent medication changes N/V/D.  Pt does report sore throat and feeling fatigued. Pt educated to f/u with PCP regarding s/s and elevated WBC.  Pt will decrease tacrolimus to 5 mg BID and repeat drug level in 1-2 weeks.     Pt questions answered, pt v/u.

## 2018-09-09 DIAGNOSIS — N25.81 SECONDARY RENAL HYPERPARATHYROIDISM (H): ICD-10-CM

## 2018-09-09 DIAGNOSIS — I15.1 HYPERTENSION SECONDARY TO OTHER RENAL DISORDERS (CODE): ICD-10-CM

## 2018-09-10 RX ORDER — AMLODIPINE BESYLATE 10 MG/1
TABLET ORAL
Qty: 90 TABLET | Refills: 3 | Status: SHIPPED | OUTPATIENT
Start: 2018-09-10 | End: 2019-08-25

## 2018-09-27 ENCOUNTER — TELEPHONE (OUTPATIENT)
Dept: TRANSPLANT | Facility: CLINIC | Age: 23
End: 2018-09-27

## 2018-10-05 ENCOUNTER — TELEPHONE (OUTPATIENT)
Dept: TRANSPLANT | Facility: CLINIC | Age: 23
End: 2018-10-05

## 2018-10-05 DIAGNOSIS — Z94.0 LIVING-DONOR KIDNEY TRANSPLANT RECIPIENT: Primary | ICD-10-CM

## 2018-10-05 RX ORDER — MYCOPHENOLATE MOFETIL 250 MG/1
750 CAPSULE ORAL 2 TIMES DAILY
Qty: 180 CAPSULE | Refills: 11 | Status: SHIPPED | OUTPATIENT
Start: 2018-10-05 | End: 2018-10-06

## 2018-10-05 RX ORDER — TACROLIMUS 1 MG/1
5 CAPSULE ORAL 2 TIMES DAILY
Qty: 300 CAPSULE | Refills: 11 | Status: SHIPPED | OUTPATIENT
Start: 2018-10-05 | End: 2019-02-18

## 2018-10-05 NOTE — TELEPHONE ENCOUNTER
Prior Authorization Specialty Medication Request    Medication/Dose:     Cover my med KEY:   Mycophenolate [id number]  Tacrolimus [id number]  ICD code (if different than what is on RX):  Kidney transplant Z94.0  Previously Tried and Failed:      Important Lab Values:   Rationale:     Insurance Name:   Insurance ID:   Insurance Phone Number:     Pharmacy Information (if different than what is on RX)  Name:  [facility]   Phone:  [phone]

## 2018-10-06 DIAGNOSIS — Z94.0 LIVING-DONOR KIDNEY TRANSPLANT RECIPIENT: Primary | ICD-10-CM

## 2018-10-08 RX ORDER — MYCOPHENOLATE MOFETIL 250 MG/1
750 CAPSULE ORAL 2 TIMES DAILY
Qty: 180 CAPSULE | Refills: 11 | Status: SHIPPED | OUTPATIENT
Start: 2018-10-08 | End: 2018-10-12

## 2018-10-09 NOTE — TELEPHONE ENCOUNTER
[employer] Prior Authorization Team   Phone: [phone]  Fax: [phone]    Prior Authorization Not Needed per Insurance    Medication: MYCOPHENOLATE, TACROLIMUS - PA NOT NEEDED  Insurance Company: [employer] - Phone [phone] Fax [phone]  Expected CoPay:      Pharmacy Filling the Rx:    Pharmacy Notified: No  Patient Notified: No        Per call to ins, [employer] does not require a Prior Authorization for Tacrolimus and Mycophenolate. The insurance is rejecting because they think [employer] is listed as primary but that is actually the patient's medical benefits. Pharmacy can try calling [employer] pharmacy help desk to see if there are any overrides allowed so they can fill it, otherwise patient will have to call their [employer] insurance and have C[employer] removed as primary.    Once everything is updated then the pharmacy can bill [employer] as primary and Medicare Part B as secondary.

## 2018-10-12 DIAGNOSIS — Z94.0 LIVING-DONOR KIDNEY TRANSPLANT RECIPIENT: Primary | ICD-10-CM

## 2018-10-12 RX ORDER — MYCOPHENOLATE MOFETIL 250 MG/1
750 CAPSULE ORAL 2 TIMES DAILY
Qty: 180 CAPSULE | Refills: 11 | Status: SHIPPED | OUTPATIENT
Start: 2018-10-12 | End: 2019-11-01

## 2018-10-15 ENCOUNTER — TELEPHONE (OUTPATIENT)
Dept: TRANSPLANT | Facility: CLINIC | Age: 23
End: 2018-10-15

## 2018-10-15 NOTE — TELEPHONE ENCOUNTER
Prior Authorization Specialty Medication Request    Medication/Dose: Mycophenolate   ICD code (if different than what is on RX):  Z94.0 Kidney transplant   Previously Tried and Failed:      Important Lab Values:   Rationale:     Insurance Name: Cover My Med Key   Insurance ID: [id number]  Insurance Phone Number:     Pharmacy Information (if different than what is on RX)  Name:  [facility]   Phone:  [phone]

## 2018-10-16 NOTE — TELEPHONE ENCOUNTER
[employer] Prior Authorization Team   Phone: [phone]  Fax: [phone]    Prior Authorization Not Needed per Insurance    Medication: MYCOPHENOLATE 250MG - PA NOT NEEDED  Insurance Company:    Expected CoPay:      Pharmacy Filling the Rx:    Pharmacy Notified:    Patient Notified:           Per call to ins, [employer] does not require a Prior Authorization for Tacrolimus and Mycophenolate. The insurance is rejecting because they think C[employer] is listed as primary but that is actually the patient's medical benefits. Pharmacy can try calling [facility] [employer] [facility] pharmacy help desk to see if there are any overrides allowed so they can fill it, otherwise patient will have to call their [employer] insurance and have Cigna removed as primary.     Once everything is updated then the pharmacy can bill [employer] as primary and Medicare Part B as secondary.

## 2018-11-01 ENCOUNTER — TELEPHONE (OUTPATIENT)
Dept: TRANSPLANT | Facility: CLINIC | Age: 23
End: 2018-11-01

## 2018-11-01 NOTE — TELEPHONE ENCOUNTER
Call placed to patient. No answer. Detailed message left with instructions listed below. Will try back

## 2018-11-01 NOTE — TELEPHONE ENCOUNTER
ISSUE:  Creatinine 1.5 on upper end of baseline 1.2-1.5    PLAN:   Please call pt to ensure pt is drinking 2-3L/day, no new illness/fever/malaise/abdominal pain/edema, medication changes, or normal UOP. If the above is normal, encourage continued hydration and plan on repeat labs next month as scheduled.

## 2018-11-06 NOTE — TELEPHONE ENCOUNTER
ISSUE:  Creatinine 1.28 back to baseline of 1.3-1.5    OUTCOME:   Call placed to pt regarding his creatinine level.  Pt aware level back to baseline and he can resume monthly lab schedule.

## 2019-01-15 NOTE — PROGRESS NOTES
CHRONIC TRANSPLANT NEPHROLOGY VISIT    Assessment & Plan   # LDKT: Stable   - Baseline Cr ~ 1.3-1.5;    - Proteinuria: Normal   - Date of DSA last checked: 6/2018  Latest DSA: No   - BK Viremia: No   - Kidney Tx Biopsy: No    # Immunosuppression: Tacrolimus immediate release (goal  4-6) and Mycophenolate mofetil (goal  1-3.5)   - Changes: No    # Prophylaxis:   - PJP: None    # Hypertension: Controlled; Goal BP: < 130/80   - Changes: No    # Anemia in chronic renal disease: Hgb: Trend up, now normal Hgb.   - Iron studies: Replete    # Mineral Bone Disorder:    - Secondary renal hyperparathyroidism; PTH level is: Minimally elevated and no need for further follow up.   - Vitamin D; level is: Low, but with high normal serum calcium, would recommend just following for now.   - Calcium; level is: Normal     # Skin Cancer Risk:    - Discussed sun protection and recommend regular follow up with Dermatology.    # Medical Compliance: Yes    Return visit: Return in about 6 months (around 7/18/2019).    # Transplant History:  Etiology of kidney failure: IgA nephropathy  Tx: LDKT  Transplant: 6/7/2017 (Kidney)  Donor Type: Living Donor Class:   Significant changes in immunosuppression: None  Significant transplant-related complications: None    Transplant Office Phone Number: [phone]    Assessment and plan was discussed with the patient and he voiced his understanding and agreement.    Jak King MD    Chief Complaint   Mr. Stover is a 23 year old here for routine follow up.    History of Present Illness    Mr. Stover reports feeling good overall with minimal medical complaints.  Since last clinic visit, patient reports no hospitalizations or new medical complaints and has been doing well overall.  Since the last time the patient was seen, he got .    His energy level is good and pretty normal.  Denies any chest pain or shortness of breath.  Stable weight and no GI symptoms.  No leg swelling.    Recent  Hospitalizations:  [x] No [] Yes    New Medical Issues: [x] No [] Yes    Decreased energy: [x] No [] Yes    Chest pain or SOB with exertion:  [x] No [] Yes    Appetite change or weight change: [x] No [] Yes Weight tends to vary between 210 and 220 lbs.   Nausea, vomiting or diarrhea:  [x] No [] Yes    Fever, sweats or chills: [x] No [] Yes    Leg swelling: [x] No [] Yes      Home BP: 120/70s    Review of Systems   A comprehensive review of systems was obtained and negative, except as noted in the HPI or PMH.    Problem List   Patient Active Problem List   Diagnosis     IgA nephropathy     HTN, kidney transplant related     Secondary renal hyperparathyroidism (H)     Kidney replaced by transplant     Immunosuppression (H)     Aftercare following organ transplant     Vitamin D deficiency     Gilbert syndrome       Social History   Social History     Tobacco Use     Smoking status: Never Smoker     Smokeless tobacco: Never Used   Substance Use Topics     Alcohol use: Yes     Alcohol/week: 0.6 oz     Types: 1 Standard drinks or equivalent per week     Drug use: No       Allergies   Allergies   Allergen Reactions     Methylprednisolone Other (See Comments)     Other reaction(s): Other (See Comments)  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      Mold Itching     Seasonal Allergies Itching     Sulfa Drugs      Thymoglobulin Itching       Medications   Current Outpatient Medications   Medication Sig     amLODIPine (NORVASC) 10 MG tablet TAKE 1 TABLET (10 MG) BY MOUTH DAILY     losartan (COZAAR) 25 MG tablet TAKE 1 TABLET (25 MG) BY MOUTH DAILY     mycophenolate (GENERIC EQUIVALENT) 250 MG capsule Take 3 capsules (750 mg) by mouth 2 times daily     tacrolimus (GENERIC EQUIVALENT) 0.5 MG capsule HOLD (Patient taking differently: Take 0.5 mg by mouth 2 times daily HOLD)     tacrolimus  (GENERIC EQUIVALENT) 1 MG capsule Take 5 capsules (5 mg) by mouth 2 times daily     No current facility-administered medications for this visit.      There are no discontinued medications.    Physical Exam   Vital Signs: There were no vitals taken for this visit.    GENERAL APPEARANCE: alert and no distress  HENT: mouth without ulcers or lesions  LYMPHATICS: no cervical or supraclavicular nodes  RESP: lungs clear to auscultation - no rales, rhonchi or wheezes  CV: regular rhythm, normal rate, no rub, no murmur  EDEMA: no LE edema bilaterally  ABDOMEN: soft, nondistended, nontender, bowel sounds normal  MS: extremities normal - no gross deformities noted, no evidence of inflammation in joints, no muscle tenderness  SKIN: no rash  TX KIDNEY: normal  DIALYSIS ACCESS:  None      Data     Renal Latest Ref Rng & Units 1/15/2019 12/7/2018 11/6/2018   Na 133 - 144 mmol/L - - -   Na (external) 134 - 143 mEq/L 140 140 138   K 3.4 - 5.3 mmol/L - - -   K (external) 3.4 - 5.1 mEq/L 3.9 4.0 4.2   Cl 94 - 109 mmol/L - - -   Cl (external) 99 - 110 mEq/L 106 107 105   CO2 20 - 32 mmol/L - - -   CO2 (external) 19 - 29 mEq/L 24 21 24   BUN 7 - 30 mg/dL - - -   BUN (external) 5 - 24 mg/dL 16 11 13   Cr 0.66 - 1.25 mg/dL - - -   Cr (external) 0.70 - 1.20 mg/dL 1.26(H) 1.29(H) 1.28(H)   Glucose 70 - 99 mg/dL - - -   Glucose (external) 70 - 99 mg/dL 89 91 95   Ca  8.5 - 10.1 mg/dL - - -   Ca (external) 8.4 - 10.5 mg/dL 9.9 10.1 9.9   Mg 1.6 - 2.3 mg/dL - - -   Mg (external) 1.5 - 2.2 mEq/L - - -     Bone Health Latest Ref Rng & Units 10/30/2018 8/17/2018 9/11/2017   Phos 2.5 - 4.5 mg/dL - - -   Phos (external) 2.5 - 4.6 mg/dL - - -   PTHi 12 - 72 pg/mL - - 132(H)   PTHi (external) 15.0 - 115.0 pg/mL - 102.9 -   Vit D Def 20 - 75 ug/L - - -   Vit D Def (external) 27 - 80 ng/mL 26(L) - -     Heme Latest Ref Rng & Units 1/15/2019 12/7/2018 10/30/2018   WBC 4.0 - 11.0 10e9/L - - -   WBC (external) 3.2 - 11.0 10*9/L 5.8 5.5 4.6   Hgb 13.3 -  17.7 g/dL - - -   Hgb (external) 12.9 - 16.9 g/dL 14.2 13.7 13.4   Plt 150 - 450 10e9/L - - -   Plt (external) 130 - 375 10*9/L 434(H) 437(H) 444(H)     Liver Latest Ref Rng & Units 6/8/2018 3/6/2018 1/23/2018   AP 40 - 150 U/L - - -   AP (external) 40 - 150 IU/L 75 76 71   TBili 0.2 - 1.3 mg/dL - - -   TBili (external) 0.2 - 1.2 mg/dL 2.3(H) 1.3(H) 1.3(H)   DBili (external) 0.0 - 0.5 mg/dL 0.7(H) 0.5 0.5   ALT 0 - 70 U/L - - -   ALT (external) 6 - 40 IU/L 31 20 20   AST 0 - 45 U/L - - -   AST (external) 10 - 40 IU/L 30 25 19   Tot Protein 6.8 - 8.8 g/dL - - -   Tot Protein (external) 6.0 - 8.0 g/dL 7.4 7.6 7.3   Albumin 3.4 - 5.0 g/dL - - -   Albumin (external) 3.5 - 5.0 g/dL 4.4 4.6 4.5        Iron studies Latest Ref Rng & Units 6/1/2017   Iron 35 - 180 ug/dL 65   Iron sat 15 - 46 % 21   Ferritin 26 - 388 ng/mL 310     [facility] Txp Virology Latest Ref Rng & Units 1/15/2019 12/7/2018 10/30/2018   BK Spec - - - -   BK Res BKNEG:BK Virus DNA Not Detected copies/mL - - -   BK Log <2.7 Log copies/mL - - -   BK Quant Result Ext None detected None detected None detected None detected   Hep B Core NR - - -   Hep B Core Ext Nonreactive - - -   Hep B Surf Ext  Positive >=12.00 mIU/mL - - -        Recent Labs   Lab Test 06/29/17  0831 06/30/17  0830 07/03/17  0857   DOSTAC 6/28/17 7:30pm 2030, 06/29/17 2030 07/02/17   TACROL 10.0 9.5 11.1     Recent Labs   Lab Test 06/16/17  0712 06/29/17  0831   DOSMPA 1930 06/15/2017 6/28/17 7:30pm   MPACID 1.26 4.40*   MPAG 27.9* 43.9

## 2019-01-18 ENCOUNTER — RECORDS - HEALTHEAST (OUTPATIENT)
Dept: ADMINISTRATIVE | Facility: OTHER | Age: 24
End: 2019-01-18

## 2019-01-18 ENCOUNTER — OFFICE VISIT (OUTPATIENT)
Dept: NEPHROLOGY | Facility: CLINIC | Age: 24
End: 2019-01-18
Payer: COMMERCIAL

## 2019-01-18 DIAGNOSIS — I15.1 HTN, KIDNEY TRANSPLANT RELATED: ICD-10-CM

## 2019-01-18 DIAGNOSIS — D84.9 IMMUNOSUPPRESSION (H): ICD-10-CM

## 2019-01-18 DIAGNOSIS — Z48.298 AFTERCARE FOLLOWING ORGAN TRANSPLANT: ICD-10-CM

## 2019-01-18 DIAGNOSIS — Z94.0 KIDNEY REPLACED BY TRANSPLANT: Primary | ICD-10-CM

## 2019-01-18 DIAGNOSIS — E55.9 VITAMIN D DEFICIENCY: ICD-10-CM

## 2019-01-18 DIAGNOSIS — Z94.0 HTN, KIDNEY TRANSPLANT RELATED: ICD-10-CM

## 2019-01-18 NOTE — LETTER
1/18/2019      RE: Golden Stover  [address]  [facility] [address]       CHRONIC TRANSPLANT NEPHROLOGY VISIT    Assessment & Plan   # LDKT: Stable   - Baseline Cr ~ 1.3-1.5;    - Proteinuria: Normal   - Date of DSA last checked: 6/2018  Latest DSA: No   - BK Viremia: No   - Kidney Tx Biopsy: No    # Immunosuppression: Tacrolimus immediate release (goal  4-6) and Mycophenolate mofetil (goal  1-3.5)   - Changes: No    # Prophylaxis:   - PJP: None    # Hypertension: Controlled; Goal BP: < 130/80   - Changes: No    # Anemia in chronic renal disease: Hgb: Trend up, now normal Hgb.   - Iron studies: Replete    # Mineral Bone Disorder:    - Secondary renal hyperparathyroidism; PTH level is: Minimally elevated and no need for further follow up.   - Vitamin D; level is: Low, but with high normal serum calcium, would recommend just following for now.   - Calcium; level is: Normal     # Skin Cancer Risk:    - Discussed sun protection and recommend regular follow up with Dermatology.    # Medical Compliance: Yes    Return visit: Return in about 6 months (around 7/18/2019).    # Transplant History:  Etiology of kidney failure: IgA nephropathy  Tx: LDKT  Transplant: 6/7/2017 (Kidney)  Donor Type: Living Donor Class:   Significant changes in immunosuppression: None  Significant transplant-related complications: None    Transplant Office Phone Number: [phone]    Assessment and plan was discussed with the patient and he voiced his understanding and agreement.    Jak King MD    Chief Complaint   Mr. Stover is a 23 year old here for routine follow up.    History of Present Illness    Mr. Stover reports feeling good overall with minimal medical complaints.  Since last clinic visit, patient reports no hospitalizations or new medical complaints and has been doing well overall.  Since the last time the patient was seen, he got .    His energy level is good and pretty normal.  Denies any chest  pain or shortness of breath.  Stable weight and no GI symptoms.  No leg swelling.    Recent Hospitalizations:  [x] No [] Yes    New Medical Issues: [x] No [] Yes    Decreased energy: [x] No [] Yes    Chest pain or SOB with exertion:  [x] No [] Yes    Appetite change or weight change: [x] No [] Yes Weight tends to vary between 210 and 220 lbs.   Nausea, vomiting or diarrhea:  [x] No [] Yes    Fever, sweats or chills: [x] No [] Yes    Leg swelling: [x] No [] Yes      Home BP: 120/70s    Review of Systems   A comprehensive review of systems was obtained and negative, except as noted in the HPI or PMH.    Problem List   Patient Active Problem List   Diagnosis     IgA nephropathy     HTN, kidney transplant related     Secondary renal hyperparathyroidism (H)     Kidney replaced by transplant     Immunosuppression (H)     Aftercare following organ transplant     Vitamin D deficiency     Gilbert syndrome       Social History   Social History     Tobacco Use     Smoking status: Never Smoker     Smokeless tobacco: Never Used   Substance Use Topics     Alcohol use: Yes     Alcohol/week: 0.6 oz     Types: 1 Standard drinks or equivalent per week     Drug use: No       Allergies   Allergies   Allergen Reactions     Methylprednisolone Other (See Comments)     Other reaction(s): Other (See Comments)  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      Mold Itching     Seasonal Allergies Itching     Sulfa Drugs      Thymoglobulin Itching       Medications   Current Outpatient Medications   Medication Sig     amLODIPine (NORVASC) 10 MG tablet TAKE 1 TABLET (10 MG) BY MOUTH DAILY     losartan (COZAAR) 25 MG tablet TAKE 1 TABLET (25 MG) BY MOUTH DAILY     mycophenolate (GENERIC EQUIVALENT) 250 MG capsule Take 3 capsules (750 mg) by mouth 2 times daily     tacrolimus (GENERIC EQUIVALENT) 0.5 MG capsule HOLD  (Patient taking differently: Take 0.5 mg by mouth 2 times daily HOLD)     tacrolimus (GENERIC EQUIVALENT) 1 MG capsule Take 5 capsules (5 mg) by mouth 2 times daily     No current facility-administered medications for this visit.      There are no discontinued medications.    Physical Exam   Vital Signs: There were no vitals taken for this visit.    GENERAL APPEARANCE: alert and no distress  HENT: mouth without ulcers or lesions  LYMPHATICS: no cervical or supraclavicular nodes  RESP: lungs clear to auscultation - no rales, rhonchi or wheezes  CV: regular rhythm, normal rate, no rub, no murmur  EDEMA: no LE edema bilaterally  ABDOMEN: soft, nondistended, nontender, bowel sounds normal  MS: extremities normal - no gross deformities noted, no evidence of inflammation in joints, no muscle tenderness  SKIN: no rash  TX KIDNEY: normal  DIALYSIS ACCESS:  None      Data     Renal Latest Ref Rng & Units 1/15/2019 12/7/2018 11/6/2018   Na 133 - 144 mmol/L - - -   Na (external) 134 - 143 mEq/L 140 140 138   K 3.4 - 5.3 mmol/L - - -   K (external) 3.4 - 5.1 mEq/L 3.9 4.0 4.2   Cl 94 - 109 mmol/L - - -   Cl (external) 99 - 110 mEq/L 106 107 105   CO2 20 - 32 mmol/L - - -   CO2 (external) 19 - 29 mEq/L 24 21 24   BUN 7 - 30 mg/dL - - -   BUN (external) 5 - 24 mg/dL 16 11 13   Cr 0.66 - 1.25 mg/dL - - -   Cr (external) 0.70 - 1.20 mg/dL 1.26(H) 1.29(H) 1.28(H)   Glucose 70 - 99 mg/dL - - -   Glucose (external) 70 - 99 mg/dL 89 91 95   Ca  8.5 - 10.1 mg/dL - - -   Ca (external) 8.4 - 10.5 mg/dL 9.9 10.1 9.9   Mg 1.6 - 2.3 mg/dL - - -   Mg (external) 1.5 - 2.2 mEq/L - - -     Bone Health Latest Ref Rng & Units 10/30/2018 8/17/2018 9/11/2017   Phos 2.5 - 4.5 mg/dL - - -   Phos (external) 2.5 - 4.6 mg/dL - - -   PTHi 12 - 72 pg/mL - - 132(H)   PTHi (external) 15.0 - 115.0 pg/mL - 102.9 -   Vit D Def 20 - 75 ug/L - - -   Vit D Def (external) 27 - 80 ng/mL 26(L) - -     Heme Latest Ref Rng & Units 1/15/2019 12/7/2018 10/30/2018   WBC  4.0 - 11.0 10e9/L - - -   WBC (external) 3.2 - 11.0 10*9/L 5.8 5.5 4.6   Hgb 13.3 - 17.7 g/dL - - -   Hgb (external) 12.9 - 16.9 g/dL 14.2 13.7 13.4   Plt 150 - 450 10e9/L - - -   Plt (external) 130 - 375 10*9/L 434(H) 437(H) 444(H)     Liver Latest Ref Rng & Units 6/8/2018 3/6/2018 1/23/2018   AP 40 - 150 U/L - - -   AP (external) 40 - 150 IU/L 75 76 71   TBili 0.2 - 1.3 mg/dL - - -   TBili (external) 0.2 - 1.2 mg/dL 2.3(H) 1.3(H) 1.3(H)   DBili (external) 0.0 - 0.5 mg/dL 0.7(H) 0.5 0.5   ALT 0 - 70 U/L - - -   ALT (external) 6 - 40 IU/L 31 20 20   AST 0 - 45 U/L - - -   AST (external) 10 - 40 IU/L 30 25 19   Tot Protein 6.8 - 8.8 g/dL - - -   Tot Protein (external) 6.0 - 8.0 g/dL 7.4 7.6 7.3   Albumin 3.4 - 5.0 g/dL - - -   Albumin (external) 3.5 - 5.0 g/dL 4.4 4.6 4.5        Iron studies Latest Ref Rng & Units 6/1/2017   Iron 35 - 180 ug/dL 65   Iron sat 15 - 46 % 21   Ferritin 26 - 388 ng/mL 310     [facility] Txp Virology Latest Ref Rng & Units 1/15/2019 12/7/2018 10/30/2018   BK Spec - - - -   BK Res BKNEG:BK Virus DNA Not Detected copies/mL - - -   BK Log <2.7 Log copies/mL - - -   BK Quant Result Ext None detected None detected None detected None detected   Hep B Core NR - - -   Hep B Core Ext Nonreactive - - -   Hep B Surf Ext  Positive >=12.00 mIU/mL - - -        Recent Labs   Lab Test 06/29/17  0831 06/30/17  0830 07/03/17  0857   DOSTAC 6/28/17 7:30pm 2030, 06/29/17 2030 07/02/17   TACROL 10.0 9.5 11.1     Recent Labs   Lab Test 06/16/17  0712 06/29/17  0831   DOSMPA 1930 06/15/2017 6/28/17 7:30pm   MPACID 1.26 4.40*   MPAG 27.9* 43.9       Jak King MD

## 2019-01-18 NOTE — LETTER
1/18/2019       RE: Golden Stover  [address]  [facility] [address]     Dear Colleague,    Thank you for referring your patient, Golden Stover, to the [facility] at [facility]. Please see a copy of my visit note below.    CHRONIC TRANSPLANT NEPHROLOGY VISIT    Assessment & Plan   # LDKT: Stable   - Baseline Cr ~ 1.3-1.5;    - Proteinuria: Normal   - Date of DSA last checked: 6/2018  Latest DSA: No   - BK Viremia: No   - Kidney Tx Biopsy: No    # Immunosuppression: Tacrolimus immediate release (goal  4-6) and Mycophenolate mofetil (goal  1-3.5)   - Changes: No    # Prophylaxis:   - PJP: None    # Hypertension: Controlled; Goal BP: < 130/80   - Changes: No    # Anemia in chronic renal disease: Hgb: Trend up, now normal Hgb.   - Iron studies: Replete    # Mineral Bone Disorder:    - Secondary renal hyperparathyroidism; PTH level is: Minimally elevated and no need for further follow up.   - Vitamin D; level is: Low, but with high normal serum calcium, would recommend just following for now.   - Calcium; level is: Normal     # Skin Cancer Risk:    - Discussed sun protection and recommend regular follow up with Dermatology.    # Medical Compliance: Yes    Return visit: Return in about 6 months (around 7/18/2019).    # Transplant History:  Etiology of kidney failure: IgA nephropathy  Tx: LDKT  Transplant: 6/7/2017 (Kidney)  Donor Type: Living Donor Class:   Significant changes in immunosuppression: None  Significant transplant-related complications: None    Transplant Office Phone Number: [phone]    Assessment and plan was discussed with the patient and he voiced his understanding and agreement.    Jak King MD    Chief Complaint   Mr. Stover is a 23 year old here for routine follow up.    History of Present Illness    Mr. Stover reports feeling good overall with minimal medical complaints.  Since last clinic visit, patient  reports no hospitalizations or new medical complaints and has been doing well overall.  Since the last time the patient was seen, he got .    His energy level is good and pretty normal.  Denies any chest pain or shortness of breath.  Stable weight and no GI symptoms.  No leg swelling.    Recent Hospitalizations:  [x] No [] Yes    New Medical Issues: [x] No [] Yes    Decreased energy: [x] No [] Yes    Chest pain or SOB with exertion:  [x] No [] Yes    Appetite change or weight change: [x] No [] Yes Weight tends to vary between 210 and 220 lbs.   Nausea, vomiting or diarrhea:  [x] No [] Yes    Fever, sweats or chills: [x] No [] Yes    Leg swelling: [x] No [] Yes      Home BP: 120/70s    Review of Systems   A comprehensive review of systems was obtained and negative, except as noted in the HPI or PMH.    Problem List   Patient Active Problem List   Diagnosis     IgA nephropathy     HTN, kidney transplant related     Secondary renal hyperparathyroidism (H)     Kidney replaced by transplant     Immunosuppression (H)     Aftercare following organ transplant     Vitamin D deficiency     Gilbert syndrome       Social History   Social History     Tobacco Use     Smoking status: Never Smoker     Smokeless tobacco: Never Used   Substance Use Topics     Alcohol use: Yes     Alcohol/week: 0.6 oz     Types: 1 Standard drinks or equivalent per week     Drug use: No       Allergies   Allergies   Allergen Reactions     Methylprednisolone Other (See Comments)     Other reaction(s): Other (See Comments)  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      Mold Itching     Seasonal Allergies Itching     Sulfa Drugs      Thymoglobulin Itching       Medications   Current Outpatient Medications   Medication Sig     amLODIPine (NORVASC) 10 MG tablet TAKE 1 TABLET (10 MG) BY MOUTH DAILY     losartan  (COZAAR) 25 MG tablet TAKE 1 TABLET (25 MG) BY MOUTH DAILY     mycophenolate (GENERIC EQUIVALENT) 250 MG capsule Take 3 capsules (750 mg) by mouth 2 times daily     tacrolimus (GENERIC EQUIVALENT) 0.5 MG capsule HOLD (Patient taking differently: Take 0.5 mg by mouth 2 times daily HOLD)     tacrolimus (GENERIC EQUIVALENT) 1 MG capsule Take 5 capsules (5 mg) by mouth 2 times daily     No current facility-administered medications for this visit.      There are no discontinued medications.    Physical Exam   Vital Signs: There were no vitals taken for this visit.    GENERAL APPEARANCE: alert and no distress  HENT: mouth without ulcers or lesions  LYMPHATICS: no cervical or supraclavicular nodes  RESP: lungs clear to auscultation - no rales, rhonchi or wheezes  CV: regular rhythm, normal rate, no rub, no murmur  EDEMA: no LE edema bilaterally  ABDOMEN: soft, nondistended, nontender, bowel sounds normal  MS: extremities normal - no gross deformities noted, no evidence of inflammation in joints, no muscle tenderness  SKIN: no rash  TX KIDNEY: normal  DIALYSIS ACCESS:  None      Data     Renal Latest Ref Rng & Units 1/15/2019 12/7/2018 11/6/2018   Na 133 - 144 mmol/L - - -   Na (external) 134 - 143 mEq/L 140 140 138   K 3.4 - 5.3 mmol/L - - -   K (external) 3.4 - 5.1 mEq/L 3.9 4.0 4.2   Cl 94 - 109 mmol/L - - -   Cl (external) 99 - 110 mEq/L 106 107 105   CO2 20 - 32 mmol/L - - -   CO2 (external) 19 - 29 mEq/L 24 21 24   BUN 7 - 30 mg/dL - - -   BUN (external) 5 - 24 mg/dL 16 11 13   Cr 0.66 - 1.25 mg/dL - - -   Cr (external) 0.70 - 1.20 mg/dL 1.26(H) 1.29(H) 1.28(H)   Glucose 70 - 99 mg/dL - - -   Glucose (external) 70 - 99 mg/dL 89 91 95   Ca  8.5 - 10.1 mg/dL - - -   Ca (external) 8.4 - 10.5 mg/dL 9.9 10.1 9.9   Mg 1.6 - 2.3 mg/dL - - -   Mg (external) 1.5 - 2.2 mEq/L - - -     Bone Health Latest Ref Rng & Units 10/30/2018 8/17/2018 9/11/2017   Phos 2.5 - 4.5 mg/dL - - -   Phos (external) 2.5 - 4.6 mg/dL - - -   PTHi 12  - 72 pg/mL - - 132(H)   PTHi (external) 15.0 - 115.0 pg/mL - 102.9 -   Vit D Def 20 - 75 ug/L - - -   Vit D Def (external) 27 - 80 ng/mL 26(L) - -     Heme Latest Ref Rng & Units 1/15/2019 12/7/2018 10/30/2018   WBC 4.0 - 11.0 10e9/L - - -   WBC (external) 3.2 - 11.0 10*9/L 5.8 5.5 4.6   Hgb 13.3 - 17.7 g/dL - - -   Hgb (external) 12.9 - 16.9 g/dL 14.2 13.7 13.4   Plt 150 - 450 10e9/L - - -   Plt (external) 130 - 375 10*9/L 434(H) 437(H) 444(H)     Liver Latest Ref Rng & Units 6/8/2018 3/6/2018 1/23/2018   AP 40 - 150 U/L - - -   AP (external) 40 - 150 IU/L 75 76 71   TBili 0.2 - 1.3 mg/dL - - -   TBili (external) 0.2 - 1.2 mg/dL 2.3(H) 1.3(H) 1.3(H)   DBili (external) 0.0 - 0.5 mg/dL 0.7(H) 0.5 0.5   ALT 0 - 70 U/L - - -   ALT (external) 6 - 40 IU/L 31 20 20   AST 0 - 45 U/L - - -   AST (external) 10 - 40 IU/L 30 25 19   Tot Protein 6.8 - 8.8 g/dL - - -   Tot Protein (external) 6.0 - 8.0 g/dL 7.4 7.6 7.3   Albumin 3.4 - 5.0 g/dL - - -   Albumin (external) 3.5 - 5.0 g/dL 4.4 4.6 4.5        Iron studies Latest Ref Rng & Units 6/1/2017   Iron 35 - 180 ug/dL 65   Iron sat 15 - 46 % 21   Ferritin 26 - 388 ng/mL 310     [facility] Txp Virology Latest Ref Rng & Units 1/15/2019 12/7/2018 10/30/2018   BK Spec - - - -   BK Res BKNEG:BK Virus DNA Not Detected copies/mL - - -   BK Log <2.7 Log copies/mL - - -   BK Quant Result Ext None detected None detected None detected None detected   Hep B Core NR - - -   Hep B Core Ext Nonreactive - - -   Hep B Surf Ext  Positive >=12.00 mIU/mL - - -        Recent Labs   Lab Test 06/29/17  0831 06/30/17  0830 07/03/17  0857   DOSTAC 6/28/17 7:30pm 2030, 06/29/17 2030 07/02/17   TACROL 10.0 9.5 11.1     Recent Labs   Lab Test 06/16/17  0712 06/29/17  0831   DOSMPA 1930 06/15/2017 6/28/17 7:30pm   MPACID 1.26 4.40*   MPAG 27.9* 43.9       Again, thank you for allowing me to participate in the care of your patient.      Sincerely,    Jak King MD

## 2019-02-18 ENCOUNTER — TELEPHONE (OUTPATIENT)
Dept: TRANSPLANT | Facility: CLINIC | Age: 24
End: 2019-02-18

## 2019-02-18 DIAGNOSIS — Z94.0 LIVING-DONOR KIDNEY TRANSPLANT RECIPIENT: ICD-10-CM

## 2019-02-18 RX ORDER — TACROLIMUS 1 MG/1
4 CAPSULE ORAL 2 TIMES DAILY
Qty: 240 CAPSULE | Refills: 11 | Status: SHIPPED | OUTPATIENT
Start: 2019-02-18 | End: 2019-07-08

## 2019-02-18 RX ORDER — TACROLIMUS 0.5 MG/1
0.5 CAPSULE ORAL 2 TIMES DAILY
Qty: 60 CAPSULE | Refills: 11 | Status: SHIPPED | OUTPATIENT
Start: 2019-02-18 | End: 2019-07-08

## 2019-02-18 NOTE — TELEPHONE ENCOUNTER
ISSUE:  Tacrolimus 7.1  Goal 4-6  Current dose 5 mg BID    OUTCOME:   Call placed to pt regarding drug levels. Pt confirms good 12 hour trough and current dose 5 mg BID.  Pt denies recent illnesses, medication changes or N/V/D.  Pt educated to decrease tacrolimus to 4.5 mg BID and repeat level in 1-2 weeks.    Orders placed.     Pt questions answered, pt v/u.

## 2019-02-21 ENCOUNTER — TELEPHONE (OUTPATIENT)
Dept: TRANSPLANT | Facility: CLINIC | Age: 24
End: 2019-02-21

## 2019-03-19 DIAGNOSIS — Z94.0 KIDNEY TRANSPLANTED: ICD-10-CM

## 2019-03-20 RX ORDER — LOSARTAN POTASSIUM 25 MG/1
TABLET ORAL
Qty: 30 TABLET | Refills: 11 | Status: SHIPPED | OUTPATIENT
Start: 2019-03-20 | End: 2019-08-16

## 2019-03-20 NOTE — TELEPHONE ENCOUNTER
Last Office Visit with Nephrologist:  1/18/19.  Medication refilled per Nephrology Clinic protocol.     Jayne Gamble RN

## 2019-06-07 ENCOUNTER — TELEPHONE (OUTPATIENT)
Dept: TRANSPLANT | Facility: CLINIC | Age: 24
End: 2019-06-07

## 2019-06-07 NOTE — LETTER
PHYSICIAN ORDERS      DATE & TIME ISSUED: 2019 9:11 AM  PATIENT NAME: Golden Stover   : 1995     [facility] MR# [if applicable]: [id number]     DIAGNOSIS:  Kidney transplant  ICD-10 CODE: Z94.0     Please obtain the following labs in 1-2 weeks   12 hour tacrolimus level     Any questions please call: [phone]  Please fax results to [phone].    .

## 2019-06-07 NOTE — TELEPHONE ENCOUNTER
ISSUE:   Tacrolimus level 8.1 on 6/6, goal 4-6, dose 4.5 mg BID    PLAN:   Please call pt and confirm this was a good 12-hour trough. Verify dose 4.5 mg BID. Confirm no new medications or illness (marco a. Diarrhea).  Previous levels have been within goal.  Recommend rechecking in 1-2 weeks with a good 12 hour trough.  If level is still elevated, will decrease dose at that time.     OUTCOME:   Called Golden to discuss elevated tac.  No answer, left v/m requesting a return call.

## 2019-06-10 NOTE — TELEPHONE ENCOUNTER
Patient Call: General  Route to LPN    Reason for call: PT was returning phone call please connect.    Call back needed? Yes    Return Call Needed  Same as documented in contacts section  When to return call?: Greater than one day: Route standard priority

## 2019-06-11 NOTE — TELEPHONE ENCOUNTER
Called and left a detailed v/m explaining the his tacrolimus level was elevated from goal.    Previous levels have been at goal, recommended rechecking in 1-2 weeks with an accurate 12 hour trough.  Lab orders faxed.

## 2019-07-06 DIAGNOSIS — Z94.0 LIVING-DONOR KIDNEY TRANSPLANT RECIPIENT: Primary | ICD-10-CM

## 2019-07-06 NOTE — LETTER
[facility]  Room 2-200  [facility],  [address]  [address]  Tel [phone]  Toll Free [phone]                OUTPATIENT LABORATORY TEST ORDER    Patient Name: Golden Stover  Transplant Date: 6/7/2017 (Kidney)  YOB: 1995                                                            Issue Date & Time:7/8/2019  12:40 PM   [facility] MR:  [id number]  Exp. Date (1 year after date issued)    Diagnoses: Kidney Transplant (ICD-10  Z94.0)   Long term use of medications (ICD-10  Z79.899)     Lab results to be available on the same day drawn.   Patient should release information to the [facility], [facility].  Please fax to the Transplant Center at [phone].    Every other month   ?Hemogram and Platelet  ?Basic Metabolic Panel (Sodium, Potassium, Chloride, CO2, Creatinine, Urea Nitrogen, Glucose, Calcium)  ?/Tacrolimus/Prograf drug level     Every 6 Months                 ?Urine for protein/creatinine                ?PRA/DSA level (mailers provided by the patient)     If you have any questions, please call [facility] at [phone] or [phone].    Please fax labs to [phone]  .

## 2019-07-06 NOTE — LETTER
PHYSICIAN ORDERS      DATE & TIME ISSUED: 2019 12:39 PM  PATIENT NAME: Golden Stover   : 1995     [facility] MR# [if applicable]: [id number]     DIAGNOSIS:  Kidney Transplant  ICD-10 CODE: Z94.0     Please repeat the following lab in 1 week:  Tacrolimus drug level    Any questions please call: [phone]  Please fax lab results to [phone].    .

## 2019-07-06 NOTE — LETTER
[facility]  Room 2-200  [facility],  [address]  [address]  Tel [phone]  Toll Free [phone]                OUTPATIENT LABORATORY TEST ORDER    Patient Name: Golden Stover  Transplant Date: 6/7/2017 (Kidney)  YOB: 1995                                                            Issue Date & Time:7/8/2019  12:40 PM   [facility] MR:  [id number]  Exp. Date (1 year after date issued)    Diagnoses: Kidney Transplant (ICD-10  Z94.0)   Long term use of medications (ICD-10  Z79.899)     Lab results to be available on the same day drawn.   Patient should release information to the [facility], [facility].  Please fax to the Transplant Center at [phone].    Every other month   ?Hemogram and Platelet  ?Basic Metabolic Panel (Sodium, Potassium, Chloride, CO2, Creatinine, Urea Nitrogen, Glucose, Calcium)  ?/Tacrolimus/Prograf drug level     Every 6 Months                 ?Urine for protein/creatinine                ?PRA/DSA level (mailers provided by the patient)     If you have any questions, please call [facility] at [phone] or [phone].    Please fax labs to [phone]  .

## 2019-07-06 NOTE — TELEPHONE ENCOUNTER
ISSUE: Tacrolimus level 7.7 on 7/3/19, goal 4-6, dose 4.5 mg BID      PLAN: Please call pt and confirm this was a good 12-hour trough. Verify dose 4.5 mg BID. Confirm no new medications or illness (marco a. Diarrhea). If good trough, decrease dose to 3.5 mg BID and recheck level in 1 week. If level is within goal, he will need repeat labs weekly x2 additional occurrences. Please send orders.

## 2019-07-08 RX ORDER — TACROLIMUS 1 MG/1
3 CAPSULE ORAL 2 TIMES DAILY
Qty: 180 CAPSULE | Refills: 11 | Status: SHIPPED | OUTPATIENT
Start: 2019-07-08 | End: 2020-07-16

## 2019-07-08 RX ORDER — TACROLIMUS 0.5 MG/1
0.5 CAPSULE ORAL 2 TIMES DAILY
Qty: 60 CAPSULE | Refills: 11 | Status: SHIPPED | OUTPATIENT
Start: 2019-07-08 | End: 2020-07-16

## 2019-07-08 NOTE — TELEPHONE ENCOUNTER
Spoke to patient who confirms current Tacrolimus dose and good 12 hour trough level.  Patient verbalizes understanding to decrease Tacrolimus dose to 3.5 mg BID and repeat tacrolimus level in 1 week.  Lab orders updated.

## 2019-07-25 ENCOUNTER — TELEPHONE (OUTPATIENT)
Dept: TRANSPLANT | Facility: CLINIC | Age: 24
End: 2019-07-25

## 2019-07-25 NOTE — LETTER
"PHYSICIAN ORDERS      DATE & TIME ISSUED: 2019 6:05 PM  PATIENT NAME: Golden Stover   : 1995     [facility] MR# [if applicable]: [id number]     DIAGNOSIS:  Kidney transplant   ICD-10 CODE: Z94.0      \"repeat random urine protein/creatinine ratio\" due in September    Any questions please call: [phone] option 5    Please fax results to [phone].    .      "

## 2019-07-25 NOTE — TELEPHONE ENCOUNTER
"INCOMING CALL: patient concerns  RE: 1) Proteinuria. 2) Not feeling well    1. Random urine protein 0.5 g/g creatinine, up from 0.3 in January.      (external lab with reference range up to 0.3)  2. Generally not feeling well. No specific symptoms      - complains of sinus-associated cough, but denies shortness of breath, chest pain, headaches      - denies fever, chills, night sweats, abdominal pain or diarrhea, but endorses nausea when outside in heat      - body aches other than normally associated with hard work, but no fatigue    PLAN  1. Repeat random urine protein in September.   2. See primary care if symptoms do not resolve    Patient verbalized understanding.    LPN TASK  Please order \"repeat random urine protein/creatinine ratio\" due in September  - fax order to external lab and mail copy to patient  "

## 2019-08-15 NOTE — PROGRESS NOTES
CHRONIC TRANSPLANT NEPHROLOGY VISIT    Assessment & Plan   # LDKT: Stable   - Baseline Cr ~ 1.3-1.5   - Proteinuria: Mild (0.5-1.0 grams)   - Date DSA Last Checked: Jun/2018       Latest DSA: No   - BK Viremia: No   - Kidney Tx Biopsy: No    # Immunosuppression: Tacrolimus immediate release (goal 4-6) and Mycophenolate mofetil (goal not followed)   - Changes: No    # Prophylaxis:   - PJP: None    # Hypertension: Controlled; Goal BP: < 130/80   - Changes: No    # Mineral Bone Disorder:   - Vitamin D; level: Low and patient is not on vitamin D supplement.  Will start cholecalciferol 1000 units daily.  Will need to follow serum calcium to ensure it doesn't increase too much.  - Calcium; level: Normal    # Skin Cancer Risk:    - Discussed sun protection and recommend regular follow up with Dermatology.    # Medical Compliance: Yes    # Transplant History:  Etiology of kidney failure: IgA nephropathy  Tx: LDKT  Transplant: 6/7/2017 (Kidney)  Donor Type: Living Donor Class:   Significant changes in immunosuppression: None  Significant transplant-related complications: None    Transplant Office Phone Number: [phone]    Assessment and plan was discussed with the patient and he voiced his understanding and agreement.    Return visit: Return in about 6 months (around 2/16/2020).    Jak King MD    Chief Complaint   Mr. Stover is a 24 year old here for routine follow up.    History of Present Illness    Mr. Stover reports feeling good overall with minimal medical complaints.  Since last clinic visit, patient reports no hospitalizations or new medical complaints and has been doing well overall.  He did hit his knee a few weeks back and ended up on crutches for a few days, but that has resolved.  His energy level is good and remains normal.  He is working two jobs and gets 20-30K steps daily.  Denies any chest pain or shortness of breath with exertion.  No leg swelling.    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  The classes are mostly online.    Recent Hospitalizations:  [x] No [] Yes    New Medical Issues: [x] No [] Yes    Decreased energy: [x] No [] Yes    Chest pain or SOB with exertion:  [x] No [] Yes    Appetite change or weight change: [x] No [] Yes    Nausea, vomiting or diarrhea:  [x] No [] Yes    Fever, sweats or chills: [x] No [] Yes    Leg swelling: [x] No [] Yes      Home BP: 120/80s    Review of Systems   A comprehensive review of systems was obtained and negative, except as noted in the HPI or PMH.    Problem List   Patient Active Problem List   Diagnosis     IgA nephropathy     HTN, kidney transplant related     Secondary renal hyperparathyroidism (H)     Kidney replaced by transplant     Immunosuppression (H)     Aftercare following organ transplant     Vitamin D deficiency     Gilbert syndrome       Social History   Social History     Tobacco Use     Smoking status: Never Smoker     Smokeless tobacco: Never Used   Substance Use Topics     Alcohol use: Yes     Alcohol/week: 0.6 oz     Types: 1 Standard drinks or equivalent per week     Drug use: No       Allergies   Allergies   Allergen Reactions     Methylprednisolone Other (See Comments)     Other reaction(s): Other (See Comments)  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      Mold Itching     Seasonal Allergies Itching     Sulfa Drugs      Thymoglobulin Itching       Medications   Current Outpatient Medications   Medication Sig     amLODIPine (NORVASC) 10 MG tablet TAKE 1 TABLET (10 MG) BY MOUTH DAILY     losartan (COZAAR) 25 MG tablet TAKE 1 TABLET (25 MG) BY MOUTH DAILY     mycophenolate (GENERIC EQUIVALENT) 250 MG capsule Take 3 capsules (750 mg) by mouth 2 times daily     tacrolimus (GENERIC EQUIVALENT) 0.5 MG capsule Take 1 capsule (0.5 mg) by mouth 2 times  "daily Total dose 3.5 mg twice daily     tacrolimus (GENERIC EQUIVALENT) 1 MG capsule Take 3 capsules (3 mg) by mouth 2 times daily Total dose 3.5 mg twice daily     No current facility-administered medications for this visit.      Medications Discontinued During This Encounter   Medication Reason     losartan (COZAAR) 25 MG tablet Medication Reconciliation Clean Up       Physical Exam   Vital Signs: /71 (BP Location: Left arm, Patient Position: Sitting, Cuff Size: Adult Regular)   Pulse 62   Ht 1.88 m (6' 2\")   Wt 97.2 kg (214 lb 4.8 oz)   BMI 27.51 kg/m      GENERAL APPEARANCE: alert and no distress  HENT: mouth without ulcers or lesions  LYMPHATICS: no cervical or supraclavicular nodes  RESP: lungs clear to auscultation - no rales, rhonchi or wheezes  CV: regular rhythm, normal rate, no rub, no murmur  EDEMA: no LE edema bilaterally  ABDOMEN: soft, nondistended, nontender, bowel sounds normal  MS: extremities normal - no gross deformities noted, no evidence of inflammation in joints, no muscle tenderness  SKIN: no rash  TX KIDNEY: normal  DIALYSIS ACCESS:  None      Data     Renal Latest Ref Rng & Units 7/18/2019 6/6/2019 4/26/2019   Na 133 - 144 mmol/L - - -   Na (external) 134 - 143 mEq/L 141 139 140   K 3.4 - 5.3 mmol/L - - -   K (external) 3.4 - 5.1 mEq/L 3.5 3.7 3.8   Cl 94 - 109 mmol/L - - -   Cl (external) 99 - 110 mEq/L 106 106 104   CO2 20 - 32 mmol/L - - -   CO2 (external) 19 - 29 mEq/L 28 24 24   BUN 7 - 30 mg/dL - - -   BUN (external) 5 - 24 mg/dL 18 15 14   Cr 0.66 - 1.25 mg/dL - - -   Cr (external) 0.70 - 1.20 mg/dL 1.48(H) 1.37(H) 1.31(H)   Glucose 70 - 99 mg/dL - - -   Glucose (external) 70 - 99 mg/dL 93 100(H) 95   Ca  8.5 - 10.1 mg/dL - - -   Ca (external) 8.4 - 10.5 mg/dL 9.6 9.8 9.7   Mg 1.6 - 2.3 mg/dL - - -   Mg (external) 1.5 - 2.2 mEq/L - - -     Bone Health Latest Ref Rng & Units 10/30/2018 8/17/2018 9/11/2017   Phos 2.5 - 4.5 mg/dL - - -   Phos (external) 2.5 - 4.6 mg/dL - - - "   PTHi 12 - 72 pg/mL - - 132(H)   PTHi (external) 15.0 - 115.0 pg/mL - 102.9 -   Vit D Def 20 - 75 ug/L - - -   Vit D Def (external) 27 - 80 ng/mL 26(L) - -     Heme Latest Ref Rng & Units 7/18/2019 6/6/2019 4/26/2019   WBC 4.0 - 11.0 10e9/L - - -   WBC (external) 3.2 - 11.0 10*9/L 8.0 5.6 5.1   Hgb 13.3 - 17.7 g/dL - - -   Hgb (external) 12.9 - 16.9 g/dL 13.8 13.8 13.6   Plt 150 - 450 10e9/L - - -   Plt (external) 130 - 375 10*9/L 419(H) 382(H) 414(H)     Liver Latest Ref Rng & Units 6/8/2018 3/6/2018 1/23/2018   AP 40 - 150 U/L - - -   AP (external) 40 - 150 IU/L 75 76 71   TBili 0.2 - 1.3 mg/dL - - -   TBili (external) 0.2 - 1.2 mg/dL 2.3(H) 1.3(H) 1.3(H)   DBili (external) 0.0 - 0.5 mg/dL 0.7(H) 0.5 0.5   ALT 0 - 70 U/L - - -   ALT (external) 6 - 40 IU/L 31 20 20   AST 0 - 45 U/L - - -   AST (external) 10 - 40 IU/L 30 25 19   Tot Protein 6.8 - 8.8 g/dL - - -   Tot Protein (external) 6.0 - 8.0 g/dL 7.4 7.6 7.3   Albumin 3.4 - 5.0 g/dL - - -   Albumin (external) 3.5 - 5.0 g/dL 4.4 4.6 4.5        Iron studies Latest Ref Rng & Units 6/1/2017   Iron 35 - 180 ug/dL 65   Iron sat 15 - 46 % 21   Ferritin 26 - 388 ng/mL 310     [facility] Txp Virology Latest Ref Rng & Units 1/15/2019 12/7/2018 10/30/2018   BK Spec - - - -   BK Res BKNEG:BK Virus DNA Not Detected copies/mL - - -   BK Log <2.7 Log copies/mL - - -   BK Quant Result Ext None detected None detected None detected None detected   Hep B Core NR - - -   Hep B Core Ext Nonreactive - - -   Hep B Surf Ext  Positive >=12.00 mIU/mL - - -        Recent Labs   Lab Test 06/29/17  0831 06/30/17  0830 07/03/17  0857   DOSTA 6/28/17 7:30pm 2030, 06/29/17 2030 07/02/17   TACROL 10.0 9.5 11.1     Recent Labs   Lab Test 06/16/17  0712 06/29/17  0831   DOSMPA 1930 06/15/2017 6/28/17 7:30pm   MPACID 1.26 4.40*   MPAG 27.9* 43.9

## 2019-08-16 ENCOUNTER — OFFICE VISIT (OUTPATIENT)
Dept: NEPHROLOGY | Facility: CLINIC | Age: 24
End: 2019-08-16

## 2019-08-16 ENCOUNTER — RECORDS - HEALTHEAST (OUTPATIENT)
Dept: ADMINISTRATIVE | Facility: OTHER | Age: 24
End: 2019-08-16

## 2019-08-16 VITALS
HEIGHT: 74 IN | WEIGHT: 214.3 LBS | DIASTOLIC BLOOD PRESSURE: 71 MMHG | HEART RATE: 62 BPM | BODY MASS INDEX: 27.5 KG/M2 | SYSTOLIC BLOOD PRESSURE: 136 MMHG

## 2019-08-16 DIAGNOSIS — I15.1 HTN, KIDNEY TRANSPLANT RELATED: ICD-10-CM

## 2019-08-16 DIAGNOSIS — Z94.0 KIDNEY REPLACED BY TRANSPLANT: Primary | ICD-10-CM

## 2019-08-16 DIAGNOSIS — Z48.298 AFTERCARE FOLLOWING ORGAN TRANSPLANT: ICD-10-CM

## 2019-08-16 DIAGNOSIS — D84.9 IMMUNOSUPPRESSION (H): ICD-10-CM

## 2019-08-16 DIAGNOSIS — E55.9 VITAMIN D DEFICIENCY: ICD-10-CM

## 2019-08-16 DIAGNOSIS — Z94.0 HTN, KIDNEY TRANSPLANT RELATED: ICD-10-CM

## 2019-08-16 ASSESSMENT — MIFFLIN-ST. JEOR: SCORE: 2026.81

## 2019-08-16 NOTE — LETTER
8/16/2019       RE: Golden Stover  [address]  [facility] [place] [facility] [address]     Dear Colleague,    Thank you for referring your patient, Golden Stover, to the [facility] at [facility]. Please see a copy of my visit note below.    CHRONIC TRANSPLANT NEPHROLOGY VISIT    Assessment & Plan   # LDKT: Stable   - Baseline Cr ~ 1.3-1.5   - Proteinuria: Mild (0.5-1.0 grams)   - Date DSA Last Checked: Jun/2018       Latest DSA: No   - BK Viremia: No   - Kidney Tx Biopsy: No    # Immunosuppression: Tacrolimus immediate release (goal 4-6) and Mycophenolate mofetil (goal not followed)   - Changes: No    # Prophylaxis:   - PJP: None    # Hypertension: Controlled; Goal BP: < 130/80   - Changes: No    # Mineral Bone Disorder:   - Vitamin D; level: Low and patient is not on vitamin D supplement.  Will start cholecalciferol 1000 units daily.  Will need to follow serum calcium to ensure it doesn't increase too much.  - Calcium; level: Normal    # Skin Cancer Risk:    - Discussed sun protection and recommend regular follow up with Dermatology.    # Medical Compliance: Yes    # Transplant History:  Etiology of kidney failure: IgA nephropathy  Tx: LDKT  Transplant: 6/7/2017 (Kidney)  Donor Type: Living Donor Class:   Significant changes in immunosuppression: None  Significant transplant-related complications: None    Transplant Office Phone Number: [phone]    Assessment and plan was discussed with the patient and he voiced his understanding and agreement.    Return visit: Return in about 6 months (around 2/16/2020).    Jak King MD    Chief Complaint   Mr. Stover is a 24 year old here for routine follow up.    History of Present Illness    Mr. Stover reports feeling good overall with minimal medical complaints.  Since last clinic visit, patient reports no hospitalizations or new medical complaints and has been doing well overall.  He did hit his  knee a few weeks back and ended up on crutches for a few days, but that has resolved.  His energy level is good and remains normal.  He is working two jobs and gets 20-30K steps daily.  Denies any chest pain or shortness of breath with exertion.  No leg swelling.    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  The classes are mostly online.    Recent Hospitalizations:  [x] No [] Yes    New Medical Issues: [x] No [] Yes    Decreased energy: [x] No [] Yes    Chest pain or SOB with exertion:  [x] No [] Yes    Appetite change or weight change: [x] No [] Yes    Nausea, vomiting or diarrhea:  [x] No [] Yes    Fever, sweats or chills: [x] No [] Yes    Leg swelling: [x] No [] Yes      Home BP: 120/80s    Review of Systems   A comprehensive review of systems was obtained and negative, except as noted in the HPI or PMH.    Problem List   Patient Active Problem List   Diagnosis     IgA nephropathy     HTN, kidney transplant related     Secondary renal hyperparathyroidism (H)     Kidney replaced by transplant     Immunosuppression (H)     Aftercare following organ transplant     Vitamin D deficiency     Gilbert syndrome       Social History   Social History     Tobacco Use     Smoking status: Never Smoker     Smokeless tobacco: Never Used   Substance Use Topics     Alcohol use: Yes     Alcohol/week: 0.6 oz     Types: 1 Standard drinks or equivalent per week     Drug use: No       Allergies   Allergies   Allergen Reactions     Methylprednisolone Other (See Comments)     Other reaction(s): Other (See Comments)  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      Mold Itching     Seasonal Allergies Itching     Sulfa Drugs      Thymoglobulin Itching       Medications   Current Outpatient Medications   Medication Sig     amLODIPine (NORVASC) 10 MG tablet TAKE  "1 TABLET (10 MG) BY MOUTH DAILY     losartan (COZAAR) 25 MG tablet TAKE 1 TABLET (25 MG) BY MOUTH DAILY     mycophenolate (GENERIC EQUIVALENT) 250 MG capsule Take 3 capsules (750 mg) by mouth 2 times daily     tacrolimus (GENERIC EQUIVALENT) 0.5 MG capsule Take 1 capsule (0.5 mg) by mouth 2 times daily Total dose 3.5 mg twice daily     tacrolimus (GENERIC EQUIVALENT) 1 MG capsule Take 3 capsules (3 mg) by mouth 2 times daily Total dose 3.5 mg twice daily     No current facility-administered medications for this visit.      Medications Discontinued During This Encounter   Medication Reason     losartan (COZAAR) 25 MG tablet Medication Reconciliation Clean Up       Physical Exam   Vital Signs: /71 (BP Location: Left arm, Patient Position: Sitting, Cuff Size: Adult Regular)   Pulse 62   Ht 1.88 m (6' 2\")   Wt 97.2 kg (214 lb 4.8 oz)   BMI 27.51 kg/m       GENERAL APPEARANCE: alert and no distress  HENT: mouth without ulcers or lesions  LYMPHATICS: no cervical or supraclavicular nodes  RESP: lungs clear to auscultation - no rales, rhonchi or wheezes  CV: regular rhythm, normal rate, no rub, no murmur  EDEMA: no LE edema bilaterally  ABDOMEN: soft, nondistended, nontender, bowel sounds normal  MS: extremities normal - no gross deformities noted, no evidence of inflammation in joints, no muscle tenderness  SKIN: no rash  TX KIDNEY: normal  DIALYSIS ACCESS:  None      Data     Renal Latest Ref Rng & Units 7/18/2019 6/6/2019 4/26/2019   Na 133 - 144 mmol/L - - -   Na (external) 134 - 143 mEq/L 141 139 140   K 3.4 - 5.3 mmol/L - - -   K (external) 3.4 - 5.1 mEq/L 3.5 3.7 3.8   Cl 94 - 109 mmol/L - - -   Cl (external) 99 - 110 mEq/L 106 106 104   CO2 20 - 32 mmol/L - - -   CO2 (external) 19 - 29 mEq/L 28 24 24   BUN 7 - 30 mg/dL - - -   BUN (external) 5 - 24 mg/dL 18 15 14   Cr 0.66 - 1.25 mg/dL - - -   Cr (external) 0.70 - 1.20 mg/dL 1.48(H) 1.37(H) 1.31(H)   Glucose 70 - 99 mg/dL - - -   Glucose (external) 70 - 99 " mg/dL 93 100(H) 95   Ca  8.5 - 10.1 mg/dL - - -   Ca (external) 8.4 - 10.5 mg/dL 9.6 9.8 9.7   Mg 1.6 - 2.3 mg/dL - - -   Mg (external) 1.5 - 2.2 mEq/L - - -     Bone Health Latest Ref Rng & Units 10/30/2018 8/17/2018 9/11/2017   Phos 2.5 - 4.5 mg/dL - - -   Phos (external) 2.5 - 4.6 mg/dL - - -   PTHi 12 - 72 pg/mL - - 132(H)   PTHi (external) 15.0 - 115.0 pg/mL - 102.9 -   Vit D Def 20 - 75 ug/L - - -   Vit D Def (external) 27 - 80 ng/mL 26(L) - -     Heme Latest Ref Rng & Units 7/18/2019 6/6/2019 4/26/2019   WBC 4.0 - 11.0 10e9/L - - -   WBC (external) 3.2 - 11.0 10*9/L 8.0 5.6 5.1   Hgb 13.3 - 17.7 g/dL - - -   Hgb (external) 12.9 - 16.9 g/dL 13.8 13.8 13.6   Plt 150 - 450 10e9/L - - -   Plt (external) 130 - 375 10*9/L 419(H) 382(H) 414(H)     Liver Latest Ref Rng & Units 6/8/2018 3/6/2018 1/23/2018   AP 40 - 150 U/L - - -   AP (external) 40 - 150 IU/L 75 76 71   TBili 0.2 - 1.3 mg/dL - - -   TBili (external) 0.2 - 1.2 mg/dL 2.3(H) 1.3(H) 1.3(H)   DBili (external) 0.0 - 0.5 mg/dL 0.7(H) 0.5 0.5   ALT 0 - 70 U/L - - -   ALT (external) 6 - 40 IU/L 31 20 20   AST 0 - 45 U/L - - -   AST (external) 10 - 40 IU/L 30 25 19   Tot Protein 6.8 - 8.8 g/dL - - -   Tot Protein (external) 6.0 - 8.0 g/dL 7.4 7.6 7.3   Albumin 3.4 - 5.0 g/dL - - -   Albumin (external) 3.5 - 5.0 g/dL 4.4 4.6 4.5        Iron studies Latest Ref Rng & Units 6/1/2017   Iron 35 - 180 ug/dL 65   Iron sat 15 - 46 % 21   Ferritin 26 - 388 ng/mL 310     [facility] Txp Virology Latest Ref Rng & Units 1/15/2019 12/7/2018 10/30/2018   BK Spec - - - -   BK Res BKNEG:BK Virus DNA Not Detected copies/mL - - -   BK Log <2.7 Log copies/mL - - -   BK Quant Result Ext None detected None detected None detected None detected   Hep B Core NR - - -   Hep B Core Ext Nonreactive - - -   Hep B Surf Ext  Positive >=12.00 mIU/mL - - -        Recent Labs   Lab Test 06/29/17  0831 06/30/17  0830 07/03/17  0857   DOSTA 6/28/17 7:30pm 2030, 06/29/17 2030 07/02/17   TACROL 10.0 9.5  11.1     Recent Labs   Lab Test 06/16/17  0712 06/29/17  0831   DOSMPA 1930 06/15/2017 6/28/17 7:30pm   MPACID 1.26 4.40*   MPAG 27.9* 43.9       Again, thank you for allowing me to participate in the care of your patient.      Sincerely,    Jak King MD

## 2019-08-16 NOTE — LETTER
8/16/2019      RE: Golden Stover  [address]  [facility] [address]       CHRONIC TRANSPLANT NEPHROLOGY VISIT    Assessment & Plan   # LDKT: Stable   - Baseline Cr ~ 1.3-1.5   - Proteinuria: Mild (0.5-1.0 grams)   - Date DSA Last Checked: Jun/2018       Latest DSA: No   - BK Viremia: No   - Kidney Tx Biopsy: No    # Immunosuppression: Tacrolimus immediate release (goal 4-6) and Mycophenolate mofetil (goal not followed)   - Changes: No    # Prophylaxis:   - PJP: None    # Hypertension: Controlled; Goal BP: < 130/80   - Changes: No    # Mineral Bone Disorder:   - Vitamin D; level: Low and patient is not on vitamin D supplement.  Will start cholecalciferol 1000 units daily.  Will need to follow serum calcium to ensure it doesn't increase too much.  - Calcium; level: Normal    # Skin Cancer Risk:    - Discussed sun protection and recommend regular follow up with Dermatology.    # Medical Compliance: Yes    # Transplant History:  Etiology of kidney failure: IgA nephropathy  Tx: LDKT  Transplant: 6/7/2017 (Kidney)  Donor Type: Living Donor Class:   Significant changes in immunosuppression: None  Significant transplant-related complications: None    Transplant Office Phone Number: [phone]    Assessment and plan was discussed with the patient and he voiced his understanding and agreement.    Return visit: Return in about 6 months (around 2/16/2020).    Jak King MD    Chief Complaint   Mr. Stover is a 24 year old here for routine follow up.    History of Present Illness    Mr. Stover reports feeling good overall with minimal medical complaints.  Since last clinic visit, patient reports no hospitalizations or new medical complaints and has been doing well overall.  He did hit his knee a few weeks back and ended up on crutches for a few days, but that has resolved.  His energy level is good and remains normal.  He is working two jobs and gets 20-30K steps daily.  Denies any chest pain or  shortness of breath with exertion.  No leg swelling.    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  The classes are mostly online.    Recent Hospitalizations:  [x] No [] Yes    New Medical Issues: [x] No [] Yes    Decreased energy: [x] No [] Yes    Chest pain or SOB with exertion:  [x] No [] Yes    Appetite change or weight change: [x] No [] Yes    Nausea, vomiting or diarrhea:  [x] No [] Yes    Fever, sweats or chills: [x] No [] Yes    Leg swelling: [x] No [] Yes      Home BP: 120/80s    Review of Systems   A comprehensive review of systems was obtained and negative, except as noted in the HPI or PMH.    Problem List   Patient Active Problem List   Diagnosis     IgA nephropathy     HTN, kidney transplant related     Secondary renal hyperparathyroidism (H)     Kidney replaced by transplant     Immunosuppression (H)     Aftercare following organ transplant     Vitamin D deficiency     Gilbert syndrome       Social History   Social History     Tobacco Use     Smoking status: Never Smoker     Smokeless tobacco: Never Used   Substance Use Topics     Alcohol use: Yes     Alcohol/week: 0.6 oz     Types: 1 Standard drinks or equivalent per week     Drug use: No       Allergies   Allergies   Allergen Reactions     Methylprednisolone Other (See Comments)     Other reaction(s): Other (See Comments)  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      Mold Itching     Seasonal Allergies Itching     Sulfa Drugs      Thymoglobulin Itching       Medications   Current Outpatient Medications   Medication Sig     amLODIPine (NORVASC) 10 MG tablet TAKE 1 TABLET (10 MG) BY MOUTH DAILY     losartan (COZAAR) 25 MG tablet TAKE 1 TABLET (25 MG) BY MOUTH DAILY     mycophenolate (GENERIC EQUIVALENT) 250 MG capsule Take 3 capsules (750 mg) by mouth 2 times daily      "tacrolimus (GENERIC EQUIVALENT) 0.5 MG capsule Take 1 capsule (0.5 mg) by mouth 2 times daily Total dose 3.5 mg twice daily     tacrolimus (GENERIC EQUIVALENT) 1 MG capsule Take 3 capsules (3 mg) by mouth 2 times daily Total dose 3.5 mg twice daily     No current facility-administered medications for this visit.      Medications Discontinued During This Encounter   Medication Reason     losartan (COZAAR) 25 MG tablet Medication Reconciliation Clean Up       Physical Exam   Vital Signs: /71 (BP Location: Left arm, Patient Position: Sitting, Cuff Size: Adult Regular)   Pulse 62   Ht 1.88 m (6' 2\")   Wt 97.2 kg (214 lb 4.8 oz)   BMI 27.51 kg/m       GENERAL APPEARANCE: alert and no distress  HENT: mouth without ulcers or lesions  LYMPHATICS: no cervical or supraclavicular nodes  RESP: lungs clear to auscultation - no rales, rhonchi or wheezes  CV: regular rhythm, normal rate, no rub, no murmur  EDEMA: no LE edema bilaterally  ABDOMEN: soft, nondistended, nontender, bowel sounds normal  MS: extremities normal - no gross deformities noted, no evidence of inflammation in joints, no muscle tenderness  SKIN: no rash  TX KIDNEY: normal  DIALYSIS ACCESS:  None      Data     Renal Latest Ref Rng & Units 7/18/2019 6/6/2019 4/26/2019   Na 133 - 144 mmol/L - - -   Na (external) 134 - 143 mEq/L 141 139 140   K 3.4 - 5.3 mmol/L - - -   K (external) 3.4 - 5.1 mEq/L 3.5 3.7 3.8   Cl 94 - 109 mmol/L - - -   Cl (external) 99 - 110 mEq/L 106 106 104   CO2 20 - 32 mmol/L - - -   CO2 (external) 19 - 29 mEq/L 28 24 24   BUN 7 - 30 mg/dL - - -   BUN (external) 5 - 24 mg/dL 18 15 14   Cr 0.66 - 1.25 mg/dL - - -   Cr (external) 0.70 - 1.20 mg/dL 1.48(H) 1.37(H) 1.31(H)   Glucose 70 - 99 mg/dL - - -   Glucose (external) 70 - 99 mg/dL 93 100(H) 95   Ca  8.5 - 10.1 mg/dL - - -   Ca (external) 8.4 - 10.5 mg/dL 9.6 9.8 9.7   Mg 1.6 - 2.3 mg/dL - - -   Mg (external) 1.5 - 2.2 mEq/L - - -     Bone Health Latest Ref Rng & Units 10/30/2018 " 8/17/2018 9/11/2017   Phos 2.5 - 4.5 mg/dL - - -   Phos (external) 2.5 - 4.6 mg/dL - - -   PTHi 12 - 72 pg/mL - - 132(H)   PTHi (external) 15.0 - 115.0 pg/mL - 102.9 -   Vit D Def 20 - 75 ug/L - - -   Vit D Def (external) 27 - 80 ng/mL 26(L) - -     Heme Latest Ref Rng & Units 7/18/2019 6/6/2019 4/26/2019   WBC 4.0 - 11.0 10e9/L - - -   WBC (external) 3.2 - 11.0 10*9/L 8.0 5.6 5.1   Hgb 13.3 - 17.7 g/dL - - -   Hgb (external) 12.9 - 16.9 g/dL 13.8 13.8 13.6   Plt 150 - 450 10e9/L - - -   Plt (external) 130 - 375 10*9/L 419(H) 382(H) 414(H)     Liver Latest Ref Rng & Units 6/8/2018 3/6/2018 1/23/2018   AP 40 - 150 U/L - - -   AP (external) 40 - 150 IU/L 75 76 71   TBili 0.2 - 1.3 mg/dL - - -   TBili (external) 0.2 - 1.2 mg/dL 2.3(H) 1.3(H) 1.3(H)   DBili (external) 0.0 - 0.5 mg/dL 0.7(H) 0.5 0.5   ALT 0 - 70 U/L - - -   ALT (external) 6 - 40 IU/L 31 20 20   AST 0 - 45 U/L - - -   AST (external) 10 - 40 IU/L 30 25 19   Tot Protein 6.8 - 8.8 g/dL - - -   Tot Protein (external) 6.0 - 8.0 g/dL 7.4 7.6 7.3   Albumin 3.4 - 5.0 g/dL - - -   Albumin (external) 3.5 - 5.0 g/dL 4.4 4.6 4.5        Iron studies Latest Ref Rng & Units 6/1/2017   Iron 35 - 180 ug/dL 65   Iron sat 15 - 46 % 21   Ferritin 26 - 388 ng/mL 310     [facility] Txp Virology Latest Ref Rng & Units 1/15/2019 12/7/2018 10/30/2018   BK Spec - - - -   BK Res BKNEG:BK Virus DNA Not Detected copies/mL - - -   BK Log <2.7 Log copies/mL - - -   BK Quant Result Ext None detected None detected None detected None detected   Hep B Core NR - - -   Hep B Core Ext Nonreactive - - -   Hep B Surf Ext  Positive >=12.00 mIU/mL - - -        Recent Labs   Lab Test 06/29/17  0831 06/30/17  0830 07/03/17  0857   DOSTAC 6/28/17 7:30pm 2030, 06/29/17 2030 07/02/17   TACROL 10.0 9.5 11.1     Recent Labs   Lab Test 06/16/17  0712 06/29/17  0831   DOSMPA 1930 06/15/2017 6/28/17 7:30pm   MPACID 1.26 4.40*   MPAG 27.9* 43.9       Jak King MD

## 2019-08-25 DIAGNOSIS — I15.1 HYPERTENSION SECONDARY TO OTHER RENAL DISORDERS (CODE): ICD-10-CM

## 2019-08-25 DIAGNOSIS — N25.81 SECONDARY RENAL HYPERPARATHYROIDISM (H): ICD-10-CM

## 2019-08-26 RX ORDER — AMLODIPINE BESYLATE 10 MG/1
TABLET ORAL
Qty: 90 TABLET | Refills: 3 | Status: SHIPPED | OUTPATIENT
Start: 2019-08-26 | End: 2020-03-20

## 2019-11-01 DIAGNOSIS — Z94.0 LIVING-DONOR KIDNEY TRANSPLANT RECIPIENT: ICD-10-CM

## 2019-11-01 RX ORDER — MYCOPHENOLATE MOFETIL 250 MG/1
750 CAPSULE ORAL 2 TIMES DAILY
Qty: 180 CAPSULE | Refills: 11 | Status: SHIPPED | OUTPATIENT
Start: 2019-11-01 | End: 2020-10-16

## 2019-11-12 ENCOUNTER — TELEPHONE (OUTPATIENT)
Dept: TRANSPLANT | Facility: CLINIC | Age: 24
End: 2019-11-12

## 2019-11-12 NOTE — TELEPHONE ENCOUNTER
Patient Call: Transplant Lab/Orders  Route to LPN  Post Transplant Days: 888  When patient is less than 60 days post-transplant, route high priority    Reason for Call: [facility]    Callback needed? No

## 2019-11-12 NOTE — LETTER
OUTPATIENT LABORATORY TEST ORDER    Patient Name: Golden Stover  Transplant Date: 6/7/2017 (Kidney)  YOB: 1995                                       Issue Date & Time:11/13/2019  4:41 PM  [facility] MR:  [id number]  Exp. Date (1 year after date issued)    Diagnoses: Kidney Transplant (ICD-10  Z94.0)   Long term use of medications (ICD-10  Z79.899)     Lab results to be available on the same day drawn.   Patient should release information to the [facility] [place] Transplant Center.  Please fax to the Transplant Center at [phone].    Every other month   ?Hemogram and Platelet  ?Basic Metabolic Panel (Sodium, Potassium, Chloride, CO2, Creatinine, Urea Nitrogen, Glucose, Calcium)  ?/Tacrolimus/Prograf drug level     Every 6 Months                 ?Urine for protein/creatinine                   If you have any questions, please call The Transplant Center at [phone] or [phone].    Please fax labs to [phone]  .

## 2019-12-30 ENCOUNTER — TELEPHONE (OUTPATIENT)
Dept: TRANSPLANT | Facility: CLINIC | Age: 24
End: 2019-12-30

## 2020-01-18 ENCOUNTER — NURSE TRIAGE (OUTPATIENT)
Dept: NURSING | Facility: CLINIC | Age: 25
End: 2020-01-18

## 2020-01-18 NOTE — TELEPHONE ENCOUNTER
Caller want  His RX transferred to another pharmacy because he will  be out on Monday  for his  0730 dose; and  That pharmacy is closed   until Monday; he will be able to  get it several hours later   Caller advised  to reconsider  need to change entire RX with refills to another pharmacy   Caller understands and will get his RX at usual pharmacy on Monday   Belén Elliott RN  FNA      Reason for Disposition    Caller has medication question only, adult not sick, and triager answers question    Protocols used: MEDICATION QUESTION CALL-A-AH

## 2020-01-28 ENCOUNTER — RESULTS ONLY (OUTPATIENT)
Dept: OTHER | Facility: CLINIC | Age: 25
End: 2020-01-28

## 2020-01-28 DIAGNOSIS — Z48.298 AFTERCARE FOLLOWING ORGAN TRANSPLANT: ICD-10-CM

## 2020-01-28 DIAGNOSIS — Z94.0 KIDNEY REPLACED BY TRANSPLANT: ICD-10-CM

## 2020-01-28 DIAGNOSIS — Z79.899 ENCOUNTER FOR LONG-TERM CURRENT USE OF MEDICATION: ICD-10-CM

## 2020-01-28 PROCEDURE — 86832 HLA CLASS I HIGH DEFIN QUAL: CPT | Performed by: TRANSPLANT SURGERY

## 2020-01-28 PROCEDURE — 86833 HLA CLASS II HIGH DEFIN QUAL: CPT | Performed by: TRANSPLANT SURGERY

## 2020-01-29 ENCOUNTER — TELEPHONE (OUTPATIENT)
Dept: TRANSPLANT | Facility: CLINIC | Age: 25
End: 2020-01-29

## 2020-01-29 DIAGNOSIS — Z48.298 AFTERCARE FOLLOWING ORGAN TRANSPLANT: ICD-10-CM

## 2020-01-29 DIAGNOSIS — Z94.0 KIDNEY REPLACED BY TRANSPLANT: Primary | ICD-10-CM

## 2020-01-29 DIAGNOSIS — Z79.899 ENCOUNTER FOR LONG-TERM CURRENT USE OF MEDICATION: ICD-10-CM

## 2020-01-29 NOTE — TELEPHONE ENCOUNTER
Ck, Golden King, Jak Herrera MD 4 hours ago (11:16 AM)         Guanako king,               I had my lab draw the other day and I've had a cough for a while now I got antibiotics the first day and I still have it my levels look good the only thing was the protein spike in my urine lab result. It went from 29 in September to 57 now in January, is there any reason to worry about that or could it just be a blip. Let me know!     -Regards,             Golden Stover

## 2020-01-29 NOTE — LETTER
PHYSICIAN ORDERS      DATE & TIME ISSUED: 2020 4:06 PM  PATIENT NAME: Golden Stover   : 1995     [facility] MR# [if applicable]: [id number]     DIAGNOSIS:  Kidney Transplanted; Aftercare following kidney transplant  ICD-10 CODE: Z94.0; Z48.22     Please repeat the following lab test in 1-2 weeks:  -Urine Protein/Creatinine ratio    Any questions please call: [phone]. Fax results to [phone].      .

## 2020-01-29 NOTE — TELEPHONE ENCOUNTER
ISSUE: Urine Pr/Cr ratio 1.2; Random Urine Protein total 56.9.     PLAN: Reviewed with Dr. King and Dr. Bhardwaj at Wednesday review meeting. Plan to repeat Urine Pr/Cr ratio.     If still elevated:  Potentially biopsy transplanted kidney for possible recurring IgA nephropathy since transplant is from 2017. Consider decrease in amlodipine to 5 mg by mouth at night and increase losartan to 50 mg by mouth at night if repeat is still elevated. Repeat BMP in 7-10 day after if adjusting medications.    OUTCOME:  Golden will repeat urine pr/cr ratio next week. Reports his BP to be controlled at approximately 120-130/70-80. Lab letter sent.    Preferred Lab:  [facility], [phone] (Phone)  [phone] (Fax)

## 2020-01-31 LAB
DONOR IDENTIFICATION: NORMAL
DSA COMMENTS: NORMAL
DSA PRESENT: NO
DSA TEST METHOD: NORMAL
ORGAN: NORMAL
SA1 CELL: NORMAL
SA1 COMMENTS: NORMAL
SA1 HI RISK ABY: NORMAL
SA1 MOD RISK ABY: NORMAL
SA1 TEST METHOD: NORMAL
SA2 CELL: NORMAL
SA2 COMMENTS: NORMAL
SA2 HI RISK ABY UA: NORMAL
SA2 MOD RISK ABY: NORMAL
SA2 TEST METHOD: NORMAL
UNACCEPTABLE ANTIGEN: NORMAL
UNOS CPRA: 59

## 2020-03-10 ENCOUNTER — HEALTH MAINTENANCE LETTER (OUTPATIENT)
Age: 25
End: 2020-03-10

## 2020-04-07 NOTE — TELEPHONE ENCOUNTER
Congrats on the 3 month hayley post transplant!  Please discontinue your valcyte & begin weekly labs.      
Spoke to patient regarding 3 month post txp.  Patient verbalizes understanding to D/C Suman and will begin weekly labs.    
Simple: Patient demonstrates quick and easy understanding

## 2020-06-23 ENCOUNTER — TELEPHONE (OUTPATIENT)
Dept: TRANSPLANT | Facility: CLINIC | Age: 25
End: 2020-06-23

## 2020-06-23 NOTE — LETTER
PHYSICIAN ORDERS      DATE & TIME ISSUED: July 3, 2020 4:06 PM  PATIENT NAME: Golden Stover   : 1995     [facility] MR# [if applicable]: [id number]     DIAGNOSIS:  Kidney Transplanted; Aftercare following kidney transplant  ICD-10 CODE: Z94.0; Z48.22     Please draw the following lab test next week ~20:  -Serum Creatinine    Any questions please call: [phone]. Fax results to [phone].      .

## 2020-06-23 NOTE — TELEPHONE ENCOUNTER
Result Notes for Basic metabolic panel     Notes recorded by Jak King MD on 6/23/2020 at 1:16 PM CDT   Elevated serum creatinine and recommend increased hydration and repeating labs.      ISSUE: Creatinine 1.8 on 6/23/20; baseline 1.3-1.5.    PLAN: Please assess for the following:  How are you feeling?  Any recent illness/fever?  Any new or missed medications?  Constipated (pancreas)?  Are you drinking 2-3L water/day?  Volume status/weight/edema?  Changes in UOP? Color?  Pain over graft sites?    Increase Hydration and repeat labs in 1-2 weeks.    Ashley Chau, RN, BSN  Solid Organ Transplant, Post Kidney and Pancreas  Transplant Care Coordinator  [phone] option 5

## 2020-07-03 NOTE — TELEPHONE ENCOUNTER
Called patient per RN request to discuss topic of obtaining compression stockings and to educate on risks and recommendations.  Pt. Agreed to talk with therapist regarding recommendations.  Educated patient that if her legs reduce from wound care or lymphedema therapy, the stockings may no longer fit.  She verbalized understanding.  Discussed concern that if she wore a stocking with a wound that is draining it could affect integrity of stocking.   Therapist agreed to recommend (send order to MD) for compression from thigh to foot (either velcro/zipper closure or similar per patient discussion with DME provider).  Pt. Has worked with [facility] in the past .  Will call them and discuss case for good continuity of care.    Second call and voicemail to Golden regarding need to hydrate and repeat labs for elevated Creatinine. Orders sent.

## 2020-07-14 ENCOUNTER — TELEPHONE (OUTPATIENT)
Dept: NEPHROLOGY | Facility: CLINIC | Age: 25
End: 2020-07-14

## 2020-07-16 DIAGNOSIS — Z94.0 LIVING-DONOR KIDNEY TRANSPLANT RECIPIENT: ICD-10-CM

## 2020-07-16 RX ORDER — TACROLIMUS 1 MG/1
CAPSULE ORAL
Qty: 180 CAPSULE | Refills: 11 | Status: SHIPPED | OUTPATIENT
Start: 2020-07-16 | End: 2020-10-27

## 2020-07-16 RX ORDER — TACROLIMUS 0.5 MG/1
CAPSULE ORAL
Qty: 60 CAPSULE | Refills: 11 | Status: SHIPPED | OUTPATIENT
Start: 2020-07-16 | End: 2020-10-27 | Stop reason: ALTCHOICE

## 2020-09-02 ENCOUNTER — TELEPHONE (OUTPATIENT)
Dept: TRANSPLANT | Facility: CLINIC | Age: 25
End: 2020-09-02

## 2020-09-02 NOTE — TELEPHONE ENCOUNTER
CC called to check in on Golden after patient notification he had tested COVID PCR Positive result.  9/2/20 Still reports No Fever or other symptoms. He was prompted to test for COVID when his wife tested positive; she had been experiencing respiratory symptoms per Golden.

## 2020-09-22 ENCOUNTER — TELEPHONE (OUTPATIENT)
Dept: TRANSPLANT | Facility: CLINIC | Age: 25
End: 2020-09-22

## 2020-09-22 NOTE — TELEPHONE ENCOUNTER
Call was placed to Golden. Informed him that it has been 3 weeks since his positive results and that we are finding transplant patients can shed the virus up to 6 weeks. Wanting to ensure he is not still positive. Check in to make sure he never developed symptoms that were not reported. Orders were sent for local lab to obtain COVID-19 swab by PCR and other routine kidney transplant labs.

## 2020-09-22 NOTE — LETTER
PHYSICIAN ORDERS      DATE & TIME ISSUED: 2020 4:53 PM  PATIENT NAME: Golden Stover   : 1995     [facility] MR# [if applicable]: [id number]     DIAGNOSIS/ICD-10 CODES: Kidney Transplanted (Z94.0);   Aftercare following kidney transplant (Z48.22);   Clinical Diagnosis of COVID-19 (U07.1)    Please draw the following lab tests this week:  -BMP  -CBC w/platelets  -Tacrolimus Level (12 hour trough)  Please repeat test for COVID-19 virus by PCR     Any questions please call: [phone]. Fax results to [phone].      .

## 2020-09-24 ENCOUNTER — TELEPHONE (OUTPATIENT)
Dept: TRANSPLANT | Facility: CLINIC | Age: 25
End: 2020-09-24

## 2020-09-24 NOTE — TELEPHONE ENCOUNTER
Called patient to schedule [facility] annual appt, he conformed for Fri Oct 16 at 2pm

## 2020-09-25 ENCOUNTER — RESULTS ONLY (OUTPATIENT)
Dept: OTHER | Facility: CLINIC | Age: 25
End: 2020-09-25

## 2020-09-25 DIAGNOSIS — Z94.0 KIDNEY REPLACED BY TRANSPLANT: ICD-10-CM

## 2020-09-25 DIAGNOSIS — Z48.298 AFTERCARE FOLLOWING ORGAN TRANSPLANT: ICD-10-CM

## 2020-09-25 DIAGNOSIS — Z79.899 ENCOUNTER FOR LONG-TERM CURRENT USE OF MEDICATION: ICD-10-CM

## 2020-09-25 PROCEDURE — 86833 HLA CLASS II HIGH DEFIN QUAL: CPT | Performed by: TRANSPLANT SURGERY

## 2020-09-25 PROCEDURE — 86832 HLA CLASS I HIGH DEFIN QUAL: CPT | Performed by: TRANSPLANT SURGERY

## 2020-09-28 ENCOUNTER — TELEPHONE (OUTPATIENT)
Dept: TRANSPLANT | Facility: CLINIC | Age: 25
End: 2020-09-28

## 2020-09-28 DIAGNOSIS — Z94.0 KIDNEY REPLACED BY TRANSPLANT: Primary | ICD-10-CM

## 2020-09-28 DIAGNOSIS — R80.9 PROTEINURIA: ICD-10-CM

## 2020-09-28 NOTE — TELEPHONE ENCOUNTER
Message   Received: 3 days ago   Message Contents   SpongJak MD Blaisdell, Ashley Pineda RN               Increased proteinuria and would consider a kidney transplant biopsy to determine the underlying etiology.

## 2020-09-28 NOTE — TELEPHONE ENCOUNTER
Transplant Coordinator Renal Biopsy Communication    Call placed to Golden Stover to discuss indication for kidney transplant biopsy per Dr. King.     Indication for transplant renal biopsy: Proteinuria   Laterality: right  Date of biopsy: Thursday October 8, 2020, 9:30AM    Patient location within 70 miles of [facility]: No, 240 miles away.  If no, must stay overnight locally. Pt verbalizes staying with family locally in [place] or [facility].     Golden Jeferson Stover's medication list was reviewed.   Anticoagulant: none   Ibuprofen: No  Fish Oil:  no  Medications held: none    Recent blood pressure readings are WNL or not applicable. Instructed to take medication, especially blood pressure medications, before arriving to the [facility] at 8 AM day 10/8/20 of procedure.     Procedure expectations and duration of stay discussed. Expressed pt can expect a phone call from LPN/MA to confirm biopsy date/time/location/directions/review of medications.   Patient verbalized understanding they will need to arrive by 8 a.m. on 10/8/20 and plan to stay 4-5 hours post biopsy for monitoring. Bring a . No solid food within 6 hours of procedure (2AM). Clear liquids allowed up until 2 hours before procedure (730AM). Pt has no additional questions at this time. Transplant Office phone number given to pt for future questions.      Ashley Chau RN  Kidney/Pancreas Transplant Coordinator  [phone] option 5

## 2020-09-29 NOTE — TELEPHONE ENCOUNTER
RNCC spoke with Golden who voiced understanding of need for kidney transplant biopsy.  BPs WNL, 120s systolic.  Denies any blood thinning medications.     Orders for biopsy ordered.    Will need to coordinate COVID swab locally after IR gives date of biopsy.

## 2020-10-01 LAB
DONOR IDENTIFICATION: NORMAL
DSA COMMENTS: NORMAL
DSA PRESENT: NO
DSA TEST METHOD: NORMAL
ORGAN: NORMAL
SA1 CELL: NORMAL
SA1 COMMENTS: NORMAL
SA1 HI RISK ABY: NORMAL
SA1 MOD RISK ABY: NORMAL
SA1 TEST METHOD: NORMAL
SA2 CELL: NORMAL
SA2 COMMENTS: NORMAL
SA2 HI RISK ABY UA: NORMAL
SA2 MOD RISK ABY: NORMAL
SA2 TEST METHOD: NORMAL
UNACCEPTABLE ANTIGEN: NORMAL
UNOS CPRA: 60

## 2020-10-02 NOTE — TELEPHONE ENCOUNTER
Message left for Golden regarding scheduling renal biopsy Dr. King had recommended. Asked for return call.     Message left for IR scheduling department. RNCC returning call to schedule renal biopsy.    Addendum: Golden returned call. Verbalized understanding of plan for biopsy. He reports that he works outside the home as a  but should not have issues with lifting restrictions post-procedure. He will stay locally with family in either [place] or [place]. Understands he needs COVID-19 PCR test within 5 days of procedure. Reviewed food/liquid restrictions and to bring  day of procedure; allowed one visitor to waiting room.     IR  has returned call to set up renal biopsy, Thursday October 8, 2020 for 9:30 AM procedure. Informed Golden he needs COVID testing tomorrow 10/3/20. He verbalized understanding.

## 2020-10-05 ENCOUNTER — TELEPHONE (OUTPATIENT)
Dept: INTERVENTIONAL RADIOLOGY/VASCULAR | Facility: CLINIC | Age: 25
End: 2020-10-05

## 2020-10-06 NOTE — TELEPHONE ENCOUNTER
See [employer]Net messages regarding COVID-19 PCR NEGATIVE on 10/3/20; testing done at [facility]. Attempting to obtain written notification of results rather than the screen shot Golden sent via TransNet attachment.

## 2020-10-08 ENCOUNTER — APPOINTMENT (OUTPATIENT)
Dept: MEDSURG UNIT | Facility: CLINIC | Age: 25
End: 2020-10-08
Payer: COMMERCIAL

## 2020-10-08 ENCOUNTER — RESULTS ONLY (OUTPATIENT)
Dept: OTHER | Facility: CLINIC | Age: 25
End: 2020-10-08

## 2020-10-08 ENCOUNTER — APPOINTMENT (OUTPATIENT)
Dept: INTERVENTIONAL RADIOLOGY/VASCULAR | Facility: CLINIC | Age: 25
End: 2020-10-08
Attending: INTERNAL MEDICINE
Payer: COMMERCIAL

## 2020-10-08 ENCOUNTER — HOSPITAL ENCOUNTER (OUTPATIENT)
Facility: CLINIC | Age: 25
Discharge: HOME | End: 2020-10-08
Attending: INTERNAL MEDICINE | Admitting: PHYSICIAN ASSISTANT
Payer: COMMERCIAL

## 2020-10-08 VITALS
SYSTOLIC BLOOD PRESSURE: 132 MMHG | TEMPERATURE: 98.3 F | OXYGEN SATURATION: 96 % | DIASTOLIC BLOOD PRESSURE: 76 MMHG | RESPIRATION RATE: 18 BRPM | HEART RATE: 83 BPM | WEIGHT: 240 LBS | BODY MASS INDEX: 29.84 KG/M2 | HEIGHT: 75 IN

## 2020-10-08 DIAGNOSIS — R80.9 PROTEINURIA: ICD-10-CM

## 2020-10-08 DIAGNOSIS — Z94.0 KIDNEY REPLACED BY TRANSPLANT: ICD-10-CM

## 2020-10-08 LAB
ABO + RH BLD: NORMAL
ABO + RH BLD: NORMAL
ALBUMIN UR-MCNC: 30 MG/DL
ANION GAP SERPL CALCULATED.3IONS-SCNC: 6 MMOL/L (ref 3–14)
APPEARANCE UR: CLEAR
BASOPHILS # BLD AUTO: 0 10E9/L (ref 0–0.2)
BASOPHILS NFR BLD AUTO: 0.7 %
BILIRUB UR QL STRIP: NEGATIVE
BLD GP AB SCN SERPL QL: NORMAL
BLOOD BANK CMNT PATIENT-IMP: NORMAL
BUN SERPL-MCNC: 12 MG/DL (ref 7–30)
CALCIUM SERPL-MCNC: 9.1 MG/DL (ref 8.5–10.1)
CHLORIDE SERPL-SCNC: 108 MMOL/L (ref 94–109)
CO2 SERPL-SCNC: 25 MMOL/L (ref 20–32)
COLOR UR AUTO: ABNORMAL
CREAT SERPL-MCNC: 1.54 MG/DL (ref 0.66–1.25)
CREAT UR-MCNC: 81 MG/DL
DIFFERENTIAL METHOD BLD: NORMAL
EOSINOPHIL # BLD AUTO: 0.2 10E9/L (ref 0–0.7)
EOSINOPHIL NFR BLD AUTO: 3.5 %
ERYTHROCYTE [DISTWIDTH] IN BLOOD BY AUTOMATED COUNT: 13 % (ref 10–15)
GFR SERPL CREATININE-BSD FRML MDRD: 61 ML/MIN/{1.73_M2}
GLUCOSE SERPL-MCNC: 95 MG/DL (ref 70–99)
GLUCOSE UR STRIP-MCNC: NEGATIVE MG/DL
HCT VFR BLD AUTO: 44.2 % (ref 40–53)
HGB BLD-MCNC: 14.1 G/DL (ref 13.3–17.7)
HGB UR QL STRIP: ABNORMAL
IMM GRANULOCYTES # BLD: 0 10E9/L (ref 0–0.4)
IMM GRANULOCYTES NFR BLD: 0.2 %
INR PPP: 0.94 (ref 0.86–1.14)
KETONES UR STRIP-MCNC: NEGATIVE MG/DL
LEUKOCYTE ESTERASE UR QL STRIP: NEGATIVE
LYMPHOCYTES # BLD AUTO: 1 10E9/L (ref 0.8–5.3)
LYMPHOCYTES NFR BLD AUTO: 22.1 %
MCH RBC QN AUTO: 27.4 PG (ref 26.5–33)
MCHC RBC AUTO-ENTMCNC: 31.9 G/DL (ref 31.5–36.5)
MCV RBC AUTO: 86 FL (ref 78–100)
MICROALBUMIN UR-MCNC: 705 MG/L
MICROALBUMIN/CREAT UR: 869.3 MG/G CR (ref 0–17)
MONOCYTES # BLD AUTO: 0.6 10E9/L (ref 0–1.3)
MONOCYTES NFR BLD AUTO: 12.8 %
NEUTROPHILS # BLD AUTO: 2.8 10E9/L (ref 1.6–8.3)
NEUTROPHILS NFR BLD AUTO: 60.7 %
NITRATE UR QL: NEGATIVE
NRBC # BLD AUTO: 0 10*3/UL
NRBC BLD AUTO-RTO: 0 /100
PH UR STRIP: 7.5 PH (ref 5–7)
PLATELET # BLD AUTO: 431 10E9/L (ref 150–450)
POTASSIUM SERPL-SCNC: 3.8 MMOL/L (ref 3.4–5.3)
PROT UR-MCNC: 0.89 G/L
PROT/CREAT 24H UR: 1.1 G/G CR (ref 0–0.2)
RBC # BLD AUTO: 5.14 10E12/L (ref 4.4–5.9)
RBC #/AREA URNS AUTO: 79 /HPF (ref 0–2)
SODIUM SERPL-SCNC: 140 MMOL/L (ref 133–144)
SOURCE: ABNORMAL
SP GR UR STRIP: 1.01 (ref 1–1.03)
SPECIMEN EXP DATE BLD: NORMAL
TACROLIMUS BLD-MCNC: 18.8 UG/L (ref 5–15)
TME LAST DOSE: ABNORMAL H
TRANS CELLS #/AREA URNS HPF: <1 /HPF (ref 0–1)
UROBILINOGEN UR STRIP-MCNC: NORMAL MG/DL (ref 0–2)
WBC # BLD AUTO: 4.5 10E9/L (ref 4–11)
WBC #/AREA URNS AUTO: 1 /HPF (ref 0–5)

## 2020-10-08 PROCEDURE — 999N001095 HC STATISTIC STAT SERVICE TX TESTING: Performed by: INTERNAL MEDICINE

## 2020-10-08 PROCEDURE — 85025 COMPLETE CBC W/AUTO DIFF WBC: CPT | Performed by: INTERNAL MEDICINE

## 2020-10-08 PROCEDURE — 50200 RENAL BIOPSY PERQ: CPT | Mod: RT | Performed by: PHYSICIAN ASSISTANT

## 2020-10-08 PROCEDURE — 88350 IMFLUOR EA ADDL 1ANTB STN PX: CPT | Mod: 26 | Performed by: PATHOLOGY

## 2020-10-08 PROCEDURE — 250N000009 HC RX 250: Performed by: PHYSICIAN ASSISTANT

## 2020-10-08 PROCEDURE — 88305 TISSUE EXAM BY PATHOLOGIST: CPT | Mod: 26 | Performed by: PATHOLOGY

## 2020-10-08 PROCEDURE — 999N000134 HC STATISTIC PP CARE STAGE 3

## 2020-10-08 PROCEDURE — 87799 DETECT AGENT NOS DNA QUANT: CPT | Performed by: INTERNAL MEDICINE

## 2020-10-08 PROCEDURE — 88346 IMFLUOR 1ST 1ANTB STAIN PX: CPT | Mod: TC | Performed by: INTERNAL MEDICINE

## 2020-10-08 PROCEDURE — 80048 BASIC METABOLIC PNL TOTAL CA: CPT | Performed by: PHYSICIAN ASSISTANT

## 2020-10-08 PROCEDURE — 84156 ASSAY OF PROTEIN URINE: CPT | Performed by: INTERNAL MEDICINE

## 2020-10-08 PROCEDURE — 88348 ELECTRON MICROSCOPY DX: CPT | Mod: TC | Performed by: INTERNAL MEDICINE

## 2020-10-08 PROCEDURE — 86850 RBC ANTIBODY SCREEN: CPT | Performed by: INTERNAL MEDICINE

## 2020-10-08 PROCEDURE — 258N000003 HC RX IP 258 OP 636: Performed by: RADIOLOGY

## 2020-10-08 PROCEDURE — 86901 BLOOD TYPING SEROLOGIC RH(D): CPT | Performed by: INTERNAL MEDICINE

## 2020-10-08 PROCEDURE — 86900 BLOOD TYPING SEROLOGIC ABO: CPT | Performed by: INTERNAL MEDICINE

## 2020-10-08 PROCEDURE — 86832 HLA CLASS I HIGH DEFIN QUAL: CPT | Performed by: INTERNAL MEDICINE

## 2020-10-08 PROCEDURE — 88346 IMFLUOR 1ST 1ANTB STAIN PX: CPT | Mod: 26 | Performed by: PATHOLOGY

## 2020-10-08 PROCEDURE — 88313 SPECIAL STAINS GROUP 2: CPT | Mod: 26 | Performed by: PATHOLOGY

## 2020-10-08 PROCEDURE — 81001 URINALYSIS AUTO W/SCOPE: CPT | Performed by: INTERNAL MEDICINE

## 2020-10-08 PROCEDURE — 86833 HLA CLASS II HIGH DEFIN QUAL: CPT | Performed by: INTERNAL MEDICINE

## 2020-10-08 PROCEDURE — 85610 PROTHROMBIN TIME: CPT | Performed by: PHYSICIAN ASSISTANT

## 2020-10-08 PROCEDURE — 88350 IMFLUOR EA ADDL 1ANTB STN PX: CPT | Mod: TC | Performed by: INTERNAL MEDICINE

## 2020-10-08 PROCEDURE — 88313 SPECIAL STAINS GROUP 2: CPT | Mod: TC | Performed by: INTERNAL MEDICINE

## 2020-10-08 PROCEDURE — 76942 ECHO GUIDE FOR BIOPSY: CPT | Mod: 26 | Performed by: PHYSICIAN ASSISTANT

## 2020-10-08 PROCEDURE — 82043 UR ALBUMIN QUANTITATIVE: CPT | Performed by: INTERNAL MEDICINE

## 2020-10-08 PROCEDURE — 80197 ASSAY OF TACROLIMUS: CPT | Performed by: INTERNAL MEDICINE

## 2020-10-08 PROCEDURE — 272N000653 IR RENAL BIOPSY RIGHT

## 2020-10-08 PROCEDURE — 88305 TISSUE EXAM BY PATHOLOGIST: CPT | Mod: TC | Performed by: INTERNAL MEDICINE

## 2020-10-08 PROCEDURE — 999N001070 HC STATISTIC R-RUSH PROCESSING: Performed by: INTERNAL MEDICINE

## 2020-10-08 RX ORDER — DEXTROSE MONOHYDRATE 25 G/50ML
25-50 INJECTION, SOLUTION INTRAVENOUS
Status: DISCONTINUED | OUTPATIENT
Start: 2020-10-08 | End: 2020-10-08 | Stop reason: HOSPADM

## 2020-10-08 RX ORDER — SODIUM CHLORIDE 9 MG/ML
INJECTION, SOLUTION INTRAVENOUS CONTINUOUS
Status: DISCONTINUED | OUTPATIENT
Start: 2020-10-08 | End: 2020-10-08 | Stop reason: HOSPADM

## 2020-10-08 RX ORDER — NICOTINE POLACRILEX 4 MG
15-30 LOZENGE BUCCAL
Status: DISCONTINUED | OUTPATIENT
Start: 2020-10-08 | End: 2020-10-08 | Stop reason: HOSPADM

## 2020-10-08 RX ORDER — LIDOCAINE 40 MG/G
CREAM TOPICAL
Status: DISCONTINUED | OUTPATIENT
Start: 2020-10-08 | End: 2020-10-08 | Stop reason: HOSPADM

## 2020-10-08 RX ADMIN — SODIUM CHLORIDE: 9 INJECTION, SOLUTION INTRAVENOUS at 09:10

## 2020-10-08 RX ADMIN — LIDOCAINE HYDROCHLORIDE 10 ML: 10 INJECTION, SOLUTION EPIDURAL; INFILTRATION; INTRACAUDAL; PERINEURAL at 10:22

## 2020-10-08 ASSESSMENT — MIFFLIN-ST. JEOR: SCORE: 2154.26

## 2020-10-08 NOTE — DISCHARGE INSTRUCTIONS
[facility]    Interventional Radiology  Patient Instructions Following Transplanted Kidney Biopsy    AFTER YOU GO HOME  ? If you were given sedation DO NOT drive or operate machinery at home or at work for at least 24 hours  ? DO relax and take it easy for 48 hours, no strenuous activity for 24 hours  ? DO drink plenty of fluids  ? DO resume your regular diet, unless otherwise instructed by your Primary Physician  ? Keep the dressing dry and in place for 24 hours.  ? DO NOT SMOKE FOR AT LEAST 24 HOURS, if you have been given any medications that were to help you relax or sedate you during your procedure  ? DO NOT drink alcoholic beverages the day of your procedure  ? DO NOT do any strenuous exercise or lifting (> 10 lbs) for at least 7 days following your procedure  ? DO NOT take a bath or shower for at least 12 hours following your procedure  ? Remove dressing after shower the next day. Replace with Band aid for 2 days.  Never leave a wet dressing in place.  ? DO NOT make any important or legal decisions for 24 hours following your procedure  ? There should be minimum drainage from the biopsy site    CALL THE PHYSICIAN IF:  ? You start bleeding from the procedure site.  If you do start to bleed from that site, lie down flat and hold pressure on the site for a minimum of 10 minutes.  Your physician will tell you if you need to return to the hospital  ? You develop nausea or vomiting  ? You have excessive swelling, redness, or tenderness at the site  ? You have drainage that looks like it is infected.  ? You experience severe pain  ? You develop hives or a rash or unexplained itching  ? You develop shortness of breath  ? You develop a temperature of 101 degrees F or greater  ? You develop bloody clots or red urine after you are discharged    [facility] INTERVENTIONAL RADIOLOGY DEPARTMENT  Procedure Physician:  KENYA PENNY PA-C                                     Date of procedure:   October 8,  2020  Telephone Numbers: [phone] Monday-Friday 8:00 am to 4:30 pm  [phone] After 4:30 pm Monday-Friday, Weekends & Holidays.     Ask for the Interventional Radiologist on call.  Someone is on call 24 hrs/day  [facility] toll free number: [phone] Monday-Friday 8:00 am to 4:30 pm  [facility] Emergency Dept: [phone]

## 2020-10-08 NOTE — PROGRESS NOTES
Patient tolerated recovery stage well. VSS, Right lower abd site clean/dry/intact, no hematoma, and denies pain. Patient tolerated PO food and fluids. Teaching was done and discharge instructions were given. Patient ambulated, voided, and PIV was removed. Patient discharged from the hospital  to home with wife.

## 2020-10-08 NOTE — PROGRESS NOTES
Prep and teaching complete for Transplanted Kidney Biopsy; Pt awake and alert, denies pain. Didi at bedside, will drive pt home. Consent current, awaiting lab results.

## 2020-10-08 NOTE — PROCEDURES
[facility]     Procedure: IR Procedure Note    Date/Time: 10/8/2020 10:26 AM  Performed by: Jonathan Li PA-C  Authorized by: Jonathan Li PA-C   IR Fellow Physician:  Other(s) attending procedure: Assist: ROSALIA Phillips    UNIVERSAL PROTOCOL   Site Marked: NA  Prior Images Obtained and Reviewed:  Yes  Required items: Required blood products, implants, devices and special equipment available    Patient identity confirmed:  Verbally with patient, arm band, provided demographic data and hospital-assigned identification number  Patient was reevaluated immediately before administering moderate or deep sedation or anesthesia  Confirmation Checklist:  Patient's identity using two indicators, relevant allergies, procedure was appropriate and matched the consent or emergent situation and correct equipment/implants were available  Time out: Immediately prior to the procedure a time out was called    Universal Protocol: the Joint Commission Universal Protocol was followed    Preparation: Patient was prepped and draped in usual sterile fashion    ESBL (mL):  1         ANESTHESIA    Anesthesia: Local infiltration  Local Anesthetic:  Lidocaine 1% without epinephrine  Anesthetic Total (mL):  10      SEDATION    Patient Sedated: No    See dictated procedure note for full details.  Findings: Ultrasound-guided biopsy of RLQ transplant kidney. 5 18-gauge core samples obtained.     Specimens: core needle biopsy specimens sent for pathological analysis    Complications: None    Condition: Stable    Plan: Follow up per primary team. No strenuous activity for 3 days.     PROCEDURE   Patient Tolerance:  Patient tolerated the procedure well with no immediate complications    Length of time physician/provider present for 1:1 monitoring during sedation: 0

## 2020-10-08 NOTE — IP AVS SNAPSHOT
MRN:[id number]                      After Visit Summary   10/8/2020    Golden Stover    MRN: [id number]           Visit Information        Department      10/8/2020  7:55 AM [facility] [address] [place]          Review of your medicines      UNREVIEWED medicines. Ask your doctor about these medicines       Dose / Directions   amLODIPine 5 MG tablet  Commonly known as: NORVASC  Used for: HTN, kidney transplant related      Dose: 5 mg  Take 1 tablet (5 mg) by mouth At Bedtime  Quantity: 90 tablet  Refills: 3     cholecalciferol 25 mcg (1000 units) capsule  Commonly known as: VITAMIN D3      Dose: 2 capsule  Take 2 capsules (2,000 Units) by mouth daily  Refills: 0     losartan 50 MG tablet  Commonly known as: COZAAR  Used for: HTN, kidney transplant related      Dose: 50 mg  Take 1 tablet (50 mg) by mouth daily  Quantity: 90 tablet  Refills: 3     mycophenolate 250 MG capsule  Commonly known as: GENERIC EQUIVALENT  Used for: Living-donor kidney transplant recipient      Dose: 750 mg  TAKE 3 CAPSULES (750 MG) BY MOUTH 2 TIMES DAILY  Quantity: 180 capsule  Refills: 11     * tacrolimus 1 MG capsule  Commonly known as: GENERIC EQUIVALENT  Used for: Living-donor kidney transplant recipient      TAKE 3 CAPSULES BY MOUTH TWICE A DAY  Quantity: 180 capsule  Refills: 11     * tacrolimus 0.5 MG capsule  Commonly known as: GENERIC EQUIVALENT  Used for: Living-donor kidney transplant recipient      TAKE 1 CAPSULE BY MOUTH TWICE A DAY  Quantity: 60 capsule  Refills: 11         * This list has 2 medication(s) that are the same as other medications prescribed for you. Read the directions carefully, and ask your doctor or other care provider to review them with you.                  Protect others around you: Learn how to safely use, store and throw away your medicines at www.disposemymeds.org.       Follow-ups after your visit       Your next 10 appointments already scheduled    Oct 16, 2020  2:00  PM  Video Visit with Jak King MD  [facility] ([facility]) [address] [facility] [address] [phone]   [facility]  Note: this is not an onsite visit; there is no need to come to the facility.  Please have a list of all current medications available for appointment.         Care Instructions       Further instructions from your care team       [facility]    Interventional Radiology  Patient Instructions Following Transplanted Kidney Biopsy    AFTER YOU GO HOME  ? If you were given sedation DO NOT drive or operate machinery at home or at work for at least 24 hours  ? DO relax and take it easy for 48 hours, no strenuous activity for 24 hours  ? DO drink plenty of fluids  ? DO resume your regular diet, unless otherwise instructed by your Primary Physician  ? Keep the dressing dry and in place for 24 hours.  ? DO NOT SMOKE FOR AT LEAST 24 HOURS, if you have been given any medications that were to help you relax or sedate you during your procedure  ? DO NOT drink alcoholic beverages the day of your procedure  ? DO NOT do any strenuous exercise or lifting (> 10 lbs) for at least 7 days following your procedure  ? DO NOT take a bath or shower for at least 12 hours following your procedure  ? Remove dressing after shower the next day. Replace with Band aid for 2 days.  Never leave a wet dressing in place.  ? DO NOT make any important or legal decisions for 24 hours following your procedure  ? There should be minimum drainage from the biopsy site    CALL THE PHYSICIAN IF:  ? You start bleeding from the procedure site.  If you do start to bleed from that site, lie down flat and hold pressure on the site for a minimum of 10 minutes.  Your physician will tell you if you need to return to the hospital  ? You develop nausea or vomiting  ? You have excessive swelling, redness, or tenderness at the  "site  ? You have drainage that looks like it is infected.  ? You experience severe pain  ? You develop hives or a rash or unexplained itching  ? You develop shortness of breath  ? You develop a temperature of 101 degrees F or greater  ? You develop bloody clots or red urine after you are discharged    [facility] INTERVENTIONAL RADIOLOGY DEPARTMENT  Procedure Physician:  KENYA PENNY PA-C                                     Date of procedure:   October 8, 2020  Telephone Numbers: [phone] Monday-Friday 8:00 am to 4:30 pm  [phone] After 4:30 pm Monday-Friday, Weekends & Holidays.     Ask for the Interventional Radiologist on call.  Someone is on call 24 hrs/day  [facility] toll free number: [phone] Monday-Friday 8:00 am to 4:30 pm  [facility] Emergency Dept: [phone]          Additional Information About Your Visit       MyChart Information    [employer] gives you secure access to your electronic health record. If you see a primary care provider, you can also send messages to your care team and make appointments. If you have questions, please call your primary care clinic.  If you do not have a primary care provider, please call [phone] and they will assist you.       Care EveryWhere ID    This is your Care EveryWhere ID. This could be used by other organizations to access your [facility] medical records  [id number]       Your Vitals Were  Most recent update: 10/8/2020 11:06 AM    Blood Pressure   138/74 (BP Location: Right arm)          Pulse   65          Temperature   98.3  F (36.8  C) (Oral)          Respirations   16          Height   1.905 m (6' 3\")             Weight   108.9 kg (240 lb)    Pulse Oximetry   99%    BMI (Body Mass Index)   30.00 kg/m           Primary Care Provider Office Phone # Fax #    [facility] [phone]      Equal Access to Services    ALINA SANCHEZ AH: Radha johnsono Evelio, waaxda luqadaha, qaybta kaalmaamanda posada, dewey hunter " ah. So [facility] [phone].    ATENCIÓN: Si fernandez larios, tiene a joseph disposición servicios gratuitos de asistencia lingüística. Anthony al [phone].    We comply with applicable federal and state civil rights laws, including the [place] Human Rights Act. We do not discriminate on the basis of race, color, creed, Anglican, national origin, marital status, age, disability, sex, sexual orientation, or gender identity.       Thank you!    Thank you for choosing [facility] for your care. Our goal is always to provide you with excellent care. Hearing back from our patients is one way we can continue to improve our services. Please take a few minutes to complete the written survey that you may receive in the mail after you visit with us. Thank you!            Medication List      ASK your doctor about these medications          Morning Afternoon Evening Bedtime As Needed    amLODIPine 5 MG tablet  Also known as: NORVASC  INSTRUCTIONS: Take 1 tablet (5 mg) by mouth At Bedtime                     cholecalciferol 25 mcg (1000 units) capsule  Also known as: VITAMIN D3  INSTRUCTIONS: Take 2 capsules (2,000 Units) by mouth daily                     losartan 50 MG tablet  Also known as: COZAAR  INSTRUCTIONS: Take 1 tablet (50 mg) by mouth daily                     mycophenolate 250 MG capsule  Also known as: GENERIC EQUIVALENT  INSTRUCTIONS: TAKE 3 CAPSULES (750 MG) BY MOUTH 2 TIMES DAILY                     * tacrolimus 1 MG capsule  Also known as: GENERIC EQUIVALENT  INSTRUCTIONS: TAKE 3 CAPSULES BY MOUTH TWICE A DAY  Doctor's comments: Patient enrolled in our Rx Med Sync service to improveadherence. We are requesting a refill authorization inadvance to ensure an active prescription is on file.                     * tacrolimus 0.5 MG capsule  Also known as: GENERIC EQUIVALENT  INSTRUCTIONS: TAKE 1 CAPSULE BY MOUTH TWICE A DAY  Doctor's comments: Patient enrolled in our Rx Med Sync service to improveadherence. We are requesting  a refill authorization inadvance to ensure an active prescription is on file.                        * This list has 2 medication(s) that are the same as other medications prescribed for you. Read the directions carefully, and ask your doctor or other care provider to review them with you.

## 2020-10-08 NOTE — IP AVS SNAPSHOT
[facility] [address] [address]                                    After Visit Summary   10/8/2020    Golden Stover    MRN: [id number]           After Visit Summary Signature Page    I have received my discharge instructions, and my questions have been answered. I have discussed any challenges I see with this plan with the nurse or doctor.    ..........................................................................................................................................  Patient/Patient Representative Signature      ..........................................................................................................................................  Patient Representative Print Name and Relationship to Patient    ..................................................               ................................................  Date                                   Time    ..........................................................................................................................................  Reviewed by Signature/Title    ...................................................              ..............................................  Date                                               Time          22EPIC Rev 08/18

## 2020-10-08 NOTE — PROGRESS NOTES
Pt back from IR s/p transplanted right kidney biopsy.  VSS.  Pt alert and oriented x4.  Pt denies any pain.  Pt's family at the bedside.

## 2020-10-08 NOTE — PROGRESS NOTES
Pt up walking, steady on his feet; voided, clear pale yellow. Denies pain; tolerated PO, food and drink. Wife at bedside; discharge instructions reviewed with copy to pt, stated understanding. Report to Gayle rDiscoll RN.

## 2020-10-08 NOTE — PROGRESS NOTES
Patient Name: Golden Stover  Medical Record Number: [id number]  Today's Date: 10/8/2020    Procedure: transplant renal biopsy  Proceduralist: Glenda LINDSEY    Procedure Start:0955  Procedure end: 1015    No sedation per pt request     Report given to: 2A RN    Pt arrived to IR room 6 from . Consent reviewed. Pt denies any questions or concerns regarding procedure. Pt positioned supine and monitored per protocol. Pt tolerated procedure without any noted complications.Site covered with sterile dressing CDI.Pt transferred back to .     Renal path was called they stated they aren't available for the biopsy. 5 passes were taken and sent to path.    4 hour stay starts at 1015 per M.Jen MCDONALD

## 2020-10-09 ENCOUNTER — TELEPHONE (OUTPATIENT)
Dept: TRANSPLANT | Facility: CLINIC | Age: 25
End: 2020-10-09

## 2020-10-09 DIAGNOSIS — I15.1 HTN, KIDNEY TRANSPLANT RELATED: Primary | ICD-10-CM

## 2020-10-09 DIAGNOSIS — Z94.0 HTN, KIDNEY TRANSPLANT RELATED: Primary | ICD-10-CM

## 2020-10-09 LAB
BKV DNA # SPEC NAA+PROBE: NORMAL COPIES/ML
BKV DNA SPEC NAA+PROBE-LOG#: NORMAL LOG COPIES/ML
COPATH REPORT: NORMAL
DONOR IDENTIFICATION: NORMAL
DSA COMMENTS: NORMAL
DSA PRESENT: NO
DSA TEST METHOD: NORMAL
ORGAN: NORMAL
SA1 CELL: NORMAL
SA1 COMMENTS: NORMAL
SA1 HI RISK ABY: NORMAL
SA1 MOD RISK ABY: NORMAL
SA1 TEST METHOD: NORMAL
SA2 CELL: NORMAL
SA2 COMMENTS: NORMAL
SA2 HI RISK ABY UA: NORMAL
SA2 MOD RISK ABY: NORMAL
SA2 TEST METHOD: NORMAL
SPECIMEN SOURCE: NORMAL
UNACCEPTABLE ANTIGEN: NORMAL
UNOS CPRA: 60

## 2020-10-09 RX ORDER — LOSARTAN POTASSIUM 100 MG/1
100 TABLET ORAL AT BEDTIME
Qty: 90 TABLET | Refills: 3 | Status: SHIPPED | OUTPATIENT
Start: 2020-10-09 | End: 2021-11-18

## 2020-10-09 NOTE — TELEPHONE ENCOUNTER
ISSUE:   Tacrolimus IR level 18.8 on 10/8/20 at 1122 AM, goal 4-6, dose 3 mg BID.    PLAN:   Golden had renal transplant biopsy on 10/8/20 and was not instructed to hold his tacrolimus dose prior to procedure. His tacrolimus level was ordered and resulted in error. It should not have been drawn because he took his medication prior to lab draw. No dose change is necessary. His tacrolimus level of 4.7 was done 9/25/20 0800 and was within goal 4-6 at this time.     OUTCOME:   Spoke with drug analysis lab. Tacrolimus test will be credited and patient will not be charged for this test. Next level should be end of November 2020 or every 2 months on current dose 3 mg BID unless biopsy results prove otherwise. [employer] message sent to Golden.

## 2020-10-12 ENCOUNTER — DOCUMENTATION ONLY (OUTPATIENT)
Dept: TRANSPLANT | Facility: CLINIC | Age: 25
End: 2020-10-12

## 2020-10-12 NOTE — PROGRESS NOTES
biopsy results  Received: 3 days ago  Message Contents   Jak King MD Blaisdell, Ashley Pineda, RN             Kidney transplant biopsy shows recurrent IgA nephropathy.  No rejection.  Not a lot of chronic changes.     I discussed this with the patient.     Recommend conservative management with good blood pressure management and antiproteinuria medications.  I told the patient to increase losartan to 100 mg daily (sent new prescription) and hold amlodipine for now.  Goal BP less than 130/80.  If not at goal, we can add back amlodipine.     Thanks     Tej

## 2020-10-16 ENCOUNTER — RECORDS - HEALTHEAST (OUTPATIENT)
Dept: ADMINISTRATIVE | Facility: OTHER | Age: 25
End: 2020-10-16

## 2020-10-16 ENCOUNTER — VIRTUAL VISIT (OUTPATIENT)
Dept: NEPHROLOGY | Facility: CLINIC | Age: 25
End: 2020-10-16
Payer: COMMERCIAL

## 2020-10-16 VITALS — SYSTOLIC BLOOD PRESSURE: 130 MMHG | DIASTOLIC BLOOD PRESSURE: 90 MMHG

## 2020-10-16 DIAGNOSIS — Z48.298 AFTERCARE FOLLOWING ORGAN TRANSPLANT: ICD-10-CM

## 2020-10-16 DIAGNOSIS — E55.9 VITAMIN D DEFICIENCY: ICD-10-CM

## 2020-10-16 DIAGNOSIS — D84.9 IMMUNOSUPPRESSION (H): ICD-10-CM

## 2020-10-16 DIAGNOSIS — Z94.0 HTN, KIDNEY TRANSPLANT RELATED: ICD-10-CM

## 2020-10-16 DIAGNOSIS — I15.1 HTN, KIDNEY TRANSPLANT RELATED: ICD-10-CM

## 2020-10-16 DIAGNOSIS — Z94.0 LIVING-DONOR KIDNEY TRANSPLANT RECIPIENT: ICD-10-CM

## 2020-10-16 DIAGNOSIS — Z94.0 KIDNEY REPLACED BY TRANSPLANT: Primary | ICD-10-CM

## 2020-10-16 DIAGNOSIS — N02.B9 IGA NEPHROPATHY: ICD-10-CM

## 2020-10-16 PROCEDURE — 99214 OFFICE O/P EST MOD 30 MIN: CPT | Mod: 95 | Performed by: INTERNAL MEDICINE

## 2020-10-16 RX ORDER — MYCOPHENOLATE MOFETIL 250 MG/1
CAPSULE ORAL
Qty: 180 CAPSULE | Refills: 11 | Status: SHIPPED | OUTPATIENT
Start: 2020-10-16 | End: 2021-11-05

## 2020-10-16 ASSESSMENT — PAIN SCALES - GENERAL: PAINLEVEL: NO PAIN (0)

## 2020-10-16 NOTE — LETTER
"10/16/2020       RE: Golden Stover  [address]  [place] [address]     Dear Colleague,    Thank you for referring your patient, Golden Stover, to the [facility] at [facility]. Please see a copy of my visit note below.    Golden Stover is a 25 year old male who is being evaluated via a billable video visit.      The patient has been notified of following:     \"This video visit will be conducted via a call between you and your physician/provider. We have found that certain health care needs can be provided without the need for an in-person physical exam.  This service lets us provide the care you need with a video conversation.  If a prescription is necessary we can send it directly to your pharmacy.  If lab work is needed we can place an order for that and you can then stop by our lab to have the test done at a later time.    Video visits are billed at different rates depending on your insurance coverage.  Please reach out to your insurance provider with any questions.    If during the course of the call the physician/provider feels a video visit is not appropriate, you will not be charged for this service.\"    Patient has given verbal consent for Video visit? Yes  How would you like to obtain your AVS? Mail a copy  If you are dropped from the video visit, the video invite should be resent to: Send to e-mail at: [email or web address]  Will anyone else be joining your video visit? No    Video-Visit Details    Type of service:  Video Visit    Video Start Time: 1402  Video End Time: 1417    Originating Location (pt. Location): Home    Distant Location (provider location):  [facility]     Platform used for Video Visit: Tor King MD      CHRONIC TRANSPLANT NEPHROLOGY VISIT    Assessment & Plan   # LDKT: Stable kidney function with some recent increase in proteinuria, just over a " gram.  Kidney transplant biopsy (10/2020) showed recurrent IgA nephropathy.   - Baseline Cr ~ 1.3-1.5   - Proteinuria: Moderate (1-3 grams) at ~ 1 gram   - Date DSA Last Checked: Oct/2020      Latest DSA: No   - BK Viremia: No   - Kidney Tx Biopsy: Oct 08, 2020; Result: No diagnostic evidence of acute rejection.  Recurrent IgA nephropathy without any crescents.  Mild arteriosclerosis.    # Immunosuppression: Tacrolimus immediate release (goal 4-6) and Mycophenolate mofetil (dose 750 mg every 12 hours)   - Changes: No    # Infection Prophylaxis:   - PJP: None    # Hypertension: Borderline control;  Goal BP: < 130/80   - Changes: Not at this time, patient just had losartan dose increased for antiproteinuric effects and amlodipine held.  If BP is still above goal in the next week, would consider starting nondihydropyridine calcium channel blocker, such as diltiazem, for its antihypertensive and antiproteinuric effects.    # Mineral Bone Disorder:   - Vitamin D; level: Not checked recently        On supplement: Yes  - Calcium; level: Normal        On supplement: No    # Overweight (BMI=30.0): Weight is up about 20 lbs recently.   - Recommend weight loss for overall health by increasing exercise and watching caloric intake.    # COVID-19 Infection: Patient has remained asymptomatic, but tested positive for SARS-CoV-2 after his wife was positive.    # Skin Cancer Risk:    - Discussed sun protection and recommend regular follow up with Dermatology.    # Medical Compliance: Yes    # Transplant History:  Etiology of Kidney Failure: IgA nephropathy  Tx: LDKT  Transplant: 6/7/2017 (Kidney)  Donor Type: Living Donor Class:   Significant changes in immunosuppression: None  Significant transplant-related complications: None    Transplant Office Phone Number: [phone]    Assessment and plan was discussed with the patient and he voiced his understanding and agreement.    Return visit: Return in about 1 year (around  "10/16/2021).    Jak King MD    Chief Complaint   Mr. Stover is a 25 year old here for kidney transplant and immunosuppression management.    History of Present Illness    Mr. Stover reports feeling good overall with minimal medical complaints.  Since last clinic visit, patient reports no hospitalizations or new medical complaints and has been doing well overall.  He recently had an increase in his proteinuria and kidney transplant biopsy showed recurrent IgA nephropathy.  There was no evidence of crescents and plan is for conservative management with focus on proteinuria and good blood pressure management.  He had his losartan increased and amlodipine held.    Also of note, his wife was diagnosed with COVID-19 infection with some shortness of breath symptoms, but she does have asthma.  Patient was checked and found to also be positive on August 29.  Patient never had any symptoms and continues to do well.    His energy level is \"real good\" and has remained normal.  He is active and gets regular exercise.  Denies any chest pain or shortness of breath with exertion.  Appetite is good, but while he was less active, he gained about 20 lbs.  No nausea, vomiting or diarrhea.  No fever, sweats or chills.  No leg swelling.    Recent Hospitalizations:  [x] No [] Yes    New Medical Issues: [] No [x] Yes See above   Decreased energy: [x] No [] Yes    Chest pain or SOB with exertion:  [x] No [] Yes    Appetite change or weight change: [x] No [] Yes    Nausea, vomiting or diarrhea:  [x] No [] Yes    Fever, sweats or chills: [x] No [] Yes    Leg swelling: [x] No [] Yes      Home BP: 130-140/80-90s    Review of Systems   A comprehensive review of systems was obtained and negative, except as noted in the HPI or PMH.    Problem List   Patient Active Problem List   Diagnosis     IgA nephropathy     HTN, kidney transplant related     Secondary renal hyperparathyroidism (H)     Kidney replaced by transplant     " Immunosuppression (H)     Aftercare following organ transplant     Vitamin D deficiency     Gilbert syndrome       Social History   Social History     Tobacco Use     Smoking status: Never Smoker     Smokeless tobacco: Never Used   Substance Use Topics     Alcohol use: Not Currently     Alcohol/week: 1.0 standard drinks     Types: 1 Standard drinks or equivalent per week     Drug use: No       Allergies   Allergies   Allergen Reactions     Methylprednisolone Other (See Comments)     Other reaction(s): Other (See Comments)  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      Medications Discontinued During This Encounter   Medication Reason     cholecalciferol (VITAMIN D3) 1000 units (25 mcg) capsule        Physical Exam   Vital Signs: BP (!) 130/90     GENERAL APPEARANCE: alert and no distress  HENT: no obvious abnormalities on appearance  RESP: breathing appears unremarkable with normal rate, no audible wheezing or cough and no apparent shortness of breath with conversation  MS: extremities normal - no gross deformities noted  SKIN: no apparent rash and normal skin tone  NEURO: speech is clear with no obvious neurological deficits  PSYCH: mentation appears normal and affect normal    Data     Renal Latest Ref Rng & Units  10/8/2020 9/25/2020 8/27/2020   Na 133 - 144 mmol/L 140 - -   Na (external) 136 - 145 mmol/L - 138 140   K 3.4 - 5.3 mmol/L 3.8 - -   K (external) 3.6 - 5.5 mmol/L - 4.2 4.0   Cl 94 - 109 mmol/L 108 - -   Cl (external) 98 - 109 mmol/L - 105 106   CO2 20 - 32 mmol/L 25 - -   CO2 (external) 23.0 - 33.0 mmol/L - 21.4(L) 24.5   BUN 7 - 30 mg/dL 12 - -   BUN (external) 7 - 22 mg/dL - 12 11   Cr 0.66 - 1.25 mg/dL 1.54(H) - -   Cr (external) 0.6 - 1.3 mg/dL - 1.4(H) 1.5(H)   Glucose 70 - 99 mg/dL 95 - -   Glucose (external) 70 - 99 mg/dL - 95 90   Ca  8.5 - 10.1 mg/dL 9.1 - -   Ca (external) 8.8 - 10.5 mg/dL - 9.5 9.3   Mg 1.6 - 2.3 mg/dL - - -   Mg (external) 1.5 - 2.2 mEq/L - - -     Bone Health Latest Ref Rng & Units 10/30/2018 8/17/2018 9/11/2017   Phos 2.5 - 4.5 mg/dL - - -   Phos (external) 2.5 - 4.6 mg/dL - - -   PTHi 12 - 72 pg/mL - - 132(H)   PTHi (external) 15.0 - 115.0 pg/mL - 102.9 -   Vit D Def 20 - 75 ug/L - - -   Vit D Def (external) 27 - 80 ng/mL 26(L) - -     Heme Latest Ref Rng & Units 10/8/2020 9/25/2020 8/27/2020   WBC 4.0 - 11.0 10e9/L 4.5 - -   WBC (external) 3.60 - 11.00 K/uL - 5.54 5.47   Hgb 13.3 - 17.7 g/dL 14.1 - -   Hgb (external) 13.0 - 18.0 g/dL - 14.7 14.6   Plt 150 - 450 10e9/L 431 - -   Plt (external) 150 - 440 K/UL - 441(H) 380   ABSOLUTE NEUTROPHIL 1.6 - 8.3 10e9/L 2.8 - -   ABSOLUTE NEUTROPHILS (EXTERNAL) 1.5 - 7.6 10*9/L - - -   ABSOLUTE LYMPHOCYTES 0.8 - 5.3 10e9/L 1.0 - -   ABSOLUTE LYMPHOCYTES (EXTERNAL) 0.8 - 3.3 10*9/L - - -   ABSOLUTE MONOCYTES 0.0 - 1.3 10e9/L 0.6 - -   ABSOLUTE MONOCYTES (EXTERNAL) 0.2 - 0.9 10*9/L - - -   ABSOLUTE EOSINOPHILS 0.0 - 0.7 10e9/L 0.2 - -   ABSOLUTE EOSINOPHILS (EXTERNAL) 0.0 - 0.4 10*9/L - - -   ABSOLUTE BASOPHILS 0.0 - 0.2 10e9/L 0.0 - -   ABSOLUTE BASOPHILS (EXTERNAL) 0.0 - 0.1 10*9/L - - -   ABS IMMATURE GRANULOCYTES 0 - 0.4 10e9/L 0.0 - -   ABSOLUTE NUCLEATED RBC - 0.0 - -     Liver Latest Ref Rng & Units 6/8/2018 3/6/2018 1/23/2018   AP 40 -  150 U/L - - -   AP (external) 40 - 150 IU/L 75 76 71   TBili 0.2 - 1.3 mg/dL - - -   TBili (external) 0.2 - 1.2 mg/dL 2.3(H) 1.3(H) 1.3(H)   DBili (external) 0.0 - 0.5 mg/dL 0.7(H) 0.5 0.5   ALT 0 - 70 U/L - - -   ALT (external) 6 - 40 IU/L 31 20 20   AST 0 - 45 U/L - - -   AST (external) 10 - 40 IU/L 30 25 19   Tot Protein 6.8 - 8.8 g/dL - - -   Tot Protein (external) 6.0 - 8.0 g/dL 7.4 7.6 7.3   Albumin 3.4 - 5.0 g/dL - - -   Albumin (external) 3.5 - 5.0 g/dL 4.4 4.6 4.5        Iron studies Latest Ref Rng & Units 6/1/2017   Iron 35 - 180 ug/dL 65   Iron sat 15 - 46 % 21   Ferritin 26 - 388 ng/mL 310     [facility] Txp Virology Latest Ref Rng & Units 10/8/2020 1/15/2019 12/7/2018   BK Spec - Plasma - -   BK Res BKNEG:BK Virus DNA Not Detected copies/mL BK Virus DNA Not Detected - -   BK Log <2.7 Log copies/mL Not Calculated - -   BK Quant Result Ext None detected - None detected None detected   EBV CAPSID ANTIBODY IGG 0.0 - 0.8 AI - - -   Hep B Core NR - - -   Hep B Core Ext Nonreactive - - -   Hep B Surf Ext  Positive >=12.00 mIU/mL - - -        Recent Labs   Lab Test 06/30/17  0830 07/03/17  0857 10/08/20  0839   DOSTAC 2030, 06/29/17 2030 07/02/17 Not Provided   TACROL 9.5 11.1 18.8*     Recent Labs   Lab Test 06/16/17  0712 06/29/17  0831   DOSMPA 1930 06/15/2017 6/28/17 7:30pm   MPACID 1.26 4.40*   MPAG 27.9* 43.9       Again, thank you for allowing me to participate in the care of your patient.      Sincerely,    Jak King MD

## 2020-10-16 NOTE — PROGRESS NOTES
"Golden Stover is a 25 year old male who is being evaluated via a billable video visit.      The patient has been notified of following:     \"This video visit will be conducted via a call between you and your physician/provider. We have found that certain health care needs can be provided without the need for an in-person physical exam.  This service lets us provide the care you need with a video conversation.  If a prescription is necessary we can send it directly to your pharmacy.  If lab work is needed we can place an order for that and you can then stop by our lab to have the test done at a later time.    Video visits are billed at different rates depending on your insurance coverage.  Please reach out to your insurance provider with any questions.    If during the course of the call the physician/provider feels a video visit is not appropriate, you will not be charged for this service.\"    Patient has given verbal consent for Video visit? Yes  How would you like to obtain your AVS? Mail a copy  If you are dropped from the video visit, the video invite should be resent to: Send to e-mail at: [email or web address]  Will anyone else be joining your video visit? No    Video-Visit Details    Type of service:  Video Visit    Video Start Time: 1402  Video End Time: 1417    Originating Location (pt. Location): Home    Distant Location (provider location):  [facility]     Platform used for Video Visit: Tro King MD      CHRONIC TRANSPLANT NEPHROLOGY VISIT    Assessment & Plan   # LDKT: Stable kidney function with some recent increase in proteinuria, just over a gram.  Kidney transplant biopsy (10/2020) showed recurrent IgA nephropathy.   - Baseline Cr ~ 1.3-1.5   - Proteinuria: Moderate (1-3 grams) at ~ 1 gram   - Date DSA Last Checked: Oct/2020      Latest DSA: No   - BK Viremia: No   - Kidney Tx Biopsy: Oct 08, 2020; Result: No diagnostic evidence of acute " rejection.  Recurrent IgA nephropathy without any crescents.  Mild arteriosclerosis.    # Immunosuppression: Tacrolimus immediate release (goal 4-6) and Mycophenolate mofetil (dose 750 mg every 12 hours)   - Changes: No    # Infection Prophylaxis:   - PJP: None    # Hypertension: Borderline control;  Goal BP: < 130/80   - Changes: Not at this time, patient just had losartan dose increased for antiproteinuric effects and amlodipine held.  If BP is still above goal in the next week, would consider starting nondihydropyridine calcium channel blocker, such as diltiazem, for its antihypertensive and antiproteinuric effects.    # Mineral Bone Disorder:   - Vitamin D; level: Not checked recently        On supplement: Yes  - Calcium; level: Normal        On supplement: No    # Overweight (BMI=30.0): Weight is up about 20 lbs recently.   - Recommend weight loss for overall health by increasing exercise and watching caloric intake.    # COVID-19 Infection: Patient has remained asymptomatic, but tested positive for SARS-CoV-2 after his wife was positive.    # Skin Cancer Risk:    - Discussed sun protection and recommend regular follow up with Dermatology.    # Medical Compliance: Yes    # Transplant History:  Etiology of Kidney Failure: IgA nephropathy  Tx: LDKT  Transplant: 6/7/2017 (Kidney)  Donor Type: Living Donor Class:   Significant changes in immunosuppression: None  Significant transplant-related complications: None    Transplant Office Phone Number: [phone]    Assessment and plan was discussed with the patient and he voiced his understanding and agreement.    Return visit: Return in about 1 year (around 10/16/2021).    Jak King MD    Chief Complaint   Mr. Stover is a 25 year old here for kidney transplant and immunosuppression management.    History of Present Illness    Mr. Stover reports feeling good overall with minimal medical complaints.  Since last clinic visit, patient reports no  "hospitalizations or new medical complaints and has been doing well overall.  He recently had an increase in his proteinuria and kidney transplant biopsy showed recurrent IgA nephropathy.  There was no evidence of crescents and plan is for conservative management with focus on proteinuria and good blood pressure management.  He had his losartan increased and amlodipine held.    Also of note, his wife was diagnosed with COVID-19 infection with some shortness of breath symptoms, but she does have asthma.  Patient was checked and found to also be positive on August 29.  Patient never had any symptoms and continues to do well.    His energy level is \"real good\" and has remained normal.  He is active and gets regular exercise.  Denies any chest pain or shortness of breath with exertion.  Appetite is good, but while he was less active, he gained about 20 lbs.  No nausea, vomiting or diarrhea.  No fever, sweats or chills.  No leg swelling.    Recent Hospitalizations:  [x] No [] Yes    New Medical Issues: [] No [x] Yes See above   Decreased energy: [x] No [] Yes    Chest pain or SOB with exertion:  [x] No [] Yes    Appetite change or weight change: [x] No [] Yes    Nausea, vomiting or diarrhea:  [x] No [] Yes    Fever, sweats or chills: [x] No [] Yes    Leg swelling: [x] No [] Yes      Home BP: 130-140/80-90s    Review of Systems   A comprehensive review of systems was obtained and negative, except as noted in the HPI or PMH.    Problem List   Patient Active Problem List   Diagnosis     IgA nephropathy     HTN, kidney transplant related     Secondary renal hyperparathyroidism (H)     Kidney replaced by transplant     Immunosuppression (H)     Aftercare following organ transplant     Vitamin D deficiency     Gilbert syndrome       Social History   Social History     Tobacco Use     Smoking status: Never Smoker     Smokeless tobacco: Never Used   Substance Use Topics     Alcohol use: Not Currently     Alcohol/week: 1.0 " standard drinks     Types: 1 Standard drinks or equivalent per week     Drug use: No       Allergies   Allergies   Allergen Reactions     Methylprednisolone Other (See Comments)     Other reaction(s): Other (See Comments)  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      Medications Discontinued During This Encounter   Medication Reason     cholecalciferol (VITAMIN D3) 1000 units (25 mcg) capsule        Physical Exam   Vital Signs: BP (!) 130/90     GENERAL APPEARANCE: alert and no distress  HENT: no obvious abnormalities on appearance  RESP: breathing appears unremarkable with normal rate, no audible wheezing or cough and no apparent shortness of breath with conversation  MS: extremities normal - no gross deformities noted  SKIN: no apparent rash and normal skin tone  NEURO: speech is clear with no obvious neurological deficits  PSYCH: mentation appears normal and affect normal    Data     Renal Latest Ref Rng & Units 10/8/2020 9/25/2020 8/27/2020   Na 133 - 144 mmol/L 140 - -   Na (external) 136 - 145 mmol/L - 138 140   K 3.4 - 5.3 mmol/L 3.8 - -   K (external) 3.6 - 5.5 mmol/L - 4.2 4.0   Cl 94 - 109 mmol/L 108 - -   Cl (external) 98 - 109 mmol/L - 105 106   CO2 20 - 32 mmol/L 25 - -   CO2 (external) 23.0 - 33.0 mmol/L -  21.4(L) 24.5   BUN 7 - 30 mg/dL 12 - -   BUN (external) 7 - 22 mg/dL - 12 11   Cr 0.66 - 1.25 mg/dL 1.54(H) - -   Cr (external) 0.6 - 1.3 mg/dL - 1.4(H) 1.5(H)   Glucose 70 - 99 mg/dL 95 - -   Glucose (external) 70 - 99 mg/dL - 95 90   Ca  8.5 - 10.1 mg/dL 9.1 - -   Ca (external) 8.8 - 10.5 mg/dL - 9.5 9.3   Mg 1.6 - 2.3 mg/dL - - -   Mg (external) 1.5 - 2.2 mEq/L - - -     Bone Health Latest Ref Rng & Units 10/30/2018 8/17/2018 9/11/2017   Phos 2.5 - 4.5 mg/dL - - -   Phos (external) 2.5 - 4.6 mg/dL - - -   PTHi 12 - 72 pg/mL - - 132(H)   PTHi (external) 15.0 - 115.0 pg/mL - 102.9 -   Vit D Def 20 - 75 ug/L - - -   Vit D Def (external) 27 - 80 ng/mL 26(L) - -     Heme Latest Ref Rng & Units 10/8/2020 9/25/2020 8/27/2020   WBC 4.0 - 11.0 10e9/L 4.5 - -   WBC (external) 3.60 - 11.00 K/uL - 5.54 5.47   Hgb 13.3 - 17.7 g/dL 14.1 - -   Hgb (external) 13.0 - 18.0 g/dL - 14.7 14.6   Plt 150 - 450 10e9/L 431 - -   Plt (external) 150 - 440 K/UL - 441(H) 380   ABSOLUTE NEUTROPHIL 1.6 - 8.3 10e9/L 2.8 - -   ABSOLUTE NEUTROPHILS (EXTERNAL) 1.5 - 7.6 10*9/L - - -   ABSOLUTE LYMPHOCYTES 0.8 - 5.3 10e9/L 1.0 - -   ABSOLUTE LYMPHOCYTES (EXTERNAL) 0.8 - 3.3 10*9/L - - -   ABSOLUTE MONOCYTES 0.0 - 1.3 10e9/L 0.6 - -   ABSOLUTE MONOCYTES (EXTERNAL) 0.2 - 0.9 10*9/L - - -   ABSOLUTE EOSINOPHILS 0.0 - 0.7 10e9/L 0.2 - -   ABSOLUTE EOSINOPHILS (EXTERNAL) 0.0 - 0.4 10*9/L - - -   ABSOLUTE BASOPHILS 0.0 - 0.2 10e9/L 0.0 - -   ABSOLUTE BASOPHILS (EXTERNAL) 0.0 - 0.1 10*9/L - - -   ABS IMMATURE GRANULOCYTES 0 - 0.4 10e9/L 0.0 - -   ABSOLUTE NUCLEATED RBC - 0.0 - -     Liver Latest Ref Rng & Units 6/8/2018 3/6/2018 1/23/2018   AP 40 - 150 U/L - - -   AP (external) 40 - 150 IU/L 75 76 71   TBili 0.2 - 1.3 mg/dL - - -   TBili (external) 0.2 - 1.2 mg/dL 2.3(H) 1.3(H) 1.3(H)   DBili (external) 0.0 - 0.5 mg/dL 0.7(H) 0.5 0.5   ALT 0 - 70 U/L - - -   ALT (external) 6 - 40 IU/L 31 20 20   AST 0 - 45 U/L - - -   AST (external) 10 - 40 IU/L 30 25 19    Tot Protein 6.8 - 8.8 g/dL - - -   Tot Protein (external) 6.0 - 8.0 g/dL 7.4 7.6 7.3   Albumin 3.4 - 5.0 g/dL - - -   Albumin (external) 3.5 - 5.0 g/dL 4.4 4.6 4.5        Iron studies Latest Ref Rng & Units 6/1/2017   Iron 35 - 180 ug/dL 65   Iron sat 15 - 46 % 21   Ferritin 26 - 388 ng/mL 310     [facility] Txp Virology Latest Ref Rng & Units 10/8/2020 1/15/2019 12/7/2018   BK Spec - Plasma - -   BK Res BKNEG:BK Virus DNA Not Detected copies/mL BK Virus DNA Not Detected - -   BK Log <2.7 Log copies/mL Not Calculated - -   BK Quant Result Ext None detected - None detected None detected   EBV CAPSID ANTIBODY IGG 0.0 - 0.8 AI - - -   Hep B Core NR - - -   Hep B Core Ext Nonreactive - - -   Hep B Surf Ext  Positive >=12.00 mIU/mL - - -        Recent Labs   Lab Test 06/30/17  0830 07/03/17  0857 10/08/20  0839   DOSTAC 2030, 06/29/17 2030 07/02/17 Not Provided   TACROL 9.5 11.1 18.8*     Recent Labs   Lab Test 06/16/17  0712 06/29/17  0831   DOSMPA 1930 06/15/2017 6/28/17 7:30pm   MPACID 1.26 4.40*   MPAG 27.9* 43.9

## 2020-10-16 NOTE — LETTER
"10/16/2020      RE: Golden Stover  [address]  [facility] [address]       Golden Stover is a 25 year old male who is being evaluated via a billable video visit.      The patient has been notified of following:     \"This video visit will be conducted via a call between you and your physician/provider. We have found that certain health care needs can be provided without the need for an in-person physical exam.  This service lets us provide the care you need with a video conversation.  If a prescription is necessary we can send it directly to your pharmacy.  If lab work is needed we can place an order for that and you can then stop by our lab to have the test done at a later time.    Video visits are billed at different rates depending on your insurance coverage.  Please reach out to your insurance provider with any questions.    If during the course of the call the physician/provider feels a video visit is not appropriate, you will not be charged for this service.\"    Patient has given verbal consent for Video visit? Yes  How would you like to obtain your AVS? Mail a copy  If you are dropped from the video visit, the video invite should be resent to: Send to e-mail at: [email or web address]  Will anyone else be joining your video visit? No    Video-Visit Details    Type of service:  Video Visit    Video Start Time: 1402  Video End Time: 1417    Originating Location (pt. Location): Home    Distant Location (provider location):  [facility]     Platform used for Video Visit: Tor Kign MD      CHRONIC TRANSPLANT NEPHROLOGY VISIT    Assessment & Plan   # LDKT: Stable kidney function with some recent increase in proteinuria, just over a gram.  Kidney transplant biopsy (10/2020) showed recurrent IgA nephropathy.   - Baseline Cr ~ 1.3-1.5   - Proteinuria: Moderate (1-3 grams) at ~ 1 gram   - Date DSA Last Checked: Oct/2020      Latest DSA: No   - BK " Viremia: No   - Kidney Tx Biopsy: Oct 08, 2020; Result: No diagnostic evidence of acute rejection.  Recurrent IgA nephropathy without any crescents.  Mild arteriosclerosis.    # Immunosuppression: Tacrolimus immediate release (goal 4-6) and Mycophenolate mofetil (dose 750 mg every 12 hours)   - Changes: No    # Infection Prophylaxis:   - PJP: None    # Hypertension: Borderline control;  Goal BP: < 130/80   - Changes: Not at this time, patient just had losartan dose increased for antiproteinuric effects and amlodipine held.  If BP is still above goal in the next week, would consider starting nondihydropyridine calcium channel blocker, such as diltiazem, for its antihypertensive and antiproteinuric effects.    # Mineral Bone Disorder:   - Vitamin D; level: Not checked recently        On supplement: Yes  - Calcium; level: Normal        On supplement: No    # Overweight (BMI=30.0): Weight is up about 20 lbs recently.   - Recommend weight loss for overall health by increasing exercise and watching caloric intake.    # COVID-19 Infection: Patient has remained asymptomatic, but tested positive for SARS-CoV-2 after his wife was positive.    # Skin Cancer Risk:    - Discussed sun protection and recommend regular follow up with Dermatology.    # Medical Compliance: Yes    # Transplant History:  Etiology of Kidney Failure: IgA nephropathy  Tx: LDKT  Transplant: 6/7/2017 (Kidney)  Donor Type: Living Donor Class:   Significant changes in immunosuppression: None  Significant transplant-related complications: None    Transplant Office Phone Number: [phone]    Assessment and plan was discussed with the patient and he voiced his understanding and agreement.    Return visit: Return in about 1 year (around 10/16/2021).    Jak King MD    Chief Complaint   Mr. Stover is a 25 year old here for kidney transplant and immunosuppression management.    History of Present Illness    Mr. Stover reports feeling good overall  "with minimal medical complaints.  Since last clinic visit, patient reports no hospitalizations or new medical complaints and has been doing well overall.  He recently had an increase in his proteinuria and kidney transplant biopsy showed recurrent IgA nephropathy.  There was no evidence of crescents and plan is for conservative management with focus on proteinuria and good blood pressure management.  He had his losartan increased and amlodipine held.    Also of note, his wife was diagnosed with COVID-19 infection with some shortness of breath symptoms, but she does have asthma.  Patient was checked and found to also be positive on August 29.  Patient never had any symptoms and continues to do well.    His energy level is \"real good\" and has remained normal.  He is active and gets regular exercise.  Denies any chest pain or shortness of breath with exertion.  Appetite is good, but while he was less active, he gained about 20 lbs.  No nausea, vomiting or diarrhea.  No fever, sweats or chills.  No leg swelling.    Recent Hospitalizations:  [x] No [] Yes    New Medical Issues: [] No [x] Yes See above   Decreased energy: [x] No [] Yes    Chest pain or SOB with exertion:  [x] No [] Yes    Appetite change or weight change: [x] No [] Yes    Nausea, vomiting or diarrhea:  [x] No [] Yes    Fever, sweats or chills: [x] No [] Yes    Leg swelling: [x] No [] Yes      Home BP: 130-140/80-90s    Review of Systems   A comprehensive review of systems was obtained and negative, except as noted in the HPI or PMH.    Problem List   Patient Active Problem List   Diagnosis     IgA nephropathy     HTN, kidney transplant related     Secondary renal hyperparathyroidism (H)     Kidney replaced by transplant     Immunosuppression (H)     Aftercare following organ transplant     Vitamin D deficiency     Gilbert syndrome       Social History   Social History     Tobacco Use     Smoking status: Never Smoker     Smokeless tobacco: Never Used "   Substance Use Topics     Alcohol use: Not Currently     Alcohol/week: 1.0 standard drinks     Types: 1 Standard drinks or equivalent per week     Drug use: No       Allergies   Allergies   Allergen Reactions     Methylprednisolone Other (See Comments)     Other reaction(s): Other (See Comments)  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      Medications Discontinued During This Encounter   Medication Reason     cholecalciferol (VITAMIN D3) 1000 units (25 mcg) capsule        Physical Exam   Vital Signs: BP (!) 130/90     GENERAL APPEARANCE: alert and no distress  HENT: no obvious abnormalities on appearance  RESP: breathing appears unremarkable with normal rate, no audible wheezing or cough and no apparent shortness of breath with conversation  MS: extremities normal - no gross deformities noted  SKIN: no apparent rash and normal skin tone  NEURO: speech is clear with no obvious neurological deficits  PSYCH: mentation appears normal and affect normal    Data     Renal Latest Ref Rng & Units 10/8/2020 9/25/2020 8/27/2020   Na 133 - 144 mmol/L 140 - -   Na (external) 136 - 145 mmol/L - 138 140   K 3.4 - 5.3 mmol/L 3.8 - -   K (external) 3.6 - 5.5 mmol/L - 4.2 4.0   Cl 94 - 109 mmol/L 108 - -   Cl (external) 98 - 109 mmol/L -  105 106   CO2 20 - 32 mmol/L 25 - -   CO2 (external) 23.0 - 33.0 mmol/L - 21.4(L) 24.5   BUN 7 - 30 mg/dL 12 - -   BUN (external) 7 - 22 mg/dL - 12 11   Cr 0.66 - 1.25 mg/dL 1.54(H) - -   Cr (external) 0.6 - 1.3 mg/dL - 1.4(H) 1.5(H)   Glucose 70 - 99 mg/dL 95 - -   Glucose (external) 70 - 99 mg/dL - 95 90   Ca  8.5 - 10.1 mg/dL 9.1 - -   Ca (external) 8.8 - 10.5 mg/dL - 9.5 9.3   Mg 1.6 - 2.3 mg/dL - - -   Mg (external) 1.5 - 2.2 mEq/L - - -     Bone Health Latest Ref Rng & Units 10/30/2018 8/17/2018 9/11/2017   Phos 2.5 - 4.5 mg/dL - - -   Phos (external) 2.5 - 4.6 mg/dL - - -   PTHi 12 - 72 pg/mL - - 132(H)   PTHi (external) 15.0 - 115.0 pg/mL - 102.9 -   Vit D Def 20 - 75 ug/L - - -   Vit D Def (external) 27 - 80 ng/mL 26(L) - -     Heme Latest Ref Rng & Units 10/8/2020 9/25/2020 8/27/2020   WBC 4.0 - 11.0 10e9/L 4.5 - -   WBC (external) 3.60 - 11.00 K/uL - 5.54 5.47   Hgb 13.3 - 17.7 g/dL 14.1 - -   Hgb (external) 13.0 - 18.0 g/dL - 14.7 14.6   Plt 150 - 450 10e9/L 431 - -   Plt (external) 150 - 440 K/UL - 441(H) 380   ABSOLUTE NEUTROPHIL 1.6 - 8.3 10e9/L 2.8 - -   ABSOLUTE NEUTROPHILS (EXTERNAL) 1.5 - 7.6 10*9/L - - -   ABSOLUTE LYMPHOCYTES 0.8 - 5.3 10e9/L 1.0 - -   ABSOLUTE LYMPHOCYTES (EXTERNAL) 0.8 - 3.3 10*9/L - - -   ABSOLUTE MONOCYTES 0.0 - 1.3 10e9/L 0.6 - -   ABSOLUTE MONOCYTES (EXTERNAL) 0.2 - 0.9 10*9/L - - -   ABSOLUTE EOSINOPHILS 0.0 - 0.7 10e9/L 0.2 - -   ABSOLUTE EOSINOPHILS (EXTERNAL) 0.0 - 0.4 10*9/L - - -   ABSOLUTE BASOPHILS 0.0 - 0.2 10e9/L 0.0 - -   ABSOLUTE BASOPHILS (EXTERNAL) 0.0 - 0.1 10*9/L - - -   ABS IMMATURE GRANULOCYTES 0 - 0.4 10e9/L 0.0 - -   ABSOLUTE NUCLEATED RBC - 0.0 - -     Liver Latest Ref Rng & Units 6/8/2018 3/6/2018 1/23/2018   AP 40 - 150 U/L - - -   AP (external) 40 - 150 IU/L 75 76 71   TBili 0.2 - 1.3 mg/dL - - -   TBili (external) 0.2 - 1.2 mg/dL 2.3(H) 1.3(H) 1.3(H)   DBili (external) 0.0 - 0.5 mg/dL 0.7(H) 0.5 0.5   ALT 0 - 70 U/L - - -   ALT (external) 6 - 40  IU/L 31 20 20   AST 0 - 45 U/L - - -   AST (external) 10 - 40 IU/L 30 25 19   Tot Protein 6.8 - 8.8 g/dL - - -   Tot Protein (external) 6.0 - 8.0 g/dL 7.4 7.6 7.3   Albumin 3.4 - 5.0 g/dL - - -   Albumin (external) 3.5 - 5.0 g/dL 4.4 4.6 4.5        Iron studies Latest Ref Rng & Units 6/1/2017   Iron 35 - 180 ug/dL 65   Iron sat 15 - 46 % 21   Ferritin 26 - 388 ng/mL 310     [facility] Txp Virology Latest Ref Rng & Units 10/8/2020 1/15/2019 12/7/2018   BK Spec - Plasma - -   BK Res BKNEG:BK Virus DNA Not Detected copies/mL BK Virus DNA Not Detected - -   BK Log <2.7 Log copies/mL Not Calculated - -   BK Quant Result Ext None detected - None detected None detected   EBV CAPSID ANTIBODY IGG 0.0 - 0.8 AI - - -   Hep B Core NR - - -   Hep B Core Ext Nonreactive - - -   Hep B Surf Ext  Positive >=12.00 mIU/mL - - -        Recent Labs   Lab Test 06/30/17  0830 07/03/17  0857 10/08/20  0839   DOSTAC 2030, 06/29/17 2030 07/02/17 Not Provided   TACROL 9.5 11.1 18.8*     Recent Labs   Lab Test 06/16/17  0712 06/29/17  0831   DOSMPA 1930 06/15/2017 6/28/17 7:30pm   MPACID 1.26 4.40*   MPAG 27.9* 43.9       Jak King MD

## 2020-10-22 DIAGNOSIS — Z48.298 AFTERCARE FOLLOWING ORGAN TRANSPLANT: ICD-10-CM

## 2020-10-22 DIAGNOSIS — Z94.0 KIDNEY REPLACED BY TRANSPLANT: Primary | ICD-10-CM

## 2020-10-22 DIAGNOSIS — Z79.899 ENCOUNTER FOR LONG-TERM CURRENT USE OF MEDICATION: ICD-10-CM

## 2020-10-27 ENCOUNTER — TELEPHONE (OUTPATIENT)
Dept: TRANSPLANT | Facility: CLINIC | Age: 25
End: 2020-10-27

## 2020-10-27 DIAGNOSIS — I12.9 HYPERTENSIVE RENAL DISEASE: Primary | ICD-10-CM

## 2020-10-27 DIAGNOSIS — Z94.0 LIVING-DONOR KIDNEY TRANSPLANT RECIPIENT: ICD-10-CM

## 2020-10-27 DIAGNOSIS — Z48.298 AFTERCARE FOLLOWING ORGAN TRANSPLANT: ICD-10-CM

## 2020-10-27 RX ORDER — DILTIAZEM HYDROCHLORIDE 120 MG/1
120 CAPSULE, COATED, EXTENDED RELEASE ORAL DAILY
Qty: 30 CAPSULE | Refills: 3 | Status: SHIPPED | OUTPATIENT
Start: 2020-10-27 | End: 2021-02-08

## 2020-10-27 RX ORDER — TACROLIMUS 1 MG/1
2 CAPSULE ORAL 2 TIMES DAILY
Qty: 120 CAPSULE | Refills: 11 | Status: SHIPPED | OUTPATIENT
Start: 2020-10-27 | End: 2021-07-06

## 2020-10-27 NOTE — TELEPHONE ENCOUNTER
ISSUE:    Golden Stover  You 22 hours ago (9:32 AM)        Alden Ramirez, over the past week I have been tracking my blood pressure after switching off the amlodipine, it's been averaging 150/90, dr alvarez mentioned he would consider a new blood pressure med to work alongside the losartan. Let me know what you think.     Regards,  Golden Stover       PLAN:    Jak Alvarez MD  You; Vandana Vargas, RN 18 hours ago (1:49 PM)     Ashley,     Let's start the patient on diltiazem  mg nightly.  You will need to decrease his tacrolimus, maybe to 2 mg bid and then follow levels.     Tej      OUTCOME: Compendium message sent to Golden.  -Diltiazem  mg nightly ordered to local [facility]. Tacrolimus script updated.

## 2020-10-27 NOTE — LETTER
PHYSICIAN ORDERS      DATE & TIME ISSUED: 2020 8:49 AM  PATIENT NAME: Golden Stover   : 1995     [facility] MR# [if applicable]: [id number]     DIAGNOSIS/ICD-10 CODES: Kidney Transplanted (Z94.0);   Aftercare following kidney transplant (Z48.22);   Long Term Use of Immunosuppressant Medication (Z79.899)    Please draw the following lab tests in approximately 1-2 weeks:  -Tacrolimus Level (12 hour trough)  -Serum Creatinine    Any questions please call: [phone]. Fax results to [phone].      .

## 2020-12-27 ENCOUNTER — HEALTH MAINTENANCE LETTER (OUTPATIENT)
Age: 25
End: 2020-12-27

## 2021-02-06 DIAGNOSIS — I12.9 HYPERTENSIVE RENAL DISEASE: ICD-10-CM

## 2021-02-06 DIAGNOSIS — Z48.298 AFTERCARE FOLLOWING ORGAN TRANSPLANT: ICD-10-CM

## 2021-02-08 RX ORDER — DILTIAZEM HYDROCHLORIDE 120 MG/1
120 CAPSULE, COATED, EXTENDED RELEASE ORAL DAILY
Qty: 30 CAPSULE | Refills: 3 | Status: SHIPPED | OUTPATIENT
Start: 2021-02-08 | End: 2021-06-08

## 2021-05-07 ENCOUNTER — RESULTS ONLY (OUTPATIENT)
Dept: OTHER | Facility: CLINIC | Age: 26
End: 2021-05-07

## 2021-05-07 DIAGNOSIS — Z94.0 KIDNEY REPLACED BY TRANSPLANT: ICD-10-CM

## 2021-05-07 DIAGNOSIS — Z79.899 ENCOUNTER FOR LONG-TERM CURRENT USE OF MEDICATION: ICD-10-CM

## 2021-05-07 DIAGNOSIS — Z48.298 AFTERCARE FOLLOWING ORGAN TRANSPLANT: ICD-10-CM

## 2021-05-07 PROCEDURE — 86832 HLA CLASS I HIGH DEFIN QUAL: CPT | Performed by: INTERNAL MEDICINE

## 2021-05-07 PROCEDURE — 86833 HLA CLASS II HIGH DEFIN QUAL: CPT | Performed by: INTERNAL MEDICINE

## 2021-05-28 ENCOUNTER — RECORDS - HEALTHEAST (OUTPATIENT)
Dept: ADMINISTRATIVE | Facility: CLINIC | Age: 26
End: 2021-05-28

## 2021-05-29 ENCOUNTER — RECORDS - HEALTHEAST (OUTPATIENT)
Dept: ADMINISTRATIVE | Facility: CLINIC | Age: 26
End: 2021-05-29

## 2021-05-30 VITALS — HEIGHT: 74 IN | BODY MASS INDEX: 27.98 KG/M2 | WEIGHT: 218 LBS

## 2021-06-07 ENCOUNTER — DOCUMENTATION ONLY (OUTPATIENT)
Dept: TRANSPLANT | Facility: CLINIC | Age: 26
End: 2021-06-07

## 2021-06-07 NOTE — PROGRESS NOTES
Message  Received: Today  Message Contents   Jak King MD Blaisdell, Christin Rebecca, PRETTY             No, every 3 months is fine.    Previous Messages    ----- Message -----   From: Ashley Chau, PRETTY   Sent: 6/4/2021   9:06 AM CDT   To: Jak King MD     UPCR >1 but appears to be patient baseline. Cr 1.6. Do you want to repeat UPCR and serum Cr in 4 weeks?         OUTCOME:   Plan to continue checking urine protein/creatinine ratio every 3 months as specified on annual post-transplant lab requisition letter last sent in October 2020. Patient also has not scheduled his annual txp neph appt for this fall. Lives in [place]. Message sent to schedulers to request appt.

## 2021-06-08 DIAGNOSIS — Z48.298 AFTERCARE FOLLOWING ORGAN TRANSPLANT: ICD-10-CM

## 2021-06-08 DIAGNOSIS — I12.9 HYPERTENSIVE RENAL DISEASE: ICD-10-CM

## 2021-06-08 RX ORDER — DILTIAZEM HYDROCHLORIDE 120 MG/1
120 CAPSULE, COATED, EXTENDED RELEASE ORAL DAILY
Qty: 90 CAPSULE | Refills: 3 | Status: SHIPPED | OUTPATIENT
Start: 2021-06-08 | End: 2022-04-20

## 2021-06-10 NOTE — PROGRESS NOTES
[facility] Radiology Pre-Procedure Note  Date/Time: 4/25/2017/1:16 PM    Requested Procedure:  Tunneled dialysis catheter exchange  Requested Provider:  Dr. Barajas    HPI: Golden Stover is a 21 y.o. male with HD dependent renal failure, here for tunneled dialysis catheter exchange for report of poor flow rates and increased arterial and venous pressures. Pt dialyzes M/W/F and reports he had a full run yesterday. Denies fevers, chills, SOB, chest pain.     NPO status:  Since MN   Anticoagulation/Antiplatelets/Bleeding tendencies: None  Antibiotics:  No home antibiotics    PAST MEDICAL HISTORY:   Past Medical History:   Diagnosis Date     Chronic kidney disease      Hypertension        PAST SURGICAL HISTORY:  Past Surgical History:   Procedure Laterality Date     RENAL BIOPSY       TUNNELED VENOUS CATHETER PLACEMENT         ALLERGIES:  Review of patient's allergies indicates no known allergies.    MEDICATIONS:  Current Outpatient Prescriptions   Medication Sig Dispense Refill     b complex vitamins tablet Take 1 tablet by mouth daily.       calcium acetate (PHOSLO) 667 mg capsule Take 1,334 mg by mouth 3 (three) times a day with meals.       lisinopril (PRINIVIL,ZESTRIL) 10 MG tablet Take 10 mg by mouth daily.       NIFEdipine (ADALAT CC) 90 MG 24 hr tablet Take 90 mg by mouth daily.       sevelamer carbonate (RENVELA) 800 mg tablet Take 800 mg by mouth 3 (three) times a day with meals.       vitamin E 400 UNIT capsule Take 400 Units by mouth daily.       NIFEdipine (PROCARDIA XL) 90 MG 24 hr tablet   3     Current Facility-Administered Medications   Medication Dose Route Frequency Provider Last Rate Last Dose     ceFAZolin 2 g in 100 mL in D5W (ANCEF)  2 g Intravenous 30 Min Pre-Op Damon Ramos CNP         lidocaine 10 mg/mL (1 %) injection 0.1-0.3 mL  0.1-0.3 mL Subcutaneous PRN Damon Ramos CNP         sodium chloride 0.9 % flush 3 mL (NS)  3 mL Intravenous Line Care Damon Ramos CNP          "sodium chloride 0.9%  125 mL/hr Intravenous Continuous Damon Ramos CNP           LABS:    Lab Results   Component Value Date    K 6.2 (HH) 04/25/2017     Lab Results   Component Value Date    HGB 11.8 (L) 04/25/2017     04/25/2017     EXAM:  /82  Pulse 65  Temp 98.5  F (36.9  C) (Oral)   Resp 18  Ht 6' 2\" (1.88 m)  Wt 218 lb (98.9 kg)  SpO2 99%  BMI 27.99 kg/m2  General:  Stable.  In no acute distress.  Neuro:  A&O x 3.  Moves all extremities equally.  Resp:  Lungs clear to auscultation bilaterally.  Cardio:  S1S2 and reg.   Skin:  No excoriations, ecchymosis, erythema, lesions, or open sores noted at right IJ tunneled dialysis exit site.    ASSESSMENT:  21 yr old male with HD dependent renal failure and report of poor flow rates with high arterial and venous pressures when dialyzing through right IJ tunneled dialysis catheter; Also hyperkalemia    PLAN:  D/w Dr. Andrade and patient. Tunneled dialysis catheter exchange with local anesthetic and possible IV fentanyl as needed.     The procedure, risks and IV fentanyl were discussed with patient, all questions answered and patient agrees to proceed with the procedure. Written consent obtained.    Page out to Dr. Harley (covering for Dr. Barajas) reqarding patient's K+. Awaiting return call.     AMY Rizvi M.T., CNP      Addendum - Dr. Harley returned call. Requests pt receive 15 g Kayexalate prior to discharge. Pt plans to dialyze tomorrow AM.   Kayexalate ordered.     AMY Rizvi M.T., CNP    "

## 2021-06-10 NOTE — PROCEDURES
Interventional Radiology Post-Procedure Note   [facility]  ?   Brief Procedure Note:   Patient name: Golden Stover  Pt MRN:[id number]   Date of procedure: 4/25/2017     Procedure(s): Tunneled dialysis catheter exchange  Sedation method: Local lidocaine only. The patient was monitored by an interventional radiology nurse at all times throughout the procedure under my direct guidance.  Pre Procedure Diagnosis: ESRD on HD, high arterial/venous pressures during dialysis  Post Procedure Diagnosis: same  Indications: Elevated arterial/venous pressures during dialysis   ?   Attending: Golden Andrade M.D.  Specimen(s) removed: None   Additional studies ordered: None  Drains: None   Estimated Blood Loss: Minimal  Complications: None  Vascular closure method: N/A    Findings/Notes/Comments: Uncomplicated exchange for a 23cm Kenroy-Split Medcomp Tunneled Hemodialysis catheter. Catheter tip in the mid/low right atrium. Ready for immediate use.   ?   Please see dictation in PACS or under the Imaging tab in [facility] for detailed procedure note.     Golden Andrade M.D.   Vascular and Interventional Radiology   Pager: [phone]   After Hours / Scheduling: [phone]     4/25/2017  2:01 PM

## 2021-07-06 DIAGNOSIS — Z94.0 LIVING-DONOR KIDNEY TRANSPLANT RECIPIENT: Primary | ICD-10-CM

## 2021-07-06 RX ORDER — TACROLIMUS 1 MG/1
2 CAPSULE ORAL 2 TIMES DAILY
Qty: 360 CAPSULE | Refills: 3 | Status: SHIPPED | OUTPATIENT
Start: 2021-07-06 | End: 2022-07-05

## 2021-07-06 RX ORDER — TACROLIMUS 0.5 MG/1
0.5 CAPSULE ORAL 2 TIMES DAILY
Qty: 180 CAPSULE | Refills: 3 | Status: SHIPPED | OUTPATIENT
Start: 2021-07-06 | End: 2022-07-05

## 2021-07-09 NOTE — TELEPHONE ENCOUNTER
RE: Family planning  Received: Yesterday  Message Contents   Jak King MD [place], Ashley Pineda, PRETTY             No issues that I know of for him, but appreciate you checking with PharmD.      ----- Message -----   From: Benson Hilario [facility]   Sent: 7/8/2021  10:33 AM CDT   To: Ashley Chau, PRETTY     I do not know how to reply MyChart msg.     Based on limited data, tacrolimus may be used in males with rheumatic and musculoskeletal diseases who are planning to father a child (ACR [Sammaritano 2020])     Information related to the mycophenolate and male fertility or pregnancy outcomes following paternal use is limited; however, available data have not suggested safety concerns (Kadens 2019; Helga 2019). According to the , sexually active male patients and/or their female partners should use effective contraception during treatment of the male patient and for at least 90 days after last dose. In addition, males should not donate semen during mycophenolate therapy and for 90 days following the last mycophenolate dose (recommendation based on animal data). However, use of mycophenolate may be considered for males with rheumatic and musculoskeletal diseases who are planning to father a child (recommendation based on limited human data) (ACR [Sammaritano 2020]; Tash 2017).     Not sure if this helps.       Benson Hilario [facility]   Specialty Pharmacist [phone]

## 2021-10-09 ENCOUNTER — HEALTH MAINTENANCE LETTER (OUTPATIENT)
Age: 26
End: 2021-10-09

## 2021-10-14 ENCOUNTER — LAB (OUTPATIENT)
Dept: LAB | Facility: CLINIC | Age: 26
End: 2021-10-14
Payer: COMMERCIAL

## 2021-10-14 DIAGNOSIS — Z79.899 ENCOUNTER FOR LONG-TERM CURRENT USE OF MEDICATION: ICD-10-CM

## 2021-10-14 DIAGNOSIS — Z48.298 AFTERCARE FOLLOWING ORGAN TRANSPLANT: ICD-10-CM

## 2021-10-14 DIAGNOSIS — Z94.0 KIDNEY REPLACED BY TRANSPLANT: ICD-10-CM

## 2021-10-14 PROCEDURE — 36415 COLL VENOUS BLD VENIPUNCTURE: CPT

## 2021-10-14 PROCEDURE — 86832 HLA CLASS I HIGH DEFIN QUAL: CPT

## 2021-10-14 PROCEDURE — 86833 HLA CLASS II HIGH DEFIN QUAL: CPT

## 2021-10-15 ENCOUNTER — VIRTUAL VISIT (OUTPATIENT)
Dept: NEPHROLOGY | Facility: CLINIC | Age: 26
End: 2021-10-15
Payer: COMMERCIAL

## 2021-10-15 DIAGNOSIS — Z94.0 HTN, KIDNEY TRANSPLANT RELATED: ICD-10-CM

## 2021-10-15 DIAGNOSIS — N18.31 STAGE 3A CHRONIC KIDNEY DISEASE (H): ICD-10-CM

## 2021-10-15 DIAGNOSIS — I15.1 HTN, KIDNEY TRANSPLANT RELATED: ICD-10-CM

## 2021-10-15 DIAGNOSIS — D84.9 IMMUNOSUPPRESSION (H): ICD-10-CM

## 2021-10-15 DIAGNOSIS — E55.9 VITAMIN D DEFICIENCY: ICD-10-CM

## 2021-10-15 DIAGNOSIS — Z94.0 KIDNEY REPLACED BY TRANSPLANT: Primary | ICD-10-CM

## 2021-10-15 DIAGNOSIS — Z48.298 AFTERCARE FOLLOWING ORGAN TRANSPLANT: ICD-10-CM

## 2021-10-15 PROCEDURE — 99214 OFFICE O/P EST MOD 30 MIN: CPT | Mod: 95 | Performed by: INTERNAL MEDICINE

## 2021-10-15 RX ORDER — CHOLECALCIFEROL (VITAMIN D3) 50 MCG
1 TABLET ORAL DAILY
COMMUNITY

## 2021-10-15 ASSESSMENT — PAIN SCALES - GENERAL: PAINLEVEL: NO PAIN (0)

## 2021-10-15 NOTE — LETTER
10/15/2021      RE: Golden Stover  [address]  [place] [address]       Golden is a 26 year old who is being evaluated via a billable video visit.      How would you like to obtain your AVS? MyChart  If the video visit is dropped, the invitation should be resent by: Text to cell phone: [phone]  Will anyone else be joining your video visit? No    Video Start Time: 0805  Video-Visit Details    Type of service:  Video Visit    Video End Time:0818    Originating Location (pt. Location): Home    Distant Location (provider location):  [facility]     Platform used for Video Visit: NewGoT[employer]      TRANSPLANT NEPHROLOGY CHRONIC POST TRANSPLANT VISIT    Assessment & Plan   # LDKT: Stable creatinine at ~ 1.4-1.6 over the last 18 months, but slightly higher than previous baseline close to ~ 1.3-1.5.  Kidney transplant biopsy 10/2020 did show recurrent IgA nephropathy and he has moderate proteinuria.   - Baseline Creatinine:  ~ 1.4-1.6   - Proteinuria: Moderate (1-3 grams)   - Date DSA Last Checked: May/2021      Latest DSA: No   - BK Viremia: No   - Kidney Tx Biopsy: Oct 08, 2020; Result: No diagnostic evidence of acute rejection.  Recurrent IgA nephropathy without any crescents.  Mild arteriosclerosis.    # Immunosuppression: Tacrolimus immediate release (goal 4-6) and Mycophenolate mofetil (dose 750 mg every 12 hours)   - Continue with intensive monitoring of immunosuppression for efficacy and toxicity.   - Changes: No    # Infection Prophylaxis:   Last CD4 Level: 201 (5/2018)  - PJP: None    # Hypertension: Borderline control;  Goal BP: < 130/80   - Changes: Not at this time, but recommend patient focus on weight loss and will follow.    # Mineral Bone Disorder:   - Vitamin D; level: Normal        On supplement: Yes  - Calcium; level: Normal        On supplement: No    # Obesity, Class I (BMI = 32.5): Weight is up ~ 20 lbs, now at ~ 260 lbs.   - Recommend weight loss for overall  health by increasing exercise and watching caloric intake.    # Skin Cancer Risk:    - Discussed sun protection and recommend regular follow up with Dermatology.    # Medical Compliance: Yes    # COVID-19 Virus Review: Discussed COVID-19 virus and the potential medical risks.  Reviewed preventative health recommendations, including wearing a mask where appropriate.  Recommended COVID vaccination should be up to date with either an initial vaccination or booster shot when appropriate.  Asked the patient to inform the transplant center if they are exposed or diagnosed with this virus.    # COVID Vaccination Up To Date: Yes    # Transplant History:  Etiology of Kidney Failure: IgA nephropathy  Tx: LDKT  Transplant: 6/7/2017 (Kidney)  Significant changes in immunosuppression: None  Significant transplant-related complications: None    Transplant Office Phone Number: [phone]    Assessment and plan was discussed with the patient and he voiced his understanding and agreement.    Return visit: Return in about 1 year (around 10/15/2022).    Jak King MD    Chief Complaint   Mr. Stover is a 26 year old here for kidney transplant and immunosuppression management.    History of Present Illness     reports feeling good overall with some medical complaints.  Since last clinic visit, patient reports no hospitalizations or new medical complaints and has been doing well overall.  His energy level is good and remains normal.  He is active, now working 10-11 hour days, which doesn't give him much time to exercise.  Denies any chest pain or shortness of breath with exertion.  No leg swelling.    Appetite is good and he has gained about 20 lbs, now at ~ 260 lbs.  No nausea, vomiting or diarrhea.  No fever, sweats or chills.  He has been bothered by psoriasis, mainly over his ears and has an appointment with ENT.    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      Mold Itching     Seasonal Allergies Itching     Sulfa Drugs      Thymoglobulin Itching       Medications   Current Outpatient Medications   Medication Sig     diltiazem ER COATED BEADS (CARDIZEM CD/CARTIA XT) 120 MG 24 hr capsule Take 1 capsule (120 mg) by mouth daily     losartan (COZAAR) 100 MG tablet Take 1 tablet (100 mg) by mouth At Bedtime     mycophenolate (GENERIC EQUIVALENT) 250 MG capsule TAKE 3 CAPSULES BY MOUTH TWICE A DAY     tacrolimus (GENERIC EQUIVALENT) 0.5 MG capsule Take 1 capsule (0.5 mg) by mouth 2 times daily Total dose 2.5 mg every 12 hours     tacrolimus (GENERIC EQUIVALENT) 1 MG capsule Take 2 capsules (2 mg) by mouth 2 times daily Total dose 2.5 mg every 12 hours     vitamin D3 (CHOLECALCIFEROL) 50 mcg (2000 units) tablet Take 1 tablet by mouth daily     No current facility-administered medications for this visit.     There are no discontinued medications.    Physical Exam   Vital Signs: Deferred for this telemedicine visit.    GENERAL APPEARANCE: alert and no distress  HENT: no obvious abnormalities on appearance  RESP: breathing appears unremarkable with normal rate, no audible wheezing or cough and no apparent shortness of breath with conversation  MS: extremities normal - no gross deformities noted  SKIN: no apparent rash and normal skin tone  NEURO: speech is  clear with no obvious neurological deficits  PSYCH: mentation appears normal and affect normal    Data     Renal Latest Ref Rng & Units 10/14/2021 8/17/2021 6/2/2021   Na 133 - 144 mmol/L - - -   Na (external) 136 - 145 mmol/L 140 140 139   K 3.4 - 5.3 mmol/L - - -   K (external) 3.6 - 5.5 mmol/L 4.2 4.2 4.2   Cl 94 - 109 mmol/L - - -   Cl (external) 98 - 109 mmol/L 105 105 104   CO2 20 - 32 mmol/L - - -   CO2 (external) 23.0 - 33.0 mmol/L 24.8 23.5 26.6   BUN 7 - 30 mg/dL - - -   BUN (external) 7 - 22 mg/dL 15 14 15   Cr 0.66 - 1.25 mg/dL - - -   Cr (external) 0.6 - 1.3 mg/dL 1.7(H) 1.6(H) 1.6   Glucose 70 - 99 mg/dL - - -   Glucose (external) 70 - 99 mg/dL 89 92 95   Ca  8.5 - 10.1 mg/dL - - -   Ca (external) 8.8 - 10.5 mg/dL 9.9 9.7 10.0   Mg 1.6 - 2.3 mg/dL - - -   Mg (external) 1.5 - 2.2 mEq/L - - -     Bone Health Latest Ref Rng & Units 10/30/2018 8/17/2018 9/11/2017   Phos 2.5 - 4.5 mg/dL - - -   Phos (external) 2.5 - 4.6 mg/dL - - -   PTHi 12 - 72 pg/mL - - 132(H)   PTHi (external) 15.0 - 115.0 pg/mL - 102.9 -   Vit D Def 20 - 75 ug/L - - -   Vit D Def (external) 27 - 80 ng/mL 26(L) - -     Heme Latest Ref Rng & Units 10/14/2021 8/17/2021 6/2/2021   WBC 4.0 - 11.0 10e9/L - - -   WBC (external) 3.60 - 11.00 K/uL 6.80 6.67 6.01   Hgb 13.3 - 17.7 g/dL - - -   Hgb (external) 13.0 - 18.0 g/dL 14.8 14.8 14.9   Plt 150 - 450 10e9/L - - -   Plt (external) 150 - 440 K/uL 460(H) 432 419   ABSOLUTE NEUTROPHIL 1.6 - 8.3 10e9/L - - -   ABSOLUTE NEUTROPHILS (EXTERNAL) 1.5 - 7.6 10*9/L - - -   ABSOLUTE LYMPHOCYTES 0.8 - 5.3 10e9/L - - -   ABSOLUTE LYMPHOCYTES (EXTERNAL) 0.8 - 3.3 10*9/L - - -   ABSOLUTE MONOCYTES 0.0 - 1.3 10e9/L - - -   ABSOLUTE MONOCYTES (EXTERNAL) 0.2 - 0.9 10*9/L - - -   ABSOLUTE EOSINOPHILS 0.0 - 0.7 10e9/L - - -   ABSOLUTE EOSINOPHILS (EXTERNAL) 0.0 - 0.4 10*9/L - - -   ABSOLUTE BASOPHILS 0.0 - 0.2 10e9/L - - -   ABSOLUTE BASOPHILS (EXTERNAL) 0.0 - 0.1 10*9/L - - -   ABS IMMATURE GRANULOCYTES 0 - 0.4  10e9/L - - -   ABSOLUTE NUCLEATED RBC - - - -     Liver Latest Ref Rng & Units 6/8/2018 3/6/2018 1/23/2018   AP 40 - 150 U/L - - -   AP (external) 40 - 150 IU/L 75 76 71   TBili 0.2 - 1.3 mg/dL - - -   TBili (external) 0.2 - 1.2 mg/dL 2.3(H) 1.3(H) 1.3(H)   DBili (external) 0.0 - 0.5 mg/dL 0.7(H) 0.5 0.5   ALT 0 - 70 U/L - - -   ALT (external) 6 - 40 IU/L 31 20 20   AST 0 - 45 U/L - - -   AST (external) 10 - 40 IU/L 30 25 19   Tot Protein 6.8 - 8.8 g/dL - - -   Tot Protein (external) 6.0 - 8.0 g/dL 7.4 7.6 7.3   Albumin 3.4 - 5.0 g/dL - - -   Albumin (external) 3.5 - 5.0 g/dL 4.4 4.6 4.5        Iron studies Latest Ref Rng & Units 6/1/2017   Iron 35 - 180 ug/dL 65   Iron sat 15 - 46 % 21   Ferritin 26 - 388 ng/mL 310     [facility] Txp Virology Latest Ref Rng & Units 10/8/2020 1/15/2019 12/7/2018   BK Spec - Plasma - -   BK Res BKNEG:BK Virus DNA Not Detected copies/mL BK Virus DNA Not Detected - -   BK Log <2.7 Log copies/mL Not Calculated - -   BK Quant Result Ext None detected - None detected None detected   EBV CAPSID ANTIBODY IGG 0.0 - 0.8 AI - - -   Hep B Core NR - - -   Hep B Core Ext Nonreactive - - -   Hep B Surf Ext  Positive >=12.00 mIU/mL - - -        Recent Labs   Lab Test 06/30/17  0830 07/03/17  0857 10/08/20  0839   DOSTAC 2030, 06/29/17 2030 07/02/17 Not Provided   TACROL 9.5 11.1 18.8*     Recent Labs   Lab Test 06/16/17  0712 06/29/17  0831   DOSMPA 1930 06/15/2017 6/28/17 7:30pm   MPACID 1.26 4.40*   MPAG 27.9* 43.9       Jak King MD

## 2021-10-15 NOTE — PROGRESS NOTES
Golden is a 26 year old who is being evaluated via a billable video visit.      How would you like to obtain your AVS? MyChart  If the video visit is dropped, the invitation should be resent by: Text to cell phone: [phone]  Will anyone else be joining your video visit? No    Video Start Time: 0805  Video-Visit Details    Type of service:  Video Visit    Video End Time:0818    Originating Location (pt. Location): Home    Distant Location (provider location):  [facility] KIDNEY SERVICES     Platform used for Video Visit: [employer]      TRANSPLANT NEPHROLOGY CHRONIC POST TRANSPLANT VISIT    Assessment & Plan   # LDKT: Stable creatinine at ~ 1.4-1.6 over the last 18 months, but slightly higher than previous baseline close to ~ 1.3-1.5.  Kidney transplant biopsy 10/2020 did show recurrent IgA nephropathy and he has moderate proteinuria.   - Baseline Creatinine:  ~ 1.4-1.6   - Proteinuria: Moderate (1-3 grams)   - Date DSA Last Checked: May/2021      Latest DSA: No   - BK Viremia: No   - Kidney Tx Biopsy: Oct 08, 2020; Result: No diagnostic evidence of acute rejection.  Recurrent IgA nephropathy without any crescents.  Mild arteriosclerosis.    # Immunosuppression: Tacrolimus immediate release (goal 4-6) and Mycophenolate mofetil (dose 750 mg every 12 hours)   - Continue with intensive monitoring of immunosuppression for efficacy and toxicity.   - Changes: No    # Infection Prophylaxis:   Last CD4 Level: 201 (5/2018)  - PJP: None    # Hypertension: Borderline control;  Goal BP: < 130/80   - Changes: Not at this time, but recommend patient focus on weight loss and will follow.    # Mineral Bone Disorder:   - Vitamin D; level: Normal        On supplement: Yes  - Calcium; level: Normal        On supplement: No    # Obesity, Class I (BMI = 32.5): Weight is up ~ 20 lbs, now at ~ 260 lbs.   - Recommend weight loss for overall health by increasing exercise and watching caloric intake.    # Skin Cancer Risk:    -  Discussed sun protection and recommend regular follow up with Dermatology.    # Medical Compliance: Yes    # COVID-19 Virus Review: Discussed COVID-19 virus and the potential medical risks.  Reviewed preventative health recommendations, including wearing a mask where appropriate.  Recommended COVID vaccination should be up to date with either an initial vaccination or booster shot when appropriate.  Asked the patient to inform the transplant center if they are exposed or diagnosed with this virus.    # COVID Vaccination Up To Date: Yes    # Transplant History:  Etiology of Kidney Failure: IgA nephropathy  Tx: LDKT  Transplant: 6/7/2017 (Kidney)  Significant changes in immunosuppression: None  Significant transplant-related complications: None    Transplant Office Phone Number: [phone]    Assessment and plan was discussed with the patient and he voiced his understanding and agreement.    Return visit: Return in about 1 year (around 10/15/2022).    Jak King MD    Chief Complaint   Mr. Stover is a 26 year old here for kidney transplant and immunosuppression management.    History of Present Illness     reports feeling good overall with some medical complaints.  Since last clinic visit, patient reports no hospitalizations or new medical complaints and has been doing well overall.  His energy level is good and remains normal.  He is active, now working 10-11 hour days, which doesn't give him much time to exercise.  Denies any chest pain or shortness of breath with exertion.  No leg swelling.    Appetite is good and he has gained about 20 lbs, now at ~ 260 lbs.  No nausea, vomiting or diarrhea.  No fever, sweats or chills.  He has been bothered by psoriasis, mainly over his ears and has an appointment with ENT.    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      Mold Itching     Seasonal Allergies Itching     Sulfa Drugs      Thymoglobulin Itching       Medications   Current Outpatient Medications   Medication Sig     diltiazem ER COATED BEADS (CARDIZEM CD/CARTIA XT) 120 MG 24 hr capsule Take 1 capsule (120 mg) by mouth daily     losartan (COZAAR) 100 MG tablet Take 1 tablet (100 mg) by mouth At Bedtime     mycophenolate (GENERIC EQUIVALENT) 250 MG capsule TAKE 3 CAPSULES BY MOUTH TWICE A DAY     tacrolimus (GENERIC EQUIVALENT) 0.5 MG capsule Take 1 capsule (0.5 mg) by mouth 2 times daily Total dose 2.5 mg every 12 hours     tacrolimus (GENERIC EQUIVALENT) 1 MG capsule Take 2 capsules (2 mg) by mouth 2 times daily Total dose 2.5 mg every 12 hours     vitamin D3 (CHOLECALCIFEROL) 50 mcg (2000 units) tablet Take 1 tablet by mouth daily     No current facility-administered medications for this visit.     There are no discontinued medications.    Physical Exam   Vital Signs: Deferred for this telemedicine visit.    GENERAL APPEARANCE: alert and no distress  HENT: no obvious abnormalities on appearance  RESP: breathing appears unremarkable with normal rate, no audible wheezing or cough and no apparent shortness of breath with conversation  MS: extremities normal - no gross deformities noted  SKIN: no apparent rash and normal skin tone  NEURO: speech is clear with no obvious neurological deficits  PSYCH: mentation appears normal and affect  normal    Data     Renal Latest Ref Rng & Units 10/14/2021 8/17/2021 6/2/2021   Na 133 - 144 mmol/L - - -   Na (external) 136 - 145 mmol/L 140 140 139   K 3.4 - 5.3 mmol/L - - -   K (external) 3.6 - 5.5 mmol/L 4.2 4.2 4.2   Cl 94 - 109 mmol/L - - -   Cl (external) 98 - 109 mmol/L 105 105 104   CO2 20 - 32 mmol/L - - -   CO2 (external) 23.0 - 33.0 mmol/L 24.8 23.5 26.6   BUN 7 - 30 mg/dL - - -   BUN (external) 7 - 22 mg/dL 15 14 15   Cr 0.66 - 1.25 mg/dL - - -   Cr (external) 0.6 - 1.3 mg/dL 1.7(H) 1.6(H) 1.6   Glucose 70 - 99 mg/dL - - -   Glucose (external) 70 - 99 mg/dL 89 92 95   Ca  8.5 - 10.1 mg/dL - - -   Ca (external) 8.8 - 10.5 mg/dL 9.9 9.7 10.0   Mg 1.6 - 2.3 mg/dL - - -   Mg (external) 1.5 - 2.2 mEq/L - - -     Bone Health Latest Ref Rng & Units 10/30/2018 8/17/2018 9/11/2017   Phos 2.5 - 4.5 mg/dL - - -   Phos (external) 2.5 - 4.6 mg/dL - - -   PTHi 12 - 72 pg/mL - - 132(H)   PTHi (external) 15.0 - 115.0 pg/mL - 102.9 -   Vit D Def 20 - 75 ug/L - - -   Vit D Def (external) 27 - 80 ng/mL 26(L) - -     Heme Latest Ref Rng & Units 10/14/2021 8/17/2021 6/2/2021   WBC 4.0 - 11.0 10e9/L - - -   WBC (external) 3.60 - 11.00 K/uL 6.80 6.67 6.01   Hgb 13.3 - 17.7 g/dL - - -   Hgb (external) 13.0 - 18.0 g/dL 14.8 14.8 14.9   Plt 150 - 450 10e9/L - - -   Plt (external) 150 - 440 K/uL 460(H) 432 419   ABSOLUTE NEUTROPHIL 1.6 - 8.3 10e9/L - - -   ABSOLUTE NEUTROPHILS (EXTERNAL) 1.5 - 7.6 10*9/L - - -   ABSOLUTE LYMPHOCYTES 0.8 - 5.3 10e9/L - - -   ABSOLUTE LYMPHOCYTES (EXTERNAL) 0.8 - 3.3 10*9/L - - -   ABSOLUTE MONOCYTES 0.0 - 1.3 10e9/L - - -   ABSOLUTE MONOCYTES (EXTERNAL) 0.2 - 0.9 10*9/L - - -   ABSOLUTE EOSINOPHILS 0.0 - 0.7 10e9/L - - -   ABSOLUTE EOSINOPHILS (EXTERNAL) 0.0 - 0.4 10*9/L - - -   ABSOLUTE BASOPHILS 0.0 - 0.2 10e9/L - - -   ABSOLUTE BASOPHILS (EXTERNAL) 0.0 - 0.1 10*9/L - - -   ABS IMMATURE GRANULOCYTES 0 - 0.4 10e9/L - - -   ABSOLUTE NUCLEATED RBC - - - -     Liver Latest Ref Rng & Units 6/8/2018  3/6/2018 1/23/2018   AP 40 - 150 U/L - - -   AP (external) 40 - 150 IU/L 75 76 71   TBili 0.2 - 1.3 mg/dL - - -   TBili (external) 0.2 - 1.2 mg/dL 2.3(H) 1.3(H) 1.3(H)   DBili (external) 0.0 - 0.5 mg/dL 0.7(H) 0.5 0.5   ALT 0 - 70 U/L - - -   ALT (external) 6 - 40 IU/L 31 20 20   AST 0 - 45 U/L - - -   AST (external) 10 - 40 IU/L 30 25 19   Tot Protein 6.8 - 8.8 g/dL - - -   Tot Protein (external) 6.0 - 8.0 g/dL 7.4 7.6 7.3   Albumin 3.4 - 5.0 g/dL - - -   Albumin (external) 3.5 - 5.0 g/dL 4.4 4.6 4.5        Iron studies Latest Ref Rng & Units 6/1/2017   Iron 35 - 180 ug/dL 65   Iron sat 15 - 46 % 21   Ferritin 26 - 388 ng/mL 310     [facility] Txp Virology Latest Ref Rng & Units 10/8/2020 1/15/2019 12/7/2018   BK Spec - Plasma - -   BK Res BKNEG:BK Virus DNA Not Detected copies/mL BK Virus DNA Not Detected - -   BK Log <2.7 Log copies/mL Not Calculated - -   BK Quant Result Ext None detected - None detected None detected   EBV CAPSID ANTIBODY IGG 0.0 - 0.8 AI - - -   Hep B Core NR - - -   Hep B Core Ext Nonreactive - - -   Hep B Surf Ext  Positive >=12.00 mIU/mL - - -        Recent Labs   Lab Test 06/30/17  0830 07/03/17  0857 10/08/20  0839   DOSTAC 2030, 06/29/17 2030 07/02/17 Not Provided   TACROL 9.5 11.1 18.8*     Recent Labs   Lab Test 06/16/17  0712 06/29/17  0831   DOSMPA 1930 06/15/2017 6/28/17 7:30pm   MPACID 1.26 4.40*   MPAG 27.9* 43.9

## 2021-10-15 NOTE — LETTER
10/15/2021       RE: Golden Stover  [address]  [place] [address]     Dear Colleague,    Thank you for referring your patient, Golden Stover, to the [facility] KIDNEY SERVICES at [facility]. Please see a copy of my visit note below.    Golden is a 26 year old who is being evaluated via a billable video visit.      How would you like to obtain your AVS? MyChart  If the video visit is dropped, the invitation should be resent by: Text to cell phone: [phone]  Will anyone else be joining your video visit? No    Video Start Time: 0805  Video-Visit Details    Type of service:  Video Visit    Video End Time:0818    Originating Location (pt. Location): Home    Distant Location (provider location):  [facility] KIDNEY SERVICES     Platform used for Video Visit: [facility]      TRANSPLANT NEPHROLOGY CHRONIC POST TRANSPLANT VISIT    Assessment & Plan   # LDKT: Stable creatinine at ~ 1.4-1.6 over the last 18 months, but slightly higher than previous baseline close to ~ 1.3-1.5.  Kidney transplant biopsy 10/2020 did show recurrent IgA nephropathy and he has moderate proteinuria.   - Baseline Creatinine:  ~ 1.4-1.6   - Proteinuria: Moderate (1-3 grams)   - Date DSA Last Checked: May/2021      Latest DSA: No   - BK Viremia: No   - Kidney Tx Biopsy: Oct 08, 2020; Result: No diagnostic evidence of acute rejection.  Recurrent IgA nephropathy without any crescents.  Mild arteriosclerosis.    # Immunosuppression: Tacrolimus immediate release (goal 4-6) and Mycophenolate mofetil (dose 750 mg every 12 hours)   - Continue with intensive monitoring of immunosuppression for efficacy and toxicity.   - Changes: No    # Infection Prophylaxis:   Last CD4 Level: 201 (5/2018)  - PJP: None    # Hypertension: Borderline control;  Goal BP: < 130/80   - Changes: Not at this time, but recommend patient focus on weight loss and will follow.    # Mineral Bone  Disorder:   - Vitamin D; level: Normal        On supplement: Yes  - Calcium; level: Normal        On supplement: No    # Obesity, Class I (BMI = 32.5): Weight is up ~ 20 lbs, now at ~ 260 lbs.   - Recommend weight loss for overall health by increasing exercise and watching caloric intake.    # Skin Cancer Risk:    - Discussed sun protection and recommend regular follow up with Dermatology.    # Medical Compliance: Yes    # COVID-19 Virus Review: Discussed COVID-19 virus and the potential medical risks.  Reviewed preventative health recommendations, including wearing a mask where appropriate.  Recommended COVID vaccination should be up to date with either an initial vaccination or booster shot when appropriate.  Asked the patient to inform the transplant center if they are exposed or diagnosed with this virus.    # COVID Vaccination Up To Date: Yes    # Transplant History:  Etiology of Kidney Failure: IgA nephropathy  Tx: LDKT  Transplant: 6/7/2017 (Kidney)  Significant changes in immunosuppression: None  Significant transplant-related complications: None    Transplant Office Phone Number: [phone]    Assessment and plan was discussed with the patient and he voiced his understanding and agreement.    Return visit: Return in about 1 year (around 10/15/2022).    Jak King MD    Chief Complaint   Mr. Stover is a 26 year old here for kidney transplant and immunosuppression management.    History of Present Illness     reports feeling good overall with some medical complaints.  Since last clinic visit, patient reports no hospitalizations or new medical complaints and has been doing well overall.  His energy level is good and remains normal.  He is active, now working 10-11 hour days, which doesn't give him much time to exercise.  Denies any chest pain or shortness of breath with exertion.  No leg swelling.    Appetite is good and he has gained about 20 lbs, now at ~ 260 lbs.  No nausea, vomiting  or diarrhea.  No fever, sweats or chills.  He has been bothered by psoriasis, mainly over his ears and has an appointment with ENT.    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      Mold Itching     Seasonal Allergies Itching     Sulfa Drugs      Thymoglobulin Itching       Medications   Current Outpatient Medications   Medication Sig     diltiazem ER COATED BEADS (CARDIZEM CD/CARTIA XT) 120 MG 24 hr capsule Take 1 capsule (120 mg) by mouth daily     losartan (COZAAR) 100 MG tablet Take 1 tablet (100 mg) by mouth At Bedtime     mycophenolate (GENERIC EQUIVALENT) 250 MG capsule TAKE 3 CAPSULES BY MOUTH TWICE A DAY     tacrolimus (GENERIC EQUIVALENT) 0.5 MG capsule Take 1 capsule (0.5 mg) by mouth 2 times daily Total dose 2.5 mg every 12 hours     tacrolimus (GENERIC EQUIVALENT) 1 MG capsule Take 2 capsules (2 mg) by mouth 2 times daily Total dose 2.5 mg every 12 hours     vitamin D3 (CHOLECALCIFEROL) 50 mcg (2000 units) tablet Take 1 tablet by mouth daily     No current facility-administered medications for this visit.     There are no discontinued medications.    Physical Exam   Vital Signs: Deferred for this telemedicine visit.    GENERAL APPEARANCE: alert and no distress  HENT: no obvious abnormalities on appearance  RESP: breathing appears  unremarkable with normal rate, no audible wheezing or cough and no apparent shortness of breath with conversation  MS: extremities normal - no gross deformities noted  SKIN: no apparent rash and normal skin tone  NEURO: speech is clear with no obvious neurological deficits  PSYCH: mentation appears normal and affect normal    Data     Renal Latest Ref Rng & Units 10/14/2021 8/17/2021 6/2/2021   Na 133 - 144 mmol/L - - -   Na (external) 136 - 145 mmol/L 140 140 139   K 3.4 - 5.3 mmol/L - - -   K (external) 3.6 - 5.5 mmol/L 4.2 4.2 4.2   Cl 94 - 109 mmol/L - - -   Cl (external) 98 - 109 mmol/L 105 105 104   CO2 20 - 32 mmol/L - - -   CO2 (external) 23.0 - 33.0 mmol/L 24.8 23.5 26.6   BUN 7 - 30 mg/dL - - -   BUN (external) 7 - 22 mg/dL 15 14 15   Cr 0.66 - 1.25 mg/dL - - -   Cr (external) 0.6 - 1.3 mg/dL 1.7(H) 1.6(H) 1.6   Glucose 70 - 99 mg/dL - - -   Glucose (external) 70 - 99 mg/dL 89 92 95   Ca  8.5 - 10.1 mg/dL - - -   Ca (external) 8.8 - 10.5 mg/dL 9.9 9.7 10.0   Mg 1.6 - 2.3 mg/dL - - -   Mg (external) 1.5 - 2.2 mEq/L - - -     Bone Health Latest Ref Rng & Units 10/30/2018 8/17/2018 9/11/2017   Phos 2.5 - 4.5 mg/dL - - -   Phos (external) 2.5 - 4.6 mg/dL - - -   PTHi 12 - 72 pg/mL - - 132(H)   PTHi (external) 15.0 - 115.0 pg/mL - 102.9 -   Vit D Def 20 - 75 ug/L - - -   Vit D Def (external) 27 - 80 ng/mL 26(L) - -     Heme Latest Ref Rng & Units 10/14/2021 8/17/2021 6/2/2021   WBC 4.0 - 11.0 10e9/L - - -   WBC (external) 3.60 - 11.00 K/uL 6.80 6.67 6.01   Hgb 13.3 - 17.7 g/dL - - -   Hgb (external) 13.0 - 18.0 g/dL 14.8 14.8 14.9   Plt 150 - 450 10e9/L - - -   Plt (external) 150 - 440 K/uL 460(H) 432 419   ABSOLUTE NEUTROPHIL 1.6 - 8.3 10e9/L - - -   ABSOLUTE NEUTROPHILS (EXTERNAL) 1.5 - 7.6 10*9/L - - -   ABSOLUTE LYMPHOCYTES 0.8 - 5.3 10e9/L - - -   ABSOLUTE LYMPHOCYTES (EXTERNAL) 0.8 - 3.3 10*9/L - - -   ABSOLUTE MONOCYTES 0.0 - 1.3 10e9/L - - -   ABSOLUTE MONOCYTES (EXTERNAL) 0.2 - 0.9 10*9/L - - -    ABSOLUTE EOSINOPHILS 0.0 - 0.7 10e9/L - - -   ABSOLUTE EOSINOPHILS (EXTERNAL) 0.0 - 0.4 10*9/L - - -   ABSOLUTE BASOPHILS 0.0 - 0.2 10e9/L - - -   ABSOLUTE BASOPHILS (EXTERNAL) 0.0 - 0.1 10*9/L - - -   ABS IMMATURE GRANULOCYTES 0 - 0.4 10e9/L - - -   ABSOLUTE NUCLEATED RBC - - - -     Liver Latest Ref Rng & Units 6/8/2018 3/6/2018 1/23/2018   AP 40 - 150 U/L - - -   AP (external) 40 - 150 IU/L 75 76 71   TBili 0.2 - 1.3 mg/dL - - -   TBili (external) 0.2 - 1.2 mg/dL 2.3(H) 1.3(H) 1.3(H)   DBili (external) 0.0 - 0.5 mg/dL 0.7(H) 0.5 0.5   ALT 0 - 70 U/L - - -   ALT (external) 6 - 40 IU/L 31 20 20   AST 0 - 45 U/L - - -   AST (external) 10 - 40 IU/L 30 25 19   Tot Protein 6.8 - 8.8 g/dL - - -   Tot Protein (external) 6.0 - 8.0 g/dL 7.4 7.6 7.3   Albumin 3.4 - 5.0 g/dL - - -   Albumin (external) 3.5 - 5.0 g/dL 4.4 4.6 4.5        Iron studies Latest Ref Rng & Units 6/1/2017   Iron 35 - 180 ug/dL 65   Iron sat 15 - 46 % 21   Ferritin 26 - 388 ng/mL 310     [facility] Txp Virology Latest Ref Rng & Units 10/8/2020 1/15/2019 12/7/2018   BK Spec - Plasma - -   BK Res BKNEG:BK Virus DNA Not Detected copies/mL BK Virus DNA Not Detected - -   BK Log <2.7 Log copies/mL Not Calculated - -   BK Quant Result Ext None detected - None detected None detected   EBV CAPSID ANTIBODY IGG 0.0 - 0.8 AI - - -   Hep B Core NR - - -   Hep B Core Ext Nonreactive - - -   Hep B Surf Ext  Positive >=12.00 mIU/mL - - -        Recent Labs   Lab Test 06/30/17  0830 07/03/17  0857 10/08/20  0839   DOSTAC 2030, 06/29/17 2030 07/02/17 Not Provided   TACROL 9.5 11.1 18.8*     Recent Labs   Lab Test 06/16/17  0712 06/29/17  0831   DOSMPA 1930 06/15/2017 6/28/17 7:30pm   MPACID 1.26 4.40*   MPAG 27.9* 43.9       Again, thank you for allowing me to participate in the care of your patient.      Sincerely,    Jak King MD

## 2021-10-19 LAB
DONOR IDENTIFICATION: NORMAL
DSA COMMENTS: NORMAL
DSA PRESENT: NO
DSA TEST METHOD: NORMAL
ORGAN: NORMAL
SA 1 CELL: NORMAL
SA 1 TEST METHOD: NORMAL
SA 2 CELL: NORMAL
SA 2 TEST METHOD: NORMAL
SA1 HI RISK ABY: NORMAL
SA1 MOD RISK ABY: NORMAL
SA2 HI RISK ABY: NORMAL
SA2 MOD RISK ABY: NORMAL
UNACCEPTABLE ANTIGENS: NORMAL
UNOS CPRA: 60
ZZZSA 1  COMMENTS: NORMAL
ZZZSA 2 COMMENTS: NORMAL

## 2021-10-21 PROBLEM — E66.9 OBESITY: Status: ACTIVE | Noted: 2021-10-21

## 2021-10-21 PROBLEM — N18.30 CHRONIC KIDNEY DISEASE, STAGE 3 (H): Status: ACTIVE | Noted: 2021-10-21

## 2021-11-05 DIAGNOSIS — Z94.0 LIVING-DONOR KIDNEY TRANSPLANT RECIPIENT: ICD-10-CM

## 2021-11-05 RX ORDER — MYCOPHENOLATE MOFETIL 250 MG/1
CAPSULE ORAL
Qty: 180 CAPSULE | Refills: 10 | Status: SHIPPED | OUTPATIENT
Start: 2021-11-05 | End: 2022-09-30

## 2021-11-17 DIAGNOSIS — Z94.0 HTN, KIDNEY TRANSPLANT RELATED: ICD-10-CM

## 2021-11-17 DIAGNOSIS — I15.1 HTN, KIDNEY TRANSPLANT RELATED: ICD-10-CM

## 2021-11-18 RX ORDER — LOSARTAN POTASSIUM 100 MG/1
100 TABLET ORAL AT BEDTIME
Qty: 90 TABLET | Refills: 2 | Status: SHIPPED | OUTPATIENT
Start: 2021-11-18 | End: 2022-04-20

## 2021-12-01 ENCOUNTER — TELEPHONE (OUTPATIENT)
Dept: TRANSPLANT | Facility: CLINIC | Age: 26
End: 2021-12-01
Payer: COMMERCIAL

## 2021-12-01 NOTE — TELEPHONE ENCOUNTER
RNCC sent updated standing lab letter to preferred lab ([facility]) as requested. Copy to Golden via [employer].    Ashley Chau RN, BSN  Solid Organ Transplant, Post Kidney and Pancreas  Transplant Care Coordinator  [phone]

## 2021-12-01 NOTE — TELEPHONE ENCOUNTER
Patient Call: Transplant Lab/Orders    Reason for Call:     Requesting transplant lab orders, patient will be in tomorrow 12/02    fax: [phone]    Callback needed? No

## 2021-12-01 NOTE — LETTER
OUTPATIENT LABORATORY TEST ORDER    Patient Name: Golden Stover  Transplant Date: 6/7/2017 (Kidney)  YOB: 1995                                      Issue Date & Time:12/1/21 8:50 AM   [facility] MR:  [id number]  Exp. Date (1 year after date issued)    Diagnoses: Kidney Transplant (ICD-10  Z94.0)   Long term use of medications (ICD-10  Z79.899)     Lab results to be available on the same day drawn.   Patient should release information to the [facility].  Please fax to the Transplant Center at [phone].    Every other month   ?Hemogram and Platelet  ?Basic Metabolic Panel (Sodium, Potassium, Chloride, CO2, Creatinine, Urea Nitrogen, Glucose, Calcium)  ?/Tacrolimus/Prograf drug level     Every 3 months   ?Urine for protein/creatinine    Yearly               ?PRA/DSA level (mailers provided by the patient)     If you have any questions, please call The Transplant Center at [phone] or [phone].    Please fax labs to [phone]    .

## 2021-12-13 ENCOUNTER — DOCUMENTATION ONLY (OUTPATIENT)
Dept: TRANSPLANT | Facility: CLINIC | Age: 26
End: 2021-12-13
Payer: COMMERCIAL

## 2021-12-13 NOTE — PROGRESS NOTES
ISSUE: Thrombocytosis         Message  Received: Today  Jak King MD Blaisdell, Christin Rebecca, PRETTY  No.  Minimal elevation like this is not a concern.             Previous Messages       ----- Message -----   From: Ashley Chau, PRETTY   Sent: 12/13/2021   2:00 PM CST   To: Jak King MD     Does he need referral to hematology for the elevated platelet counts?

## 2022-01-29 ENCOUNTER — HEALTH MAINTENANCE LETTER (OUTPATIENT)
Age: 27
End: 2022-01-29

## 2022-04-19 ENCOUNTER — TELEPHONE (OUTPATIENT)
Dept: TRANSPLANT | Facility: CLINIC | Age: 27
End: 2022-04-19
Payer: COMMERCIAL

## 2022-04-19 DIAGNOSIS — Z94.0 HTN, KIDNEY TRANSPLANT RELATED: ICD-10-CM

## 2022-04-19 DIAGNOSIS — Z48.298 AFTERCARE FOLLOWING ORGAN TRANSPLANT: ICD-10-CM

## 2022-04-19 DIAGNOSIS — I15.1 HTN, KIDNEY TRANSPLANT RELATED: ICD-10-CM

## 2022-04-19 DIAGNOSIS — I12.9 HYPERTENSIVE RENAL DISEASE: Primary | ICD-10-CM

## 2022-04-19 NOTE — LETTER
PHYSICIAN ORDERS      DATE & TIME ISSUED: 2022 3:42 PM  PATIENT NAME: Golden Stover   : 1995     [facility] MR# [if applicable]: [id number]     DIAGNOSIS/ICD-10 CODES: Kidney Transplanted (Z94.0);   Aftercare following kidney transplant (Z48.22);       Please draw the following lab tests in approximately 1-2 weeks:  -BMP  -Urine Protein Creatinine Ratio    Any questions please call: [phone]. Fax results to [phone].      .

## 2022-04-19 NOTE — TELEPHONE ENCOUNTER
ISSUE:   Urine protein creatinine ratio 1.8 4/19/22 0713; trend up  Taking losartan 100 mg PO at HS  Hx IgA nephropathy; post kidney transplant 6/7/2017.   Kidney transplant biopsy 10/2020 did show recurrent IgA nephropathy and he has moderate proteinuria. UP checks every 3 months    PLAN:  Call Golden and discuss proteinuria.  How are blood pressures/heartrate running? 171/110 yesterday; 170/100 today; HR 60. Recently/past month headache.   Confirm current BP meds: Losartan 100 mg at HS, Diltiazem 120 mg PO daily. Yes, (taking both at bedtime however)  Any swelling? No   S/S of UTI? Denies, Asymptomatic.     OUTCOME:  Left [employer] message for Golden and [employer] message sent.     Addendum: Golden returned call. His blood pressure is not controlled. Confirms taking anti-hypertensive meds above. In past, prior to transplant, states he would not know if he had UTI or not because he would not have any symptoms even if he did have UTI.     Ashley Chau, RN, BSN  Solid Organ Transplant, Post Kidney and Pancreas  Transplant Care Coordinator  [phone]

## 2022-04-20 RX ORDER — LOSARTAN POTASSIUM 100 MG/1
100 TABLET ORAL AT BEDTIME
Qty: 90 TABLET | Refills: 3 | Status: SHIPPED | OUTPATIENT
Start: 2022-04-20 | End: 2023-05-01

## 2022-04-20 RX ORDER — DILTIAZEM HYDROCHLORIDE 120 MG/1
120 CAPSULE, COATED, EXTENDED RELEASE ORAL EVERY MORNING
Qty: 90 CAPSULE | Refills: 3 | Status: SHIPPED | OUTPATIENT
Start: 2022-04-20 | End: 2023-05-01

## 2022-04-20 RX ORDER — AMLODIPINE BESYLATE 5 MG/1
5 TABLET ORAL AT BEDTIME
Qty: 90 TABLET | Refills: 3 | Status: SHIPPED | OUTPATIENT
Start: 2022-04-20 | End: 2022-10-21 | Stop reason: DRUGHIGH

## 2022-04-20 NOTE — TELEPHONE ENCOUNTER
Message  Received: Today  Jak King MD Blaisdell, Christin Rebecca, RN  Caller: Unspecified (Yesterday,  2:26 PM)  Having them both at night is generally okay.  However, in this instance, would recommend he change the diltiazem to the morning, keep losartan at night and start amlodipine 5 mg nightly.     Tej             Previous Messages       ----- Message -----   From: Ashley Chau RN   Sent: 4/20/2022   9:12 AM CDT   To: Jak King MD, *     ----- Message from Ashley Chau RN sent at 4/20/2022  9:12 AM CDT -----   Hey Golden Fox's UPCR continues to trend up. BP checked today and yesterday 170/100s. Taking both his losartan and dilt at HS. I told him he likely should take the dilt in the AM to separate the 2 meds, but would ask you. Let me know what you would like to do for BP please.   He typically is asymptomatic with UTI. Doesn't have s/s but could rule out if you want UA.     Kathy,   Ashley     OUTCOME: RNCC spoke with Golden in regards to Dr. King's plan. Prescriptions updated/sent. He verbalized understanding to start tonight. Will record BP/HR and notify if edema develops after new amlodipine added. Repeat labs in 1-2 weeks. Orders sent for BMP and UPCR.    Ashley Chau RN, BSN  Solid Organ Transplant, Post Kidney and Pancreas  Transplant Care Coordinator  [phone]

## 2022-05-01 ENCOUNTER — MYC MEDICAL ADVICE (OUTPATIENT)
Dept: OTHER | Age: 27
End: 2022-05-01

## 2022-05-03 ENCOUNTER — TELEPHONE (OUTPATIENT)
Dept: TRANSPLANT | Facility: CLINIC | Age: 27
End: 2022-05-03

## 2022-05-03 NOTE — TELEPHONE ENCOUNTER
RNCC received [employer] message from Golden with his BP/HR documentation log.        Log sent to Dr. King to review.     Ashley Chau RN, BSN  Solid Organ Transplant, Post Kidney and Pancreas  Transplant Care Coordinator  [phone]

## 2022-05-03 NOTE — TELEPHONE ENCOUNTER
General  Route to LPN    Reason for call: Golden called to follow up with coordinator about elevated BP. Patient is purchasing a new BP machine & will arrive by Friday.     Call back needed? Yes  Return Call Needed  Same as documented in contacts section  When to return call?: Same day: Route High Priority

## 2022-05-09 ENCOUNTER — TELEPHONE (OUTPATIENT)
Dept: TRANSPLANT | Facility: CLINIC | Age: 27
End: 2022-05-09
Payer: COMMERCIAL

## 2022-05-09 NOTE — TELEPHONE ENCOUNTER
Left message and sent [employer] message to patient regarding:  Creatinine = 1.8  (5/6/22)  Baseline 1.4-1.6     PLAN:  Call Golden Stover and check for recent illness.  Any fever?  Any missed medication?  Changes in medication, (marco a diuretics)?  Any pain over the transplanted kidney?  Any nausea / vomiting / diarrhea?  Dehydrated?   If not on fluid restriction, instruct to improve hydration and recheck BMP next week.

## 2022-05-09 NOTE — TELEPHONE ENCOUNTER
Creatinine = 1.8  (5/6/22)  Baseline 1.4-1.6    PLAN:  Call Golden Stover and check for recent illness.  Any fever?  Any missed medication?  Changes in medication, (marco a diuretics)?  Any pain over the transplanted kidney?  Any nausea / vomiting / diarrhea?  Dehydrated?   If not on fluid restriction, instruct to improve hydration and recheck BMP next week.

## 2022-05-09 NOTE — TELEPHONE ENCOUNTER
Spoke to patient who reports recent BP readings average 130/80 in the AM and 120's/80 in the PM.  Patient also states that he has started exercising more and is eating a healthier diet.  Patient will increase he fluid intake and repeat BMP next week.

## 2022-05-09 NOTE — LETTER
PHYSICIAN ORDERS      DATE & TIME ISSUED: May 9, 2022 12:38 PM  PATIENT NAME: Golden Stover   : 1995     [facility] MR# [if applicable]: [id number]     DIAGNOSIS:  Kidney Transplant  ICD-10 CODE: Z94.0     Please repeat the following lab next week:  BMP    Any questions please call: [phone]  Please fax lab results to [phone].    .

## 2022-05-10 RX ORDER — FUROSEMIDE 20 MG
20 TABLET ORAL DAILY
Qty: 30 TABLET | Refills: 3 | Status: SHIPPED | OUTPATIENT
Start: 2022-05-10 | End: 2022-08-01

## 2022-05-10 RX ORDER — CLONIDINE HYDROCHLORIDE 0.1 MG/1
0.1 TABLET ORAL 2 TIMES DAILY
Qty: 60 TABLET | Refills: 3 | Status: SHIPPED | OUTPATIENT
Start: 2022-05-10 | End: 2022-08-01

## 2022-05-10 NOTE — TELEPHONE ENCOUNTER
Message  Received: 2 days ago  Jak King MD Blaisdell, Christin Rebecca, RN  Caller: Unspecified (3 weeks ago)  The BP readings are too small for me to read.  It seems a bit high still.  Would probably start furosemide 20 mg daily and clonidine 0.1 mg bid.     Tej             Previous Messages       ----- Message -----   From: Ashley Chau RN   Sent: 5/3/2022  10:37 AM CDT   To: Jak King MD     ----- Message from Ashley Chau RN sent at 5/3/2022 10:37 AM CDT -----   Dr. King, BP improving with the changes made. Anything additional at this time? Did note some swelling.   ----- Message from Ashley Chau RN sent at 4/20/2022  9:12 AM CDT -----   Hey Dr. King,     Golden's UPCR continues to trend up. BP checked today and yesterday 170/100s. Taking both his losartan and dilt at HS. I told him he likely should take the dilt in the AM to separate the 2 meds, but would ask you. Let me know what you would like to do for BP please.   He typically is asymptomatic with UTI. Doesn't have s/s but could rule out if you want UA.     Ashley Chavez   ----- Message from Ashley Chau RN sent at 4/19/2022  2:47 PM CDT -----   MyChart message sent to patient; replied?

## 2022-05-10 NOTE — TELEPHONE ENCOUNTER
RNCC spoke with Golden. He verbalized understanding to start clonidine 0.1 mg BID and Lasix 20 mg daily. Prescriptions sent to preferred pharmacy.     Ashley Chau RN, BSN  Solid Organ Transplant, Post Kidney and Pancreas  Transplant Care Coordinator  [phone]

## 2022-05-17 ENCOUNTER — DOCUMENTATION ONLY (OUTPATIENT)
Dept: TRANSPLANT | Facility: CLINIC | Age: 27
End: 2022-05-17
Payer: COMMERCIAL

## 2022-05-17 NOTE — PROGRESS NOTES
ISSUE: Creatinine level 1.8 on 5/16/22 07:17  Hx Recurrent IgA nephropathy with moderate proteinuria    Previous Messages       ----- Message -----   From: Ashley Chau, PRETTY   Sent: 5/17/2022  11:26 AM CDT   To: Jak King MD     Recently added clonidine 0.1 mg BID and furosemide 20 mg Daily to regimen for proteinuria and HTN. Labs have not changed. Is this new baseline 1.6-1.8?     PLAN:  Message  Received: Today  Jak King MD Blaisdell, Christin Rebecca, PRETTY  yes      OUTCOME:  Threshold for Creatinine documented as 1.6-1.8. Notification of new baseline to Golden via result notes.    Ashley Chau RN, BSN  Solid Organ Transplant, Post Kidney and Pancreas  Transplant Care Coordinator  [phone]

## 2022-07-02 DIAGNOSIS — Z94.0 LIVING-DONOR KIDNEY TRANSPLANT RECIPIENT: Primary | ICD-10-CM

## 2022-07-05 RX ORDER — TACROLIMUS 0.5 MG/1
0.5 CAPSULE ORAL 2 TIMES DAILY
Qty: 180 CAPSULE | Refills: 3 | Status: SHIPPED | OUTPATIENT
Start: 2022-07-05 | End: 2023-06-28

## 2022-07-05 RX ORDER — TACROLIMUS 1 MG/1
2 CAPSULE ORAL 2 TIMES DAILY
Qty: 360 CAPSULE | Refills: 3 | Status: SHIPPED | OUTPATIENT
Start: 2022-07-05 | End: 2023-06-28

## 2022-07-25 ENCOUNTER — TELEPHONE (OUTPATIENT)
Dept: TRANSPLANT | Facility: CLINIC | Age: 27
End: 2022-07-25

## 2022-07-25 NOTE — LETTER
PHYSICIAN ORDERS      DATE & TIME ISSUED: 2022  11:20 AM  PATIENT NAME: Golden Stover   : 1995     [facility] MR# [if applicable]: [id number]     DIAGNOSIS:  Kidney Transplant  ICD-10 CODE: Z94.0     Please repeat the following labs in 1-2 weeks:  Tacrolimus drug level  CBC  BMP    Any questions please call: [phone]  Please fax lab results to [phone].    .

## 2022-07-25 NOTE — TELEPHONE ENCOUNTER
Spoke to patient who confirms no recent illness, med changes, or pain over txp area.  Patient confirmed that he will repeat labs in 1-2 weeks.

## 2022-07-25 NOTE — TELEPHONE ENCOUNTER
ISSUE  Creatinine 2.0, new baseline 1.6-1.8.      PLAN  Christine, MD Kandi Aguirre, Ashley Pineda RN  Elevated serum creatinine and recommend usual assessment for fever, recent illness, pain over kidney allograft, blood pressure and volume status, as well as would just follow for now and repeat labs.         LPN task:  Please call with above plan as recommended by Dr. King. Send lab orders for repeat labs in 1-2 weeks.  Thanks!

## 2022-08-01 DIAGNOSIS — Z94.0 KIDNEY REPLACED BY TRANSPLANT: ICD-10-CM

## 2022-08-01 DIAGNOSIS — Z94.0 HTN, KIDNEY TRANSPLANT RELATED: Primary | ICD-10-CM

## 2022-08-01 DIAGNOSIS — I15.1 HTN, KIDNEY TRANSPLANT RELATED: Primary | ICD-10-CM

## 2022-08-01 DIAGNOSIS — Z48.298 AFTERCARE FOLLOWING ORGAN TRANSPLANT: ICD-10-CM

## 2022-08-01 DIAGNOSIS — I12.9 HYPERTENSIVE RENAL DISEASE: ICD-10-CM

## 2022-08-01 RX ORDER — FUROSEMIDE 20 MG
20 TABLET ORAL DAILY
Qty: 90 TABLET | Refills: 0 | Status: SHIPPED | OUTPATIENT
Start: 2022-08-01 | End: 2024-04-12

## 2022-08-01 RX ORDER — CLONIDINE HYDROCHLORIDE 0.1 MG/1
0.1 TABLET ORAL 2 TIMES DAILY
Qty: 180 TABLET | Refills: 0 | Status: SHIPPED | OUTPATIENT
Start: 2022-08-01 | End: 2022-11-09

## 2022-08-01 NOTE — TELEPHONE ENCOUNTER
"RNCC refilled requested furosemide and clonidine with 90 day supply, then sent Masala message to Golden with notification to collaborate with his primary care provider for future refill requests on hypertensive medications and diuretics.     Also, \"follow up within SOT\" order placed for schedulers to call patient and schedule in-person visit ([place] resident) w/Dr. King for annual transplant nephrology visit ([facility] Outreach visit requested).     Ashley Chau RN, BSN  Solid Organ Transplant, Post Kidney and Pancreas  Transplant Care Coordinator  [phone]       "

## 2022-09-11 ENCOUNTER — HEALTH MAINTENANCE LETTER (OUTPATIENT)
Age: 27
End: 2022-09-11

## 2022-09-30 DIAGNOSIS — Z94.0 LIVING-DONOR KIDNEY TRANSPLANT RECIPIENT: ICD-10-CM

## 2022-09-30 RX ORDER — MYCOPHENOLATE MOFETIL 250 MG/1
CAPSULE ORAL
Qty: 180 CAPSULE | Refills: 3 | Status: SHIPPED | OUTPATIENT
Start: 2022-09-30 | End: 2023-01-29

## 2022-10-21 ENCOUNTER — OFFICE VISIT (OUTPATIENT)
Dept: FAMILY MEDICINE | Facility: CLINIC | Age: 27
End: 2022-10-21
Payer: COMMERCIAL

## 2022-10-21 VITALS
TEMPERATURE: 97.7 F | DIASTOLIC BLOOD PRESSURE: 62 MMHG | SYSTOLIC BLOOD PRESSURE: 102 MMHG | OXYGEN SATURATION: 99 % | HEART RATE: 73 BPM

## 2022-10-21 DIAGNOSIS — H66.91 ACUTE OTITIS MEDIA, RIGHT: Primary | ICD-10-CM

## 2022-10-21 DIAGNOSIS — H10.33 ACUTE BACTERIAL CONJUNCTIVITIS OF BOTH EYES: ICD-10-CM

## 2022-10-21 DIAGNOSIS — B36.9 EXTERNAL OTITIS OF RIGHT EAR DUE TO FUNGUS: ICD-10-CM

## 2022-10-21 DIAGNOSIS — H62.41 EXTERNAL OTITIS OF RIGHT EAR DUE TO FUNGUS: ICD-10-CM

## 2022-10-21 PROCEDURE — 99203 OFFICE O/P NEW LOW 30 MIN: CPT | Performed by: NURSE PRACTITIONER

## 2022-10-21 RX ORDER — KETOCONAZOLE 20 MG/ML
SHAMPOO TOPICAL
COMMUNITY
Start: 2022-06-14

## 2022-10-21 RX ORDER — AMLODIPINE BESYLATE 10 MG/1
1 TABLET ORAL DAILY
COMMUNITY
Start: 2022-08-15

## 2022-10-21 RX ORDER — CLOTRIMAZOLE 1 G/ML
SOLUTION TOPICAL
Qty: 10 ML | Refills: 0 | Status: SHIPPED | OUTPATIENT
Start: 2022-10-21 | End: 2022-11-18

## 2022-10-21 RX ORDER — TRIAMCINOLONE ACETONIDE 1 MG/G
CREAM TOPICAL
COMMUNITY
Start: 2022-01-17

## 2022-10-21 RX ORDER — ALBUTEROL SULFATE 90 UG/1
1-2 AEROSOL, METERED RESPIRATORY (INHALATION)
COMMUNITY
Start: 2022-08-04 | End: 2023-07-30

## 2022-10-21 RX ORDER — AZITHROMYCIN 250 MG/1
TABLET, FILM COATED ORAL
Qty: 6 TABLET | Refills: 0 | Status: SHIPPED | OUTPATIENT
Start: 2022-10-21 | End: 2022-10-26

## 2022-10-21 RX ORDER — HYDROCORTISONE 25 MG/G
OINTMENT TOPICAL
COMMUNITY
Start: 2022-01-17

## 2022-10-21 RX ORDER — CIPROFLOXACIN HYDROCHLORIDE 3.5 MG/ML
1-2 SOLUTION/ DROPS TOPICAL 4 TIMES DAILY
Qty: 2.5 ML | Refills: 0 | Status: SHIPPED | OUTPATIENT
Start: 2022-10-21 | End: 2022-11-18

## 2022-10-21 NOTE — PROGRESS NOTES
Assessment & Plan     Acute otitis media, right    - azithromycin (ZITHROMAX) 250 MG tablet  Dispense: 6 tablet; Refill: 0    External otitis of right ear due to fungus    - azithromycin (ZITHROMAX) 250 MG tablet  Dispense: 6 tablet; Refill: 0  - clotrimazole (LOTRIMIN) 1 % external solution  Dispense: 10 mL; Refill: 0    Acute bacterial conjunctivitis of both eyes    - ciprofloxacin (CILOXAN) 0.3 % ophthalmic solution  Dispense: 2.5 mL; Refill: 0       Patient Instructions   To take antibiotics as directed. If eyes worsen, vision changes go to eye doctor. For ear if no improvement follow up with primary care provider or ENT. Wash linens 24 hours after starting eye drops.       Return in about 1 week (around 10/28/2022), or if symptoms worsen or fail to improve.    [facility]    Gloria Franks is a 27 year old male who presents to clinic today for the following health issues:  Chief Complaint   Patient presents with     Urgent Care     Eye Problem     Bilateral eye discharge and redness started yesterday. Pt had an old Rx of eye drops and used it today.     Ear Problem     Right ear pain for 1 month and has already been tx 2 times for ear infection. But no improvement. Left side of his face is sore/swollen and painful.     Patient reports that he was treated for eye infection both eyes with Ofloxacin eye drops, and for ear canal infection. He completed the 7 day course for the eyes, and finished the ear drops. The eyes are no better, the ear canal is somewhat better. He has no fever/chills/sweats. No pain in ears or eyes. He has no change in vision or sensitivity to light. He has drainage in his eyes in the am. In his ears he has a cottage cheese type of drainage, which is a little better. He has a history of psoriasis and kidney transplant 5 years ago.               Objective    /62   Pulse 73   Temp 97.7  F (36.5   C) (Tympanic)   SpO2 99%   Physical Exam  Constitutional:       Appearance: Normal appearance. He is normal weight. He is not ill-appearing.   HENT:      Head: Normocephalic and atraumatic.      Right Ear: External ear normal.      Left Ear: Tympanic membrane, ear canal and external ear normal.      Ears:      Comments: Right TM, purulent mucous behind TM, non erythematous. Right ear canal reddened, with mild swelling whitish drainage along top of ear canal.     Nose: Nose normal.      Mouth/Throat:      Mouth: Mucous membranes are moist.      Pharynx: Oropharynx is clear. No posterior oropharyngeal erythema.   Eyes:      Extraocular Movements: Extraocular movements intact.      Pupils: Pupils are equal, round, and reactive to light.      Comments: Right and left conjuctivae erythematous. No drainage.    Cardiovascular:      Rate and Rhythm: Normal rate and regular rhythm.   Pulmonary:      Effort: Pulmonary effort is normal.      Breath sounds: Normal breath sounds.   Musculoskeletal:      Cervical back: Neck supple. No tenderness.   Lymphadenopathy:      Cervical: No cervical adenopathy.   Skin:     General: Skin is warm and dry.   Neurological:      Mental Status: He is alert and oriented to person, place, and time.   Psychiatric:         Mood and Affect: Mood normal.         Behavior: Behavior normal.         Thought Content: Thought content normal.         Judgment: Judgment normal.

## 2022-10-21 NOTE — PATIENT INSTRUCTIONS
To take antibiotics as directed. If eyes worsen, vision changes go to eye doctor. For ear if no improvement follow up with primary care provider or ENT. Wash linens 24 hours after starting eye drops.

## 2022-10-22 ENCOUNTER — HOSPITAL ENCOUNTER (EMERGENCY)
Facility: CLINIC | Age: 27
Discharge: HOME | End: 2022-10-22
Attending: EMERGENCY MEDICINE | Admitting: EMERGENCY MEDICINE
Payer: COMMERCIAL

## 2022-10-22 VITALS
TEMPERATURE: 98 F | SYSTOLIC BLOOD PRESSURE: 138 MMHG | WEIGHT: 274 LBS | DIASTOLIC BLOOD PRESSURE: 81 MMHG | BODY MASS INDEX: 34.25 KG/M2 | HEART RATE: 85 BPM | RESPIRATION RATE: 16 BRPM | OXYGEN SATURATION: 99 %

## 2022-10-22 DIAGNOSIS — H10.33 ACUTE CONJUNCTIVITIS OF BOTH EYES, UNSPECIFIED ACUTE CONJUNCTIVITIS TYPE: ICD-10-CM

## 2022-10-22 DIAGNOSIS — K08.89 PAIN, DENTAL: ICD-10-CM

## 2022-10-22 DIAGNOSIS — J32.9 SINUSITIS, UNSPECIFIED CHRONICITY, UNSPECIFIED LOCATION: ICD-10-CM

## 2022-10-22 PROCEDURE — 99284 EMERGENCY DEPT VISIT MOD MDM: CPT

## 2022-10-22 RX ORDER — OXYCODONE HYDROCHLORIDE 5 MG/1
5 TABLET ORAL 3 TIMES DAILY PRN
Qty: 6 TABLET | Refills: 0 | Status: SHIPPED | OUTPATIENT
Start: 2022-10-22 | End: 2022-11-18

## 2022-10-22 ASSESSMENT — ENCOUNTER SYMPTOMS
CHILLS: 0
DIZZINESS: 0
EYE REDNESS: 1
CONFUSION: 0
JOINT SWELLING: 0
SHORTNESS OF BREATH: 0
NAUSEA: 0
VOMITING: 0
HEMATURIA: 0
DYSURIA: 0
COUGH: 1
SORE THROAT: 0
ABDOMINAL PAIN: 0
DIARRHEA: 0
FEVER: 0

## 2022-10-22 NOTE — DISCHARGE INSTRUCTIONS
Switch antibiotic to augmentin and take as directed (stop Zpak).  Take oxycodone for breakthrough pain.  Ok to take Tylenol as directed with this.  Follow up with your primary providers when you return home, as well as dentist.

## 2022-10-22 NOTE — ED TRIAGE NOTES
Pt presents to the ED with c/o tooth pain that has been going on for 5 days. Pt also has pink eye, and ear pain that have been getting treated. Pt is supposed to be following up with ENT soon. Pt denies any fevers. Pt reports hx of kidney transplant/immunosuppressed. Pt reports blood drainage out of right ear that has occurred in the last few days. Pain chewing on left side. Pt called emergency dentist, who referred pt here.      Triage Assessment       Row Name 10/22/22 6306       Triage Assessment (Adult)    Airway WDL WDL       Respiratory WDL    Respiratory WDL WDL       Skin Circulation/Temperature WDL    Skin Circulation/Temperature WDL WDL       Cardiac WDL    Cardiac WDL WDL       Peripheral/Neurovascular WDL    Peripheral Neurovascular WDL WDL       Cognitive/Neuro/Behavioral WDL    Cognitive/Neuro/Behavioral WDL WDL

## 2022-10-22 NOTE — ED PROVIDER NOTES
Emergency Department Encounter     Evaluation Date & Time:   10/22/2022  6:03 PM    CHIEF COMPLAINT:  Dental Pain, Conjunctivitis, and Otalgia      Triage Note:  Pt presents to the ED with c/o tooth pain that has been going on for 5 days. Pt also has pink eye, and ear pain that have been getting treated. Pt is supposed to be following up with ENT soon. Pt denies any fevers. Pt reports hx of kidney transplant/immunosuppressed. Pt reports blood drainage out of right ear that has occurred in the last few days. Pain chewing on left side. Pt called emergency dentist, who referred pt here.                ED COURSE & MEDICAL DECISION MAKING:        Pt has a history of previous kidney transplant, visiting from [place], but set to return home tomorrow, presenting with left upper dental pain for the past couple days.  Pt has been seen multiple times previous month for conjunctivitis, sinusitis and otitis externa, currently on antifungal and antibiotic drops to right ear, as well as Zpak, which he started yesterday.  Pt here now primarily for pain control for dental pain.  No abscess, afebrile, nontoxic appearing.  Will change antibx to augmentin for better dental/oral coverage.  Pt encouraged to follow up with dentistry Monday when he returns home, as well as primary to reassess everything.  No indication for labs/imaging at this time.    6:34 PM I introduced myself to the patient, obtained patient history, performed a physical exam, and discussed plan for ED workup including potential diagnostic laboratory/imaging studies and interventions.  6:45 PM Patient to be discharged by ED RN.    At the conclusion of the encounter I discussed the results of all the tests and the disposition. The questions were answered. The patient or family acknowledged understanding and was agreeable with the care plan.      MEDICATIONS GIVEN IN THE EMERGENCY DEPARTMENT:  Medications - No data to display    NEW PRESCRIPTIONS STARTED AT TODAY'S  ED VISIT:  Discharge Medication List as of 10/22/2022  6:46 PM      START taking these medications    Details   amoxicillin-clavulanate (AUGMENTIN) 875-125 MG tablet Take 1 tablet by mouth 2 times daily for 5 days, Disp-10 tablet, R-0, E-Prescribe      oxyCODONE (ROXICODONE) 5 MG tablet Take 1 tablet (5 mg) by mouth 3 times daily as needed for pain, Disp-6 tablet, R-0, Local Print             HPI   The history is provided by the patient. No  was used.        Golden Stover is a 27 year old male with a pertinent history of s/p kidney transplant with immunosuppression status who presents to this ED by private car with his spouse for evaluation of dental pain.    Per chart review, the [facility] presenting for evaluation of eye infection. The patient reported he recently completed a 7 day course of ofloxacin drops for a bilateral eye infection but presented after not noticing any improvement. He had noticed a cottage cheese like drainage from his eyes. On exam, right and left conjunctivae erythematous, no drainage, and right TM with purulent mucous behind it, no erythema behind the TM but the ear canal was reddened with mild swelling. Prescribed azithromycin, clotrimazole, and ciprofloxacin.    The patient reports in the last month he has had 3 courses of antibiotics and 2 courses of antifungals for recurrent ear infections and eye infections. His most recent antibiotic course was started yesterday and included a Z-elena and ciprofloxacin for ear infection, eye infection, and sinus infection. He was also started on clotrimazole yesterday. T    yessenia he is presenting with left upper dental pain. The pain has been gradually worsening since onset. He has been taking Tylenol for the pain without much improvement. He has not had any recent fever, vomiting, or diarrhea. He currently endorses cough and congestion. He otherwise denies any other complaints at this  time.    REVIEW OF SYSTEMS:  Review of Systems   Constitutional: Negative for chills and fever.   HENT: Positive for congestion, dental problem and ear pain. Negative for sore throat.    Eyes: Positive for redness. Negative for visual disturbance.   Respiratory: Positive for cough. Negative for shortness of breath.    Cardiovascular: Negative for chest pain.   Gastrointestinal: Negative for abdominal pain, diarrhea, nausea and vomiting.   Endocrine: Negative for polyuria.   Genitourinary: Negative for dysuria and hematuria.        - urinary changes   Musculoskeletal: Negative for joint swelling.   Skin: Negative for rash.   Neurological: Negative for dizziness.   Psychiatric/Behavioral: Negative for confusion.   All other systems reviewed and are negative.        Medical History     Past Medical History:   Diagnosis Date     Anemia in chronic kidney disease 11/21/2016     End stage kidney disease (H) 11/21/2016     Hypertension 11/21/2016     IgA nephropathy 11/21/2016     Psoriasis      Rhabdomyolysis 09/28/2016     Secondary renal hyperparathyroidism (H)        Past Surgical History:   Procedure Laterality Date     BIOPSY KIDNEY  2016     [facility]     IR RENAL BIOPSY RIGHT  10/8/2020     IR TUNNELED CATHETER COMPLETE REPLACEMENT  4/25/2017     RENAL BIOPSY       TRANSPLANT KIDNEY RECIPIENT LIVING RELATED N/A 6/7/2017    Procedure: TRANSPLANT KIDNEY RECIPIENT LIVING RELATED;  Kidney Transplant Living Related Recipient ;  Surgeon: Martell Robert MD;  Location: [facility] OR     TUNNELED VENOUS CATHETER PLACEMENT         Family History   Problem Relation Age of Onset     Cancer Paternal Grandfather      Cancer Maternal Grandfather      No Known Problems Mother      No Known Problems Father      No Known Problems Sister      No Known Problems Brother      Lung Cancer Maternal Grandfather      Cancer Paternal Grandmother      Skin Cancer Paternal Grandmother        Social History     Tobacco Use      Smoking status: Never     Smokeless tobacco: Never   Substance Use Topics     Alcohol use: Not Currently     Alcohol/week: 1.0 standard drink     Types: 1 Standard drinks or equivalent per week     Drug use: No       amoxicillin-clavulanate (AUGMENTIN) 875-125 MG tablet  oxyCODONE (ROXICODONE) 5 MG tablet  albuterol (PROAIR HFA/PROVENTIL HFA/VENTOLIN HFA) 108 (90 Base) MCG/ACT inhaler  amLODIPine (NORVASC) 10 MG tablet  azithromycin (ZITHROMAX) 250 MG tablet  ciprofloxacin (CILOXAN) 0.3 % ophthalmic solution  cloNIDine (CATAPRES) 0.1 MG tablet  clotrimazole (LOTRIMIN) 1 % external solution  diltiazem ER COATED BEADS (CARDIZEM CD/CARTIA XT) 120 MG 24 hr capsule  furosemide (LASIX) 20 MG tablet  hydrocortisone 2.5 % ointment  ketoconazole (NIZORAL) 2 % external shampoo  losartan (COZAAR) 100 MG tablet  mycophenolate (GENERIC EQUIVALENT) 250 MG capsule  tacrolimus (GENERIC EQUIVALENT) 0.5 MG capsule  tacrolimus (GENERIC EQUIVALENT) 1 MG capsule  triamcinolone (KENALOG) 0.1 % external cream  vitamin D3 (CHOLECALCIFEROL) 50 mcg (2000 units) tablet        Physical Exam     Vitals:  /81   Pulse 85   Temp 98  F (36.7  C) (Temporal)   Resp 16   Wt 124.3 kg (274 lb)   SpO2 99%   BMI 34.25 kg/m      PHYSICAL EXAM:   Physical Exam  Vitals and nursing note reviewed.   Constitutional:       General: He is not in acute distress.     Appearance: Normal appearance.   HENT:      Head: Normocephalic and atraumatic.      Ears:      Comments: TMs normal bilaterally, right canal mild swelling, left canal normal     Nose: Nose normal.      Mouth/Throat:      Mouth: Mucous membranes are moist.      Comments: Tooth 16 is minimally tender, no associated abscess or facial swelling  Eyes:      Extraocular Movements: Extraocular movements intact.      Comments: Severe bilateral conjuntival injection, no hemorrhage, clear teras coming form both eyes   Cardiovascular:      Rate and Rhythm: Normal rate and regular rhythm.      Pulses:  Normal pulses.           Radial pulses are 2+ on the right side and 2+ on the left side.        Dorsalis pedis pulses are 2+ on the right side and 2+ on the left side.   Pulmonary:      Effort: Pulmonary effort is normal.   Skin:     Findings: No rash.   Neurological:      General: No focal deficit present.      Mental Status: He is alert. Mental status is at baseline.      Comments: Fluent speech   Psychiatric:         Mood and Affect: Mood normal.         Behavior: Behavior normal.         Results     LAB:  All pertinent labs reviewed and interpreted  Labs Ordered and Resulted from Time of ED Arrival to Time of ED Departure - No data to display    RADIOLOGY:  No orders to display                ECG:  none    PROCEDURES:  Procedures:  none      FINAL IMPRESSION:    ICD-10-CM    1. Pain, dental  K08.89       2. Acute conjunctivitis of both eyes, unspecified acute conjunctivitis type  H10.33       3. Sinusitis, unspecified chronicity, unspecified location  J32.9           0 minutes of critical care time      IRuss, am serving as a scribe to document services personally performed by Dr. Luis Live, based on my observations and the provider's statements to me. ILuis, DO attest that Russ Daniels is acting in a scribe capacity, has observed my performance of the services and has documented them in accordance with my direction.      Luis Live DO  Emergency Medicine  [facility] EMERGENCY ROOM  10/22/2022  6:15 PM          Luis Live MD  10/22/22 6226

## 2022-10-24 ENCOUNTER — TELEPHONE (OUTPATIENT)
Dept: TRANSPLANT | Facility: CLINIC | Age: 27
End: 2022-10-24

## 2022-10-25 NOTE — TELEPHONE ENCOUNTER
RNCC returned call to Golden. He states he is back home to [place] and has received a referral for ENT specialist yesterday. He is currently taking antibiotics. Encouraged good hydration and to repeat post-transplant labs when he has completed his antibiotic course. Golden verbalized understanding. Follow up with Dr. King 11/18/22.     Ashley Chau RN, BSN  Solid Organ Transplant, Post Kidney and Pancreas  Transplant Care Coordinator  [phone]

## 2022-11-01 DIAGNOSIS — Z48.298 AFTERCARE FOLLOWING ORGAN TRANSPLANT: ICD-10-CM

## 2022-11-01 DIAGNOSIS — Z94.0 KIDNEY REPLACED BY TRANSPLANT: Primary | ICD-10-CM

## 2022-11-01 DIAGNOSIS — Z79.899 ENCOUNTER FOR LONG-TERM CURRENT USE OF MEDICATION: ICD-10-CM

## 2022-11-09 DIAGNOSIS — I12.9 HYPERTENSIVE RENAL DISEASE: Primary | ICD-10-CM

## 2022-11-09 DIAGNOSIS — I15.1 HTN, KIDNEY TRANSPLANT RELATED: ICD-10-CM

## 2022-11-09 DIAGNOSIS — Z94.0 HTN, KIDNEY TRANSPLANT RELATED: ICD-10-CM

## 2022-11-09 DIAGNOSIS — Z48.298 AFTERCARE FOLLOWING ORGAN TRANSPLANT: ICD-10-CM

## 2022-11-09 RX ORDER — CLONIDINE HYDROCHLORIDE 0.1 MG/1
0.1 TABLET ORAL 2 TIMES DAILY
Qty: 180 TABLET | Refills: 0 | Status: SHIPPED | OUTPATIENT
Start: 2022-11-09 | End: 2023-02-16

## 2022-11-18 ENCOUNTER — TELEPHONE (OUTPATIENT)
Dept: TRANSPLANT | Facility: CLINIC | Age: 27
End: 2022-11-18

## 2022-11-18 ENCOUNTER — OFFICE VISIT (OUTPATIENT)
Dept: NEPHROLOGY | Facility: CLINIC | Age: 27
End: 2022-11-18
Payer: COMMERCIAL

## 2022-11-18 VITALS
BODY MASS INDEX: 33.59 KG/M2 | HEART RATE: 66 BPM | WEIGHT: 268.7 LBS | DIASTOLIC BLOOD PRESSURE: 66 MMHG | SYSTOLIC BLOOD PRESSURE: 118 MMHG

## 2022-11-18 DIAGNOSIS — E55.9 VITAMIN D DEFICIENCY: ICD-10-CM

## 2022-11-18 DIAGNOSIS — Z94.0 HTN, KIDNEY TRANSPLANT RELATED: ICD-10-CM

## 2022-11-18 DIAGNOSIS — Z48.298 AFTERCARE FOLLOWING ORGAN TRANSPLANT: ICD-10-CM

## 2022-11-18 DIAGNOSIS — I15.1 HTN, KIDNEY TRANSPLANT RELATED: ICD-10-CM

## 2022-11-18 DIAGNOSIS — Z94.0 KIDNEY REPLACED BY TRANSPLANT: Primary | ICD-10-CM

## 2022-11-18 DIAGNOSIS — D84.9 IMMUNOSUPPRESSION (H): ICD-10-CM

## 2022-11-18 DIAGNOSIS — N18.31 STAGE 3A CHRONIC KIDNEY DISEASE (H): ICD-10-CM

## 2022-11-18 PROCEDURE — 99214 OFFICE O/P EST MOD 30 MIN: CPT | Performed by: INTERNAL MEDICINE

## 2022-11-18 NOTE — LETTER
PHYSICIAN ORDERS      DATE & TIME ISSUED: 2022 1:48 PM  PATIENT NAME: Golden Stover   : 1995     [facility] MR# [if applicable]: [id number]     DIAGNOSIS:  Kidney Transplanted; Encounter for Vitamin D Deficiency  ICD-10 CODE: Z94.0; Z13.21    Please check Vitamin D deficiency level with next scheduled labs.    Any questions please call: [phone]  Fax results to:  [phone].    .

## 2022-11-18 NOTE — TELEPHONE ENCOUNTER
Provider Call: General  Route to LPN    Reason for call: Call from clinic  They received information on pt to Dr Patrick Day- no longer practices at the clinic  He has his own Practice  They shredded information   [facility], [place]  [phone]    Call back needed? No

## 2022-11-18 NOTE — PROGRESS NOTES
Clinic visit  Received: Today  Jak King MD Blaisdell, Ashley Pineda, RN    Please check the following labs: Vitamin D.     OUTCOME: Lab requisition letter sent to preferred lab on file and to patient via [employer].    Ashley Chau, RN, BSN  Solid Organ Transplant, Post Kidney and Pancreas  Transplant Care Coordinator  [phone]

## 2022-11-18 NOTE — PATIENT INSTRUCTIONS
Patient Recommendations:  - Recommend obtaining an updated COVID vaccination, the bivalent version, as well as the flu vaccination.   - Recommend weight loss for overall health by increasing exercise and watching caloric intake.     Transplant Patient Information  Your Post Transplant Coordinator is: Ashley Chau  For non urgent items, we encourage you to contact your coordinator/care team online via [employer]  You and your care team can also contact your transplant coordinator Monday - Friday, 8am - 5pm at [phone] (Option 2 to reach the coordinator or Option 4 to schedule an appointment).  After hours for urgent matters, please call [facility] at [phone].

## 2022-11-18 NOTE — PROGRESS NOTES
TRANSPLANT NEPHROLOGY CHRONIC POST TRANSPLANT VISIT    Assessment & Plan   # LDKT: Stable creatinine at ~ 1.6-1.8 over the last 18 months, but slightly higher than previous baseline close to ~ 1.4-1.6.  Kidney transplant biopsy 10/2020 did show recurrent IgA nephropathy and he has moderate proteinuria.  Mild proteinuria now.  Would consider kidney transplant biopsy for creatinine 2.2 or greater.   - Baseline Creatinine:  ~ 1.6-1.8   - Proteinuria: Mild (0.5-1.0 grams)   - Date DSA Last Checked: Oct/2021      Latest DSA: No   - BK Viremia: Not checked recently due to time from transplant   - Kidney Tx Biopsy: Oct 08, 2020; Result: No diagnostic evidence of acute rejection.  Recurrent IgA nephropathy without any crescents.  Mild arteriosclerosis.    # Immunosuppression: Tacrolimus immediate release (goal 4-6) and Mycophenolate mofetil (dose 750 mg every 12 hours)   - Continue with intensive monitoring of immunosuppression for efficacy and toxicity.   - Changes: No    # Infection Prophylaxis:   Last CD4 Level: 201 (5/2018)  - PJP: None    # Hypertension: Controlled;  Goal BP: < 130/80   - Changes: No    # Mineral Bone Disorder:   - Vitamin D; level: Not checked recently        On supplement: Yes  - Calcium; level: Normal        On supplement: No    # Obesity, Class I (BMI = 33.6): Weight is a bit up and down, but stable overall.   - Recommend weight loss for overall health by increasing exercise and watching caloric intake.    # Skin Cancer Risk:    - Discussed sun protection and recommend regular follow up with Dermatology.    # Medical Compliance: Yes    # COVID-19 Virus Review: Discussed COVID-19 virus and the potential medical risks.  Reviewed preventative health recommendations, including wearing a mask where appropriate.  Recommended COVID vaccination should be up to date with either an initial vaccination or booster shot when appropriate.  Asked the patient to inform the transplant center if they are exposed or  diagnosed with this virus.    # COVID Vaccination Up To Date: No, due for next dose    # Transplant History:  Etiology of Kidney Failure: IgA nephropathy  Tx: LDKT  Transplant: 6/7/2017 (Kidney)  Significant changes in immunosuppression: None  Significant transplant-related complications: None    Transplant Office Phone Number: [phone]    Assessment and plan was discussed with the patient and he voiced his understanding and agreement.    Return visit: Return in about 1 year (around 11/18/2023).    Jak King MD    Chief Complaint   Mr. Stover is a 27 year old here for kidney transplant and immunosuppression management.    History of Present Illness    Mr. Stover reports feeling good overall with some medical complaints.  Since last clinic visit, patient reports no hospitalizations, but some new medical complaints.  Patient has been bothered by recurrent sinusitis and otitis externa infections.  He has been treated with several courses of antibiotics and just finished some antibiotic ear drops last week.  Symptoms seems to be mostly resolved at this point.  He has a follow up with ENT in a couple of weeks.    Also of note, he and his wife had a baby boy about 4 months ago.  The baby is healthy and doing well, even sleeping through the night now.    His energy level is good and remains pretty normal.  He is active and does get some exercise.  Denies any chest pain or shortness of breath with exertion.  No leg swelling.    Appetite is good and weight is mostly stable.  No nausea, vomiting or diarrhea.  No fever, sweats or chills.  No night sweats.    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      Mold Itching     Seasonal Allergies Itching     Sulfa Drugs      Thymoglobulin Itching       Medications   Current Outpatient Medications   Medication Sig     albuterol (PROAIR HFA/PROVENTIL HFA/VENTOLIN HFA) 108 (90 Base) MCG/ACT inhaler Inhale 1-2 puffs into the lungs     amLODIPine (NORVASC) 10 MG tablet Take 1 tablet by mouth daily     cloNIDine (CATAPRES) 0.1 MG tablet Take 1 tablet (0.1 mg) by mouth 2 times daily     diltiazem ER COATED BEADS (CARDIZEM CD/CARTIA XT) 120 MG 24 hr capsule Take 1 capsule (120 mg) by mouth every morning     furosemide (LASIX) 20 MG tablet Take 1 tablet (20 mg) by mouth daily     hydrocortisone 2.5 % ointment APPLY OVER AFFECTED AREA TWICE DAILY TO AFFECTED AREA FOR 2 WEEKS, THEN TAKE 1 WEEK OFF. REPEAT AS NECESSARY.     ketoconazole (NIZORAL) 2 % external shampoo WASH WITH 2-4 TIMES A WEEK     losartan (COZAAR) 100 MG tablet Take 1 tablet (100 mg) by mouth At Bedtime     mycophenolate (GENERIC EQUIVALENT) 250 MG capsule TAKE 3 CAPSULES BY MOUTH TWICE A DAY     tacrolimus (GENERIC EQUIVALENT) 0.5 MG capsule Take 1 capsule (0.5 mg) by mouth 2 times daily     tacrolimus (GENERIC EQUIVALENT) 1 MG capsule Take 2 capsules (2 mg) by mouth 2 times daily     triamcinolone (KENALOG) 0.1 % external cream APPLY TOPICALLY TO AFFECTED AREAS TWICE A DAY FOR UP TO 14 DAYS IN A ROW, OR AS NEEDED.     vitamin D3 (CHOLECALCIFEROL) 50 mcg (2000 units) tablet Take 1 tablet by mouth daily     No current facility-administered medications for this visit.     Medications Discontinued During This Encounter   Medication Reason     ciprofloxacin (CILOXAN)  0.3 % ophthalmic solution Therapy completed     clotrimazole (LOTRIMIN) 1 % external solution Therapy completed     oxyCODONE (ROXICODONE) 5 MG tablet Therapy completed       Physical Exam   Vital Signs: /66 (BP Location: Left arm, Patient Position: Sitting, Cuff Size: Adult Large)   Pulse 66     GENERAL APPEARANCE: alert and no distress  HENT: mouth without ulcers or lesions  LYMPHATICS: no cervical or supraclavicular nodes  RESP: lungs clear to auscultation - no rales, rhonchi or wheezes  CV: regular rhythm, normal rate, no rub, no murmur  EDEMA: no LE edema bilaterally  ABDOMEN: soft, nondistended, nontender, bowel sounds normal, obese  MS: extremities normal - no gross deformities noted, no evidence of inflammation in joints, no muscle tenderness  SKIN: no rash  TX KIDNEY: normal  DIALYSIS ACCESS: none    Data     Renal Latest Ref Rng & Units 10/3/2022 7/22/2022 5/16/2022   Na 133 - 144 mmol/L - - -   Na (external) 136 - 145 mmol/L 140 140 140   K 3.4 - 5.3 mmol/L - - -   K (external) 3.6 - 5.5 mmol/L 4.1 4.2 4.3   Cl 98 - 109 mmol/L 108 107 106   CO2 20 - 32 mmol/L - - -   CO2 (external) 23.0 - 33.0 mmol/L 21.3(L) 25.2 25.3   BUN 7 - 30 mg/dL - - -   BUN (external) 7 - 22 mg/dL 18 20 21   Cr 0.66 - 1.25 mg/dL - - -   Cr (external) 0.6 - 1.3 mg/dL 1.8(H) 2.0(H) 1.8(H)   Glucose 70 - 99 mg/dL - - -   Glucose (external) 70 - 99 mg/dL 98 96 94   Ca  8.5 - 10.1 mg/dL - - -   Ca (external) 8.8 - 10.5 mg/dL 9.8 9.7 9.7   Mg 1.6 - 2.3 mg/dL - - -   Mg (external) 1.5 - 2.2 mEq/L - - -     Bone Health Latest Ref Rng & Units 10/30/2018 8/17/2018 9/11/2017   Phos 2.5 - 4.5 mg/dL - - -   Phos (external) 2.5 - 4.6 mg/dL - - -   PTHi 12 - 72 pg/mL - - 132(H)   PTHi (external) 15.0 - 115.0 pg/mL - 102.9 -   Vit D Def 20 - 75 ug/L - - -   Vit D Def (external) 27 - 80 ng/mL 26(L) - -     Heme Latest Ref Rng & Units 10/3/2022 7/22/2022 4/19/2022   WBC 4.0 - 11.0 10e9/L - - -   WBC (external) 3.60 - 11.0 K/uL 6.67 6.11 6.89    Hgb 13.3 - 17.7 g/dL - - -   Hgb (external) 13.0 - 18.0 g/dL 14.2 14.2 14.8   Plt 150 - 450 10e9/L - - -   Plt (external) 150 - 440 K/uL 449(H) 395 436   ABSOLUTE NEUTROPHIL 1.6 - 8.3 10e9/L - - -   ABSOLUTE NEUTROPHILS (EXTERNAL) 1.5 - 7.6 10*9/L - - -   ABSOLUTE LYMPHOCYTES 0.8 - 5.3 10e9/L - - -   ABSOLUTE LYMPHOCYTES (EXTERNAL) 0.8 - 3.3 10*9/L - - -   ABSOLUTE MONOCYTES 0.0 - 1.3 10e9/L - - -   ABSOLUTE MONOCYTES (EXTERNAL) 0.2 - 0.9 10*9/L - - -   ABSOLUTE EOSINOPHILS 0.0 - 0.7 10e9/L - - -   ABSOLUTE EOSINOPHILS (EXTERNAL) 0.0 - 0.4 10*9/L - - -   ABSOLUTE BASOPHILS 0.0 - 0.2 10e9/L - - -   ABSOLUTE BASOPHILS (EXTERNAL) 0.0 - 0.1 10*9/L - - -   ABS IMMATURE GRANULOCYTES 0 - 0.4 10e9/L - - -   ABSOLUTE NUCLEATED RBC - - - -     Liver Latest Ref Rng & Units 6/8/2018 3/6/2018 1/23/2018   AP 40 - 150 U/L - - -   AP (external) 40 - 150 IU/L 75 76 71   TBili 0.2 - 1.3 mg/dL - - -   TBili (external) 0.2 - 1.2 mg/dL 2.3(H) 1.3(H) 1.3(H)   DBili (external) 0.0 - 0.5 mg/dL 0.7(H) 0.5 0.5   ALT 0 - 70 U/L - - -   ALT (external) 6 - 40 IU/L 31 20 20   AST 0 - 45 U/L - - -   AST (external) 10 - 40 IU/L 30 25 19   Tot Protein 6.8 - 8.8 g/dL - - -   Tot Protein (external) 6.0 - 8.0 g/dL 7.4 7.6 7.3   Albumin 3.4 - 5.0 g/dL - - -   Albumin (external) 3.5 - 5.0 g/dL 4.4 4.6 4.5        Iron studies Latest Ref Rng & Units 6/1/2017   Iron 35 - 180 ug/dL 65   Iron sat 15 - 46 % 21   Ferritin 26 - 388 ng/mL 310     [facility] Txp Virology Latest Ref Rng & Units 10/8/2020 1/15/2019 12/7/2018   BK Spec - Plasma - -   BK Res BKNEG:BK Virus DNA Not Detected copies/mL BK Virus DNA Not Detected - -   BK Log <2.7 Log copies/mL Not Calculated - -   BK Quant Result Ext None detected - None detected None detected   EBV CAPSID ANTIBODY IGG 0.0 - 0.8 AI - - -   Hep B Core NR - - -   Hep B Core Ext Nonreactive - - -   Hep B Surf Ext  Positive >=12.00 mIU/mL - - -        Recent Labs   Lab Test 06/30/17  0830 07/03/17  0857 10/08/20  0839   DOSTAC  2030, 06/29/17 2030 07/02/17 Not Provided   TACROL 9.5 11.1 18.8*     Recent Labs   Lab Test 06/16/17  0712 06/29/17  0831   DOSMPA 1930 06/15/2017 6/28/17 7:30pm   MPACID 1.26 4.40*   MPAG 27.9* 43.9

## 2022-11-18 NOTE — LETTER
11/18/2022      RE: [place]  [address] [facility] [address]       TRANSPLANT NEPHROLOGY CHRONIC POST TRANSPLANT VISIT    Assessment & Plan   # LDKT: Stable creatinine at ~ 1.6-1.8 over the last 18 months, but slightly higher than previous baseline close to ~ 1.4-1.6.  Kidney transplant biopsy 10/2020 did show recurrent IgA nephropathy and he has moderate proteinuria.  Mild proteinuria now.  Would consider kidney transplant biopsy for creatinine 2.2 or greater.   - Baseline Creatinine:  ~ 1.6-1.8   - Proteinuria: Mild (0.5-1.0 grams)   - Date DSA Last Checked: Oct/2021      Latest DSA: No   - BK Viremia: Not checked recently due to time from transplant   - Kidney Tx Biopsy: Oct 08, 2020; Result: No diagnostic evidence of acute rejection.  Recurrent IgA nephropathy without any crescents.  Mild arteriosclerosis.    # Immunosuppression: Tacrolimus immediate release (goal 4-6) and Mycophenolate mofetil (dose 750 mg every 12 hours)   - Continue with intensive monitoring of immunosuppression for efficacy and toxicity.   - Changes: No    # Infection Prophylaxis:   Last CD4 Level: 201 (5/2018)  - PJP: None    # Hypertension: Controlled;  Goal BP: < 130/80   - Changes: No    # Mineral Bone Disorder:   - Vitamin D; level: Not checked recently        On supplement: Yes  - Calcium; level: Normal        On supplement: No    # Obesity, Class I (BMI = 33.6): Weight is a bit up and down, but stable overall.   - Recommend weight loss for overall health by increasing exercise and watching caloric intake.    # Skin Cancer Risk:    - Discussed sun protection and recommend regular follow up with Dermatology.    # Medical Compliance: Yes    # COVID-19 Virus Review: Discussed COVID-19 virus and the potential medical risks.  Reviewed preventative health recommendations, including wearing a mask where appropriate.  Recommended COVID vaccination should be up to date with either an initial vaccination or booster shot when  appropriate.  Asked the patient to inform the transplant center if they are exposed or diagnosed with this virus.    # COVID Vaccination Up To Date: No, due for next dose    # Transplant History:  Etiology of Kidney Failure: IgA nephropathy  Tx: LDKT  Transplant: 6/7/2017 (Kidney)  Significant changes in immunosuppression: None  Significant transplant-related complications: None    Transplant Office Phone Number: [phone]    Assessment and plan was discussed with the patient and he voiced his understanding and agreement.    Return visit: Return in about 1 year (around 11/18/2023).    Jak King MD    Chief Complaint   Mr. Stover is a 27 year old here for kidney transplant and immunosuppression management.    History of Present Illness    Mr. Stover reports feeling good overall with some medical complaints.  Since last clinic visit, patient reports no hospitalizations, but some new medical complaints.  Patient has been bothered by recurrent sinusitis and otitis externa infections.  He has been treated with several courses of antibiotics and just finished some antibiotic ear drops last week.  Symptoms seems to be mostly resolved at this point.  He has a follow up with ENT in a couple of weeks.    Also of note, he and his wife had a baby boy about 4 months ago.  The baby is healthy and doing well, even sleeping through the night now.    His energy level is good and remains pretty normal.  He is active and does get some exercise.  Denies any chest pain or shortness of breath with exertion.  No leg swelling.    Appetite is good and weight is mostly stable.  No nausea, vomiting or diarrhea.  No fever, sweats or chills.  No night sweats.    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      Mold Itching     Seasonal Allergies Itching     Sulfa Drugs      Thymoglobulin Itching       Medications   Current Outpatient Medications   Medication Sig     albuterol (PROAIR HFA/PROVENTIL HFA/VENTOLIN HFA) 108 (90 Base) MCG/ACT inhaler Inhale 1-2 puffs into the lungs     amLODIPine (NORVASC) 10 MG tablet Take 1 tablet by mouth daily     cloNIDine (CATAPRES) 0.1 MG tablet Take 1 tablet (0.1 mg) by mouth 2 times daily     diltiazem ER COATED BEADS (CARDIZEM CD/CARTIA XT) 120 MG 24 hr capsule Take 1 capsule (120 mg) by mouth every morning     furosemide (LASIX) 20 MG tablet Take 1 tablet (20 mg) by mouth daily     hydrocortisone 2.5 % ointment APPLY OVER AFFECTED AREA TWICE DAILY TO AFFECTED AREA FOR 2 WEEKS, THEN TAKE 1 WEEK OFF. REPEAT AS NECESSARY.     ketoconazole (NIZORAL) 2 % external shampoo WASH WITH 2-4 TIMES A WEEK     losartan (COZAAR) 100 MG tablet Take 1 tablet (100 mg) by mouth At Bedtime     mycophenolate (GENERIC EQUIVALENT) 250 MG capsule TAKE 3 CAPSULES BY MOUTH TWICE A DAY     tacrolimus (GENERIC EQUIVALENT) 0.5 MG capsule Take 1 capsule (0.5 mg) by mouth 2 times daily     tacrolimus (GENERIC EQUIVALENT) 1 MG capsule Take 2 capsules (2 mg) by mouth 2 times daily     triamcinolone (KENALOG) 0.1 % external cream APPLY TOPICALLY TO AFFECTED AREAS TWICE A DAY FOR UP TO 14 DAYS IN A ROW, OR AS NEEDED.     vitamin D3 (CHOLECALCIFEROL) 50 mcg (2000 units) tablet Take 1 tablet by mouth daily     No current facility-administered medications for this visit.      Medications Discontinued During This Encounter   Medication Reason     ciprofloxacin (CILOXAN) 0.3 % ophthalmic solution Therapy completed     clotrimazole (LOTRIMIN) 1 % external solution Therapy completed     oxyCODONE (ROXICODONE) 5 MG tablet Therapy completed       Physical Exam   Vital Signs: /66 (BP Location: Left arm, Patient Position: Sitting, Cuff Size: Adult Large)   Pulse 66     GENERAL APPEARANCE: alert and no distress  HENT: mouth without ulcers or lesions  LYMPHATICS: no cervical or supraclavicular nodes  RESP: lungs clear to auscultation - no rales, rhonchi or wheezes  CV: regular rhythm, normal rate, no rub, no murmur  EDEMA: no LE edema bilaterally  ABDOMEN: soft, nondistended, nontender, bowel sounds normal, obese  MS: extremities normal - no gross deformities noted, no evidence of inflammation in joints, no muscle tenderness  SKIN: no rash  TX KIDNEY: normal  DIALYSIS ACCESS: none    Data     Renal Latest Ref Rng & Units 10/3/2022 7/22/2022 5/16/2022   Na 133 - 144 mmol/L - - -   Na (external) 136 - 145 mmol/L 140 140 140   K 3.4 - 5.3 mmol/L - - -   K (external) 3.6 - 5.5 mmol/L 4.1 4.2 4.3   Cl 98 - 109 mmol/L 108 107 106   CO2 20 - 32 mmol/L - - -   CO2 (external) 23.0 - 33.0 mmol/L 21.3(L) 25.2 25.3   BUN 7 - 30 mg/dL - - -   BUN (external) 7 - 22 mg/dL 18 20 21   Cr 0.66 - 1.25 mg/dL - - -   Cr (external) 0.6 - 1.3 mg/dL 1.8(H) 2.0(H) 1.8(H)   Glucose 70 - 99 mg/dL - - -   Glucose (external) 70 - 99 mg/dL 98 96 94   Ca  8.5 - 10.1 mg/dL - - -   Ca (external) 8.8 - 10.5 mg/dL 9.8 9.7 9.7   Mg 1.6 - 2.3 mg/dL - - -   Mg (external) 1.5 - 2.2 mEq/L - - -     Bone Health Latest Ref Rng & Units 10/30/2018 8/17/2018 9/11/2017   Phos 2.5 - 4.5 mg/dL - - -   Phos (external) 2.5 - 4.6 mg/dL - - -   PTHi 12 - 72 pg/mL - - 132(H)   PTHi (external) 15.0 - 115.0 pg/mL - 102.9 -   Vit D Def 20 - 75 ug/L - - -   Vit D Def (external) 27 - 80 ng/mL 26(L) - -     Heme Latest Ref Rng & Units 10/3/2022  7/22/2022 4/19/2022   WBC 4.0 - 11.0 10e9/L - - -   WBC (external) 3.60 - 11.0 K/uL 6.67 6.11 6.89   Hgb 13.3 - 17.7 g/dL - - -   Hgb (external) 13.0 - 18.0 g/dL 14.2 14.2 14.8   Plt 150 - 450 10e9/L - - -   Plt (external) 150 - 440 K/uL 449(H) 395 436   ABSOLUTE NEUTROPHIL 1.6 - 8.3 10e9/L - - -   ABSOLUTE NEUTROPHILS (EXTERNAL) 1.5 - 7.6 10*9/L - - -   ABSOLUTE LYMPHOCYTES 0.8 - 5.3 10e9/L - - -   ABSOLUTE LYMPHOCYTES (EXTERNAL) 0.8 - 3.3 10*9/L - - -   ABSOLUTE MONOCYTES 0.0 - 1.3 10e9/L - - -   ABSOLUTE MONOCYTES (EXTERNAL) 0.2 - 0.9 10*9/L - - -   ABSOLUTE EOSINOPHILS 0.0 - 0.7 10e9/L - - -   ABSOLUTE EOSINOPHILS (EXTERNAL) 0.0 - 0.4 10*9/L - - -   ABSOLUTE BASOPHILS 0.0 - 0.2 10e9/L - - -   ABSOLUTE BASOPHILS (EXTERNAL) 0.0 - 0.1 10*9/L - - -   ABS IMMATURE GRANULOCYTES 0 - 0.4 10e9/L - - -   ABSOLUTE NUCLEATED RBC - - - -     Liver Latest Ref Rng & Units 6/8/2018 3/6/2018 1/23/2018   AP 40 - 150 U/L - - -   AP (external) 40 - 150 IU/L 75 76 71   TBili 0.2 - 1.3 mg/dL - - -   TBili (external) 0.2 - 1.2 mg/dL 2.3(H) 1.3(H) 1.3(H)   DBili (external) 0.0 - 0.5 mg/dL 0.7(H) 0.5 0.5   ALT 0 - 70 U/L - - -   ALT (external) 6 - 40 IU/L 31 20 20   AST 0 - 45 U/L - - -   AST (external) 10 - 40 IU/L 30 25 19   Tot Protein 6.8 - 8.8 g/dL - - -   Tot Protein (external) 6.0 - 8.0 g/dL 7.4 7.6 7.3   Albumin 3.4 - 5.0 g/dL - - -   Albumin (external) 3.5 - 5.0 g/dL 4.4 4.6 4.5        Iron studies Latest Ref Rng & Units 6/1/2017   Iron 35 - 180 ug/dL 65   Iron sat 15 - 46 % 21   Ferritin 26 - 388 ng/mL 310     [facility] Txp Virology Latest Ref Rng & Units 10/8/2020 1/15/2019 12/7/2018   BK Spec - Plasma - -   BK Res BKNEG:BK Virus DNA Not Detected copies/mL BK Virus DNA Not Detected - -   BK Log <2.7 Log copies/mL Not Calculated - -   BK Quant Result Ext None detected - None detected None detected   EBV CAPSID ANTIBODY IGG 0.0 - 0.8 AI - - -   Hep B Core NR - - -   Hep B Core Ext Nonreactive - - -   Hep B Surf Ext  Positive >=12.00  mIU/mL - - -        Recent Labs   Lab Test 06/30/17  0830 07/03/17  0857 10/08/20  0839   DOSTAC 2030, 06/29/17 2030 07/02/17 Not Provided   TACROL 9.5 11.1 18.8*     Recent Labs   Lab Test 06/16/17  0712 06/29/17  0831   DOSMPA 1930 06/15/2017 6/28/17 7:30pm   MPACID 1.26 4.40*   MPAG 27.9* 43.9       Jak King MD

## 2022-11-18 NOTE — LETTER
11/18/2022       RE: Golden Stover  [address] [facility] [place] [address]     Dear Colleague,    Thank you for referring your patient, Golden Stover, to the [facility] at [facility]. Please see a copy of my visit note below.    TRANSPLANT NEPHROLOGY CHRONIC POST TRANSPLANT VISIT    Assessment & Plan   # LDKT: Stable creatinine at ~ 1.6-1.8 over the last 18 months, but slightly higher than previous baseline close to ~ 1.4-1.6.  Kidney transplant biopsy 10/2020 did show recurrent IgA nephropathy and he has moderate proteinuria.  Mild proteinuria now.  Would consider kidney transplant biopsy for creatinine 2.2 or greater.   - Baseline Creatinine:  ~ 1.6-1.8   - Proteinuria: Mild (0.5-1.0 grams)   - Date DSA Last Checked: Oct/2021      Latest DSA: No   - BK Viremia: Not checked recently due to time from transplant   - Kidney Tx Biopsy: Oct 08, 2020; Result: No diagnostic evidence of acute rejection.  Recurrent IgA nephropathy without any crescents.  Mild arteriosclerosis.    # Immunosuppression: Tacrolimus immediate release (goal 4-6) and Mycophenolate mofetil (dose 750 mg every 12 hours)   - Continue with intensive monitoring of immunosuppression for efficacy and toxicity.   - Changes: No    # Infection Prophylaxis:   Last CD4 Level: 201 (5/2018)  - PJP: None    # Hypertension: Controlled;  Goal BP: < 130/80   - Changes: No    # Mineral Bone Disorder:   - Vitamin D; level: Not checked recently        On supplement: Yes  - Calcium; level: Normal        On supplement: No    # Obesity, Class I (BMI = 33.6): Weight is a bit up and down, but stable overall.   - Recommend weight loss for overall health by increasing exercise and watching caloric intake.    # Skin Cancer Risk:    - Discussed sun protection and recommend regular follow up with Dermatology.    # Medical Compliance: Yes    # COVID-19 Virus Review: Discussed COVID-19 virus and  the potential medical risks.  Reviewed preventative health recommendations, including wearing a mask where appropriate.  Recommended COVID vaccination should be up to date with either an initial vaccination or booster shot when appropriate.  Asked the patient to inform the transplant center if they are exposed or diagnosed with this virus.    # COVID Vaccination Up To Date: No, due for next dose    # Transplant History:  Etiology of Kidney Failure: IgA nephropathy  Tx: LDKT  Transplant: 6/7/2017 (Kidney)  Significant changes in immunosuppression: None  Significant transplant-related complications: None    Transplant Office Phone Number: [phone]    Assessment and plan was discussed with the patient and he voiced his understanding and agreement.    Return visit: Return in about 1 year (around 11/18/2023).    Jak King MD    Chief Complaint   Mr. Stover is a 27 year old here for kidney transplant and immunosuppression management.    History of Present Illness    Mr. Stover reports feeling good overall with some medical complaints.  Since last clinic visit, patient reports no hospitalizations, but some new medical complaints.  Patient has been bothered by recurrent sinusitis and otitis externa infections.  He has been treated with several courses of antibiotics and just finished some antibiotic ear drops last week.  Symptoms seems to be mostly resolved at this point.  He has a follow up with ENT in a couple of weeks.    Also of note, he and his wife had a baby boy about 4 months ago.  The baby is healthy and doing well, even sleeping through the night now.    His energy level is good and remains pretty normal.  He is active and does get some exercise.  Denies any chest pain or shortness of breath with exertion.  No leg swelling.    Appetite is good and weight is mostly stable.  No nausea, vomiting or diarrhea.  No fever, sweats or chills.  No night sweats.    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      Mold Itching     Seasonal Allergies Itching     Sulfa Drugs      Thymoglobulin Itching       Medications   Current Outpatient Medications   Medication Sig     albuterol (PROAIR HFA/PROVENTIL HFA/VENTOLIN HFA) 108 (90 Base) MCG/ACT inhaler Inhale 1-2 puffs into the lungs     amLODIPine (NORVASC) 10 MG tablet Take 1 tablet by mouth daily     cloNIDine (CATAPRES) 0.1 MG tablet Take 1 tablet (0.1 mg) by mouth 2 times daily     diltiazem ER COATED BEADS (CARDIZEM CD/CARTIA XT) 120 MG 24 hr capsule Take 1 capsule (120 mg) by mouth every morning     furosemide (LASIX) 20 MG tablet Take 1 tablet (20 mg) by mouth daily     hydrocortisone 2.5 % ointment APPLY OVER AFFECTED AREA TWICE DAILY TO AFFECTED AREA FOR 2 WEEKS, THEN TAKE 1 WEEK OFF. REPEAT AS NECESSARY.     ketoconazole (NIZORAL) 2 % external shampoo WASH WITH 2-4 TIMES A WEEK     losartan (COZAAR) 100 MG tablet Take 1 tablet (100 mg) by mouth At Bedtime     mycophenolate (GENERIC EQUIVALENT) 250 MG capsule TAKE 3 CAPSULES BY MOUTH TWICE A DAY     tacrolimus (GENERIC EQUIVALENT) 0.5 MG capsule Take 1 capsule (0.5 mg) by mouth 2 times daily     tacrolimus (GENERIC EQUIVALENT) 1 MG capsule Take 2 capsules (2 mg) by mouth 2 times daily     triamcinolone (KENALOG) 0.1 % external cream APPLY TOPICALLY TO  AFFECTED AREAS TWICE A DAY FOR UP TO 14 DAYS IN A ROW, OR AS NEEDED.     vitamin D3 (CHOLECALCIFEROL) 50 mcg (2000 units) tablet Take 1 tablet by mouth daily     No current facility-administered medications for this visit.     Medications Discontinued During This Encounter   Medication Reason     ciprofloxacin (CILOXAN) 0.3 % ophthalmic solution Therapy completed     clotrimazole (LOTRIMIN) 1 % external solution Therapy completed     oxyCODONE (ROXICODONE) 5 MG tablet Therapy completed       Physical Exam   Vital Signs: /66 (BP Location: Left arm, Patient Position: Sitting, Cuff Size: Adult Large)   Pulse 66     GENERAL APPEARANCE: alert and no distress  HENT: mouth without ulcers or lesions  LYMPHATICS: no cervical or supraclavicular nodes  RESP: lungs clear to auscultation - no rales, rhonchi or wheezes  CV: regular rhythm, normal rate, no rub, no murmur  EDEMA: no LE edema bilaterally  ABDOMEN: soft, nondistended, nontender, bowel sounds normal, obese  MS: extremities normal - no gross deformities noted, no evidence of inflammation in joints, no muscle tenderness  SKIN: no rash  TX KIDNEY: normal  DIALYSIS ACCESS: none    Data     Renal Latest Ref Rng & Units 10/3/2022 7/22/2022 5/16/2022   Na 133 - 144 mmol/L - - -   Na (external) 136 - 145 mmol/L 140 140 140   K 3.4 - 5.3 mmol/L - - -   K (external) 3.6 - 5.5 mmol/L 4.1 4.2 4.3   Cl 98 - 109 mmol/L 108 107 106   CO2 20 - 32 mmol/L - - -   CO2 (external) 23.0 - 33.0 mmol/L 21.3(L) 25.2 25.3   BUN 7 - 30 mg/dL - - -   BUN (external) 7 - 22 mg/dL 18 20 21   Cr 0.66 - 1.25 mg/dL - - -   Cr (external) 0.6 - 1.3 mg/dL 1.8(H) 2.0(H) 1.8(H)   Glucose 70 - 99 mg/dL - - -   Glucose (external) 70 - 99 mg/dL 98 96 94   Ca  8.5 - 10.1 mg/dL - - -   Ca (external) 8.8 - 10.5 mg/dL 9.8 9.7 9.7   Mg 1.6 - 2.3 mg/dL - - -   Mg (external) 1.5 - 2.2 mEq/L - - -     Bone Health Latest Ref Rng & Units 10/30/2018 8/17/2018 9/11/2017   Phos 2.5 - 4.5 mg/dL - - -   Phos  (external) 2.5 - 4.6 mg/dL - - -   PTHi 12 - 72 pg/mL - - 132(H)   PTHi (external) 15.0 - 115.0 pg/mL - 102.9 -   Vit D Def 20 - 75 ug/L - - -   Vit D Def (external) 27 - 80 ng/mL 26(L) - -     Heme Latest Ref Rng & Units 10/3/2022 7/22/2022 4/19/2022   WBC 4.0 - 11.0 10e9/L - - -   WBC (external) 3.60 - 11.0 K/uL 6.67 6.11 6.89   Hgb 13.3 - 17.7 g/dL - - -   Hgb (external) 13.0 - 18.0 g/dL 14.2 14.2 14.8   Plt 150 - 450 10e9/L - - -   Plt (external) 150 - 440 K/uL 449(H) 395 436   ABSOLUTE NEUTROPHIL 1.6 - 8.3 10e9/L - - -   ABSOLUTE NEUTROPHILS (EXTERNAL) 1.5 - 7.6 10*9/L - - -   ABSOLUTE LYMPHOCYTES 0.8 - 5.3 10e9/L - - -   ABSOLUTE LYMPHOCYTES (EXTERNAL) 0.8 - 3.3 10*9/L - - -   ABSOLUTE MONOCYTES 0.0 - 1.3 10e9/L - - -   ABSOLUTE MONOCYTES (EXTERNAL) 0.2 - 0.9 10*9/L - - -   ABSOLUTE EOSINOPHILS 0.0 - 0.7 10e9/L - - -   ABSOLUTE EOSINOPHILS (EXTERNAL) 0.0 - 0.4 10*9/L - - -   ABSOLUTE BASOPHILS 0.0 - 0.2 10e9/L - - -   ABSOLUTE BASOPHILS (EXTERNAL) 0.0 - 0.1 10*9/L - - -   ABS IMMATURE GRANULOCYTES 0 - 0.4 10e9/L - - -   ABSOLUTE NUCLEATED RBC - - - -     Liver Latest Ref Rng & Units 6/8/2018 3/6/2018 1/23/2018   AP 40 - 150 U/L - - -   AP (external) 40 - 150 IU/L 75 76 71   TBili 0.2 - 1.3 mg/dL - - -   TBili (external) 0.2 - 1.2 mg/dL 2.3(H) 1.3(H) 1.3(H)   DBili (external) 0.0 - 0.5 mg/dL 0.7(H) 0.5 0.5   ALT 0 - 70 U/L - - -   ALT (external) 6 - 40 IU/L 31 20 20   AST 0 - 45 U/L - - -   AST (external) 10 - 40 IU/L 30 25 19   Tot Protein 6.8 - 8.8 g/dL - - -   Tot Protein (external) 6.0 - 8.0 g/dL 7.4 7.6 7.3   Albumin 3.4 - 5.0 g/dL - - -   Albumin (external) 3.5 - 5.0 g/dL 4.4 4.6 4.5        Iron studies Latest Ref Rng & Units 6/1/2017   Iron 35 - 180 ug/dL 65   Iron sat 15 - 46 % 21   Ferritin 26 - 388 ng/mL 310     [facility] Txp Virology Latest Ref Rng & Units 10/8/2020 1/15/2019 12/7/2018   BK Spec - Plasma - -   BK Res BKNEG:BK Virus DNA Not Detected copies/mL BK Virus DNA Not Detected - -   BK Log <2.7 Log  copies/mL Not Calculated - -   BK Quant Result Ext None detected - None detected None detected   EBV CAPSID ANTIBODY IGG 0.0 - 0.8 AI - - -   Hep B Core NR - - -   Hep B Core Ext Nonreactive - - -   Hep B Surf Ext  Positive >=12.00 mIU/mL - - -        Recent Labs   Lab Test 06/30/17  0830 07/03/17  0857 10/08/20  0839   DOSTAC 2030, 06/29/17 2030 07/02/17 Not Provided   TACROL 9.5 11.1 18.8*     Recent Labs   Lab Test 06/16/17  0712 06/29/17  0831   DOSMPA 1930 06/15/2017 6/28/17 7:30pm   MPACID 1.26 4.40*   MPAG 27.9* 43.9       Again, thank you for allowing me to participate in the care of your patient.      Sincerely,    Jak King MD

## 2023-01-29 DIAGNOSIS — Z94.0 LIVING-DONOR KIDNEY TRANSPLANT RECIPIENT: Primary | ICD-10-CM

## 2023-01-30 RX ORDER — MYCOPHENOLATE MOFETIL 250 MG/1
750 CAPSULE ORAL 2 TIMES DAILY
Qty: 540 CAPSULE | Refills: 3 | Status: SHIPPED | OUTPATIENT
Start: 2023-01-30 | End: 2024-01-22

## 2023-02-16 DIAGNOSIS — Z94.0 HTN, KIDNEY TRANSPLANT RELATED: ICD-10-CM

## 2023-02-16 DIAGNOSIS — I15.1 HTN, KIDNEY TRANSPLANT RELATED: ICD-10-CM

## 2023-02-16 DIAGNOSIS — I10 HYPERTENSION: Primary | ICD-10-CM

## 2023-02-16 DIAGNOSIS — I12.9 HYPERTENSIVE RENAL DISEASE: ICD-10-CM

## 2023-02-16 DIAGNOSIS — Z48.298 AFTERCARE FOLLOWING ORGAN TRANSPLANT: ICD-10-CM

## 2023-02-16 RX ORDER — CLONIDINE HYDROCHLORIDE 0.1 MG/1
0.1 TABLET ORAL 2 TIMES DAILY
Qty: 180 TABLET | Refills: 0 | Status: SHIPPED | OUTPATIENT
Start: 2023-02-16 | End: 2023-06-28

## 2023-02-16 NOTE — TELEPHONE ENCOUNTER
Relcy message sent to Golden regarding primary care to refill non-transplant specific medications.

## 2023-04-29 DIAGNOSIS — Z48.298 AFTERCARE FOLLOWING ORGAN TRANSPLANT: ICD-10-CM

## 2023-04-29 DIAGNOSIS — Z94.0 HTN, KIDNEY TRANSPLANT RELATED: ICD-10-CM

## 2023-04-29 DIAGNOSIS — I12.9 HYPERTENSIVE RENAL DISEASE: Primary | ICD-10-CM

## 2023-04-29 DIAGNOSIS — I15.1 HTN, KIDNEY TRANSPLANT RELATED: ICD-10-CM

## 2023-05-01 RX ORDER — DILTIAZEM HYDROCHLORIDE 120 MG/1
120 CAPSULE, COATED, EXTENDED RELEASE ORAL DAILY
Qty: 90 CAPSULE | Refills: 0 | Status: SHIPPED | OUTPATIENT
Start: 2023-05-01 | End: 2023-07-28

## 2023-05-01 RX ORDER — LOSARTAN POTASSIUM 100 MG/1
100 TABLET ORAL AT BEDTIME
Qty: 90 TABLET | Refills: 0 | Status: SHIPPED | OUTPATIENT
Start: 2023-05-01 | End: 2023-07-28

## 2023-05-02 DIAGNOSIS — Z94.0 KIDNEY REPLACED BY TRANSPLANT: Primary | ICD-10-CM

## 2023-05-02 NOTE — PROGRESS NOTES
Order for annual txp neph appt entered in [facility]. Due November 2023 w/Dr. King in [facility]

## 2023-05-06 ENCOUNTER — HEALTH MAINTENANCE LETTER (OUTPATIENT)
Age: 28
End: 2023-05-06

## 2023-06-28 DIAGNOSIS — I10 HYPERTENSION: ICD-10-CM

## 2023-06-28 DIAGNOSIS — Z94.0 LIVING-DONOR KIDNEY TRANSPLANT RECIPIENT: Primary | ICD-10-CM

## 2023-06-28 RX ORDER — TACROLIMUS 1 MG/1
2 CAPSULE ORAL 2 TIMES DAILY
Qty: 360 CAPSULE | Refills: 3 | Status: SHIPPED | OUTPATIENT
Start: 2023-06-28 | End: 2024-06-17

## 2023-06-28 RX ORDER — CLONIDINE HYDROCHLORIDE 0.1 MG/1
0.1 TABLET ORAL 2 TIMES DAILY
Qty: 180 TABLET | Refills: 0 | Status: SHIPPED | OUTPATIENT
Start: 2023-06-28 | End: 2023-09-26

## 2023-06-28 RX ORDER — TACROLIMUS 0.5 MG/1
0.5 CAPSULE ORAL 2 TIMES DAILY
Qty: 180 CAPSULE | Refills: 3 | Status: SHIPPED | OUTPATIENT
Start: 2023-06-28 | End: 2024-06-19

## 2023-07-28 DIAGNOSIS — Z48.298 AFTERCARE FOLLOWING ORGAN TRANSPLANT: ICD-10-CM

## 2023-07-28 DIAGNOSIS — I15.1 HTN, KIDNEY TRANSPLANT RELATED: ICD-10-CM

## 2023-07-28 DIAGNOSIS — I12.9 HYPERTENSIVE RENAL DISEASE: ICD-10-CM

## 2023-07-28 DIAGNOSIS — Z94.0 HTN, KIDNEY TRANSPLANT RELATED: ICD-10-CM

## 2023-07-28 RX ORDER — LOSARTAN POTASSIUM 100 MG/1
100 TABLET ORAL AT BEDTIME
Qty: 90 TABLET | Refills: 0 | Status: SHIPPED | OUTPATIENT
Start: 2023-07-28 | End: 2023-10-26

## 2023-07-28 RX ORDER — DILTIAZEM HYDROCHLORIDE 120 MG/1
120 CAPSULE, COATED, EXTENDED RELEASE ORAL DAILY
Qty: 90 CAPSULE | Refills: 0 | Status: SHIPPED | OUTPATIENT
Start: 2023-07-28 | End: 2023-10-26

## 2023-09-26 DIAGNOSIS — I10 HYPERTENSION: ICD-10-CM

## 2023-09-26 DIAGNOSIS — Z94.0 LIVING-DONOR KIDNEY TRANSPLANT RECIPIENT: ICD-10-CM

## 2023-09-26 RX ORDER — CLONIDINE HYDROCHLORIDE 0.1 MG/1
0.1 TABLET ORAL 2 TIMES DAILY
Qty: 180 TABLET | Refills: 0 | Status: SHIPPED | OUTPATIENT
Start: 2023-09-26 | End: 2023-12-21

## 2023-10-03 DIAGNOSIS — Z48.298 AFTERCARE FOLLOWING ORGAN TRANSPLANT: ICD-10-CM

## 2023-10-03 DIAGNOSIS — Z79.899 ENCOUNTER FOR LONG-TERM CURRENT USE OF MEDICATION: ICD-10-CM

## 2023-10-03 DIAGNOSIS — Z94.0 KIDNEY REPLACED BY TRANSPLANT: Primary | ICD-10-CM

## 2023-10-26 DIAGNOSIS — Z48.298 AFTERCARE FOLLOWING ORGAN TRANSPLANT: ICD-10-CM

## 2023-10-26 DIAGNOSIS — Z94.0 HTN, KIDNEY TRANSPLANT RELATED: ICD-10-CM

## 2023-10-26 DIAGNOSIS — I12.9 HYPERTENSIVE RENAL DISEASE: ICD-10-CM

## 2023-10-26 DIAGNOSIS — I15.1 HTN, KIDNEY TRANSPLANT RELATED: ICD-10-CM

## 2023-10-26 RX ORDER — LOSARTAN POTASSIUM 100 MG/1
100 TABLET ORAL AT BEDTIME
Qty: 90 TABLET | Refills: 0 | Status: SHIPPED | OUTPATIENT
Start: 2023-10-26 | End: 2024-01-22

## 2023-10-26 RX ORDER — DILTIAZEM HYDROCHLORIDE 120 MG/1
120 CAPSULE, COATED, EXTENDED RELEASE ORAL DAILY
Qty: 90 CAPSULE | Refills: 0 | Status: SHIPPED | OUTPATIENT
Start: 2023-10-26 | End: 2024-01-22

## 2023-12-21 DIAGNOSIS — I10 HYPERTENSION: Primary | ICD-10-CM

## 2023-12-21 DIAGNOSIS — Z94.0 LIVING-DONOR KIDNEY TRANSPLANT RECIPIENT: ICD-10-CM

## 2023-12-21 RX ORDER — CLONIDINE HYDROCHLORIDE 0.1 MG/1
0.1 TABLET ORAL 2 TIMES DAILY
Qty: 180 TABLET | Refills: 0 | Status: SHIPPED | OUTPATIENT
Start: 2023-12-21 | End: 2024-03-20

## 2024-01-20 DIAGNOSIS — Z94.0 HTN, KIDNEY TRANSPLANT RELATED: ICD-10-CM

## 2024-01-20 DIAGNOSIS — I12.9 HYPERTENSIVE RENAL DISEASE: ICD-10-CM

## 2024-01-20 DIAGNOSIS — Z94.0 LIVING-DONOR KIDNEY TRANSPLANT RECIPIENT: ICD-10-CM

## 2024-01-20 DIAGNOSIS — I15.1 HTN, KIDNEY TRANSPLANT RELATED: ICD-10-CM

## 2024-01-20 DIAGNOSIS — Z48.298 AFTERCARE FOLLOWING ORGAN TRANSPLANT: ICD-10-CM

## 2024-01-22 ENCOUNTER — TELEPHONE (OUTPATIENT)
Dept: TRANSPLANT | Facility: CLINIC | Age: 29
End: 2024-01-22
Payer: COMMERCIAL

## 2024-01-22 RX ORDER — DILTIAZEM HYDROCHLORIDE 120 MG/1
120 CAPSULE, COATED, EXTENDED RELEASE ORAL DAILY
Qty: 90 CAPSULE | Refills: 0 | Status: SHIPPED | OUTPATIENT
Start: 2024-01-22 | End: 2024-04-22

## 2024-01-22 RX ORDER — MYCOPHENOLATE MOFETIL 250 MG/1
CAPSULE ORAL
Qty: 540 CAPSULE | Refills: 3 | Status: SHIPPED | OUTPATIENT
Start: 2024-01-22

## 2024-01-22 RX ORDER — LOSARTAN POTASSIUM 100 MG/1
TABLET ORAL
Qty: 90 TABLET | Refills: 0 | Status: SHIPPED | OUTPATIENT
Start: 2024-01-22 | End: 2024-04-22

## 2024-01-22 NOTE — TELEPHONE ENCOUNTER
Called patient (1st attempt) for the patient to call back and schedule the following:    Appointment type: RKT  Provider: YONNY  Return date: NEXT AVAILABLE  Specialty phone number: [phone]  Additional appointment(s) needed: NA  Additonal Notes: per reschedule request.

## 2024-01-24 ENCOUNTER — TELEPHONE (OUTPATIENT)
Dept: TRANSPLANT | Facility: CLINIC | Age: 29
End: 2024-01-24
Payer: COMMERCIAL

## 2024-03-20 DIAGNOSIS — I10 HYPERTENSION: ICD-10-CM

## 2024-03-20 DIAGNOSIS — Z94.0 LIVING-DONOR KIDNEY TRANSPLANT RECIPIENT: ICD-10-CM

## 2024-03-20 RX ORDER — CLONIDINE HYDROCHLORIDE 0.1 MG/1
0.1 TABLET ORAL 2 TIMES DAILY
Qty: 180 TABLET | Refills: 0 | Status: SHIPPED | OUTPATIENT
Start: 2024-03-20 | End: 2024-06-17

## 2024-04-12 ENCOUNTER — OFFICE VISIT (OUTPATIENT)
Dept: TRANSPLANT | Facility: CLINIC | Age: 29
End: 2024-04-12
Attending: INTERNAL MEDICINE
Payer: COMMERCIAL

## 2024-04-12 VITALS
WEIGHT: 262 LBS | DIASTOLIC BLOOD PRESSURE: 77 MMHG | SYSTOLIC BLOOD PRESSURE: 124 MMHG | TEMPERATURE: 97.9 F | OXYGEN SATURATION: 97 % | BODY MASS INDEX: 32.75 KG/M2 | HEART RATE: 66 BPM

## 2024-04-12 DIAGNOSIS — N02.B9 IGA NEPHROPATHY: ICD-10-CM

## 2024-04-12 DIAGNOSIS — I15.1 HTN, KIDNEY TRANSPLANT RELATED: ICD-10-CM

## 2024-04-12 DIAGNOSIS — R80.9 PROTEINURIA, UNSPECIFIED TYPE: ICD-10-CM

## 2024-04-12 DIAGNOSIS — R25.2 LEG CRAMPING: ICD-10-CM

## 2024-04-12 DIAGNOSIS — R79.89 ELEVATED SERUM CREATININE: ICD-10-CM

## 2024-04-12 DIAGNOSIS — Z94.0 HTN, KIDNEY TRANSPLANT RELATED: ICD-10-CM

## 2024-04-12 DIAGNOSIS — I12.9 HYPERTENSIVE RENAL DISEASE: ICD-10-CM

## 2024-04-12 DIAGNOSIS — Z48.298 AFTERCARE FOLLOWING ORGAN TRANSPLANT: ICD-10-CM

## 2024-04-12 DIAGNOSIS — D84.9 IMMUNOSUPPRESSION (H): ICD-10-CM

## 2024-04-12 DIAGNOSIS — Z94.0 KIDNEY REPLACED BY TRANSPLANT: Primary | ICD-10-CM

## 2024-04-12 PROCEDURE — 99214 OFFICE O/P EST MOD 30 MIN: CPT | Performed by: INTERNAL MEDICINE

## 2024-04-12 PROCEDURE — 99213 OFFICE O/P EST LOW 20 MIN: CPT | Performed by: INTERNAL MEDICINE

## 2024-04-12 RX ORDER — FLUOCINOLONE ACETONIDE 0.11 MG/ML
OIL AURICULAR (OTIC) PRN
COMMUNITY
Start: 2023-09-11

## 2024-04-12 RX ORDER — FUROSEMIDE 20 MG
20 TABLET ORAL PRN
Qty: 90 TABLET | Refills: 3 | Status: SHIPPED | OUTPATIENT
Start: 2024-04-12

## 2024-04-12 RX ORDER — FLUOCINONIDE TOPICAL SOLUTION USP, 0.05% 0.5 MG/ML
SOLUTION TOPICAL PRN
COMMUNITY
Start: 2023-01-20

## 2024-04-12 ASSESSMENT — PAIN SCALES - GENERAL: PAINLEVEL: NO PAIN (0)

## 2024-04-12 NOTE — PROGRESS NOTES
TRANSPLANT NEPHROLOGY CHRONIC POST TRANSPLANT VISIT    Assessment & Plan   # LDKT: Trend up   Previously with creatinine baseline of 1.4-1.6 but his baseline is trending up since 2022 from 1.6-1.8 in 2022 to 1.8-2 in 2023 and now at 2.1 in feb 2024. He had biopsy in 2020 with recurrence of IgA in transplanted kidney and also mild arteriosclerosis. Plan was to repeat biopsy if his creatinine is 2.2 or greater which he close to. Repeat labs later this month, along with DSA, UA and UPCR.    - Baseline Creatinine:  ~ 1.6-1.8   - Proteinuria: Mild (0.5-1.0 grams)   - Date DSA Last Checked: Oct/2021      Latest DSA: No   - BK Viremia: Not checked recently due to time from transplant   - Kidney Tx Biopsy: Oct 08, 2020; Result: No diagnostic evidence of acute rejection.  Recurrent IgA nephropathy without any crescents.  Mild arteriosclerosis.    # Immunosuppression: Tacrolimus immediate release (goal 4-6) and Mycophenolate mofetil (dose 750 mg every 12 hours)   - Continue with intensive monitoring of immunosuppression for efficacy and toxicity.   - Changes: No    # Infection Prophylaxis:   Last CD4 Level: 201 (5/2018)  - PJP: None    # Hypertension: Controlled;  Goal BP: < 130/80   - Changes: No    # Mineral Bone Disorder:   - Vitamin D; level: Not checked recently        On supplement: Yes  - Calcium; level: Normal        On supplement: No    # Obesity, Class I (BMI = 33.6): Weight is a bit up and down, but stable overall.   - Recommend weight loss for overall health by increasing exercise and watching caloric intake.    # LE cramping and pain  H/o of it since his transplant, but noted to happen more now with increased intensity of pain. Happening mostly during nights. On daily lasix, changed to PRN for swelling, need to check his mag and phos with his next labs. Despite of above changes, if he still having cramping, need to be discussed with PCP and or neurology.    # Skin Cancer Risk:    - Discussed sun protection and  recommend regular follow up with Dermatology.    # Medical Compliance: Yes    # COVID Vaccination Up To Date: Not asked    # Transplant History:  Etiology of Kidney Failure: IgA nephropathy  Tx: LDKT  Transplant: 6/7/2017 (Kidney)  Significant changes in immunosuppression: None  Significant transplant-related complications: None    Transplant Office Phone Number: [phone]    Assessment and plan was discussed with the patient and he voiced his understanding and agreement.    Return visit: Return in about 1 year (around 4/12/2025).    Fabi Bedolla MD    Chief Complaint   Mr. Stover is a 28 year old here for kidney transplant and immunosuppression management.    History of Present Illness    Mr. Stover reports feeling good overall with some medical complaints. Had URI and otitis symptoms in the past which are mostly resolved at this point.    His energy level is good and remains pretty normal.  He is active and does get some exercise.  Denies any chest pain or shortness of breath with exertion.  No leg swelling.  Appetite is good and weight is mostly stable.  No nausea, vomiting or diarrhea.  No fever, sweats or chills.  No night sweats.    C/o cramping which is happening more often and in increased intensity. Seems like happens mostly during the nights and wife was mentioning that he is shouting in pain during those episodes. Continued to be on lasix for LE swelling. So with no LE swelling today, discontinued his scheduled lasix and gave him prescription for as needed if he develops swelling.     Home BP:  120/80s    Problem List   Patient Active Problem List   Diagnosis     IgA nephropathy     HTN, kidney transplant related     Secondary renal hyperparathyroidism (H24)     Kidney replaced by transplant     Immunosuppression (H24)     Aftercare following organ transplant     Vitamin D deficiency     Gilbert syndrome     CKD (chronic kidney disease) stage 3, GFR 30-59 ml/min (H)     Obesity       Allergies    Allergies   Allergen Reactions     Anti-Thymocyte Glob (Rabbit) Itching     Methylprednisolone Other (See Comments)     Other reaction(s): Other (See Comments)  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      Mold Itching     Seasonal Allergies Itching     Sulfa Antibiotics        Medications   Current Outpatient Medications   Medication Sig Dispense Refill     amLODIPine (NORVASC) 10 MG tablet Take 1 tablet by mouth daily       cloNIDine (CATAPRES) 0.1 MG tablet TAKE 1 TABLET (0.1 MG) BY MOUTH 2 TIMES DAILY 180 tablet 0     diltiazem ER COATED BEADS (CARDIZEM CD/CARTIA XT) 120 MG 24 hr capsule TAKE 1 CAPSULE BY MOUTH ONCE DAILY 90 capsule 0     fluocinolone acetonide oil 0.01 % ear drops as needed       fluocinonide (LIDEX) 0.05 % external solution Apply topically as needed       furosemide (LASIX) 20 MG tablet Take 1 tablet (20 mg) by mouth daily 90 tablet 0     hydrocortisone 2.5 % ointment APPLY OVER AFFECTED AREA TWICE DAILY TO AFFECTED AREA FOR 2 WEEKS, THEN TAKE 1 WEEK OFF. REPEAT AS NECESSARY.       ketoconazole (NIZORAL) 2 % external shampoo WASH WITH 2-4 TIMES A WEEK       losartan (COZAAR) 100 MG tablet TAKE 1 TABLET BY MOUTH ONCEDAILY AT BEDTIME 90 tablet 0     mycophenolate (GENERIC EQUIVALENT) 250 MG capsule TAKE 3 CAPSULES (750 MG) BYMOUTH 2 TIMES DAILY 540 capsule 3     tacrolimus (GENERIC EQUIVALENT) 0.5 MG capsule Take 1 capsule (0.5 mg) by mouth 2 times daily 180 capsule 3     tacrolimus (GENERIC EQUIVALENT) 1 MG capsule Take 2 capsules (2 mg) by mouth 2 times daily 360 capsule 3     triamcinolone (KENALOG) 0.1 % external cream APPLY TOPICALLY TO AFFECTED AREAS TWICE A DAY FOR UP TO 14 DAYS IN A ROW, OR AS NEEDED.       vitamin D3 (CHOLECALCIFEROL) 50 mcg (2000 units) tablet Take 1 tablet by mouth daily       albuterol (PROAIR HFA/PROVENTIL HFA/VENTOLIN HFA) 108  (90 Base) MCG/ACT inhaler Inhale 1-2 puffs into the lungs       No current facility-administered medications for this visit.     There are no discontinued medications.      Physical Exam   Vital Signs: /77 (BP Location: Left arm, Patient Position: Sitting, Cuff Size: Adult Large)   Pulse 66   Temp 97.9  F (36.6  C) (Oral)   Wt 118.8 kg (262 lb)   SpO2 97%   BMI 32.75 kg/m      GENERAL APPEARANCE: alert and no distress  HENT: mouth without ulcers or lesions  LYMPHATICS: no cervical or supraclavicular nodes  RESP: lungs clear to auscultation - no rales, rhonchi or wheezes  CV: regular rhythm, normal rate, no rub, no murmur  EDEMA: no LE edema bilaterally  ABDOMEN: soft, nondistended, nontender, bowel sounds normal, obese  MS: extremities normal - no gross deformities noted, no evidence of inflammation in joints, no muscle tenderness  SKIN: no rash  TX KIDNEY: normal  DIALYSIS ACCESS: none    Data         Latest Ref Rng & Units 2/26/2024     8:14 AM 11/9/2023     8:16 AM 8/9/2023     8:07 AM   Renal   Na (external) 136 - 145 mmol/L 136  140  138    K (external) 3.6 - 5.5 mmol/L 4.3  4.4  4.2    Cl 98 - 109 mmol/L 106  108  105    Cl (external) 98 - 109 mmol/L 106  108  105    CO2 (external) 23.0 - 33.0 mmol/L 18.9  21.4  21.8    BUN (external) 7 - 22 mg/dL 21  18  19    Cr (external) 0.6 - 1.3 mg/dL 2.1  1.8  1.9    Glucose (external) 70 - 99 mg/dL 97  106  95    Ca (external) 8.8 - 10.5 mg/dL 10.0  10.1  9.9          Latest Ref Rng & Units 1/18/2023     8:12 AM 10/30/2018     7:51 AM 8/17/2018     7:01 AM   Bone Health   PTHi (external) 15.0 - 115.0 pg/mL   102.9       Vit D Def (external) 20 - 50 ng/mL 32     26            This result is from an external source.         Latest Ref Rng & Units 2/26/2024     8:14 AM 11/9/2023     8:16 AM 8/9/2023     8:07 AM   Heme   WBC (external) 3.60 - 11.00 K/uL 6.87  6.46     7.18    Hgb (external) 13.0 - 18.0 g/dL 14.5  14.6     13.6    Plt (external) 150 - 440 k/uL  501  470     450        This result is from an external source.         Latest Ref Rng & Units 6/8/2018     6:52 AM 3/6/2018     9:21 AM 1/23/2018     7:51 AM   Liver   AP (external) 40 - 150 IU/L 75     76     71       TBili (external) 0.2 - 1.2 mg/dL 2.3     1.3     1.3       DBili (external) 0.0 - 0.5 mg/dL 0.7     0.5     0.5       ALT (external) 6 - 40 IU/L 31     20     20       AST (external) 10 - 40 IU/L 30     25     19       Tot Protein (external) 6.0 - 8.0 g/dL 7.4     7.6     7.3       Albumin (external) 3.5 - 5.0 g/dL 4.4     4.6     4.5           This result is from an external source.            Latest Ref Rng & Units 6/1/2017     1:10 PM   Iron studies   Iron 35 - 180 ug/dL 65    Iron Saturation Index 15 - 46 % 21    Ferritin 26 - 388 ng/mL 310          Latest Ref Rng & Units 10/8/2020     8:39 AM 1/15/2019     6:49 AM 12/7/2018     8:32 AM   [facility] Txp Virology   BK Spec  Plasma      BK Res BKNEG^BK Virus DNA Not Detected copies/mL BK Virus DNA Not Detected      BK Log <2.7 Log copies/mL Not Calculated      BK Quant Result Ext None detected  None detected     None detected           This result is from an external source.     Failed to redirect to the Timeline version of the REVFS SmartLink.  Recent Labs   Lab Test 06/30/17  0830 07/03/17  0857 10/08/20  0839   DOSTAC 2030, 06/29/17 2030 07/02/17 Not Provided   TACROL 9.5 11.1 18.8*     Recent Labs   Lab Test 06/16/17  0712 06/29/17  0831   DOSMPA 1930 06/15/2017 6/28/17 7:30pm   MPACID 1.26 4.40*   MPAG 27.9* 43.9

## 2024-04-12 NOTE — LETTER
4/12/2024         RE: Golden Stover  [address] [facility] [address]        Dear Colleague,    Thank you for referring your patient, Golden Stover, to the [facility]. Please see a copy of my visit note below.    TRANSPLANT NEPHROLOGY CHRONIC POST TRANSPLANT VISIT    Assessment & Plan   # LDKT: Trend up   Previously with creatinine baseline of 1.4-1.6 but his baseline is trending up since 2022 from 1.6-1.8 in 2022 to 1.8-2 in 2023 and now at 2.1 in feb 2024. He had biopsy in 2020 with recurrence of IgA in transplanted kidney and also mild arteriosclerosis. Plan was to repeat biopsy if his creatinine is 2.2 or greater which he close to. Repeat labs later this month, along with DSA, UA and UPCR.    - Baseline Creatinine:  ~ 1.6-1.8   - Proteinuria: Mild (0.5-1.0 grams)   - Date DSA Last Checked: Oct/2021      Latest DSA: No   - BK Viremia: Not checked recently due to time from transplant   - Kidney Tx Biopsy: Oct 08, 2020; Result: No diagnostic evidence of acute rejection.  Recurrent IgA nephropathy without any crescents.  Mild arteriosclerosis.    # Immunosuppression: Tacrolimus immediate release (goal 4-6) and Mycophenolate mofetil (dose 750 mg every 12 hours)   - Continue with intensive monitoring of immunosuppression for efficacy and toxicity.   - Changes: No    # Infection Prophylaxis:   Last CD4 Level: 201 (5/2018)  - PJP: None    # Hypertension: Controlled;  Goal BP: < 130/80   - Changes: No    # Mineral Bone Disorder:   - Vitamin D; level: Not checked recently        On supplement: Yes  - Calcium; level: Normal        On supplement: No    # Obesity, Class I (BMI = 33.6): Weight is a bit up and down, but stable overall.   - Recommend weight loss for overall health by increasing exercise and watching caloric intake.    # LE cramping and pain  H/o of it since his transplant, but noted to happen more now with increased intensity of pain. Happening mostly during nights.  On daily lasix, changed to PRN for swelling, need to check his mag and phos with his next labs. Despite of above changes, if he still having cramping, need to be discussed with PCP and or neurology.    # Skin Cancer Risk:    - Discussed sun protection and recommend regular follow up with Dermatology.    # Medical Compliance: Yes    # COVID Vaccination Up To Date: Not asked    # Transplant History:  Etiology of Kidney Failure: IgA nephropathy  Tx: LDKT  Transplant: 6/7/2017 (Kidney)  Significant changes in immunosuppression: None  Significant transplant-related complications: None    Transplant Office Phone Number: [phone]    Assessment and plan was discussed with the patient and he voiced his understanding and agreement.    Return visit: Return in about 1 year (around 4/12/2025).    Fabi Bedolla MD    Chief Complaint   Mr. Stover is a 28 year old here for kidney transplant and immunosuppression management.    History of Present Illness    Mr. Stover reports feeling good overall with some medical complaints. Had URI and otitis symptoms in the past which are mostly resolved at this point.    His energy level is good and remains pretty normal.  He is active and does get some exercise.  Denies any chest pain or shortness of breath with exertion.  No leg swelling.  Appetite is good and weight is mostly stable.  No nausea, vomiting or diarrhea.  No fever, sweats or chills.  No night sweats.    C/o cramping which is happening more often and in increased intensity. Seems like happens mostly during the nights and wife was mentioning that he is shouting in pain during those episodes. Continued to be on lasix for LE swelling. So with no LE swelling today, discontinued his scheduled lasix and gave him prescription for as needed if he develops swelling.     Home BP:  120/80s    Problem List   Patient Active Problem List   Diagnosis     IgA nephropathy     HTN, kidney transplant related     Secondary renal  hyperparathyroidism (H24)     Kidney replaced by transplant     Immunosuppression (H24)     Aftercare following organ transplant     Vitamin D deficiency     Gilbert syndrome     CKD (chronic kidney disease) stage 3, GFR 30-59 ml/min (H)     Obesity       Allergies   Allergies   Allergen Reactions     Anti-Thymocyte Glob (Rabbit) Itching     Methylprednisolone Other (See Comments)     Other reaction(s): Other (See Comments)  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      Mold Itching     Seasonal Allergies Itching     Sulfa Antibiotics        Medications   Current Outpatient Medications   Medication Sig Dispense Refill     amLODIPine (NORVASC) 10 MG tablet Take 1 tablet by mouth daily       cloNIDine (CATAPRES) 0.1 MG tablet TAKE 1 TABLET (0.1 MG) BY MOUTH 2 TIMES DAILY 180 tablet 0     diltiazem ER COATED BEADS (CARDIZEM CD/CARTIA XT) 120 MG 24 hr capsule TAKE 1 CAPSULE BY MOUTH ONCE DAILY 90 capsule 0     fluocinolone acetonide oil 0.01 % ear drops as needed       fluocinonide (LIDEX) 0.05 % external solution Apply topically as needed       furosemide (LASIX) 20 MG tablet Take 1 tablet (20 mg) by mouth daily 90 tablet 0     hydrocortisone 2.5 % ointment APPLY OVER AFFECTED AREA TWICE DAILY TO AFFECTED AREA FOR 2 WEEKS, THEN TAKE 1 WEEK OFF. REPEAT AS NECESSARY.       ketoconazole (NIZORAL) 2 % external shampoo WASH WITH 2-4 TIMES A WEEK       losartan (COZAAR) 100 MG tablet TAKE 1 TABLET BY MOUTH ONCEDAILY AT BEDTIME 90 tablet 0     mycophenolate (GENERIC EQUIVALENT) 250 MG capsule TAKE 3 CAPSULES (750 MG) BYMOUTH 2 TIMES DAILY 540 capsule 3     tacrolimus (GENERIC EQUIVALENT) 0.5 MG capsule Take 1 capsule (0.5 mg) by mouth 2 times daily 180 capsule 3     tacrolimus (GENERIC EQUIVALENT) 1 MG capsule Take 2 capsules (2 mg) by mouth 2 times daily 360 capsule 3     triamcinolone  (KENALOG) 0.1 % external cream APPLY TOPICALLY TO AFFECTED AREAS TWICE A DAY FOR UP TO 14 DAYS IN A ROW, OR AS NEEDED.       vitamin D3 (CHOLECALCIFEROL) 50 mcg (2000 units) tablet Take 1 tablet by mouth daily       albuterol (PROAIR HFA/PROVENTIL HFA/VENTOLIN HFA) 108 (90 Base) MCG/ACT inhaler Inhale 1-2 puffs into the lungs       No current facility-administered medications for this visit.     There are no discontinued medications.      Physical Exam   Vital Signs: /77 (BP Location: Left arm, Patient Position: Sitting, Cuff Size: Adult Large)   Pulse 66   Temp 97.9  F (36.6  C) (Oral)   Wt 118.8 kg (262 lb)   SpO2 97%   BMI 32.75 kg/m      GENERAL APPEARANCE: alert and no distress  HENT: mouth without ulcers or lesions  LYMPHATICS: no cervical or supraclavicular nodes  RESP: lungs clear to auscultation - no rales, rhonchi or wheezes  CV: regular rhythm, normal rate, no rub, no murmur  EDEMA: no LE edema bilaterally  ABDOMEN: soft, nondistended, nontender, bowel sounds normal, obese  MS: extremities normal - no gross deformities noted, no evidence of inflammation in joints, no muscle tenderness  SKIN: no rash  TX KIDNEY: normal  DIALYSIS ACCESS: none    Data         Latest Ref Rng & Units 2/26/2024     8:14 AM 11/9/2023     8:16 AM 8/9/2023     8:07 AM   Renal   Na (external) 136 - 145 mmol/L 136  140  138    K (external) 3.6 - 5.5 mmol/L 4.3  4.4  4.2    Cl 98 - 109 mmol/L 106  108  105    Cl (external) 98 - 109 mmol/L 106  108  105    CO2 (external) 23.0 - 33.0 mmol/L 18.9  21.4  21.8    BUN (external) 7 - 22 mg/dL 21  18  19    Cr (external) 0.6 - 1.3 mg/dL 2.1  1.8  1.9    Glucose (external) 70 - 99 mg/dL 97  106  95    Ca (external) 8.8 - 10.5 mg/dL 10.0  10.1  9.9          Latest Ref Rng & Units 1/18/2023     8:12 AM 10/30/2018     7:51 AM 8/17/2018     7:01 AM   Bone Health   PTHi (external) 15.0 - 115.0 pg/mL   102.9       Vit D Def (external) 20 - 50 ng/mL 32     26            This result is  from an external source.         Latest Ref Rng & Units 2/26/2024     8:14 AM 11/9/2023     8:16 AM 8/9/2023     8:07 AM   Heme   WBC (external) 3.60 - 11.00 K/uL 6.87  6.46     7.18    Hgb (external) 13.0 - 18.0 g/dL 14.5  14.6     13.6    Plt (external) 150 - 440 k/uL 501  470     450        This result is from an external source.         Latest Ref Rng & Units 6/8/2018     6:52 AM 3/6/2018     9:21 AM 1/23/2018     7:51 AM   Liver   AP (external) 40 - 150 IU/L 75     76     71       TBili (external) 0.2 - 1.2 mg/dL 2.3     1.3     1.3       DBili (external) 0.0 - 0.5 mg/dL 0.7     0.5     0.5       ALT (external) 6 - 40 IU/L 31     20     20       AST (external) 10 - 40 IU/L 30     25     19       Tot Protein (external) 6.0 - 8.0 g/dL 7.4     7.6     7.3       Albumin (external) 3.5 - 5.0 g/dL 4.4     4.6     4.5           This result is from an external source.            Latest Ref Rng & Units 6/1/2017     1:10 PM   Iron studies   Iron 35 - 180 ug/dL 65    Iron Saturation Index 15 - 46 % 21    Ferritin 26 - 388 ng/mL 310          Latest Ref Rng & Units 10/8/2020     8:39 AM 1/15/2019     6:49 AM 12/7/2018     8:32 AM   [facility] Txp Virology   BK Spec  Plasma      BK Res BKNEG^BK Virus DNA Not Detected copies/mL BK Virus DNA Not Detected      BK Log <2.7 Log copies/mL Not Calculated      BK Quant Result Ext None detected  None detected     None detected           This result is from an external source.     Failed to redirect to the Timeline version of the REVFS SmartLink.  Recent Labs   Lab Test 06/30/17  0830 07/03/17  0857 10/08/20  0839   DOSTAC 2030, 06/29/17 2030 07/02/17 Not Provided   TACROL 9.5 11.1 18.8*     Recent Labs   Lab Test 06/16/17  0712 06/29/17  0831   DOSMPA 1930 06/15/2017 6/28/17 7:30pm   MPACID 1.26 4.40*   MPAG 27.9* 43.9       Again, thank you for allowing me to participate in the care of your patient.        Sincerely,        Fabi Bedolla MD

## 2024-04-12 NOTE — NURSING NOTE
Chief Complaint   Patient presents with    RECHECK     K/P POST RETURN TXP NEPH     /77 (BP Location: Left arm, Patient Position: Sitting, Cuff Size: Adult Large)   Pulse 66   Temp 97.9  F (36.6  C) (Oral)   Wt 118.8 kg (262 lb)   SpO2 97%   BMI 32.75 kg/m      Viktor Baeza CMA on 4/12/2024 at 10:23 AM

## 2024-04-15 PROBLEM — R25.2 LEG CRAMPING: Status: ACTIVE | Noted: 2024-04-15

## 2024-04-15 PROBLEM — I12.9 HYPERTENSIVE RENAL DISEASE: Status: ACTIVE | Noted: 2024-04-15

## 2024-04-15 PROBLEM — R80.9 PROTEINURIA, UNSPECIFIED TYPE: Status: ACTIVE | Noted: 2024-04-15

## 2024-04-15 PROBLEM — R79.89 ELEVATED SERUM CREATININE: Status: ACTIVE | Noted: 2024-04-15

## 2024-04-16 ENCOUNTER — TELEPHONE (OUTPATIENT)
Dept: TRANSPLANT | Facility: CLINIC | Age: 29
End: 2024-04-16
Payer: COMMERCIAL

## 2024-04-16 DIAGNOSIS — R79.89 ELEVATED SERUM CREATININE: ICD-10-CM

## 2024-04-16 DIAGNOSIS — N02.B9 IGA NEPHROPATHY: ICD-10-CM

## 2024-04-16 DIAGNOSIS — Z94.0 KIDNEY REPLACED BY TRANSPLANT: Primary | ICD-10-CM

## 2024-04-16 NOTE — TELEPHONE ENCOUNTER
ISSUE:   Clinic visit for transplant nephrology follow up 4/12/24.     PLAN:   Labs  Received: Yesterday  Fabi Bedolla, MD Chau, Ashley Pineda, PRETTY Ramirez    I saw this pt last Friday. He need his labs updated some time this month. Can you also order DSA, UA, UPCR and BK virus, mag, phos along with his routine labs  His baseline creatinine is slowly trending up from 1.4 to 2.1 since his transplant. He had renal biopsy in 2020 which showed recurrent IGA in his transplanted kidney. So  likely need biopsy as well if his serum creatinine is not back to 1.8 or 1.9 on next labs.    Thank you  Fabi    OUTCOME:   Lab orders entered in EPIC and lab requisition letter sent to Ricardo brewster and Golden via [employer].  Spoke with Golden. He will watch mail for the DSA kit.    Ashley Chau RN, BSN  Solid Organ Transplant, Post Kidney and Pancreas  Transplant Care Coordinator  [phone]

## 2024-04-16 NOTE — LETTER
OUTPATIENT LABORATORY TEST ORDER     Patient Name: Godlen Stover   YOB: 1995     [facility] MR# [if applicable]: [id number]   Date & Time: April 16, 2024  3:50 PM  Expiration Date: 90 days after date issued      Diagnoses: Kidney Transplant (ICD-10 Z94.0)   Long term use of medications (ICD-10 Z79.899)             Contact with or exposure to viral disease (Z20.828)            Aftercare following organ transplant (Z48.298)   Other specified postprocedural states (Z98.890)     We ask your assistance in obtaining the following laboratory tests, which are part of our routine surveillance program for Solid Organ Transplant patients.     Please fax each result to [phone], same day as resulted/available    Critical lab results page [phone]  Monday - Friday 8 am to 5 pm  Evening/Weekend/Holiday communicate Critical labs results [phone]    With next standing post transplant lab orders, please include:  CBC with platelets  Basic Metabolic Panel (Sodium, Potassium, Chloride, Creatinine, CO2, Urea Nitrogen, glucose, Calcium)  Tacrolimus drug level - 12-hour trough, please document time of last dose    Urine for protein/creatinine  Urinalysis  BK virus PCR quantitative  Magnesium  Phosphorus  PRA Donor Specific Antibodies (Kit/ provider by patient)     If you have any questions, please call [facility]- [phone] or [phone], Fax- [phone].    .  Fabi Bedolla MD

## 2024-04-16 NOTE — LETTER
OUTPATIENT LABORATORY TEST ORDER     Patient Name: Golden Stover   YOB: 1995     [facility] MR# [if applicable]: [id number]   Date & Time: April 25, 2024  12:50 PM  Expiration Date: 1 year after date issued      Diagnoses: Kidney Transplant (ICD-10 Z94.0)   Long term use of medications (ICD-10 Z79.899)             Proteinuria (ICD-10 R80.9)            Aftercare following organ transplant (Z48.298)     We ask your assistance in obtaining the following laboratory tests, which are part of our routine surveillance program for Solid Organ Transplant patients.     Please fax each result to [phone], same day as resulted/available    Critical lab results page [phone]  Monday - Friday 8 am to 5 pm  Evening/Weekend/Holiday communicate Critical labs results [phone]    Every 3 months  CBC with platelets  Basic Metabolic Panel (Sodium, Potassium, Chloride, Creatinine, CO2, Urea Nitrogen, glucose, Calcium)  Tacrolimus drug level - 12-hour trough, please document time of last dose    Urine for protein/creatinine     If you have any questions, please call [facility]- [phone] or [phone], Fax- [phone].    .  Fabi Bedolla MD

## 2024-04-22 ENCOUNTER — LAB (OUTPATIENT)
Dept: LAB | Facility: CLINIC | Age: 29
End: 2024-04-22
Payer: COMMERCIAL

## 2024-04-22 DIAGNOSIS — Z94.0 KIDNEY REPLACED BY TRANSPLANT: ICD-10-CM

## 2024-04-22 DIAGNOSIS — I12.9 HYPERTENSIVE RENAL DISEASE: ICD-10-CM

## 2024-04-22 DIAGNOSIS — I15.1 HTN, KIDNEY TRANSPLANT RELATED: ICD-10-CM

## 2024-04-22 DIAGNOSIS — R79.89 ELEVATED SERUM CREATININE: ICD-10-CM

## 2024-04-22 DIAGNOSIS — Z94.0 HTN, KIDNEY TRANSPLANT RELATED: ICD-10-CM

## 2024-04-22 DIAGNOSIS — Z48.298 AFTERCARE FOLLOWING ORGAN TRANSPLANT: ICD-10-CM

## 2024-04-22 PROCEDURE — 86828 HLA CLASS I&II ANTIBODY QUAL: CPT | Mod: XU

## 2024-04-22 PROCEDURE — 86833 HLA CLASS II HIGH DEFIN QUAL: CPT

## 2024-04-22 PROCEDURE — 86832 HLA CLASS I HIGH DEFIN QUAL: CPT

## 2024-04-22 RX ORDER — LOSARTAN POTASSIUM 100 MG/1
TABLET ORAL
Qty: 90 TABLET | Refills: 0 | Status: SHIPPED | OUTPATIENT
Start: 2024-04-22 | End: 2024-07-16

## 2024-04-22 RX ORDER — DILTIAZEM HYDROCHLORIDE 120 MG/1
120 CAPSULE, COATED, EXTENDED RELEASE ORAL DAILY
Qty: 90 CAPSULE | Refills: 0 | Status: SHIPPED | OUTPATIENT
Start: 2024-04-22 | End: 2024-07-16

## 2024-04-25 NOTE — TELEPHONE ENCOUNTER
ISSUE:   Lab result follow up 4/22/24:  - Cr 1.8 (baseline 1.8-2.0)  - UPCR 1.4 (increased from 1.1)  - UA moderate blood, RBC 6-10, Protein Albumin >300  - BK PCR undetected  - Tacrolimus level 3.8, goal 4-6.   - Magnesium level 1.7 (WNL)  - Phosphorus level 2.7 (WNL)  - DSA kit in process    Provider notified of elevated urine protein creatinine ratio (UPCR):    Previous Messages       ----- Message -----  From: Ashley Chau, PRETTY  Sent: 4/23/2024   9:03 AM CDT  To: Ashley Chau RN; *    Protein in urine. Consistent with IgA nephropathy recurrence diagnoses on last renal txp bxp. Losartan has already been optimized at 100 mg daily. Provider to review urine results and advise w/ any changes or new recommendations.    PLAN:   Per Dr. Bedolla visit note 4/12/24  # LE cramping and pain  H/o of it since his transplant, but noted to happen more now with increased intensity of pain. Happening mostly during nights. On daily lasix, changed to PRN for swelling, need to check his mag and phos with his next labs. Despite of above changes, if he still having cramping, need to be discussed with PCP and or neurology.    Message  Received: 2 days ago  Fabi Bedolla MD Blaisdell, Christin Rebecca, PRETTY Dorman, new changes. When he will be due for another UPCR?  Can you let him know that he need to see his PCP for his ongoing cramping, as his electrolytes when recently checked were WNL.    Thank you  aFbi        OUTCOME:   MyChart message sent to Golden to follow up on leg cramping with PCP. electrolytes are WNL.   Will continue checking UPCR every 3 months with routine post kidney transplant labs. Orders sent.    Ashley Chau RN, BSN  Solid Organ Transplant, Post Kidney and Pancreas  Transplant Care Coordinator  [phone]

## 2024-05-03 LAB
DONOR IDENTIFICATION: NORMAL
DSA COMMENTS: NORMAL
DSA PRESENT: NO
DSA TEST METHOD: NORMAL
ORGAN: NORMAL
SA 1  COMMENTS: NORMAL
SA 1 CELL: NORMAL
SA 1 TEST METHOD: NORMAL
SA 2 CELL: NORMAL
SA 2 COMMENTS: NORMAL
SA 2 TEST METHOD: NORMAL
SA1 HI RISK ABY: NORMAL
SA1 MOD RISK ABY: NORMAL
SA2 HI RISK ABY: NORMAL
SA2 MOD RISK ABY: NORMAL
SCR 1 TEST METHOD: NORMAL
SCR1 CELL: NORMAL
SCR1 COMMENTS: NORMAL
SCR1 RESULT: NORMAL
SCR2 CELL: NORMAL
SCR2 COMMENTS: NORMAL
SCR2 RESULT: NORMAL
SCR2 TEST METHOD: NORMAL
UNACCEPTABLE ANTIGENS: NORMAL
UNOS CPRA: 60

## 2024-06-16 DIAGNOSIS — I10 HYPERTENSION: ICD-10-CM

## 2024-06-16 DIAGNOSIS — Z94.0 LIVING-DONOR KIDNEY TRANSPLANT RECIPIENT: ICD-10-CM

## 2024-06-17 RX ORDER — TACROLIMUS 1 MG/1
2 CAPSULE ORAL 2 TIMES DAILY
Qty: 360 CAPSULE | Refills: 3 | Status: SHIPPED | OUTPATIENT
Start: 2024-06-17

## 2024-06-17 RX ORDER — CLONIDINE HYDROCHLORIDE 0.1 MG/1
0.1 TABLET ORAL 2 TIMES DAILY
Qty: 180 TABLET | Refills: 0 | Status: SHIPPED | OUTPATIENT
Start: 2024-06-17

## 2024-06-19 DIAGNOSIS — Z94.0 LIVING-DONOR KIDNEY TRANSPLANT RECIPIENT: Primary | ICD-10-CM

## 2024-06-19 DIAGNOSIS — I10 HYPERTENSION: ICD-10-CM

## 2024-06-19 RX ORDER — TACROLIMUS 0.5 MG/1
0.5 CAPSULE ORAL 2 TIMES DAILY
Qty: 180 CAPSULE | Refills: 3 | Status: SHIPPED | OUTPATIENT
Start: 2024-06-19

## 2024-07-13 ENCOUNTER — HEALTH MAINTENANCE LETTER (OUTPATIENT)
Age: 29
End: 2024-07-13

## 2024-07-16 DIAGNOSIS — Z94.0 HTN, KIDNEY TRANSPLANT RELATED: ICD-10-CM

## 2024-07-16 DIAGNOSIS — Z48.298 AFTERCARE FOLLOWING ORGAN TRANSPLANT: ICD-10-CM

## 2024-07-16 DIAGNOSIS — I15.1 HTN, KIDNEY TRANSPLANT RELATED: ICD-10-CM

## 2024-07-16 DIAGNOSIS — I12.9 HYPERTENSIVE RENAL DISEASE: ICD-10-CM

## 2024-07-16 RX ORDER — LOSARTAN POTASSIUM 100 MG/1
TABLET ORAL
Qty: 90 TABLET | Refills: 0 | Status: SHIPPED | OUTPATIENT
Start: 2024-07-16

## 2024-07-16 RX ORDER — DILTIAZEM HYDROCHLORIDE 120 MG/1
120 CAPSULE, COATED, EXTENDED RELEASE ORAL DAILY
Qty: 90 CAPSULE | Refills: 0 | Status: SHIPPED | OUTPATIENT
Start: 2024-07-16

## 2024-08-27 NOTE — PROGRESS NOTES
"Notified that pt was confused and agitated.  Found pt to be trying to leave room, paranoid that nurses were lying to him, and insisting that he had changed rooms.  HR regular 160s, sat 98% RA, lungs clear, previous rash had resolved.  Talked with pt at length.  He calmed considerably and HR came down to -120s, but remains paranoid that he is \"bring tricked\" and reports feeling very anxious.  Suspect reaction to either benadryl or steroid psychosis.  Ordered BMP, CBC, and small dose of ativan (which pt refused).  Calm, back in bed, and talking with family after about 10 minutes.  -Freda Jose NP   " Xray Tibia + Fibula 2 Views, Left

## 2024-09-27 ENCOUNTER — TELEPHONE (OUTPATIENT)
Dept: TRANSPLANT | Facility: CLINIC | Age: 29
End: 2024-09-27
Payer: COMMERCIAL

## 2024-09-27 NOTE — TELEPHONE ENCOUNTER
RNCC returned call to Golden after reviewing with Dr. Bedolla.   -Post kidney transplant 7 years out  - Recurrent IgA nephropathy. Cr 1.7  - No DSA.  - Tacrolimus goal 4-6. Previous level 4.2. Current dose 2.5 mg PO BID.  -  mg BID    Okay to skip doses this evening's dose and resume tomorrow early morning.     Ashley Chau RN, BSN, CCTN  Solid Organ Transplant, Post Kidney and Pancreas  Transplant Care Coordinator  [phone]

## 2024-09-27 NOTE — TELEPHONE ENCOUNTER
Patient Call: General  Route to LPN    Reason for call:  patient took morning medications at 7:30am and then took night medications at 9:25am by accident. Patient wants to know if they should skip their night medications for today. More details with call back.     Call back needed? Yes    Return Call Needed  Same as documented in contacts section  When to return call?: Same day: Route High Priority

## 2024-10-16 DIAGNOSIS — I15.1 HTN, KIDNEY TRANSPLANT RELATED: ICD-10-CM

## 2024-10-16 DIAGNOSIS — Z94.0 HTN, KIDNEY TRANSPLANT RELATED: ICD-10-CM

## 2024-10-16 DIAGNOSIS — I12.9 HYPERTENSIVE RENAL DISEASE: ICD-10-CM

## 2024-10-16 DIAGNOSIS — Z48.298 AFTERCARE FOLLOWING ORGAN TRANSPLANT: ICD-10-CM

## 2024-10-16 RX ORDER — LOSARTAN POTASSIUM 100 MG/1
TABLET ORAL
Qty: 90 TABLET | Refills: 0 | Status: SHIPPED | OUTPATIENT
Start: 2024-10-16

## 2024-10-16 RX ORDER — DILTIAZEM HYDROCHLORIDE 120 MG/1
120 CAPSULE, COATED, EXTENDED RELEASE ORAL DAILY
Qty: 90 CAPSULE | Refills: 0 | Status: SHIPPED | OUTPATIENT
Start: 2024-10-16

## 2024-10-21 ENCOUNTER — RESULTS ONLY (OUTPATIENT)
Dept: LAB | Facility: CLINIC | Age: 29
End: 2024-10-21
Payer: COMMERCIAL

## 2024-10-21 LAB
ANION GAP SERPL CALC-SCNC: 9 MMOL/L (ref 5–13)
BUN SERPL-MCNC: 23 MG/DL (ref 7–22)
CALCIUM (EXTERNAL): 10.1 MG/DL (ref 8.8–10.5)
CHLORIDE (EXTERNAL): 108 MMOL/L (ref 98–109)
CO2 (EXTERNAL): 22 MMOL/L (ref 23–33)
CREATININE (EXTERNAL): 2 MG/DL (ref 0.6–1.3)
GFR ESTIMATED (EXTERNAL): 39 ML/MIN/1.73M2
GLUCOSE (EXTERNAL): 105 MG/DL (ref 70–99)
POTASSIUM (EXTERNAL): 4.4 MMOL/L (ref 3.6–5.5)
SODIUM (EXTERNAL): 139 MMOL/L (ref 136–145)

## 2024-12-26 DIAGNOSIS — N02.B9 IGA NEPHROPATHY: ICD-10-CM

## 2024-12-26 DIAGNOSIS — Z48.298 AFTERCARE FOLLOWING ORGAN TRANSPLANT: Primary | ICD-10-CM

## 2024-12-26 DIAGNOSIS — Z94.0 KIDNEY REPLACED BY TRANSPLANT: ICD-10-CM

## 2024-12-26 DIAGNOSIS — Z79.899 ENCOUNTER FOR LONG-TERM CURRENT USE OF MEDICATION: ICD-10-CM

## 2025-01-11 DIAGNOSIS — Z94.0 LIVING-DONOR KIDNEY TRANSPLANT RECIPIENT: ICD-10-CM

## 2025-01-13 RX ORDER — MYCOPHENOLATE MOFETIL 250 MG/1
CAPSULE ORAL
Qty: 540 CAPSULE | Refills: 3 | Status: SHIPPED | OUTPATIENT
Start: 2025-01-13

## 2025-02-06 ENCOUNTER — TELEPHONE (OUTPATIENT)
Dept: TRANSPLANT | Facility: CLINIC | Age: 30
End: 2025-02-06
Payer: COMMERCIAL

## 2025-02-06 DIAGNOSIS — N02.B9 IGA NEPHROPATHY: ICD-10-CM

## 2025-02-06 DIAGNOSIS — Z79.899 ENCOUNTER FOR LONG-TERM (CURRENT) USE OF HIGH-RISK MEDICATION: ICD-10-CM

## 2025-02-06 DIAGNOSIS — Z94.0 KIDNEY REPLACED BY TRANSPLANT: Primary | ICD-10-CM

## 2025-02-06 NOTE — TELEPHONE ENCOUNTER
ISSUE:   Urine protein creatinine ratio 1.9 on 2/5/25.   Known IgA recurrence confirmed on biopsy in 2020.   Serum creatinine stable 1.8-2.0. Plan to reconsider repeat bxp if >2.2.    PLAN:  Call Golden and discuss proteinuria.  How are blood pressures running?   Recommend checking BP daily x 1 week and logging them.  Call BP logs to CC  Confirm current BP meds:   - amlodipine 10 mg daily,   - clonidine 0.1 mg PO BID,   - diltiazem 120 mg every 24 hours.    Any swelling?    When did swelling start?    Any S/Sx of infection?  Urinary tract infection?  Changes in Urine output? Color? Odor? Urgency/Frequency/Discomfort with urination?  Pain over graft sites?  If so check Urinalysis.    Due for annual SOT appt after April 12, 2025. Order entered.    OUTCOME:  [employer] message sent to Golden.    Proteinuria  Received: Today  Golden Stover, Ashley Pineda RN  Phone Number: [phone]     ar More very weird result in my opinion I have been eating less then 80g of protein and day and lost 40lbs in the last 4 months. I attached the blood pressure and pulse readings I ve been getting at home. Yes I ve been taking those medications, and no swelling, I ve been having cramps in my ankles/upper quad area and that s about it. No pain or infection. I stopped taking the gout medication for now as well because of the leg cramps. So kind of a curious thing.    Attached BP/Pulse Readings: at goal  HR 59-71; sBP 110-133/dBP 71-79    Sent to Provider for further review and recommendations.     Ashley Chau, RN, BSN, CCTN  Solid Organ Transplant, Post Kidney and Pancreas  Transplant Care Coordinator  [phone]

## 2025-02-06 NOTE — LETTER
OUTPATIENT LABORATORY TEST ORDER     Patient Name: Golden Stover   YOB: 1995     [facility] MR# [if applicable]: [id number]   Date & Time: February 6, 2025 1:50 PM  Expiration Date: 90 days after date issued      Diagnoses: Kidney Transplant (ICD-10 Z94.0)   Long term use of medications (ICD-10 Z79.899)             Contact with or exposure to viral disease (Z20.828)            Aftercare following organ transplant (Z48.298)   Other specified postprocedural states (Z98.890)     We ask your assistance in obtaining the following laboratory tests, which are part of our routine surveillance program for Solid Organ Transplant patients.     Please fax each result to [phone], same day as resulted/available    Critical lab results page [phone]  Monday - Friday 8 am to 5 pm  Evening/Weekend/Holiday communicate Critical labs results [phone]      Please add on to specimen collected 2/5/2025; ok to use [facility]-  Albumin to creatinine ratio, urine    Please collect the following labs in 4 weeks ~March 6, 2025:  CBC with platelets  Basic Metabolic Panel (Sodium, Potassium, Chloride, Creatinine, CO2, Urea Nitrogen, glucose, Calcium)  Tacrolimus drug level - 12-hour trough, please document time of last dose    Urine Protein/creatinine ratio  Urinalysis with micro reflex to culture     If you have any questions, please call [facility]- [phone] or [phone], Fax- [phone].    .  Fabi Bedolla MD     Paroxysmal a-fib Paroxysmal a-fib Paroxysmal a-fib Paroxysmal a-fib Paroxysmal a-fib Paroxysmal a-fib Paroxysmal a-fib Paroxysmal a-fib R/O GIB (gastrointestinal bleeding) R/O GIB (gastrointestinal bleeding) R/O GIB (gastrointestinal bleeding) Thrombus in heart chamber Paroxysmal a-fib R/O GIB (gastrointestinal bleeding) Paroxysmal a-fib Paroxysmal a-fib Paroxysmal a-fib

## 2025-02-06 NOTE — TELEPHONE ENCOUNTER
Message  Received: Today  Fabi Bedolla MD Blaisdell, Ashley Pineda, RN  Caller: Unspecified (Today, 10:59 AM)    Zach Ramirez    Can you please obtain albumin to creatinine ratio (see if you can add to urine collected yesterday)  Okay to monitor for now. Repeat UA and UPCR in 4 weeks.  He need to see us in couple months for his yearly follow up, lets see if he needs any ACEi/ARB for proteinuria    Thank you  Fabi          Previous Messages       ----- Message -----  From: Ashley Chau, RN  Sent: 2/6/2025  12:44 PM CST  To: Ashley Chau RN; *    ----- Message from Ashley Chau RN sent at 2/6/2025 12:44 PM CST -----  Hi Dr. Bedolla, Urine Protein Creatinine ratio 1.9. Golden's BP is at goal <130/80 on average. His serum creatinine is also stable at baseline 1.8-2.0. He seems to get an okay amount of protein given he his weight is 118 kg and he can have 1 gram protein per 1 kg, so consuming less than 80 grams of protein per day seems fine. Any additional recommendations for the proteinuria? Thanks, Ashley    OUTCOME:  Spoke with Golden and discussed Dr. Bedolla's message. Spoke with lab regarding adding on albumin/creatinine ratio and send lab requisition letter. Instructed to fax to [phone]. Golden will get scheduled for lab appt in March and Txp Neph appt in April to follow up.     Ashley Chau RN, BSN, CCTN  Solid Organ Transplant, Post Kidney and Pancreas  Transplant Care Coordinator  [phone]

## 2025-02-11 ENCOUNTER — TELEPHONE (OUTPATIENT)
Dept: TRANSPLANT | Facility: CLINIC | Age: 30
End: 2025-02-11
Payer: COMMERCIAL

## 2025-02-11 DIAGNOSIS — Z94.0 LIVING-DONOR KIDNEY TRANSPLANT RECIPIENT: ICD-10-CM

## 2025-02-11 DIAGNOSIS — I10 HYPERTENSION: ICD-10-CM

## 2025-02-11 DIAGNOSIS — N02.B9 IGA NEPHROPATHY: ICD-10-CM

## 2025-02-11 RX ORDER — TACROLIMUS 1 MG/1
3 CAPSULE ORAL 2 TIMES DAILY
Qty: 540 CAPSULE | Refills: 3 | Status: SHIPPED | OUTPATIENT
Start: 2025-02-11

## 2025-02-11 NOTE — TELEPHONE ENCOUNTER
Discharge Instructions for  [address] [place] [address]  Telephone: [phone]    NAME:  Stevenson Martinez OF BIRTH:  1977  MEDICAL RECORD NUMBER:  [id number]  DATE:  6/3/2021      Return Appointment:  [] Dressing supply provider:  [] Home Healthcare:  [x] Return Appointment: 2 Week(s)  [x] Nurse Visit: 1   Week(s)    [] Discharge from [facility]  [x] Ordered tests: Venous Reflux and PVR  [] Referrals:   [] Rx:     Wound Cleansing:   Do not scrub or use excessive force. Cleanse wound prior to applying a clean dressing with:  [x] Normal Saline   [x] Mild Soap & Water    [] Keep Wound Dry in Shower  [] Other:      Topical Treatments:  Do not apply lotions, creams, or ointments to wound bed unless directed. [x] Apply moisturizing lotion to skin surrounding the wound prior to dressing change.  [] Apply antifungal ointment to skin surrounding the wound prior to dressing change.  [] Apply thin film of moisture barrier ointment to skin immediately around wound. [] Other:        Dressings:           Wound Location right leg   [] Apply Primary Dressing:       [] MediHoney Gel    [] MediHoney Alginate               [] Calcium Alginate      [] Calcium Alginate with Silver   [] Collagen                   [] Collagen with Silver                [] Santyl with Moistened saline gauze              [] Hydrofera Blue (cut to size and moistened)  [] Hydrofera Blue Ready (Cut to size)   [] NS wet to dry    [] Betadine wet to dry              [] Hydrogel                [] Mepitel     [] Bactroban/Mupirocin               [x] Other: silvercel  [x] Cover and Secure with:     [x] Gauze [x] Tiffanie [] Kerlix   [] Foam [] Super Absorbant [] ABD     [] ACE Wrap            [] Other:    Avoid contact of tape with skin.     [x] Change dressing: [] Daily    [] Every Other Day [x] Once per week   [] Twice per week   [] Three times per week [] Do Not Change ISSUE:   Tacrolimus IR level 3.8 on 2/5/25 08:05 AM, goal 4-6, dose 2.5 mg BID. Last dose date/time not recorded.    PLAN:   Call Patient and confirm this was an accurate 12-hour trough.   Verify Tacrolimus IR dose 2.5 mg BID.   Confirm no new medications or or missed doses.   Confirm no new illness / infection / diarrhea.   If accurate trough and accurate dose, increase Tacrolimus IR dose to 3 mg BID*   Repeat labs in 2 weeks.      Tacrolimus IR level 3.8 on 02/05/25, goal 5, dose 2.5 mg BID.  *Recommended dose rounded from calculated dose 3.29 mg PO BID.    Is this more than a 50% increase or decrease in current IS dose: No    OUTCOME:   Smallable message sent.  Lab requisition letter sent to [facility], for repeat in 2 weeks.    Addendum: [employer] reply received from Golden:    Low Tacrolimus Level  Received: Today  Golden Stover, Ashley Pineda RN  Phone Number: [phone]     Alden Ramirez, was 12.5hr trough from last dose, and no missed doses. No sickness or sick symptoms. I will move my dosage up to 3mg twice a day (12hr ). And will repeat the Tac test in a couple of weeks.    Ashley Chau, RN, BSN, CCTN  Solid Organ Transplant, Post Kidney and Pancreas  Transplant Care Coordinator  [phone]                Dressing   [] Other:     Negative Pressure:           Wound Location:   [] Pressure @  mm/Hg  []Continuous []Intermittent   [] Black  [] White Foam              [] Bridge:               [] Change vac dressing three times per week    Pressure Relief:  [] When sitting, shift position or do seat lifts every 15 minutes.  [] Wheelchair cushion [] Specialty Bed/Mattress  [] Turn every 2 hours when in bed. Avoid direct pressure on wound site. Limit side lying to 30 degree tilt. Limit HOB elevation to 30 degrees. Edema Control:  Apply: [x] Compression Stocking []Right Leg [x]Left Leg   [] Tubigrip []Right Leg Double Layer []Left Leg Double Layer      []Right Leg Single Layer []Left Leg Single Layer     [x] Elevate leg(s) above the level of the heart when sitting. [x] Avoid prolonged standing in one place. Compression:  Apply: [x] Multilayer Compression Wrap: [x]RightLeg []Left Leg                                 [x]Profore  []Profore Lite             []Unna     [x] Do not get leg(s) with wrap wet. If wraps become too tight call the center or completely remove the wrap. [x] Elevate leg(s) above the level of the heart when sitting. [x] Avoid prolonged standing in one place. Off-Loading:   [] Off-loading when [] walking  [] in bed [] sitting  [] Total non-weight bearing  [] Right Leg  [] Left Leg   [] Assistive Device [] Walker [] Cane      [] Wheelchair  [] Crutches   [] Surgical shoe    [] Podus Boot(s)   [] Foam Boot(s)  [] Roll About    [] Cast Boot [] CROW Boot  [] Wedge Shoe  [] Other:    Contact Cast:  Apply: [] Total Contact Cast Applied in Clinic []RightLeg []Left Leg   [] Do not get cast wet. Contact center or go to emergency room if there is a foul odor or becomes uncomfortable due to feeling tight or swelling. Do not use objects inside of cast to scratch.       Dietary:  [x] Diet as tolerated: [] Calorie Diabetic Diet: [] No Added Salt:  [] Increase Protein: [] Other:     Activity:  [x] Activity as tolerated:    [] Patient has no activity restrictions       [] Strict Bedrest:   [] Remain off Work:       [] May return to full duty work:                                     [] Return to work with restrictions:                    [address] Information: Should you experience any significant changes in your wound(s) or have questions about your wound care, please contact Gala Cancino at [place] [address] 8:00 am - 4:30. If you need help with your wound outside of these hours and cannot wait until we are again available, contact your PCP or go to the hospital emergency room. PLEASE NOTE: IF YOU ARE UNABLE TO OBTAIN WOUND SUPPLIES, CONTINUE TO USE THE SUPPLIES YOU HAVE AVAILABLE UNTIL YOU ARE ABLE TO REACH US. IT IS MOST IMPORTANT TO KEEP THE WOUND COVERED AT ALL TIMES.     [] Dr. Zulema Pacheco   [] Dr. Corrine Estrada  [x] Dr. Nelli Perez  [] Dr. Doreen Garvin

## 2025-02-11 NOTE — LETTER
OUTPATIENT LABORATORY TEST ORDER     Patient Name: Golden Stover   YOB: 1995     [facility] MR# [if applicable]: [id number]   Date & Time: February 11, 2025 8:58 AM  Expiration Date: 90 days after date issued      Diagnoses: Kidney Transplant (ICD-10 Z94.0)   Long term use of medications (ICD-10 Z79.899)             Contact with or exposure to viral disease (Z20.828)            Aftercare following organ transplant (Z48.298)   Other specified postprocedural states (Z98.890)     We ask your assistance in obtaining the following laboratory tests, which are part of our routine surveillance program for Solid Organ Transplant patients.     Please fax each result to [phone], same day as resulted/available    Critical lab results page [phone]  Monday - Friday 8 am to 5 pm  Evening/Weekend/Holiday communicate Critical labs results [phone]      Please collect the following labs in 2 weeks:  CBC with platelets  Basic Metabolic Panel (Sodium, Potassium, Chloride, Creatinine, CO2, Urea Nitrogen, glucose, Calcium)  Tacrolimus drug level - 12-hour trough, please document time of last dose       If you have any questions, please call [facility]- [phone] or [phone], Fax- [phone].    .  Fabi Bedolla MD

## 2025-02-15 ENCOUNTER — MYC REFILL (OUTPATIENT)
Dept: NEPHROLOGY | Facility: CLINIC | Age: 30
End: 2025-02-15
Payer: COMMERCIAL

## 2025-02-15 DIAGNOSIS — Z48.298 AFTERCARE FOLLOWING ORGAN TRANSPLANT: ICD-10-CM

## 2025-02-15 DIAGNOSIS — Z94.0 HTN, KIDNEY TRANSPLANT RELATED: ICD-10-CM

## 2025-02-15 DIAGNOSIS — I15.1 HTN, KIDNEY TRANSPLANT RELATED: ICD-10-CM

## 2025-02-15 DIAGNOSIS — I12.9 HYPERTENSIVE RENAL DISEASE: ICD-10-CM

## 2025-02-17 DIAGNOSIS — N02.B9 IGA NEPHROPATHY: ICD-10-CM

## 2025-02-17 RX ORDER — LOSARTAN POTASSIUM 100 MG/1
100 TABLET ORAL EVERY EVENING
Qty: 90 TABLET | Refills: 0 | Status: SHIPPED | OUTPATIENT
Start: 2025-02-17

## 2025-02-17 RX ORDER — DILTIAZEM HYDROCHLORIDE 120 MG/1
120 CAPSULE, COATED, EXTENDED RELEASE ORAL DAILY
Qty: 90 CAPSULE | Refills: 0 | Status: SHIPPED | OUTPATIENT
Start: 2025-02-17

## 2025-03-17 ENCOUNTER — TELEPHONE (OUTPATIENT)
Dept: TRANSPLANT | Facility: CLINIC | Age: 30
End: 2025-03-17
Payer: COMMERCIAL

## 2025-03-17 DIAGNOSIS — Z94.0 KIDNEY TRANSPLANTED: Primary | ICD-10-CM

## 2025-03-17 DIAGNOSIS — I10 HYPERTENSION: ICD-10-CM

## 2025-03-17 DIAGNOSIS — N02.B9 IGA NEPHROPATHY: ICD-10-CM

## 2025-03-17 DIAGNOSIS — Z94.0 LIVING-DONOR KIDNEY TRANSPLANT RECIPIENT: ICD-10-CM

## 2025-03-17 RX ORDER — TACROLIMUS 0.5 MG/1
0.5 CAPSULE ORAL 2 TIMES DAILY
Qty: 60 CAPSULE | Refills: 11 | Status: SHIPPED | OUTPATIENT
Start: 2025-03-17

## 2025-03-17 RX ORDER — TACROLIMUS 1 MG/1
2 CAPSULE ORAL 2 TIMES DAILY
Qty: 540 CAPSULE | Refills: 3 | Status: SHIPPED | OUTPATIENT
Start: 2025-03-17

## 2025-03-17 NOTE — TELEPHONE ENCOUNTER
ISSUE:   Tacrolimus IR level 7.1 on 03/14/25, goal 6, dose 3 mg BID.    PLAN:   Call Patient and confirm this was an accurate 12-hour trough.   Verify Tacrolimus IR dose 3 mg BID.   Confirm no new medications or illness.   Confirm no missed doses.     If accurate trough and accurate dose, decrease Tacrolimus IR dose to 2.5 mg BID  *Recommended dose rounded from calculated dose 2.54 mg  BID.      Repeat labs in 1 weeks.   For any dose change <50%, recheck labs per guideline or within 1 month.  For any dose change of more than 50%, recheck drug level based on timing to reach steady state:   Immediate release tacrolimus: 2-3 days  Extended-release tacrolimus: 7 days  Cyclosporine: 2 days  Sirolimus: 12 days  Everolimus: 8 days      OUTCOME:   Spoke with Patient, they confirm accurate trough level and current dose 3 mg BID.   Patient confirmed dose change to 2.5 mg BID.  Patient agreed to repeat labs in 1 weeks.   Orders sent to preferred pharmacy for dose change and lab for repeat labs.   Patient voiced understanding of plan.    Tabitha Navarro RN   Transplant Coordinator  [phone]

## 2025-03-17 NOTE — LETTER
OUTPATIENT LABORATORY TEST ORDER     Patient Name: Golden Stover   YOB: 1995     [facility] MR# [if applicable]: [id number]   Date & Time: 3/17/25  Expiration Date: 90 days after date issued      Diagnoses: Kidney Transplant (ICD-10 Z94.0)   Long term use of medications (ICD-10 Z79.899)             Contact with or exposure to viral disease (Z20.828)            Aftercare following organ transplant (Z48.298)   Other specified postprocedural states (Z98.890)     We ask your assistance in obtaining the following laboratory tests, which are part of our routine surveillance program for Solid Organ Transplant patients.     Please fax each result to [phone], same day as resulted/available    Critical lab results page [phone]  Monday - Friday 8 am to 5 pm  Evening/Weekend/Holiday communicate Critical labs results [phone]      Please collect the following labs in 1-2 weeks:  CBC with platelets  Basic Metabolic Panel (Sodium, Potassium, Chloride, Creatinine, CO2, Urea Nitrogen, glucose, Calcium)  Tacrolimus drug level - 12-hour trough, please document time of last dose       If you have any questions, please call [facility]- [phone] or [phone], Fax- [phone].    .  Fabi Bedolla MD

## 2025-03-26 ENCOUNTER — VIRTUAL VISIT (OUTPATIENT)
Dept: TRANSPLANT | Facility: CLINIC | Age: 30
End: 2025-03-26
Attending: INTERNAL MEDICINE
Payer: COMMERCIAL

## 2025-03-26 VITALS — HEIGHT: 74 IN | BODY MASS INDEX: 30.8 KG/M2 | WEIGHT: 240 LBS

## 2025-03-26 DIAGNOSIS — I15.1 HTN, KIDNEY TRANSPLANT RELATED: Primary | ICD-10-CM

## 2025-03-26 DIAGNOSIS — E83.42 HYPOMAGNESEMIA: ICD-10-CM

## 2025-03-26 DIAGNOSIS — E87.20 METABOLIC ACIDOSIS: ICD-10-CM

## 2025-03-26 DIAGNOSIS — D84.9 IMMUNOSUPPRESSION: ICD-10-CM

## 2025-03-26 DIAGNOSIS — Z94.0 HTN, KIDNEY TRANSPLANT RELATED: Primary | ICD-10-CM

## 2025-03-26 DIAGNOSIS — N18.32 STAGE 3B CHRONIC KIDNEY DISEASE (H): ICD-10-CM

## 2025-03-26 DIAGNOSIS — Z79.899 ENCOUNTER FOR LONG-TERM (CURRENT) USE OF HIGH-RISK MEDICATION: ICD-10-CM

## 2025-03-26 DIAGNOSIS — R80.9 PROTEINURIA, UNSPECIFIED TYPE: ICD-10-CM

## 2025-03-26 DIAGNOSIS — N02.B9 IGA NEPHROPATHY: ICD-10-CM

## 2025-03-26 DIAGNOSIS — Z48.298 AFTERCARE FOLLOWING ORGAN TRANSPLANT: ICD-10-CM

## 2025-03-26 DIAGNOSIS — R25.2 LEG CRAMPING: ICD-10-CM

## 2025-03-26 DIAGNOSIS — Z94.0 KIDNEY REPLACED BY TRANSPLANT: ICD-10-CM

## 2025-03-26 DIAGNOSIS — R79.89 ELEVATED SERUM CREATININE: ICD-10-CM

## 2025-03-26 ASSESSMENT — PAIN SCALES - GENERAL: PAINLEVEL_OUTOF10: NO PAIN (0)

## 2025-03-26 NOTE — LETTER
3/26/2025      Golden Stover  [address] [address]      Dear Colleague,    Thank you for referring your patient, Golden Stover, to the [facility]. Please see a copy of my visit note below.    Virtual Visit Details    Type of service:  Video Visit     Video start time : 9: 29 AM  Video end time : 9:56 AM    Originating Location (pt. Location): Home    Distant Location (provider location):  Off-site  Platform used for Video Visit: [facility]    TRANSPLANT NEPHROLOGY CLINIC VISIT     Assessment & Plan  # LDKT: CKD Stage 3b - Stable for past few checks, but higher than his previous baseline. Endorsed feeling okay over all. Hydration been stable. No DSA history. Drug levels stable. Likely this is his new baseline. He did lost some weight and likely have some muscle mass which could be the reason for higher creatinine as well. Proteinuria present, but stable.    - Baseline Creatinine: ~ 1.7-2.1, up from previous baseline of 1.6-1.8   - Proteinuria: Moderate (1-3 grams)   - DSA Hx: No DSA   - Last cPRA: 60%   - BK Viremia: No   - Kidney Tx Biopsy Hx: No history of acute rejection and Mild vascular changes. Recurrent IgA nephropathy without any crescents.     # IgA Nephropathy: noted recurrence on kidney biopsy. Currently stable proteinuria at ~1.5 g/ g of creatinine. Currently on losartan 100 mg and spironolactone 25 mg daily . Started on jardiance for ongoing proteinuria.    # Immunosuppression: Tacrolimus immediate release (goal 4-6) and Mycophenolate mofetil (dose 750 mg every 12 hours)   - Induction with Recent Transplant:  Not known due to time from transplant   - Continue with intensive monitoring of immunosuppression for efficacy and toxicity.   - Historical Changes in Immunosuppression: None   - Changes: Not at this time    # Infection Prevention:   Last CD4 Level:  201 (5/2018)  - PJP: None      - CMV IgG Ab High Risk Discordance (D+/R-) at time of transplant:  No  Present CMV Serostatus: Negative  - EBV IgG Ab High Risk Discordance (D+/R-) at time of transplant: No  Present EBV Serostatus: Positive    # Hypertension: Controlled;  Goal BP: < 130/80   - Changes: Not at this time    # Hemoglobin: Stable       Iron studies: Replete    # Mineral Bone Disorder:    - Secondary renal hyperparathyroidism; PTH level: Minimally elevated ( pg/ml)        On treatment: None  - Vitamin D; level: Not checked recently, but was normal last check        On supplement: Yes  - Calcium; level: Normal        On supplement: No    # Electrolytes:   - Potassium; level: Normal        On supplement: No  - Magnesium; level: Normal        On supplement: Yes  - Bicarbonate; level: Low        On supplement: No  - Sodium; level: Normal    # Other Significant PMH:   - Obesity, Class I (BMI = 33.6): lost 40 pounds in the past several months.             - Recommend continued weight loss for overall health by increasing exercise and watching caloric intake.  - LE cramping and pain  H/o of it since his transplant, but noted to happen more now with increased intensity of pain. Happening mostly during nights. On daily lasix, changed to PRN for swelling, need to check his mag and phos with his next labs. Despite of above changes, if he still having cramping, need to be discussed with PCP and or neurology.     # Skin Cancer Risk:    - Discussed sun protection and recommend regular follow up with Dermatology.    # Transplant History:  Etiology of Kidney Failure: IgA nephropathy  Tx: LDKT  Transplant: 6/7/2017 (Kidney)  Significant transplant-related complications: None    Transplant Office Phone Number: [phone]    Assessment and plan was discussed with the patient and he voiced his understanding and agreement.    Return visit: Return in about 1 year (around 3/26/2026).    Fabi Bedolla MD    The longitudinal plan of care for the diagnosis(es)/condition(s) as documented were addressed during this  visit. Due to the added complexity in care, I will continue to support Golden in the subsequent management and with ongoing continuity of care.      Chief Complaint  Mr. Stover is a 29 year old here for kidney transplant, immunosuppression management, CKD management, and proteinuria.     History of Present Illness   Mr. Stover reports feeling stable overall. Cramping  in the past now improved with supplements including mag and B complex vitamins.  Since last clinic visit:   Hospitalizations: No   New Medical Issues: No  Chest pain or shortness of breath: No  Lower extremity swelling: No  Weight change: Yes, lost 40 pounds  Nausea and vomiting: No  Diarrhea: No  Heartburn symptoms: No  Fever, sweats or chills: No  Urinary complaints: No, but felt foaminess to urine in later part of the day.     Home BP:  less than 130 and > 115 systolic      Problem List  Patient Active Problem List   Diagnosis     IgA nephropathy     HTN, kidney transplant related     Secondary renal hyperparathyroidism     Kidney replaced by transplant     Immunosuppression     Aftercare following organ transplant     Vitamin D deficiency     Gilbert syndrome     CKD (chronic kidney disease) stage 3, GFR 30-59 ml/min (H)     Obesity     Proteinuria, unspecified type     Leg cramping     Elevated serum creatinine     Hypertensive renal disease       Allergies  Allergies   Allergen Reactions     Anti-Thymocyte Glob (Rabbit) Itching     Methylprednisolone Other (See Comments)     Other reaction(s): Other (See Comments)  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 90 capsule 0     fluocinolone acetonide oil 0.01 % ear drops as needed       fluocinonide (LIDEX) 0.05 % external solution Apply topically as needed       furosemide (LASIX) 20 MG tablet Take 1 tablet (20 mg) by mouth as needed (for LE swelling) 90 tablet 3     hydrocortisone 2.5 % ointment APPLY OVER AFFECTED AREA TWICE DAILY TO AFFECTED AREA FOR 2 WEEKS, THEN TAKE 1 WEEK OFF. REPEAT AS NECESSARY.       ketoconazole (NIZORAL) 2 % external shampoo WASH WITH 2-4 TIMES A WEEK       losartan (COZAAR) 100 MG tablet Take 1 tablet (100 mg) by mouth every evening. 90 tablet 0     mycophenolate (GENERIC EQUIVALENT) 250 MG capsule TAKE 3 CAPSULES (750 MG) BYMOUTH 2 TIMES DAILY 540 capsule 3     spironolactone (ALDACTONE) 25 MG tablet Take 1 tablet (25 mg) by mouth daily. 90 tablet 3     tacrolimus (GENERIC EQUIVALENT) 0.5 MG capsule Take 1 capsule (0.5 mg) by mouth 2 times daily. TOTAL DOSE: 2.5 mg twice daily 60 capsule 11     tacrolimus (GENERIC EQUIVALENT) 1 MG capsule Take 2 capsules (2 mg) by mouth 2 times daily. TOTAL DOSE: 2.5 mg twice daily 540 capsule 3     triamcinolone (KENALOG) 0.1 % external cream APPLY TOPICALLY TO AFFECTED AREAS TWICE A DAY FOR UP TO 14 DAYS IN A ROW, OR AS NEEDED.       vitamin D3 (CHOLECALCIFEROL) 50 mcg (2000 units) tablet Take 1 tablet by mouth daily       No current facility-administered medications for this visit.     There are no discontinued medications.    Physical Exam  Vital Signs: Ht 1.88 m (6' 2\")   Wt 108.9 kg (240 lb)   BMI 30.81 kg/m      GENERAL APPEARANCE: alert and no distress  EYES: eyes grossly normal to inspection  HENT: normal cephalic/atraumatic  RESP: able to speak in full " sentences, no audible wheezing and no accessory  muscle usage.  EDEMA: denied LE edema bilaterally  MS: extremities normal - no gross deformities noted  SKIN: no visible facial rash  NEURO: mentation intact and speech normal  PSYCH: mentation appears normal and affect normal/bright    Data        Latest Ref Rng & Units 3/14/2025     8:09 AM 2/26/2025     8:17 AM 2/5/2025     8:05 AM   Renal   Na (external) 136 - 145 mmol/L 138  136  139    K (external) 3.6 - 5.5 mmol/L 4.5  4.6  4.3    Cl 98 - 109 mmol/L 108  105  106    Cl (external) 98 - 109 mmol/L 108  105  106    CO2 (external) 23.0 - 33.0 mmol/L 20.3  22.2  22.4    BUN (external) 7 - 22 mg/dL 18  23  18    Cr (external) 0.6 - 1.3 mg/dL 2.0  1.9  2.0    Glucose (external) 70 - 99 mg/dL 88  82.00  93    Ca (external) 8.8 - 10.5 mg/dL 10.0  9.6  9.6    Mg (external) 1.4 - 2.2 mg/dL 1.6             This result is from an external source.         Latest Ref Rng & Units 4/22/2024     8:30 AM 1/18/2023     8:12 AM 10/30/2018     7:51 AM   Bone Health   Phos (external) 2.5 - 4.9 mg/dL 2.7      Vit D Def (external) 20 - 50 ng/mL  32     26           This result is from an external source.         Latest Ref Rng & Units 3/14/2025     8:09 AM 2/26/2025     8:17 AM 2/5/2025     8:05 AM   Heme   WBC (external) 3.60 - 11.00 K/UL 6.12     6.54  7.07       Hgb (external) 13.0 - 18.0 g/dL 13.4     13.4  13.4       Plt (external) 150 - 440 K/uL 489     506  478           This result is from an external source.         Latest Ref Rng & Units 6/8/2018     6:52 AM 3/6/2018     9:21 AM 1/23/2018     7:51 AM   Liver   AP (external) 40 - 150 IU/L 75     76     71       TBili (external) 0.2 - 1.2 mg/dL 2.3     1.3     1.3       DBili (external) 0.0 - 0.5 mg/dL 0.7     0.5     0.5       ALT (external) 6 - 40 IU/L 31     20     20       AST (external) 10 - 40 IU/L 30     25     19       Tot Protein (external) 6.0 - 8.0 g/dL 7.4     7.6     7.3       Albumin (external) 3.5 - 5.0 g/dL 4.4      4.6     4.5           This result is from an external source.            Latest Ref Rng & Units 3/14/2025     8:09 AM 6/1/2017     1:10 PM   Iron studies   Iron 35 - 180 ug/dL  65    Iron Saturation Index 15 - 46 %  21    Ferritin 26 - 388 ng/mL  310    Ferritin (external) 22.0 - 274.0 ng/mL 129.8            This result is from an external source.         Latest Ref Rng & Units 4/22/2024     8:30 AM 10/8/2020     8:39 AM 1/15/2019     6:49 AM   [facility] Txp Virology   BK Spec   Plasma     BK Res BKNEG^BK Virus DNA Not Detected copies/mL  BK Virus DNA Not Detected     BK Log <2.7 Log copies/mL  Not Calculated     BK Quant Log Ext log IU/mL Undetected      BK Quant Result Ext Undeteted IU/mL Undetected   None detected       BK Quant Spec Ext  Blood          This result is from an external source.     Failed to redirect to the Timeline version of the [employer] SmartLink.  Recent Labs   Lab Test 06/30/17  0830 07/03/17  0857 10/08/20  0839   DOSTAC 2030, 06/29/17 2030 07/02/17 Not Provided   TACROL 9.5 11.1 18.8*     Recent Labs   Lab Test 06/16/17  0712 06/29/17  0831   DOSMPA 1930 06/15/2017 6/28/17 7:30pm   MPACID 1.26 4.40*   MPAG 27.9* 43.9      Prescription drug management  45 minutes spent by me on the date of the encounter doing chart review, history and exam, documentation and further activities per the note      Again, thank you for allowing me to participate in the care of your patient.        Sincerely,        Fabi Bedolla MD    Electronically signed

## 2025-03-26 NOTE — NURSING NOTE
Current patient location: [address] [place] [id number]    Is the patient currently in the state of [place]? YES    Visit mode: VIDEO    If the visit is dropped, the patient can be reconnected by:VIDEO VISIT: Text to cell phone:   Telephone Information:   Mobile [phone]       Will anyone else be joining the visit? NO  (If patient encounters technical issues they should call [phone]    Are changes needed to the allergy or medication list? Yes Magnesium 200 MG at night B12 complex 1000 MG at night    Are refills needed on medications prescribed by this physician? NO    Rooming Documentation:  Questionnaire(s) completed    Reason for visit: SHON RIZOF

## 2025-03-26 NOTE — PROGRESS NOTES
Virtual Visit Details    Type of service:  Video Visit     Video start time : 9: 29 AM  Video end time : 9:56 AM    Originating Location (pt. Location): Home    Distant Location (provider location):  Off-site  Platform used for Video Visit: [facility]    TRANSPLANT NEPHROLOGY CLINIC VISIT     Assessment & Plan   # LDKT: CKD Stage 3b - Stable for past few checks, but higher than his previous baseline. Endorsed feeling okay over all. Hydration been stable. No DSA history. Drug levels stable. Likely this is his new baseline. He did lost some weight and likely have some muscle mass which could be the reason for higher creatinine as well. Proteinuria present, but stable.    - Baseline Creatinine: ~ 1.7-2.1, up from previous baseline of 1.6-1.8   - Proteinuria: Moderate (1-3 grams)   - DSA Hx: No DSA   - Last cPRA: 60%   - BK Viremia: No   - Kidney Tx Biopsy Hx: No history of acute rejection and Mild vascular changes. Recurrent IgA nephropathy without any crescents.     # IgA Nephropathy: noted recurrence on kidney biopsy. Currently stable proteinuria at ~1.5 g/ g of creatinine. Currently on losartan 100 mg and spironolactone 25 mg daily . Started on jardiance for ongoing proteinuria.    # Immunosuppression: Tacrolimus immediate release (goal 4-6) and Mycophenolate mofetil (dose 750 mg every 12 hours)   - Induction with Recent Transplant:  Not known due to time from transplant   - Continue with intensive monitoring of immunosuppression for efficacy and toxicity.   - Historical Changes in Immunosuppression: None   - Changes: Not at this time    # Infection Prevention:   Last CD4 Level:  201 (5/2018)  - PJP: None      - CMV IgG Ab High Risk Discordance (D+/R-) at time of transplant: No  Present CMV Serostatus: Negative  - EBV IgG Ab High Risk Discordance (D+/R-) at time of transplant: No  Present EBV Serostatus: Positive    # Hypertension: Controlled;  Goal BP: < 130/80   - Changes: Not at this time    # Hemoglobin: Stable        Iron studies: Replete    # Mineral Bone Disorder:    - Secondary renal hyperparathyroidism; PTH level: Minimally elevated ( pg/ml)        On treatment: None  - Vitamin D; level: Not checked recently, but was normal last check        On supplement: Yes  - Calcium; level: Normal        On supplement: No    # Electrolytes:   - Potassium; level: Normal        On supplement: No  - Magnesium; level: Normal        On supplement: Yes  - Bicarbonate; level: Low        On supplement: No  - Sodium; level: Normal    # Other Significant PMH:   - Obesity, Class I (BMI = 33.6): lost 40 pounds in the past several months.             - Recommend continued weight loss for overall health by increasing exercise and watching caloric intake.  - LE cramping and pain  H/o of it since his transplant, but noted to happen more now with increased intensity of pain. Happening mostly during nights. On daily lasix, changed to PRN for swelling, need to check his mag and phos with his next labs. Despite of above changes, if he still having cramping, need to be discussed with PCP and or neurology.     # Skin Cancer Risk:    - Discussed sun protection and recommend regular follow up with Dermatology.    # Transplant History:  Etiology of Kidney Failure: IgA nephropathy  Tx: LDKT  Transplant: 6/7/2017 (Kidney)  Significant transplant-related complications: None    Transplant Office Phone Number: [phone]    Assessment and plan was discussed with the patient and he voiced his understanding and agreement.    Return visit: Return in about 1 year (around 3/26/2026).    Fabi Bedolla MD    The longitudinal plan of care for the diagnosis(es)/condition(s) as documented were addressed during this visit. Due to the added complexity in care, I will continue to support Golden in the subsequent management and with ongoing continuity of care.      Chief Complaint   Mr. Stover is a 29 year old here for kidney transplant, immunosuppression  management, CKD management, and proteinuria.     History of Present Illness    Mr. Stover reports feeling stable overall. Cramping  in the past now improved with supplements including mag and B complex vitamins.  Since last clinic visit:   Hospitalizations: No   New Medical Issues: No  Chest pain or shortness of breath: No  Lower extremity swelling: No  Weight change: Yes, lost 40 pounds  Nausea and vomiting: No  Diarrhea: No  Heartburn symptoms: No  Fever, sweats or chills: No  Urinary complaints: No, but felt foaminess to urine in later part of the day.     Home BP:  less than 130 and > 115 systolic      Problem List   Patient Active Problem List   Diagnosis    IgA nephropathy    HTN, kidney transplant related    Secondary renal hyperparathyroidism    Kidney replaced by transplant    Immunosuppression    Aftercare following organ transplant    Vitamin D deficiency    Gilbert syndrome    CKD (chronic kidney disease) stage 3, GFR 30-59 ml/min (H)    Obesity    Proteinuria, unspecified type    Leg cramping    Elevated serum creatinine    Hypertensive renal disease       Allergies   Allergies   Allergen Reactions    Anti-Thymocyte Glob (Rabbit) Itching    Methylprednisolone Other (See Comments)     Other reaction(s): Other (See Comments)  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 90 capsule 0    fluocinolone acetonide oil 0.01 % ear drops as needed      fluocinonide (LIDEX) 0.05 % external solution Apply topically as needed      furosemide (LASIX) 20 MG tablet Take 1 tablet (20 mg) by mouth as needed (for LE swelling) 90 tablet 3    hydrocortisone 2.5 % ointment APPLY OVER AFFECTED AREA TWICE DAILY TO AFFECTED AREA FOR 2 WEEKS, THEN TAKE 1 WEEK OFF. REPEAT AS NECESSARY.      ketoconazole (NIZORAL) 2 % external shampoo WASH WITH 2-4 TIMES A WEEK      losartan (COZAAR) 100 MG tablet Take 1 tablet (100 mg) by mouth every evening. 90 tablet 0    mycophenolate (GENERIC EQUIVALENT) 250 MG capsule TAKE 3 CAPSULES (750 MG) BYMOUTH 2 TIMES DAILY 540 capsule 3    spironolactone (ALDACTONE) 25 MG tablet Take 1 tablet (25 mg) by mouth daily. 90 tablet 3    tacrolimus (GENERIC EQUIVALENT) 0.5 MG capsule Take 1 capsule (0.5 mg) by mouth 2 times daily. TOTAL DOSE: 2.5 mg twice daily 60 capsule 11    tacrolimus (GENERIC EQUIVALENT) 1 MG capsule Take 2 capsules (2 mg) by mouth 2 times daily. TOTAL DOSE: 2.5 mg twice daily 540 capsule 3    triamcinolone (KENALOG) 0.1 % external cream APPLY TOPICALLY TO AFFECTED AREAS TWICE A DAY FOR UP TO 14 DAYS IN A ROW, OR AS NEEDED.      vitamin D3 (CHOLECALCIFEROL) 50 mcg (2000 units) tablet Take 1 tablet by mouth daily       No current facility-administered medications for this visit.     There are no discontinued medications.    Physical Exam   Vital Signs: Ht 1.88 m (6' 2\")   Wt 108.9 kg (240 lb)   BMI 30.81 kg/m      GENERAL APPEARANCE: alert and no distress  EYES: eyes grossly normal to inspection  HENT: normal cephalic/atraumatic  RESP: able to speak in full sentences, no audible wheezing and no accessory  muscle usage.  EDEMA: denied LE edema bilaterally  MS: extremities normal - no gross deformities noted  SKIN: no visible facial rash  NEURO: mentation intact and speech normal  PSYCH: mentation appears normal and affect " normal/bright    Data         Latest Ref Rng & Units 3/14/2025     8:09 AM 2/26/2025     8:17 AM 2/5/2025     8:05 AM   Renal   Na (external) 136 - 145 mmol/L 138  136  139    K (external) 3.6 - 5.5 mmol/L 4.5  4.6  4.3    Cl 98 - 109 mmol/L 108  105  106    Cl (external) 98 - 109 mmol/L 108  105  106    CO2 (external) 23.0 - 33.0 mmol/L 20.3  22.2  22.4    BUN (external) 7 - 22 mg/dL 18  23  18    Cr (external) 0.6 - 1.3 mg/dL 2.0  1.9  2.0    Glucose (external) 70 - 99 mg/dL 88  82.00  93    Ca (external) 8.8 - 10.5 mg/dL 10.0  9.6  9.6    Mg (external) 1.4 - 2.2 mg/dL 1.6             This result is from an external source.         Latest Ref Rng & Units 4/22/2024     8:30 AM 1/18/2023     8:12 AM 10/30/2018     7:51 AM   Bone Health   Phos (external) 2.5 - 4.9 mg/dL 2.7      Vit D Def (external) 20 - 50 ng/mL  32     26           This result is from an external source.         Latest Ref Rng & Units 3/14/2025     8:09 AM 2/26/2025     8:17 AM 2/5/2025     8:05 AM   Heme   WBC (external) 3.60 - 11.00 K/UL 6.12     6.54  7.07       Hgb (external) 13.0 - 18.0 g/dL 13.4     13.4  13.4       Plt (external) 150 - 440 K/uL 489     506  478           This result is from an external source.         Latest Ref Rng & Units 6/8/2018     6:52 AM 3/6/2018     9:21 AM 1/23/2018     7:51 AM   Liver   AP (external) 40 - 150 IU/L 75     76     71       TBili (external) 0.2 - 1.2 mg/dL 2.3     1.3     1.3       DBili (external) 0.0 - 0.5 mg/dL 0.7     0.5     0.5       ALT (external) 6 - 40 IU/L 31     20     20       AST (external) 10 - 40 IU/L 30     25     19       Tot Protein (external) 6.0 - 8.0 g/dL 7.4     7.6     7.3       Albumin (external) 3.5 - 5.0 g/dL 4.4     4.6     4.5           This result is from an external source.            Latest Ref Rng & Units 3/14/2025     8:09 AM 6/1/2017     1:10 PM   Iron studies   Iron 35 - 180 ug/dL  65    Iron Saturation Index 15 - 46 %  21    Ferritin 26 - 388 ng/mL  310    Ferritin  (external) 22.0 - 274.0 ng/mL 129.8            This result is from an external source.         Latest Ref Rng & Units 4/22/2024     8:30 AM 10/8/2020     8:39 AM 1/15/2019     6:49 AM   [facility] Txp Virology   BK Spec   Plasma     BK Res BKNEG^BK Virus DNA Not Detected copies/mL  BK Virus DNA Not Detected     BK Log <2.7 Log copies/mL  Not Calculated     BK Quant Log Ext log IU/mL Undetected      BK Quant Result Ext Undeteted IU/mL Undetected   None detected       BK Quant Spec Ext  Blood          This result is from an external source.     Failed to redirect to the Timeline version of the REVFS SmartLink.  Recent Labs   Lab Test 06/30/17  0830 07/03/17  0857 10/08/20  0839   DOSTAC 2030, 06/29/17 2030 07/02/17 Not Provided   TACROL 9.5 11.1 18.8*     Recent Labs   Lab Test 06/16/17  0712 06/29/17  0831   DOSMPA 1930 06/15/2017 6/28/17 7:30pm   MPACID 1.26 4.40*   MPAG 27.9* 43.9      Prescription drug management  45 minutes spent by me on the date of the encounter doing chart review, history and exam, documentation and further activities per the note

## 2025-04-01 ENCOUNTER — TELEPHONE (OUTPATIENT)
Dept: MULTI SPECIALTY CLINIC | Facility: CLINIC | Age: 30
End: 2025-04-01
Payer: COMMERCIAL

## 2025-04-01 NOTE — TELEPHONE ENCOUNTER
Retail Pharmacy Prior Authorization Team   Phone: [phone]    [facility] - [id number]

## 2025-04-02 NOTE — TELEPHONE ENCOUNTER
Please refrain from closing out PA encounters until it has been addressed fully by the [facility] PA Team. Status will be updated to Approved/Denied/Dismissed/PA Not Needed. Closing/signing the encounters results in missed submission to the patient's insurance as that encounter disappears from our PA Pool and does not get sent. Thank you!  Retail Pharmacy Prior Authorization Team   Phone: [phone]      CM KEY: (Key: [id number])    PA Initiation    Medication: JARDIANCE 10 MG PO TABS  Insurance Company: [employer] Non-Specialty PA's - Phone [phone] Fax [phone]  Pharmacy Filling the Rx: [id number] - [facility] [address] [place] [address] RD  Filling Pharmacy Phone: [phone]  Filling Pharmacy Fax: [phone]  Start Date: 4/2/2025

## 2025-04-03 NOTE — TELEPHONE ENCOUNTER
Retail Pharmacy Prior Authorization Team   Phone: [phone]      Prior Authorization Approval    Medication: JARDIANCE 10 MG PO TABS  Authorization Effective Date: 4/1/2025  Authorization Expiration Date: 4/1/2028  Approved Dose/Quantity: 90/90 - 1 TABLET DAILY  Reference #: [id number]   Insurance Company: [employer] Non-Specialty PA's - Phone [phone] Fax [phone]  Expected CoPay: $    CoPay Card Available: No    Financial Assistance Needed:   Which Pharmacy is filling the prescription: [id number] - [place], [place] - [address] [facility]  Pharmacy Notified: YES  Patient Notified: **Instructed pharmacy to notify patient when script is ready to /ship.**

## 2025-04-16 NOTE — TELEPHONE ENCOUNTER
[facility]BULATORY ENCOUNTER  FOLLOW UP ORTHOPEDIC OFFICE VISIT      HISTORY OF PRESENT ILLNESS      The patient presents for evaluation of bilateral knee pain, hip pain, and back pain. Denies any recent falls or injuries.     The chief complaint is stiffness in the right knee, which is abut 2 years s/p right total knee replacement. No pain is reported in the right knee, but stiffness is experienced during ambulation, necessitating the use of a walker or cane. Denies right knee pain. No erythema, swelling, or warmth.     The left knee is generally asymptomatic, but a snapping sensation occurs when transitioning from a seated to standing position when she sits with her left foot underneath her right buttock. Denies left knee pain. No erythema, swelling or warmth.     Soreness in the hip is also reported, which disrupts sleep and makes it difficult to roll over. The pain is localized to the lateral aspect of the hip, with no reported groin pain. She is 10 years s/p left total hip replacement direct anterior. Tylenol has been attempted for pain management but has proven ineffective. She also reports low back pain which is localized to the left SI joint. Patient reports that she often sits with her left foot underneath her right buttock to alleviate her low back discomfort.         Laboratories obtained previously were personally reviewed, which include:   Hemoglobin A1C (%)   Date Value   06/07/2024 5.5     Creatinine (mg/dL)   Date Value   03/31/2025 0.91     Vitamin D, 25-Hydroxy (ng/mL)   Date Value   03/31/2025 44.9       I have reviewed the patient's pertinent past medical and surgical history history.    Family history, social history, medications  listed in the medical record as obtained by my nursing staff and support staff and agree with their documentation.  PHYSICAL EXAM    Blood pressure 106/70, pulse 87, resp. rate 18, height 5' 1.5\" (1.562 m), weight 73.5 kg (162 lb), SpO2 98%, not currently  PHYSICIAN ORDERS      DATE & TIME ISSUED: 2018 10:32 AM  PATIENT NAME: Golden Stover   : 1995     [facility] MR# [if applicable]: [id number]     DIAGNOSIS:  kidney transplant   ICD-10 CODE: z 94.0       Abdominal Xray (KUB of kidney transplant ) - confirming tx ureteral stent removed   Kidney transplant right lower quad                  Any questions please call:  [phone]. FAX report     .     breastfeeding.     Alert with appropriate response.  Skin of normal texture and turgor.  Left SI joint with tenderness to palpation. Gait is without antalgia. Stand independently on each foot without Trendelenburg on Bilateral.   Rises from chair without assist.     Right knee reveals a range of motion from 0 to 120 degrees. Alignment is normal. Effusion absent. There is not medial and lateral joint line tenderness.  Anterior and posterior drawer is negative. There is zero  mm pseudo laxity to the collateral ligaments. Right calf is nontender. Homans is negative. Distal sensation and pulses are intact.  Venous stasis changes  not present.      The left knee reveals a range of motion from 0 to 120 degrees. There is crepitus with ROM.  Alignment is normal. Effusion absent. There is not medial and lateral joint line tenderness.  Anterior and posterior drawer is negative. There is zero  mm pseudo laxity to the collateral ligaments. Left calf is nontender. Homans is negative. Distal sensation and pulses are intact.  Venous stasis changes  not present.        Left hip reveals a range of motion of 100 flexion, 20 internal rotation, and 30 external rotation. Internal and external and Flexion ranging is without pain.  Flexors and abductors are intact.  Tenderness over greater trochanter is present.         STUDIES   All pertinent images were reviewed during today's office visit.   Imaging   Imaging of the knees and hips obtained today. The right knee shows a well fixed knee prothesis without evidence of loosening or fracture. The left knee shows moderate medial joint space narrowing and severe patellofemoral joint space narrowing with spur formation. The left hip prothesis is well fixed without evidence of loosening or fracture. Right hip fairly well preserved joint space. Degenerative changes of lumbar spine.     ASSESSMENT/PLAN      2 years s/p right total knee replacement, stable condition.   - Exhibits good ROM and  stability on exam today. No evidence of compromise on imaging. No evidence of infection- no pain, erythema, swelling or warmth. Physical therapy aimed at strengthening the knee is recommended to alleviate the sensation of tightness.     Moderate-severe left knee osteoarthritis   - Signs of arthritis, particularly in the patella. Tightness on the medial aspect when flexed. Snapping the patient describes but could not reproduce today likely due to the patella sliding back into its groove as it attempts to track laterally. Patient reports no pain and declines cortisone injection today. Avoid sitting in positions that could exacerbate the condition. Consider a cortisone injection if the left knee becomes painful. Postpone intervention if it remains relatively pain-free.    10 years s/p left total hip replacement direct anterior, good condition, with secondary left hip bursitis  Degenerative changes of lumbar spine  Left SI joint dysfunction  - Left hip prothesis shows no evidence of compromise on imaging or on exam today. Discussed bursitis likely due to knee arthritis and back issues. Continue using the walker until gaining more confidence and strength, particularly in the core, to help manage SI joint and bursitis issues.    External order for PT placed and the patient states she will call to schedule. She has PT come to her living facility and would prefer to schedule with them.       All questions answered and the patient agrees with the plan of care. Follow up in 2 months.      Shyanne Gonzalez PA-C  [facility]  [phone]    Supervising Physician for this date and note.   Ej Lopez MD

## 2025-04-21 ENCOUNTER — MYC MEDICAL ADVICE (OUTPATIENT)
Dept: TRANSPLANT | Facility: CLINIC | Age: 30
End: 2025-04-21
Payer: COMMERCIAL

## 2025-04-21 DIAGNOSIS — N02.B9 IGA NEPHROPATHY: ICD-10-CM

## 2025-04-22 DIAGNOSIS — N02.B9 IGA NEPHROPATHY: ICD-10-CM

## 2025-04-22 DIAGNOSIS — Z94.0 LIVING-DONOR KIDNEY TRANSPLANT RECIPIENT: ICD-10-CM

## 2025-04-22 DIAGNOSIS — I10 HYPERTENSION: ICD-10-CM

## 2025-04-22 DIAGNOSIS — R80.9 PROTEINURIA, UNSPECIFIED TYPE: ICD-10-CM

## 2025-04-22 DIAGNOSIS — Z94.0 KIDNEY REPLACED BY TRANSPLANT: ICD-10-CM

## 2025-04-22 RX ORDER — CLONIDINE HYDROCHLORIDE 0.1 MG/1
0.1 TABLET ORAL 2 TIMES DAILY
Qty: 180 TABLET | Refills: 3 | Status: SHIPPED | OUTPATIENT
Start: 2025-04-22

## 2025-04-22 RX ORDER — SPIRONOLACTONE 25 MG/1
25 TABLET ORAL DAILY
Qty: 90 TABLET | Refills: 3 | Status: SHIPPED | OUTPATIENT
Start: 2025-04-22

## 2025-04-22 NOTE — CONFIDENTIAL NOTE
Clonidine and spironolactone prescriptions reordered. Emergency supply qty 3 days sent on 4/18/25 appears to have cancelled out the originating prescription.

## 2025-04-23 DIAGNOSIS — R80.9 PROTEINURIA, UNSPECIFIED TYPE: ICD-10-CM

## 2025-04-23 DIAGNOSIS — N02.B9 IGA NEPHROPATHY: ICD-10-CM

## 2025-04-23 DIAGNOSIS — Z79.899 ENCOUNTER FOR LONG-TERM (CURRENT) USE OF HIGH-RISK MEDICATION: ICD-10-CM

## 2025-04-23 DIAGNOSIS — Z94.0 KIDNEY REPLACED BY TRANSPLANT: Primary | ICD-10-CM

## 2025-04-23 NOTE — CONFIDENTIAL NOTE
Standing lab orders updated in EPIC and requisition letter sent to preferred lab on file.     Ashley Chau RN, BSN, CCTN  Solid Organ Transplant, Post Kidney and Pancreas  Transplant Care Coordinator  [phone]

## 2025-04-23 NOTE — LETTER
OUTPATIENT LABORATORY TEST ORDER     Patient Name: Golden Stover   YOB: 1995     [facility] MR# [if applicable]: [id number]   Date & Time: April 23, 2025 4:29 PM  Expiration Date: 1 year after date issued      Diagnoses: Kidney Transplant (ICD-10 Z94.0)   Long term use of medications (ICD-10 Z79.899)             Proteinuria (ICD-10 R80.9)            Aftercare following organ transplant (Z48.298)     We ask your assistance in obtaining the following laboratory tests, which are part of our routine surveillance program for Solid Organ Transplant patients.     Please fax each result to [phone], same day as resulted/available    Critical lab results page [phone]  Monday - Friday 8 am to 5 pm  Evening/Weekend/Holiday communicate Critical labs results [phone]    Every 3 months  CBC with platelets  Basic Metabolic Panel (Sodium, Potassium, Chloride, Creatinine, CO2, Urea Nitrogen, glucose, Calcium)  Tacrolimus drug level - 12-hour trough, please document time of last dose    Urine for protein/creatinine     If you have any questions, please call [facility]- [phone] or [phone], Fax- [phone].    .  Fabi Bedolla MD

## 2025-04-29 DIAGNOSIS — N02.B9 IGA NEPHROPATHY: ICD-10-CM

## 2025-04-29 NOTE — TELEPHONE ENCOUNTER
ISSUE: Hypotension with taking clonidine and spironolactone.    April 27, 2025  Golden Stover to Me   4/27/25  1:10 PM  Alden Ramirez, since taking those two for the last 3 days my blood pressure has significantly dropped to the ranges showed in the attached. Also the increased awareness of when I stand up quickly that I feel very light headed. Let me know if it s wise to stay on these meds or if some changes should be made also if this is something i should go to my general provider to determine here in [place].     PLAN:   Received: Today  Fabi Bedolla MD [place], Ashley Pineda, PRETTY  Phone Number: [phone]     Zach Ramirez    Can you please check on him some time later this week  Also go over meds,  If he is taking amlodipine (also on diltiazem), stop amlo  Also encourage him to stop taking lasix if he is taking it  Increase fluid intake given jardiance does work as weak diuretic  We can certainly go down on other meds if he is still feeling dizzy and his BP are in 100's systolic. Goal BP for him is 110-120's if he can tolerate if now 120-130's at the most.    Thank you  Fabi     OUTCOME:   Call to Golden to review his med list/ensure current.  - Amlodipine 10 mg PO at night.  - Clonidine 0.1 mg PO BID.  - Diltiazem  mg every morning.  - Furosemide, Not taking/Never has taken. Denies edema.  - Losartan 100 mg every evening.  - Spironolactone 25 mg every morning.  Hydrating well w/1 gallon water daily - good target per Dr. Giles Jones.   Lost 50lbs in past year. Following low protein diet and taking Jardiance 10 mg every morning.    He will stop taking amlodipine. Continue monitoring BP at home. See his PCP to establish follow up on BP. It is likely that given his 50 lb weight loss over the past year, he may not need so much medication on board to manage proteinuria.    Ashley Chau, RN, BSN, CCTN  Solid Organ Transplant, Post Kidney and Pancreas  Transplant Care  Coordinator  [phone]

## 2025-05-12 ENCOUNTER — TELEPHONE (OUTPATIENT)
Dept: TRANSPLANT | Facility: CLINIC | Age: 30
End: 2025-05-12
Payer: COMMERCIAL

## 2025-05-12 DIAGNOSIS — I15.1 HTN, KIDNEY TRANSPLANT RELATED: Primary | ICD-10-CM

## 2025-05-12 DIAGNOSIS — Z94.0 KIDNEY REPLACED BY TRANSPLANT: ICD-10-CM

## 2025-05-12 DIAGNOSIS — Z79.899 ENCOUNTER FOR LONG-TERM (CURRENT) USE OF HIGH-RISK MEDICATION: ICD-10-CM

## 2025-05-12 DIAGNOSIS — N02.B9 IGA NEPHROPATHY: ICD-10-CM

## 2025-05-12 DIAGNOSIS — Z94.0 HTN, KIDNEY TRANSPLANT RELATED: Primary | ICD-10-CM

## 2025-05-12 NOTE — TELEPHONE ENCOUNTER
General  Route to LPN    Reason for call:   Pt is calling about his most recent lab results   Call back needed? Yes    Return Call Needed  Same as documented in contacts section  When to return call?: Greater than one day: Route standard priority

## 2025-05-13 ENCOUNTER — RESULTS FOLLOW-UP (OUTPATIENT)
Dept: TRANSPLANT | Facility: CLINIC | Age: 30
End: 2025-05-13

## 2025-05-13 DIAGNOSIS — N02.B9 IGA NEPHROPATHY: ICD-10-CM

## 2025-05-13 NOTE — RESULT ENCOUNTER NOTE
Zach Franks,  I see your creatinine is creeping up more on that jardiance 10 mg daily. We discontinued amlodipine 4/29/25. Are you still having any lightheadedness or low blood pressures since stopping amlodipine? Any other med changes?

## 2025-05-13 NOTE — TELEPHONE ENCOUNTER
ISSUE:   Creatinine 2.4 on 5/12/25. Continues to trend up after starting jardiance 10 mg PO daily. He stopped taking amlodipine about 2 weeks ago (4/29/25) per Dr. Bedolla's recommendation.    PLAN: Follow up on hypotensive symptoms Golden reported 4/27/25.     Me to Golden Stover  5/13/25 11:25 AM    Hi Golden,  I see your creatinine is creeping up more on that jardiance 10 mg daily. We discontinued amlodipine 4/29/25. Are you still having any lightheadedness or low blood pressures since stopping amlodipine? Any other med changes?    Ashley Chau, RN, BSN, CCTN  Solid Organ Transplant, Post Kidney and Pancreas  Transplant Care Coordinator  [phone]     OUTCOME: Spoke with Golden. BP running 110/60 not above 120/80. Notes some orthrostatic changes when he gets up but not as bad. Maintains he is drinking 1 gallon water per day. Reiterates that he has lost about 50 lbs of weight over the last year. 235 lbs now. RNCC thanked him for the update and sent to Dr. Bedolla to find out if the jardiance she had prescribed on last visit was still the best plan or if any additional changes to meds needed made. His PCP did not make any changes on last visit.     Ashley Chau, RN, BSN, CCTN  Solid Organ Transplant, Post Kidney and Pancreas  Transplant Care Coordinator  [phone]

## 2025-05-14 NOTE — TELEPHONE ENCOUNTER
Golden was scheduled for follow up visit with Dr. Bedolla on July 10th. We discussed this morning, clarifying with provider if he must come in person or if he could do video visit.     Confirmed with Dr. Bedolla that he could do video visit since long-distance. Notified scheduling, PCS and patient. Patient informed that RKT inperson visit was approved for video visit alternatively. He notes this change but checking with work/employer if he may be able to come in person. He should know tomorrow and will call back schedulers to notify of his decision.    Ashley Chau, RN, BSN, CCTN  Solid Organ Transplant, Post Kidney and Pancreas  Transplant Care Coordinator  [phone]

## 2025-05-14 NOTE — TELEPHONE ENCOUNTER
Fabi Bedolla MD to Me 5/13/25  4:14 PM    Hi Ashley     Lets hold his Jardiance for couple weeks and reassess his symptoms/ BP readings   His proteinuria is better, now at 0.8 from 1.5 which is very good improvement.  Make appointment with me in about 4-6 weeks to re discuss its initiation with few meds decreasing in dose at that time.     Thank you   Fabi    OUTCOME: RNCC gave Golden the above instructions. He verbalized understanding to hold jardiance and call in 2 weeks with BP readings and update on symptoms. He agreed proteinuria is better. He will make appt for in 4-6 weeks, when schedulers call him. RNCC will clarify if provider wants in person visit or if video visit is sufficient in across [place] border.     Ashley Chau, RN, BSN, CCTN  Solid Organ Transplant, Post Kidney and Pancreas  Transplant Care Coordinator  [phone]

## 2025-05-19 DIAGNOSIS — I15.1 HTN, KIDNEY TRANSPLANT RELATED: ICD-10-CM

## 2025-05-19 DIAGNOSIS — Z48.298 AFTERCARE FOLLOWING ORGAN TRANSPLANT: ICD-10-CM

## 2025-05-19 DIAGNOSIS — I12.9 HYPERTENSIVE RENAL DISEASE: ICD-10-CM

## 2025-05-19 DIAGNOSIS — Z94.0 HTN, KIDNEY TRANSPLANT RELATED: ICD-10-CM

## 2025-05-19 RX ORDER — DILTIAZEM HYDROCHLORIDE 120 MG/1
120 CAPSULE, COATED, EXTENDED RELEASE ORAL DAILY
Qty: 90 CAPSULE | Refills: 0 | Status: SHIPPED | OUTPATIENT
Start: 2025-05-19 | End: 2025-05-23

## 2025-07-10 ENCOUNTER — VIRTUAL VISIT (OUTPATIENT)
Dept: TRANSPLANT | Facility: CLINIC | Age: 30
End: 2025-07-10
Attending: INTERNAL MEDICINE
Payer: COMMERCIAL

## 2025-07-10 VITALS — BODY MASS INDEX: 29.59 KG/M2 | HEIGHT: 75 IN | WEIGHT: 238 LBS

## 2025-07-10 DIAGNOSIS — N25.81 SECONDARY RENAL HYPERPARATHYROIDISM: ICD-10-CM

## 2025-07-10 DIAGNOSIS — Z94.0 HTN, KIDNEY TRANSPLANT RELATED: ICD-10-CM

## 2025-07-10 DIAGNOSIS — I15.1 HTN, KIDNEY TRANSPLANT RELATED: ICD-10-CM

## 2025-07-10 DIAGNOSIS — Z48.298 AFTERCARE FOLLOWING ORGAN TRANSPLANT: ICD-10-CM

## 2025-07-10 DIAGNOSIS — E80.4 GILBERT SYNDROME: ICD-10-CM

## 2025-07-10 DIAGNOSIS — E83.42 HYPOMAGNESEMIA: ICD-10-CM

## 2025-07-10 DIAGNOSIS — R80.9 PROTEINURIA, UNSPECIFIED TYPE: ICD-10-CM

## 2025-07-10 DIAGNOSIS — N18.32 STAGE 3B CHRONIC KIDNEY DISEASE (H): ICD-10-CM

## 2025-07-10 DIAGNOSIS — N18.32 ANEMIA IN STAGE 3B CHRONIC KIDNEY DISEASE (H): Primary | ICD-10-CM

## 2025-07-10 DIAGNOSIS — N02.B9 IGA NEPHROPATHY: ICD-10-CM

## 2025-07-10 DIAGNOSIS — Z94.0 KIDNEY REPLACED BY TRANSPLANT: ICD-10-CM

## 2025-07-10 DIAGNOSIS — D84.9 IMMUNOSUPPRESSION: ICD-10-CM

## 2025-07-10 DIAGNOSIS — E87.20 METABOLIC ACIDOSIS: ICD-10-CM

## 2025-07-10 DIAGNOSIS — D63.1 ANEMIA IN STAGE 3B CHRONIC KIDNEY DISEASE (H): Primary | ICD-10-CM

## 2025-07-10 DIAGNOSIS — E55.9 VITAMIN D DEFICIENCY: ICD-10-CM

## 2025-07-10 DIAGNOSIS — Z79.899 ENCOUNTER FOR LONG-TERM (CURRENT) USE OF HIGH-RISK MEDICATION: ICD-10-CM

## 2025-07-10 ASSESSMENT — PAIN SCALES - GENERAL: PAINLEVEL_OUTOF10: NO PAIN (0)

## 2025-07-10 NOTE — NURSING NOTE
Current patient location: [address] [place] [id number]    Is the patient currently in the state of [place]? YES    Visit mode: VIDEO    If the visit is dropped, the patient can be reconnected by:VIDEO VISIT: Text to cell phone:   Telephone Information:   Mobile [phone]       Will anyone else be joining the visit? NO  (If patient encounters technical issues they should call [phone]    Are changes needed to the allergy or medication list? No    Are refills needed on medications prescribed by this physician? NO    Rooming Documentation:  Questionnaire(s) completed    Reason for visit: RECHECK (Recheck )    Christiane ODEN

## 2025-07-10 NOTE — LETTER
7/10/2025      Golden Stover  [address] [facility] [address]      Dear Colleague,    Thank you for referring your patient, Golden Stover, to the [facility]. Please see a copy of my visit note below.    Virtual Visit Details    Type of service:  Video Visit     Video start time : 2:03 PM  Video end time : 2: 20 PM    Originating Location (pt. Location): Home    Distant Location (provider location):  On-site  Platform used for Video Visit: [facility]      TRANSPLANT NEPHROLOGY CLINIC VISIT     Assessment & Plan  # LDKT: CKD Stage 3b - Trend up, creatinine now up to 2.4 on latest labs. Need to repeat labs since holding jardiance and improvement in BP's. SHAKIRA likely in setting of hemodynamic changes happened after jardiance initiation.   - Baseline Creatinine: ~ 1.8-2.1   - Proteinuria: Mild (0.5-1.0 grams)   - DSA Hx: No DSA   - Last cPRA: 60%   - BK Viremia: No   - Kidney Tx Biopsy Hx: No history of acute rejection and Recurrent IgA nephropathy without any crescents.   - Primary Nephrologist: Daisha Mancilla MD.    # IgA Nephropathy: noted recurrence on kidney biopsy. Currently on losartan 100 mg and spironolactone 25 mg daily . Started on jardiance for ongoing proteinuria which indeed improved his proteinuria to < 1 g but unfortunately pt was not able to tolerate it with signifant hypotension which likely resulted in SHAKIRA. Will monitor his labs for now and likely to introduce later if we can.    # Immunosuppression: Tacrolimus immediate release (goal 4-6) and Mycophenolate mofetil (dose 750 mg every 12 hours)   - Induction with Recent Transplant:  High Intensity Protocol   - Continue with intensive monitoring of immunosuppression for efficacy and toxicity.   - Historical Changes in Immunosuppression: None   - Changes: Not at this time    # Infection Prevention:   Last CD4 Level: 201 (5/2018)  - PJP: None      - CMV IgG Ab High Risk Discordance (D+/R-) at time of transplant:  No  Present CMV Serostatus: Negative  - EBV IgG Ab High Risk Discordance (D+/R-) at time of transplant: No  Present EBV Serostatus: Positive    # Hypertension: Controlled;  Goal BP: < 130/80   - Changes: Not at this time    # Anemia in Chronic Renal Disease: Hgb: Stable      FLORI: No   - Iron studies: Not checked recently    # Mineral Bone Disorder:    - Secondary renal hyperparathyroidism; PTH level: Minimally elevated ( pg/ml)        On treatment: None  - Vitamin D; level: Not checked recently, but was normal last check        On supplement: Yes  - Calcium; level: Normal        On supplement: No    # Electrolytes:  - Potassium; level: Normal        On supplement: No  - Bicarbonate; level: Normal        On supplement: No  - Sodium; level: Normal    # Other Significant PMH:   - Obesity, Class I (BMI = 33.6): lost 40 pounds in the past several months.             - Recommend continued weight loss for overall health by increasing exercise and watching caloric intake.  - LE cramping and pain  H/o of it since his transplant, but noted to happen more now with increased intensity of pain. Happening mostly during nights. Now stable/slightly improved. Electrolytes are stable.     # Skin Cancer Risk:    - Discussed sun protection and recommend regular follow up with Dermatology.    # Transplant History:  Etiology of Kidney Failure: IgA nephropathy  Tx: LDKT  Transplant: 6/7/2017 (Kidney)  Significant transplant-related complications: Recurrent IgA nephropathy    Transplant Office Phone Number: [phone]    Assessment and plan was discussed with the patient and he voiced his understanding and agreement.    Return visit: No follow-ups on file.    Fabi Bedolla MD    The longitudinal plan of care for the diagnosis(es)/condition(s) as documented were addressed during this visit. Due to the added complexity in care, I will continue to support Golden in the subsequent management and with ongoing continuity of  care.      Chief Complaint  Mr. Stover is a 29 year old here for kidney transplant, immunosuppression management, and CKD management.     History of Present Illness   Mr. Stover reports feeling stable overall. Have had significant low BP and dizziness after initiation of Jardiance. Its been 6 weeks since holding it with improvement in symptoms and BP  Since last clinic visit:   Hospitalizations: No   New Medical Issues: Yes, dizziness and hypotension with Jardiance  Chest pain or shortness of breath: No  Lower extremity swelling: No  Weight change: No  Nausea and vomiting: No  Diarrhea: No  Heartburn symptoms: No  Fever, sweats or chills: No  Urinary complaints: No    Home BP: 115-125 systolic and 70's and 80's diastolic    Problem List  Patient Active Problem List   Diagnosis     IgA nephropathy     HTN, kidney transplant related     Secondary renal hyperparathyroidism     Kidney replaced by transplant     Immunosuppression     Aftercare following organ transplant     Hypomagnesemia     Vitamin D deficiency     Gilbert syndrome     CKD (chronic kidney disease) stage 3, GFR 30-59 ml/min (H)     Obesity     Proteinuria, unspecified type     Leg cramping     Elevated serum creatinine     Hypertensive renal disease     Metabolic acidosis       Allergies  Allergies   Allergen Reactions     Anti-Thymocyte Glob (Rabbit) Itching     Methylprednisolone Other (See Comments)     Other reaction(s): Other (See Comments)  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 180 tablet 3     diltiazem ER COATED BEADS (CARDIZEM CD/CARTIA XT) 120 MG 24 hr capsule Take 1 capsule (120 mg) by mouth daily. 90 capsule 0     empagliflozin (JARDIANCE) 10 MG TABS tablet Take 1 tablet (10 mg) by mouth daily. 90 tablet 1     fluocinolone acetonide oil 0.01 % ear drops as needed       fluocinonide (LIDEX) 0.05 % external solution Apply topically as needed       furosemide (LASIX) 20 MG tablet Take 1 tablet (20 mg) by mouth as needed (for LE swelling) 90 tablet 3     hydrocortisone 2.5 % ointment APPLY OVER AFFECTED AREA TWICE DAILY TO AFFECTED AREA FOR 2 WEEKS, THEN TAKE 1 WEEK OFF. REPEAT AS NECESSARY.       ketoconazole (NIZORAL) 2 % external shampoo WASH WITH 2-4 TIMES A WEEK       losartan (COZAAR) 100 MG tablet Take 1 tablet (100 mg) by mouth daily. 90 tablet 0     mycophenolate (GENERIC EQUIVALENT) 250 MG capsule Take 3 capsules (750 mg) by mouth 2 times daily. 540 capsule 3     spironolactone (ALDACTONE) 25 MG tablet Take 1 tablet (25 mg) by mouth daily. 90 tablet 3     tacrolimus (GENERIC EQUIVALENT) 0.5 MG capsule Take 1 capsule (0.5 mg) by mouth 2 times daily. TOTAL DOSE: 2.5 mg twice daily 180 capsule 3     tacrolimus (GENERIC EQUIVALENT) 1 MG capsule Take 2 capsules (2 mg) by mouth 2 times daily. TOTAL DOSE: 2.5 mg twice daily 360 capsule 3     triamcinolone (KENALOG) 0.1 % external cream APPLY TOPICALLY TO AFFECTED AREAS TWICE A DAY FOR UP TO 14 DAYS IN A ROW, OR AS NEEDED.       vitamin D3 (CHOLECALCIFEROL) 50 mcg (2000 units) tablet Take 1 tablet by mouth daily       No current facility-administered medications for this visit.     There are no discontinued medications.    Physical Exam  Vital Signs: There were no vitals taken for this visit.    GENERAL APPEARANCE: alert and no distress  EYES: eyes grossly normal to inspection  HENT: normal cephalic/atraumatic  RESP: able to speak in full sentences, no audible wheezing and no accessory muscle usage  EDEMA: denied  edema  MS: extremities normal - no gross deformities noted  SKIN: no visible facial rash  NEURO: mentation intact and speech normal  PSYCH: mentation appears normal and affect normal/bright    Data        Latest Ref Rng & Units 5/12/2025     8:20 AM 4/30/2025     8:22 AM 3/28/2025     8:17 AM   Renal   Na (external) 136 - 145 mmol/L 141  142  142    K (external) 3.6 - 5.5 mmol/L 4.6  4.7  4.8    Cl 98 - 109 mmol/L 109  110  108    Cl (external) 98 - 109 mmol/L 109  110  108    CO2 (external) 23.0 - 33.0 mmol/L 23.3  20.9  22.0    BUN (external) 7 - 22 mg/dL 24  29  20    Cr (external) 0.6 - 1.3 mg/dl 2.4  2.3  2.1    Glucose (external) 70 - 99 mg/dL 96  93  96    Ca (external) 8.8 - 10.5 mg/dL 9.5  9.7  9.6          Latest Ref Rng & Units 4/22/2024     8:30 AM 1/18/2023     8:12 AM 10/30/2018     7:51 AM   Bone Health   Phos (external) 2.5 - 4.9 mg/dL 2.7      Vit D Def (external) 20 - 50 ng/mL  32  26          Latest Ref Rng & Units 4/30/2025     8:22 AM 3/28/2025     8:17 AM 3/14/2025     8:09 AM   Heme   WBC (external) 3.60 - 11.00 K/UL 5.19  6.99  6.12    Hgb (external) 13.0 - 18.0 g/dL 12.6  12.9  13.4    Plt (external) 150 - 440 K/uL 430  464  489          Latest Ref Rng & Units 6/8/2018     6:52 AM 3/6/2018     9:21 AM 1/23/2018     7:51 AM   Liver   AP (external) 40 - 150 IU/L 75  76  71    TBili (external) 0.2 - 1.2 mg/dL 2.3  1.3  1.3    DBili (external) 0.0 - 0.5 mg/dL 0.7  0.5  0.5    ALT (external) 6 - 40 IU/L 31  20  20    AST (external) 10 - 40 IU/L 30  25  19    Tot Protein (external) 6.0 - 8.0 g/dL 7.4  7.6  7.3    Albumin (external) 3.5 - 5.0 g/dL 4.4  4.6  4.5             Latest Ref Rng & Units 3/14/2025     8:09 AM 6/1/2017     1:10 PM   Iron studies   Iron 35 - 180 ug/dL  65    Iron Saturation Index 15 - 46 %  21    Ferritin 26 - 388 ng/mL  310    Ferritin (external) 22.0 - 274.0 ng/mL 129.8           Latest Ref Rng & Units 4/22/2024     8:30 AM 10/8/2020     8:39 AM 1/15/2019     6:49 AM   [facility]  Txp Virology   BK Spec   Plasma     BK Res BKNEG^BK Virus DNA Not Detected copies/mL  BK Virus DNA Not Detected     BK Log <2.7 Log copies/mL  Not Calculated     BK Quant Log Ext log IU/mL Undetected      BK Quant Result Ext Undeteted IU/mL Undetected   None detected    BK Quant Spec Ext  Blood        Failed to redirect to the Timeline version of the REVFS SmartLink.  Recent Labs   Lab Test 06/30/17  0830 07/03/17  0857 10/08/20  0839   DOSTAC 2030, 06/29/17 2030 07/02/17 Not Provided   TACROL 9.5 11.1 18.8*     Recent Labs   Lab Test 06/16/17  0712 06/29/17  0831   DOSMPA 1930 06/15/2017 6/28/17 7:30pm   MPACID 1.26 4.40*   MPAG 27.9* 43.9    Prescription drug management  30 minutes spent by me on the date of the encounter doing chart review, history and exam, documentation and further activities per the note    Again, thank you for allowing me to participate in the care of your patient.        Sincerely,        Fabi Bedolla MD    Electronically signed

## 2025-07-10 NOTE — PROGRESS NOTES
Virtual Visit Details    Type of service:  Video Visit     Video start time : 2:03 PM  Video end time : 2: 20 PM    Originating Location (pt. Location): Home    Distant Location (provider location):  On-site  Platform used for Video Visit: [facility]      TRANSPLANT NEPHROLOGY CLINIC VISIT     Assessment & Plan   # LDKT: CKD Stage 3b - Trend up, creatinine now up to 2.4 on latest labs. Need to repeat labs since holding jardiance and improvement in BP's. SHAKIRA likely in setting of hemodynamic changes happened after jardiance initiation.   - Baseline Creatinine: ~ 1.8-2.1   - Proteinuria: Mild (0.5-1.0 grams)   - DSA Hx: No DSA   - Last cPRA: 60%   - BK Viremia: No   - Kidney Tx Biopsy Hx: No history of acute rejection and Recurrent IgA nephropathy without any crescents.   - Primary Nephrologist: Daisha Mancilla MD.    # IgA Nephropathy: noted recurrence on kidney biopsy. Currently on losartan 100 mg and spironolactone 25 mg daily . Started on jardiance for ongoing proteinuria which indeed improved his proteinuria to < 1 g but unfortunately pt was not able to tolerate it with signifant hypotension which likely resulted in SHAKIRA. Will monitor his labs for now and likely to introduce later if we can.    # Immunosuppression: Tacrolimus immediate release (goal 4-6) and Mycophenolate mofetil (dose 750 mg every 12 hours)   - Induction with Recent Transplant:  High Intensity Protocol   - Continue with intensive monitoring of immunosuppression for efficacy and toxicity.   - Historical Changes in Immunosuppression: None   - Changes: Not at this time    # Infection Prevention:   Last CD4 Level: 201 (5/2018)  - PJP: None      - CMV IgG Ab High Risk Discordance (D+/R-) at time of transplant: No  Present CMV Serostatus: Negative  - EBV IgG Ab High Risk Discordance (D+/R-) at time of transplant: No  Present EBV Serostatus: Positive    # Hypertension: Controlled;  Goal BP: < 130/80   - Changes: Not at this time    # Anemia in Chronic  Renal Disease: Hgb: Stable      FLORI: No   - Iron studies: Not checked recently    # Mineral Bone Disorder:    - Secondary renal hyperparathyroidism; PTH level: Minimally elevated ( pg/ml)        On treatment: None  - Vitamin D; level: Not checked recently, but was normal last check        On supplement: Yes  - Calcium; level: Normal        On supplement: No    # Electrolytes:  - Potassium; level: Normal        On supplement: No  - Bicarbonate; level: Normal        On supplement: No  - Sodium; level: Normal    # Other Significant PMH:   - Obesity, Class I (BMI = 33.6): lost 40 pounds in the past several months.             - Recommend continued weight loss for overall health by increasing exercise and watching caloric intake.  - LE cramping and pain  H/o of it since his transplant, but noted to happen more now with increased intensity of pain. Happening mostly during nights. Now stable/slightly improved. Electrolytes are stable.     # Skin Cancer Risk:    - Discussed sun protection and recommend regular follow up with Dermatology.    # Transplant History:  Etiology of Kidney Failure: IgA nephropathy  Tx: LDKT  Transplant: 6/7/2017 (Kidney)  Significant transplant-related complications: Recurrent IgA nephropathy    Transplant Office Phone Number: [phone]    Assessment and plan was discussed with the patient and he voiced his understanding and agreement.    Return visit: No follow-ups on file.    Fabi Bedolla MD    The longitudinal plan of care for the diagnosis(es)/condition(s) as documented were addressed during this visit. Due to the added complexity in care, I will continue to support Golden in the subsequent management and with ongoing continuity of care.      Chief Complaint   Mr. Stover is a 29 year old here for kidney transplant, immunosuppression management, and CKD management.     History of Present Illness    Mr. Stover reports feeling stable overall. Have had significant low BP and dizziness  after initiation of Jardiance. Its been 6 weeks since holding it with improvement in symptoms and BP  Since last clinic visit:   Hospitalizations: No   New Medical Issues: Yes, dizziness and hypotension with Jardiance  Chest pain or shortness of breath: No  Lower extremity swelling: No  Weight change: No  Nausea and vomiting: No  Diarrhea: No  Heartburn symptoms: No  Fever, sweats or chills: No  Urinary complaints: No    Home BP: 115-125 systolic and 70's and 80's diastolic    Problem List   Patient Active Problem List   Diagnosis    IgA nephropathy    HTN, kidney transplant related    Secondary renal hyperparathyroidism    Kidney replaced by transplant    Immunosuppression    Aftercare following organ transplant    Hypomagnesemia    Vitamin D deficiency    Gilbert syndrome    CKD (chronic kidney disease) stage 3, GFR 30-59 ml/min (H)    Obesity    Proteinuria, unspecified type    Leg cramping    Elevated serum creatinine    Hypertensive renal disease    Metabolic acidosis       Allergies   Allergies   Allergen Reactions    Anti-Thymocyte Glob (Rabbit) Itching    Methylprednisolone Other (See Comments)     Other reaction(s): Other (See Comments)  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 180 tablet 3    diltiazem ER COATED BEADS (CARDIZEM CD/CARTIA XT) 120 MG 24 hr capsule Take 1 capsule (120 mg) by mouth daily. 90 capsule 0    empagliflozin (JARDIANCE) 10 MG TABS tablet Take 1 tablet (10 mg) by mouth daily. 90 tablet 1     fluocinolone acetonide oil 0.01 % ear drops as needed      fluocinonide (LIDEX) 0.05 % external solution Apply topically as needed      furosemide (LASIX) 20 MG tablet Take 1 tablet (20 mg) by mouth as needed (for LE swelling) 90 tablet 3    hydrocortisone 2.5 % ointment APPLY OVER AFFECTED AREA TWICE DAILY TO AFFECTED AREA FOR 2 WEEKS, THEN TAKE 1 WEEK OFF. REPEAT AS NECESSARY.      ketoconazole (NIZORAL) 2 % external shampoo WASH WITH 2-4 TIMES A WEEK      losartan (COZAAR) 100 MG tablet Take 1 tablet (100 mg) by mouth daily. 90 tablet 0    mycophenolate (GENERIC EQUIVALENT) 250 MG capsule Take 3 capsules (750 mg) by mouth 2 times daily. 540 capsule 3    spironolactone (ALDACTONE) 25 MG tablet Take 1 tablet (25 mg) by mouth daily. 90 tablet 3    tacrolimus (GENERIC EQUIVALENT) 0.5 MG capsule Take 1 capsule (0.5 mg) by mouth 2 times daily. TOTAL DOSE: 2.5 mg twice daily 180 capsule 3    tacrolimus (GENERIC EQUIVALENT) 1 MG capsule Take 2 capsules (2 mg) by mouth 2 times daily. TOTAL DOSE: 2.5 mg twice daily 360 capsule 3    triamcinolone (KENALOG) 0.1 % external cream APPLY TOPICALLY TO AFFECTED AREAS TWICE A DAY FOR UP TO 14 DAYS IN A ROW, OR AS NEEDED.      vitamin D3 (CHOLECALCIFEROL) 50 mcg (2000 units) tablet Take 1 tablet by mouth daily       No current facility-administered medications for this visit.     There are no discontinued medications.    Physical Exam   Vital Signs: There were no vitals taken for this visit.    GENERAL APPEARANCE: alert and no distress  EYES: eyes grossly normal to inspection  HENT: normal cephalic/atraumatic  RESP: able to speak in full sentences, no audible wheezing and no accessory muscle usage  EDEMA: denied edema  MS: extremities normal - no gross deformities noted  SKIN: no visible facial rash  NEURO: mentation intact and speech normal  PSYCH: mentation appears normal and affect normal/bright    Data         Latest Ref Rng & Units 5/12/2025     8:20 AM 4/30/2025     8:22 AM  3/28/2025     8:17 AM   Renal   Na (external) 136 - 145 mmol/L 141  142  142    K (external) 3.6 - 5.5 mmol/L 4.6  4.7  4.8    Cl 98 - 109 mmol/L 109  110  108    Cl (external) 98 - 109 mmol/L 109  110  108    CO2 (external) 23.0 - 33.0 mmol/L 23.3  20.9  22.0    BUN (external) 7 - 22 mg/dL 24  29  20    Cr (external) 0.6 - 1.3 mg/dl 2.4  2.3  2.1    Glucose (external) 70 - 99 mg/dL 96  93  96    Ca (external) 8.8 - 10.5 mg/dL 9.5  9.7  9.6          Latest Ref Rng & Units 4/22/2024     8:30 AM 1/18/2023     8:12 AM 10/30/2018     7:51 AM   Bone Health   Phos (external) 2.5 - 4.9 mg/dL 2.7      Vit D Def (external) 20 - 50 ng/mL  32  26          Latest Ref Rng & Units 4/30/2025     8:22 AM 3/28/2025     8:17 AM 3/14/2025     8:09 AM   Heme   WBC (external) 3.60 - 11.00 K/UL 5.19  6.99  6.12    Hgb (external) 13.0 - 18.0 g/dL 12.6  12.9  13.4    Plt (external) 150 - 440 K/uL 430  464  489          Latest Ref Rng & Units 6/8/2018     6:52 AM 3/6/2018     9:21 AM 1/23/2018     7:51 AM   Liver   AP (external) 40 - 150 IU/L 75  76  71    TBili (external) 0.2 - 1.2 mg/dL 2.3  1.3  1.3    DBili (external) 0.0 - 0.5 mg/dL 0.7  0.5  0.5    ALT (external) 6 - 40 IU/L 31  20  20    AST (external) 10 - 40 IU/L 30  25  19    Tot Protein (external) 6.0 - 8.0 g/dL 7.4  7.6  7.3    Albumin (external) 3.5 - 5.0 g/dL 4.4  4.6  4.5             Latest Ref Rng & Units 3/14/2025     8:09 AM 6/1/2017     1:10 PM   Iron studies   Iron 35 - 180 ug/dL  65    Iron Saturation Index 15 - 46 %  21    Ferritin 26 - 388 ng/mL  310    Ferritin (external) 22.0 - 274.0 ng/mL 129.8           Latest Ref Rng & Units 4/22/2024     8:30 AM 10/8/2020     8:39 AM 1/15/2019     6:49 AM   [facility] Txp Virology   BK Spec   Plasma     BK Res BKNEG^BK Virus DNA Not Detected copies/mL  BK Virus DNA Not Detected     BK Log <2.7 Log copies/mL  Not Calculated     BK Quant Log Ext log IU/mL Undetected      BK Quant Result Ext Undeteted IU/mL Undetected   None detected     BK Quant Spec Ext  Blood        Failed to redirect to the Timeline version of the REVFS SmartLink.  Recent Labs   Lab Test 06/30/17  0830 07/03/17  0857 10/08/20  0839   DOSTAC 2030, 06/29/17 2030 07/02/17 Not Provided   TACROL 9.5 11.1 18.8*     Recent Labs   Lab Test 06/16/17  0712 06/29/17  0831   DOSMPA 1930 06/15/2017 6/28/17 7:30pm   MPACID 1.26 4.40*   MPAG 27.9* 43.9    Prescription drug management  30 minutes spent by me on the date of the encounter doing chart review, history and exam, documentation and further activities per the note

## 2025-07-19 ENCOUNTER — HEALTH MAINTENANCE LETTER (OUTPATIENT)
Age: 30
End: 2025-07-19

## 2025-07-30 DIAGNOSIS — N02.B9 IGA NEPHROPATHY: ICD-10-CM

## 2025-08-22 ENCOUNTER — TELEPHONE (OUTPATIENT)
Dept: TRANSPLANT | Facility: CLINIC | Age: 30
End: 2025-08-22
Payer: COMMERCIAL

## 2025-08-22 DIAGNOSIS — N02.B9 IGA NEPHROPATHY: ICD-10-CM

## (undated) DEVICE — SU PDS II 5-0 RB-1 27" Z303H

## (undated) DEVICE — Device

## (undated) DEVICE — SU PROLENE 5-0 RB-1DA 36"  8556H

## (undated) DEVICE — SU SILK 4-0 TIE 12X30" A303H

## (undated) DEVICE — PREP CHLORAPREP 26ML TINTED ORANGE  260815

## (undated) DEVICE — DRAPE SLUSH/WARMER 66X44" ORS-320

## (undated) DEVICE — SU SILK 0 TIE 6X30" A306H

## (undated) DEVICE — SUCTION MANIFOLD DORNOCH ULTRA CART UL-CL500

## (undated) DEVICE — TUBING IRRIG CYSTO/BLADDER SET 81" LF 2C4040

## (undated) DEVICE — SOL NACL 0.9% IRRIG 1000ML BOTTLE 2F7124

## (undated) DEVICE — PUNCH AORTIC 5MM SINGLE USE 1001-626

## (undated) DEVICE — INSERT FOGARTY 33MM TRACTION HYDRAJAW HYDRA33

## (undated) DEVICE — SU PDS II 6-0 RB-2DA 30" Z149H

## (undated) DEVICE — CATH PLUG W/CAP 000076

## (undated) DEVICE — CATH FOLEY 3WAY 18FR 30ML LATEX 0167SI18

## (undated) DEVICE — SU DERMABOND ADVANCED .7ML DNX12

## (undated) DEVICE — LINEN TOWEL PACK X30 5481

## (undated) DEVICE — SOL NACL 0.9% INJ 1000ML BAG 07983-09

## (undated) DEVICE — SU PROLENE 6-0 RB-2DA 30" 8711H

## (undated) DEVICE — SU PDS II 4-0 SH 27" Z315H

## (undated) DEVICE — SU SILK 2-0 TIE 12X30" A305H

## (undated) DEVICE — SU MONOCRYL 4-0 PS-2 18" UND Y496G

## (undated) DEVICE — SOL WATER IRRIG 1000ML BOTTLE 2F7114

## (undated) DEVICE — WIPES FOLEY CARE SURESTEP PROVON DFC100

## (undated) DEVICE — CLIP HORIZON LG ORANGE 004200

## (undated) DEVICE — SU SILK 3-0 SH CR 8X18" C013D

## (undated) DEVICE — SU SILK 1 TIE 6X30" A307H

## (undated) DEVICE — SU PDS II 0 TP-1 60" Z991G

## (undated) DEVICE — LINEN TOWEL PACK X6 WHITE 5487

## (undated) DEVICE — SU VICRYL 3-0 SH 27" J316H

## (undated) DEVICE — BLADE CLIPPER SGL USE 9680

## (undated) DEVICE — SU SILK 3-0 TIE 12X30" A304H

## (undated) RX ORDER — FENTANYL CITRATE 50 UG/ML
INJECTION, SOLUTION INTRAMUSCULAR; INTRAVENOUS
Status: DISPENSED
Start: 2017-06-07

## (undated) RX ORDER — LABETALOL HYDROCHLORIDE 5 MG/ML
INJECTION, SOLUTION INTRAVENOUS
Status: DISPENSED
Start: 2017-06-07

## (undated) RX ORDER — HEPARIN SODIUM 1000 [USP'U]/ML
INJECTION, SOLUTION INTRAVENOUS; SUBCUTANEOUS
Status: DISPENSED
Start: 2017-06-07

## (undated) RX ORDER — SODIUM CHLORIDE 450 MG/100ML
INJECTION, SOLUTION INTRAVENOUS
Status: DISPENSED
Start: 2017-06-07

## (undated) RX ORDER — LIDOCAINE HYDROCHLORIDE 10 MG/ML
INJECTION, SOLUTION EPIDURAL; INFILTRATION; INTRACAUDAL; PERINEURAL
Status: DISPENSED
Start: 2020-10-08

## (undated) RX ORDER — MANNITOL 20 G/100ML
INJECTION, SOLUTION INTRAVENOUS
Status: DISPENSED
Start: 2017-06-07

## (undated) RX ORDER — LIDOCAINE HYDROCHLORIDE 20 MG/ML
INJECTION, SOLUTION EPIDURAL; INFILTRATION; INTRACAUDAL; PERINEURAL
Status: DISPENSED
Start: 2017-06-07

## (undated) RX ORDER — PROPOFOL 10 MG/ML
INJECTION, EMULSION INTRAVENOUS
Status: DISPENSED
Start: 2017-06-07

## (undated) RX ORDER — HEPARIN SODIUM 1000 [USP'U]/ML
INJECTION, SOLUTION INTRAVENOUS; SUBCUTANEOUS
Status: DISPENSED
Start: 2017-06-13

## (undated) RX ORDER — ONDANSETRON 2 MG/ML
INJECTION INTRAMUSCULAR; INTRAVENOUS
Status: DISPENSED
Start: 2017-06-07

## (undated) RX ORDER — NEOSTIGMINE METHYLSULFATE 5 MG/5 ML
SYRINGE (ML) INTRAVENOUS
Status: DISPENSED
Start: 2017-06-07

## (undated) RX ORDER — SODIUM CHLORIDE 9 MG/ML
INJECTION, SOLUTION INTRAVENOUS
Status: DISPENSED
Start: 2020-10-08

## (undated) RX ORDER — FUROSEMIDE 10 MG/ML
INJECTION INTRAMUSCULAR; INTRAVENOUS
Status: DISPENSED
Start: 2017-06-07

## (undated) RX ORDER — EPHEDRINE SULFATE 50 MG/ML
INJECTION, SOLUTION INTRAMUSCULAR; INTRAVENOUS; SUBCUTANEOUS
Status: DISPENSED
Start: 2017-06-07

## (undated) RX ORDER — SODIUM CHLORIDE 9 MG/ML
INJECTION, SOLUTION INTRAVENOUS
Status: DISPENSED
Start: 2017-06-07

## (undated) RX ORDER — GLYCOPYRROLATE 0.2 MG/ML
INJECTION, SOLUTION INTRAMUSCULAR; INTRAVENOUS
Status: DISPENSED
Start: 2017-06-07

## (undated) RX ORDER — CALCIUM CHLORIDE 100 MG/ML
INJECTION INTRAVENOUS; INTRAVENTRICULAR
Status: DISPENSED
Start: 2017-06-07